# Patient Record
Sex: FEMALE | Race: BLACK OR AFRICAN AMERICAN | Employment: OTHER | URBAN - METROPOLITAN AREA
[De-identification: names, ages, dates, MRNs, and addresses within clinical notes are randomized per-mention and may not be internally consistent; named-entity substitution may affect disease eponyms.]

---

## 2017-07-18 ENCOUNTER — HOSPITAL ENCOUNTER (OUTPATIENT)
Dept: PREADMISSION TESTING | Age: 82
Discharge: HOME | End: 2017-07-18
Attending: UROLOGY
Payer: MEDICARE

## 2017-07-18 VITALS
RESPIRATION RATE: 16 BRPM | SYSTOLIC BLOOD PRESSURE: 145 MMHG | DIASTOLIC BLOOD PRESSURE: 74 MMHG | HEART RATE: 77 BPM | TEMPERATURE: 97.8 F | WEIGHT: 179.9 LBS | BODY MASS INDEX: 28.24 KG/M2 | HEIGHT: 67 IN | OXYGEN SATURATION: 96 %

## 2017-07-18 LAB
ALBUMIN SERPL BCP-MCNC: 3.5 G/DL (ref 3.5–5)
ALBUMIN/GLOB SERPL: 0.8 {RATIO} (ref 1.1–2.2)
ALP SERPL-CCNC: 127 U/L (ref 45–117)
ALT SERPL-CCNC: 17 U/L (ref 12–78)
ANION GAP BLD CALC-SCNC: 2 MMOL/L (ref 5–15)
APPEARANCE UR: ABNORMAL
AST SERPL W P-5'-P-CCNC: 14 U/L (ref 15–37)
ATRIAL RATE: 76 BPM
BACTERIA URNS QL MICRO: ABNORMAL /HPF
BILIRUB SERPL-MCNC: 0.5 MG/DL (ref 0.2–1)
BILIRUB UR QL: NEGATIVE
BUN SERPL-MCNC: 13 MG/DL (ref 6–20)
BUN/CREAT SERPL: 15 (ref 12–20)
CALCIUM SERPL-MCNC: 9.4 MG/DL (ref 8.5–10.1)
CALCULATED P AXIS, ECG09: 49 DEGREES
CALCULATED R AXIS, ECG10: -43 DEGREES
CALCULATED T AXIS, ECG11: 37 DEGREES
CHLORIDE SERPL-SCNC: 103 MMOL/L (ref 97–108)
CO2 SERPL-SCNC: 33 MMOL/L (ref 21–32)
COLOR UR: ABNORMAL
CREAT SERPL-MCNC: 0.88 MG/DL (ref 0.55–1.02)
DIAGNOSIS, 93000: NORMAL
EPITH CASTS URNS QL MICRO: ABNORMAL /LPF
ERYTHROCYTE [DISTWIDTH] IN BLOOD BY AUTOMATED COUNT: 15.9 % (ref 11.5–14.5)
GLOBULIN SER CALC-MCNC: 4.3 G/DL (ref 2–4)
GLUCOSE SERPL-MCNC: 100 MG/DL (ref 65–100)
GLUCOSE UR STRIP.AUTO-MCNC: NEGATIVE MG/DL
HCT VFR BLD AUTO: 39.7 % (ref 35–47)
HGB BLD-MCNC: 12.3 G/DL (ref 11.5–16)
HGB UR QL STRIP: ABNORMAL
HYALINE CASTS URNS QL MICRO: ABNORMAL /LPF (ref 0–5)
KETONES UR QL STRIP.AUTO: NEGATIVE MG/DL
LEUKOCYTE ESTERASE UR QL STRIP.AUTO: ABNORMAL
MCH RBC QN AUTO: 26.5 PG (ref 26–34)
MCHC RBC AUTO-ENTMCNC: 31 G/DL (ref 30–36.5)
MCV RBC AUTO: 85.4 FL (ref 80–99)
NITRITE UR QL STRIP.AUTO: POSITIVE
P-R INTERVAL, ECG05: 176 MS
PH UR STRIP: 7 [PH] (ref 5–8)
PLATELET # BLD AUTO: 286 K/UL (ref 150–400)
POTASSIUM SERPL-SCNC: 4 MMOL/L (ref 3.5–5.1)
PROT SERPL-MCNC: 7.8 G/DL (ref 6.4–8.2)
PROT UR STRIP-MCNC: 100 MG/DL
Q-T INTERVAL, ECG07: 368 MS
QRS DURATION, ECG06: 80 MS
QTC CALCULATION (BEZET), ECG08: 414 MS
RBC # BLD AUTO: 4.65 M/UL (ref 3.8–5.2)
RBC #/AREA URNS HPF: >100 /HPF (ref 0–5)
SODIUM SERPL-SCNC: 138 MMOL/L (ref 136–145)
SP GR UR REFRACTOMETRY: 1.01 (ref 1–1.03)
UROBILINOGEN UR QL STRIP.AUTO: 0.2 EU/DL (ref 0.2–1)
VENTRICULAR RATE, ECG03: 76 BPM
WBC # BLD AUTO: 6.7 K/UL (ref 3.6–11)
WBC URNS QL MICRO: >100 /HPF (ref 0–4)

## 2017-07-18 PROCEDURE — 87186 SC STD MICRODIL/AGAR DIL: CPT | Performed by: UROLOGY

## 2017-07-18 PROCEDURE — 36415 COLL VENOUS BLD VENIPUNCTURE: CPT | Performed by: UROLOGY

## 2017-07-18 PROCEDURE — 93005 ELECTROCARDIOGRAM TRACING: CPT

## 2017-07-18 PROCEDURE — 87077 CULTURE AEROBIC IDENTIFY: CPT | Performed by: UROLOGY

## 2017-07-18 PROCEDURE — 80053 COMPREHEN METABOLIC PANEL: CPT | Performed by: UROLOGY

## 2017-07-18 PROCEDURE — 87086 URINE CULTURE/COLONY COUNT: CPT | Performed by: UROLOGY

## 2017-07-18 PROCEDURE — 85027 COMPLETE CBC AUTOMATED: CPT | Performed by: UROLOGY

## 2017-07-18 PROCEDURE — 81001 URINALYSIS AUTO W/SCOPE: CPT | Performed by: UROLOGY

## 2017-07-18 RX ORDER — GUAIFENESIN 100 MG/5ML
81 LIQUID (ML) ORAL DAILY
COMMUNITY
End: 2017-07-27

## 2017-07-18 RX ORDER — ACETAMINOPHEN 500 MG
500 TABLET ORAL
COMMUNITY
End: 2019-05-13

## 2017-07-18 RX ORDER — SODIUM CHLORIDE, SODIUM LACTATE, POTASSIUM CHLORIDE, CALCIUM CHLORIDE 600; 310; 30; 20 MG/100ML; MG/100ML; MG/100ML; MG/100ML
25 INJECTION, SOLUTION INTRAVENOUS CONTINUOUS
Status: CANCELLED | OUTPATIENT
Start: 2017-07-25

## 2017-07-18 RX ORDER — GLIPIZIDE 10 MG/1
2.5 TABLET, FILM COATED, EXTENDED RELEASE ORAL DAILY
Status: ON HOLD | COMMUNITY
End: 2021-06-11

## 2017-07-18 RX ORDER — PRAVASTATIN SODIUM 80 MG/1
80 TABLET ORAL
COMMUNITY

## 2017-07-18 RX ORDER — DILTIAZEM HYDROCHLORIDE 240 MG/1
240 CAPSULE, COATED, EXTENDED RELEASE ORAL DAILY
COMMUNITY
End: 2018-01-02 | Stop reason: DRUGHIGH

## 2017-07-18 RX ORDER — METFORMIN HYDROCHLORIDE 500 MG/1
500 TABLET ORAL
COMMUNITY
End: 2019-05-13

## 2017-07-18 NOTE — PERIOP NOTES
[facility]  Preoperative Instructions        Surgery Date 7/25/17          Time of Arrival 1:00pm     Contact #  [phone]    1. On the day of your surgery, please report to the Surgical Services Registration Desk and sign in at your designated time. The Surgery Center is located to the right of the Emergency Room. 2. You must have someone with you to drive you home. You should not drive a car for 24 hours following surgery. Please make arrangements for a friend or family member to stay with you for the first 24 hours after your surgery. 3. Do not have anything to eat or drink (including water, gum, mints, coffee, juice) after midnight 7/24/17 . ? This may not apply to medications prescribed by your physician. ?(Please note below the special instructions with medications to take the morning of your procedure.)    4. We recommend you do not drink any alcoholic beverages for 24 hours before and after your surgery. 5. Stop all Aspirin, non-steroidal anti-inflammatory drugs (i.e. Advil, Aleve), vitamins, and supplements?as directed by your surgeon's office. ? **If you are currently taking Plavix, Coumadin, or other blood-thinning agents, contact your surgeon for instructions. **    6. Wear comfortable clothes. Wear glasses instead of contacts. Do not bring any money or jewelry. Please bring picture ID, insurance card, and any prearranged co-payment or hospital payment. Do not wear make-up, particularly mascara the morning of your surgery. Do not wear nail polish, particularly if you are having foot /hand surgery. Wear your hair loose or down, no ponytails, buns, amauri pins or clips. All body piercings must be removed. Please shower with antibacterial soap for three consecutive days before and on the morning of surgery, but do not apply any lotions, powders or deodorants after the shower on the day of surgery. Please use a fresh towels after each shower.  Please sleep in clean clothes and change bed linens the night before surgery. Please do not shave for 48 hours prior to surgery. Shaving of the face is acceptable. 7. You should understand that if you do not follow these instructions your surgery may be cancelled. If your physical condition changes (I.e. fever, cold or flu) please contact your surgeon as soon as possible. 8. It is important that you be on time. If a situation occurs where you may be late, please call [phone] (OR Holding Area). 9. If you have any questions and or problems, please call [phone] (Pre-admission Testing). 10. Your surgery time may be subject to change. You will receive a phone call the evening prior if your time changes. 11.  If having outpatient surgery, you must have someone to drive you here, stay with you during the duration of your stay, and to drive you home at time of discharge. Special Instructions:    MEDICATIONS TO TAKE THE MORNING OF SURGERY WITH A SIP OF WATER: Tylenol if needed, Diltiazem (Cartia)      I understand a pre-operative phone call will be made to verify my surgery time. In the event that I am not available, I give permission for a message to be left on my answering service and/or with another person?   yes         ___________________      __________   _________    (Signature of Patient)             (Witness)                (Date and Time)

## 2017-07-20 NOTE — PERIOP NOTES
Faxed preliminary urine culture results to Dr. Thurman [facility] office. Fax confirmed.

## 2017-07-21 LAB
BACTERIA SPEC CULT: ABNORMAL
CC UR VC: ABNORMAL
SERVICE CMNT-IMP: ABNORMAL

## 2017-07-21 NOTE — PERIOP NOTES
Faxed and left message at Dr. Villasenor American office regarding final urine culture results. Fax confirmed. Waiting on follow-up if treatment needed.

## 2017-07-24 RX ORDER — CIPROFLOXACIN 500 MG/1
500 TABLET ORAL 2 TIMES DAILY
COMMUNITY
End: 2017-09-25

## 2017-07-24 NOTE — PERIOP NOTES
LM with Roverto Martinez, , at [facility] requesting call back about labs previously faxed. 56 - Spoke with Warden Campos at [facility], patient being treated with Cipro 500mg twice per day for one week. First dose 7/21/2017.

## 2017-07-25 ENCOUNTER — ANESTHESIA (OUTPATIENT)
Dept: SURGERY | Age: 82
DRG: 670 | End: 2017-07-25
Payer: MEDICARE

## 2017-07-25 ENCOUNTER — HOSPITAL ENCOUNTER (INPATIENT)
Age: 82
LOS: 1 days | Discharge: HOME | DRG: 670 | End: 2017-07-27
Attending: UROLOGY | Admitting: UROLOGY
Payer: MEDICARE

## 2017-07-25 ENCOUNTER — ANESTHESIA EVENT (OUTPATIENT)
Dept: SURGERY | Age: 82
DRG: 670 | End: 2017-07-25
Payer: MEDICARE

## 2017-07-25 PROBLEM — C67.9 TRANSITIONAL CELL BLADDER CANCER (HCC): Status: ACTIVE | Noted: 2017-07-25

## 2017-07-25 LAB
ANION GAP BLD CALC-SCNC: 6 MMOL/L (ref 5–15)
BUN SERPL-MCNC: 14 MG/DL (ref 6–20)
BUN/CREAT SERPL: 14 (ref 12–20)
CALCIUM SERPL-MCNC: 8.5 MG/DL (ref 8.5–10.1)
CHLORIDE SERPL-SCNC: 107 MMOL/L (ref 97–108)
CO2 SERPL-SCNC: 28 MMOL/L (ref 21–32)
CREAT SERPL-MCNC: 0.98 MG/DL (ref 0.55–1.02)
GLUCOSE BLD STRIP.AUTO-MCNC: 105 MG/DL (ref 65–100)
GLUCOSE BLD STRIP.AUTO-MCNC: 125 MG/DL (ref 65–100)
GLUCOSE BLD STRIP.AUTO-MCNC: 157 MG/DL (ref 65–100)
GLUCOSE SERPL-MCNC: 123 MG/DL (ref 65–100)
HCT VFR BLD AUTO: 38.3 % (ref 35–47)
HGB BLD-MCNC: 11.4 G/DL (ref 11.5–16)
POTASSIUM SERPL-SCNC: 4.2 MMOL/L (ref 3.5–5.1)
SERVICE CMNT-IMP: ABNORMAL
SODIUM SERPL-SCNC: 141 MMOL/L (ref 136–145)

## 2017-07-25 PROCEDURE — 99218 HC RM OBSERVATION: CPT

## 2017-07-25 PROCEDURE — 77030020782 HC GWN BAIR PAWS FLX 3M -B

## 2017-07-25 PROCEDURE — 77030028157 HC ELECTRD BPLR BLDR RWOL -B: Performed by: UROLOGY

## 2017-07-25 PROCEDURE — 77030005546 HC CATH URETH FOL 3W BARD -A: Performed by: UROLOGY

## 2017-07-25 PROCEDURE — 77030008684 HC TU ET CUF COVD -B: Performed by: ANESTHESIOLOGY

## 2017-07-25 PROCEDURE — 74011000258 HC RX REV CODE- 258: Performed by: UROLOGY

## 2017-07-25 PROCEDURE — 74011250636 HC RX REV CODE- 250/636: Performed by: ANESTHESIOLOGY

## 2017-07-25 PROCEDURE — 77030019908 HC STETH ESOPH SIMS -A: Performed by: ANESTHESIOLOGY

## 2017-07-25 PROCEDURE — 82962 GLUCOSE BLOOD TEST: CPT

## 2017-07-25 PROCEDURE — 77030018836 HC SOL IRR NACL ICUM -A: Performed by: UROLOGY

## 2017-07-25 PROCEDURE — 77030005520 HC CATH URETH FOL38 BARD -A: Performed by: UROLOGY

## 2017-07-25 PROCEDURE — 74011250636 HC RX REV CODE- 250/636

## 2017-07-25 PROCEDURE — 74011250636 HC RX REV CODE- 250/636: Performed by: UROLOGY

## 2017-07-25 PROCEDURE — 74011000250 HC RX REV CODE- 250

## 2017-07-25 PROCEDURE — 77030026438 HC STYL ET INTUB CARD -A: Performed by: ANESTHESIOLOGY

## 2017-07-25 PROCEDURE — 0TBB8ZZ EXCISION OF BLADDER, VIA NATURAL OR ARTIFICIAL OPENING ENDOSCOPIC: ICD-10-PCS | Performed by: UROLOGY

## 2017-07-25 PROCEDURE — 76060000034 HC ANESTHESIA 1.5 TO 2 HR: Performed by: UROLOGY

## 2017-07-25 PROCEDURE — 77030032490 HC SLV COMPR SCD KNE COVD -B: Performed by: UROLOGY

## 2017-07-25 PROCEDURE — 77030013079 HC BLNKT BAIR HGGR 3M -A: Performed by: ANESTHESIOLOGY

## 2017-07-25 PROCEDURE — 74011000250 HC RX REV CODE- 250: Performed by: ANESTHESIOLOGY

## 2017-07-25 PROCEDURE — 76210000000 HC OR PH I REC 2 TO 2.5 HR: Performed by: UROLOGY

## 2017-07-25 PROCEDURE — 80048 BASIC METABOLIC PNL TOTAL CA: CPT | Performed by: UROLOGY

## 2017-07-25 PROCEDURE — 88307 TISSUE EXAM BY PATHOLOGIST: CPT | Performed by: UROLOGY

## 2017-07-25 PROCEDURE — 77010033678 HC OXYGEN DAILY

## 2017-07-25 PROCEDURE — 88332 PATH CONSLTJ SURG EA ADD BLK: CPT | Performed by: UROLOGY

## 2017-07-25 PROCEDURE — 36415 COLL VENOUS BLD VENIPUNCTURE: CPT | Performed by: UROLOGY

## 2017-07-25 PROCEDURE — 74011250637 HC RX REV CODE- 250/637: Performed by: UROLOGY

## 2017-07-25 PROCEDURE — 85018 HEMOGLOBIN: CPT | Performed by: UROLOGY

## 2017-07-25 PROCEDURE — 88331 PATH CONSLTJ SURG 1 BLK 1SPC: CPT | Performed by: UROLOGY

## 2017-07-25 PROCEDURE — 76010000153 HC OR TIME 1.5 TO 2 HR: Performed by: UROLOGY

## 2017-07-25 PROCEDURE — 77030021163 HC TUBE CYSTO IRR ICUM -A: Performed by: UROLOGY

## 2017-07-25 RX ORDER — SODIUM CHLORIDE, SODIUM LACTATE, POTASSIUM CHLORIDE, CALCIUM CHLORIDE 600; 310; 30; 20 MG/100ML; MG/100ML; MG/100ML; MG/100ML
25 INJECTION, SOLUTION INTRAVENOUS CONTINUOUS
Status: DISCONTINUED | OUTPATIENT
Start: 2017-07-25 | End: 2017-07-25 | Stop reason: HOSPADM

## 2017-07-25 RX ORDER — ACETAMINOPHEN 500 MG
500 TABLET ORAL
Status: DISCONTINUED | OUTPATIENT
Start: 2017-07-25 | End: 2017-07-27 | Stop reason: HOSPADM

## 2017-07-25 RX ORDER — NALOXONE HYDROCHLORIDE 0.4 MG/ML
0.4 INJECTION, SOLUTION INTRAMUSCULAR; INTRAVENOUS; SUBCUTANEOUS
Status: DISCONTINUED | OUTPATIENT
Start: 2017-07-25 | End: 2017-07-27 | Stop reason: HOSPADM

## 2017-07-25 RX ORDER — ONDANSETRON 2 MG/ML
4 INJECTION INTRAMUSCULAR; INTRAVENOUS
Status: DISCONTINUED | OUTPATIENT
Start: 2017-07-25 | End: 2017-07-27 | Stop reason: HOSPADM

## 2017-07-25 RX ORDER — MAGNESIUM SULFATE 100 %
4 CRYSTALS MISCELLANEOUS AS NEEDED
Status: DISCONTINUED | OUTPATIENT
Start: 2017-07-25 | End: 2017-07-27 | Stop reason: HOSPADM

## 2017-07-25 RX ORDER — DEXTROSE, SODIUM CHLORIDE, AND POTASSIUM CHLORIDE 5; .45; .15 G/100ML; G/100ML; G/100ML
75 INJECTION INTRAVENOUS CONTINUOUS
Status: DISCONTINUED | OUTPATIENT
Start: 2017-07-25 | End: 2017-07-25

## 2017-07-25 RX ORDER — DEXTROSE 50 % IN WATER (D50W) INTRAVENOUS SYRINGE
12.5-25 AS NEEDED
Status: DISCONTINUED | OUTPATIENT
Start: 2017-07-25 | End: 2017-07-25

## 2017-07-25 RX ORDER — CIPROFLOXACIN 250 MG/1
250 TABLET, FILM COATED ORAL 2 TIMES DAILY
Qty: 10 TAB | Refills: 0 | Status: SHIPPED | OUTPATIENT
Start: 2017-07-25 | End: 2017-07-30

## 2017-07-25 RX ORDER — DILTIAZEM HYDROCHLORIDE 120 MG/1
240 CAPSULE, COATED, EXTENDED RELEASE ORAL DAILY
Status: DISCONTINUED | OUTPATIENT
Start: 2017-07-26 | End: 2017-07-27 | Stop reason: HOSPADM

## 2017-07-25 RX ORDER — HYDROMORPHONE HYDROCHLORIDE 1 MG/ML
.2-.5 INJECTION, SOLUTION INTRAMUSCULAR; INTRAVENOUS; SUBCUTANEOUS
Status: DISCONTINUED | OUTPATIENT
Start: 2017-07-25 | End: 2017-07-25 | Stop reason: HOSPADM

## 2017-07-25 RX ORDER — SODIUM CHLORIDE 0.9 % (FLUSH) 0.9 %
5-10 SYRINGE (ML) INJECTION AS NEEDED
Status: DISCONTINUED | OUTPATIENT
Start: 2017-07-25 | End: 2017-07-27 | Stop reason: HOSPADM

## 2017-07-25 RX ORDER — PROPOFOL 10 MG/ML
INJECTION, EMULSION INTRAVENOUS AS NEEDED
Status: DISCONTINUED | OUTPATIENT
Start: 2017-07-25 | End: 2017-07-25 | Stop reason: HOSPADM

## 2017-07-25 RX ORDER — LIDOCAINE HYDROCHLORIDE 20 MG/ML
INJECTION, SOLUTION EPIDURAL; INFILTRATION; INTRACAUDAL; PERINEURAL AS NEEDED
Status: DISCONTINUED | OUTPATIENT
Start: 2017-07-25 | End: 2017-07-25 | Stop reason: HOSPADM

## 2017-07-25 RX ORDER — GLYCOPYRROLATE 0.2 MG/ML
INJECTION INTRAMUSCULAR; INTRAVENOUS AS NEEDED
Status: DISCONTINUED | OUTPATIENT
Start: 2017-07-25 | End: 2017-07-25 | Stop reason: HOSPADM

## 2017-07-25 RX ORDER — LIDOCAINE HYDROCHLORIDE 10 MG/ML
0.1 INJECTION, SOLUTION EPIDURAL; INFILTRATION; INTRACAUDAL; PERINEURAL AS NEEDED
Status: DISCONTINUED | OUTPATIENT
Start: 2017-07-25 | End: 2017-07-25 | Stop reason: HOSPADM

## 2017-07-25 RX ORDER — PRAVASTATIN SODIUM 40 MG/1
80 TABLET ORAL
Status: DISCONTINUED | OUTPATIENT
Start: 2017-07-25 | End: 2017-07-27 | Stop reason: HOSPADM

## 2017-07-25 RX ORDER — DIPHENHYDRAMINE HYDROCHLORIDE 50 MG/ML
12.5 INJECTION, SOLUTION INTRAMUSCULAR; INTRAVENOUS AS NEEDED
Status: DISCONTINUED | OUTPATIENT
Start: 2017-07-25 | End: 2017-07-25 | Stop reason: HOSPADM

## 2017-07-25 RX ORDER — NEOSTIGMINE METHYLSULFATE 1 MG/ML
INJECTION INTRAVENOUS AS NEEDED
Status: DISCONTINUED | OUTPATIENT
Start: 2017-07-25 | End: 2017-07-25 | Stop reason: HOSPADM

## 2017-07-25 RX ORDER — SODIUM CHLORIDE 0.9 % (FLUSH) 0.9 %
5-10 SYRINGE (ML) INJECTION EVERY 8 HOURS
Status: DISCONTINUED | OUTPATIENT
Start: 2017-07-25 | End: 2017-07-27 | Stop reason: HOSPADM

## 2017-07-25 RX ORDER — ONDANSETRON 2 MG/ML
INJECTION INTRAMUSCULAR; INTRAVENOUS AS NEEDED
Status: DISCONTINUED | OUTPATIENT
Start: 2017-07-25 | End: 2017-07-25 | Stop reason: HOSPADM

## 2017-07-25 RX ORDER — ROCURONIUM BROMIDE 10 MG/ML
INJECTION, SOLUTION INTRAVENOUS AS NEEDED
Status: DISCONTINUED | OUTPATIENT
Start: 2017-07-25 | End: 2017-07-25 | Stop reason: HOSPADM

## 2017-07-25 RX ORDER — INSULIN LISPRO 100 [IU]/ML
INJECTION, SOLUTION INTRAVENOUS; SUBCUTANEOUS EVERY 6 HOURS
Status: DISCONTINUED | OUTPATIENT
Start: 2017-07-26 | End: 2017-07-25

## 2017-07-25 RX ORDER — PROCHLORPERAZINE EDISYLATE 5 MG/ML
INJECTION INTRAMUSCULAR; INTRAVENOUS
Status: DISPENSED
Start: 2017-07-25 | End: 2017-07-26

## 2017-07-25 RX ORDER — FENTANYL CITRATE 50 UG/ML
50 INJECTION, SOLUTION INTRAMUSCULAR; INTRAVENOUS AS NEEDED
Status: DISCONTINUED | OUTPATIENT
Start: 2017-07-25 | End: 2017-07-25 | Stop reason: HOSPADM

## 2017-07-25 RX ORDER — SODIUM CHLORIDE 450 MG/100ML
75 INJECTION, SOLUTION INTRAVENOUS CONTINUOUS
Status: DISCONTINUED | OUTPATIENT
Start: 2017-07-25 | End: 2017-07-27

## 2017-07-25 RX ORDER — FENTANYL CITRATE 50 UG/ML
INJECTION, SOLUTION INTRAMUSCULAR; INTRAVENOUS AS NEEDED
Status: DISCONTINUED | OUTPATIENT
Start: 2017-07-25 | End: 2017-07-25 | Stop reason: HOSPADM

## 2017-07-25 RX ORDER — HYDROMORPHONE HYDROCHLORIDE 2 MG/ML
INJECTION, SOLUTION INTRAMUSCULAR; INTRAVENOUS; SUBCUTANEOUS AS NEEDED
Status: DISCONTINUED | OUTPATIENT
Start: 2017-07-25 | End: 2017-07-25 | Stop reason: HOSPADM

## 2017-07-25 RX ORDER — ACETAMINOPHEN 10 MG/ML
INJECTION, SOLUTION INTRAVENOUS AS NEEDED
Status: DISCONTINUED | OUTPATIENT
Start: 2017-07-25 | End: 2017-07-25 | Stop reason: HOSPADM

## 2017-07-25 RX ORDER — ONDANSETRON 2 MG/ML
INJECTION INTRAMUSCULAR; INTRAVENOUS
Status: COMPLETED
Start: 2017-07-25 | End: 2017-07-25

## 2017-07-25 RX ORDER — HYDROCODONE BITARTRATE AND ACETAMINOPHEN 5; 325 MG/1; MG/1
1 TABLET ORAL
Status: DISCONTINUED | OUTPATIENT
Start: 2017-07-25 | End: 2017-07-27 | Stop reason: HOSPADM

## 2017-07-25 RX ORDER — DEXTROSE MONOHYDRATE 100 MG/ML
125-250 INJECTION, SOLUTION INTRAVENOUS AS NEEDED
Status: DISCONTINUED | OUTPATIENT
Start: 2017-07-25 | End: 2017-07-27 | Stop reason: HOSPADM

## 2017-07-25 RX ORDER — CEFAZOLIN SODIUM IN 0.9 % NACL 2 G/100 ML
2 PLASTIC BAG, INJECTION (ML) INTRAVENOUS EVERY 12 HOURS
Status: DISCONTINUED | OUTPATIENT
Start: 2017-07-26 | End: 2017-07-27 | Stop reason: HOSPADM

## 2017-07-25 RX ORDER — INSULIN LISPRO 100 [IU]/ML
INJECTION, SOLUTION INTRAVENOUS; SUBCUTANEOUS
Status: DISCONTINUED | OUTPATIENT
Start: 2017-07-26 | End: 2017-07-27 | Stop reason: HOSPADM

## 2017-07-25 RX ORDER — ONDANSETRON 2 MG/ML
4 INJECTION INTRAMUSCULAR; INTRAVENOUS AS NEEDED
Status: DISCONTINUED | OUTPATIENT
Start: 2017-07-25 | End: 2017-07-25 | Stop reason: HOSPADM

## 2017-07-25 RX ORDER — PHENYLEPHRINE HCL IN 0.9% NACL 0.4MG/10ML
SYRINGE (ML) INTRAVENOUS AS NEEDED
Status: DISCONTINUED | OUTPATIENT
Start: 2017-07-25 | End: 2017-07-25 | Stop reason: HOSPADM

## 2017-07-25 RX ORDER — MIDAZOLAM HYDROCHLORIDE 1 MG/ML
1 INJECTION, SOLUTION INTRAMUSCULAR; INTRAVENOUS AS NEEDED
Status: DISCONTINUED | OUTPATIENT
Start: 2017-07-25 | End: 2017-07-25 | Stop reason: HOSPADM

## 2017-07-25 RX ORDER — FENTANYL CITRATE 50 UG/ML
25 INJECTION, SOLUTION INTRAMUSCULAR; INTRAVENOUS
Status: DISCONTINUED | OUTPATIENT
Start: 2017-07-25 | End: 2017-07-25 | Stop reason: HOSPADM

## 2017-07-25 RX ADMIN — NEOSTIGMINE METHYLSULFATE 3 MG: 1 INJECTION INTRAVENOUS at 17:15

## 2017-07-25 RX ADMIN — SODIUM CHLORIDE, SODIUM LACTATE, POTASSIUM CHLORIDE, AND CALCIUM CHLORIDE 25 ML/HR: 600; 310; 30; 20 INJECTION, SOLUTION INTRAVENOUS at 13:52

## 2017-07-25 RX ADMIN — ONDANSETRON 4 MG: 2 INJECTION INTRAMUSCULAR; INTRAVENOUS at 16:06

## 2017-07-25 RX ADMIN — PROPOFOL 70 MG: 10 INJECTION, EMULSION INTRAVENOUS at 15:54

## 2017-07-25 RX ADMIN — ONDANSETRON 4 MG: 2 INJECTION INTRAMUSCULAR; INTRAVENOUS at 17:53

## 2017-07-25 RX ADMIN — PROPOFOL 30 MG: 10 INJECTION, EMULSION INTRAVENOUS at 17:20

## 2017-07-25 RX ADMIN — Medication 40 MCG: at 16:02

## 2017-07-25 RX ADMIN — PROCHLORPERAZINE EDISYLATE 5 MG: 5 INJECTION INTRAMUSCULAR; INTRAVENOUS at 18:27

## 2017-07-25 RX ADMIN — ACETAMINOPHEN 1000 MG: 10 INJECTION, SOLUTION INTRAVENOUS at 16:16

## 2017-07-25 RX ADMIN — FENTANYL CITRATE 50 MCG: 50 INJECTION, SOLUTION INTRAMUSCULAR; INTRAVENOUS at 15:44

## 2017-07-25 RX ADMIN — LIDOCAINE HYDROCHLORIDE 100 MG: 20 INJECTION, SOLUTION EPIDURAL; INFILTRATION; INTRACAUDAL; PERINEURAL at 15:54

## 2017-07-25 RX ADMIN — HYDROMORPHONE HYDROCHLORIDE 0.4 MG: 2 INJECTION, SOLUTION INTRAMUSCULAR; INTRAVENOUS; SUBCUTANEOUS at 16:20

## 2017-07-25 RX ADMIN — SODIUM CHLORIDE 75 ML/HR: 450 INJECTION, SOLUTION INTRAVENOUS at 18:05

## 2017-07-25 RX ADMIN — HYDROCODONE BITARTRATE AND ACETAMINOPHEN 1 TABLET: 5; 325 TABLET ORAL at 23:11

## 2017-07-25 RX ADMIN — FENTANYL CITRATE 50 MCG: 50 INJECTION, SOLUTION INTRAMUSCULAR; INTRAVENOUS at 16:10

## 2017-07-25 RX ADMIN — CEFAZOLIN 2 G: 1 INJECTION, POWDER, FOR SOLUTION INTRAMUSCULAR; INTRAVENOUS; PARENTERAL at 15:45

## 2017-07-25 RX ADMIN — GLYCOPYRROLATE 0.4 MG: 0.2 INJECTION INTRAMUSCULAR; INTRAVENOUS at 17:15

## 2017-07-25 RX ADMIN — ROCURONIUM BROMIDE 40 MG: 10 INJECTION, SOLUTION INTRAVENOUS at 15:54

## 2017-07-25 NOTE — ANESTHESIA PREPROCEDURE EVALUATION
Anesthetic History   No history of anesthetic complications            Review of Systems / Medical History  Patient summary reviewed, nursing notes reviewed and pertinent labs reviewed    Pulmonary  Within defined limits                 Neuro/Psych   Within defined limits           Cardiovascular    Hypertension              Exercise tolerance: >4 METS     GI/Hepatic/Renal  Within defined limits              Endo/Other    Diabetes         Other Findings   Comments: Aneurysm? ??           Physical Exam    Airway  Mallampati: II  TM Distance: 4 - 6 cm  Neck ROM: normal range of motion   Mouth opening: Normal     Cardiovascular  Regular rate and rhythm,  S1 and S2 normal,  no murmur, click, rub, or gallop             Dental    Dentition: Full upper dentures and Lower partial plate     Pulmonary  Breath sounds clear to auscultation               Abdominal  GI exam deferred       Other Findings            Anesthetic Plan    ASA: 2  Anesthesia type: general    Monitoring Plan: BIS      Induction: Intravenous  Anesthetic plan and risks discussed with: Patient

## 2017-07-25 NOTE — IP AVS SNAPSHOT
[address] [facility] 
[phone] Patient: Humberto Gruber MRN: [id number] AZT:0[birth year] Current Discharge Medication List  
  
CONTINUE these medications which have CHANGED Dose & Instructions Dispensing Information Comments Morning Noon Evening Bedtime * ciprofloxacin HCl 500 mg tablet Commonly known as:  CIPRO What changed:  Another medication with the same name was added. Make sure you understand how and when to take each. Your last dose was: Your next dose is:    
   
   
 Dose:  500 mg Take 500 mg by mouth two (2) times a day. X 1 week, first dose 7/21/2017 Refills:  0  
     
   
   
   
  
 * ciprofloxacin HCl 250 mg tablet Commonly known as:  CIPRO What changed: You were already taking a medication with the same name, and this prescription was added. Make sure you understand how and when to take each. Your last dose was: Your next dose is:    
   
   
 Dose:  250 mg Take 1 Tab by mouth two (2) times a day for 5 days. Quantity:  10 Tab Refills:  0  
     
   
   
   
  
 * Notice: This list has 2 medication(s) that are the same as other medications prescribed for you. Read the directions carefully, and ask your doctor or other care provider to review them with you. CONTINUE these medications which have NOT CHANGED Dose & Instructions Dispensing Information Comments Morning Noon Evening Bedtime ARTIFICIAL TEARS OP Your last dose was: Your next dose is:    
   
   
 Apply  to eye as needed. Refills:  0  
     
   
   
   
  
 CARTIA  mg ER capsule Generic drug:  dilTIAZem CD Your last dose was: Your next dose is:    
   
   
 Dose:  240 mg Take 240 mg by mouth daily. Refills:  0  
     
   
   
   
  
 glipiZIDE SR 10 mg CR tablet Commonly known as:  GLUCOTROL XL Your last dose was: Your next dose is:    
   
   
 Dose:  20 mg Take 20 mg by mouth daily. Refills:  0  
     
   
   
   
  
 metFORMIN 500 mg tablet Commonly known as:  GLUCOPHAGE Your last dose was: Your next dose is:    
   
   
 Dose:  500 mg Take 500 mg by mouth daily (with breakfast). Refills:  0  
     
   
   
   
  
 ONE-A-DAY WOMENS FORMULA PO Your last dose was: Your next dose is:    
   
   
 Dose:  1 Tab Take 1 Tab by mouth daily. Refills:  0  
     
   
   
   
  
 pravastatin 80 mg tablet Commonly known as:  PRAVACHOL Your last dose was: Your next dose is:    
   
   
 Dose:  80 mg Take 80 mg by mouth nightly. Refills:  0  
     
   
   
   
  
 TYLENOL EXTRA STRENGTH 500 mg tablet Generic drug:  acetaminophen Your last dose was: Your next dose is:    
   
   
 Dose:  500 mg Take 500 mg by mouth every six (6) hours as needed for Pain. Usually takes every night Refills:  0 STOP taking these medications   
 aspirin 81 mg chewable tablet Where to Get Your Medications Information on where to get these meds will be given to you by the nurse or doctor. ! Ask your nurse or doctor about these medications  
  ciprofloxacin HCl 250 mg tablet

## 2017-07-25 NOTE — H&P
Chikis Kramer is an 80year old female who presents today for \"CT results\". She returns for follow-up. This very nice diabetic lady has been having trouble with dysuria and hematuria and was recently in the emergency room where she was given a treatment course of antibiotics and comes here for consultation and further E and M. Patient is complaining of continued dysuria and urinary frequency. Evidently she had another urinary tract infection earlier this year. She has been on Invokana for her diabetes. She said she saw some blood in the toilet today with urination. Denies previous significant  problems, lateralizing flank pain or fever. Records from PCP as well as lab work were requested and reviewed. No imaging studies. She was told to stop her Invokana and antibiotics. She is a non-smoker. She is status post hysterectomy in the past.    Set her up for a CT IVP followed by cystoscopy for which she returns today. Physical exam demonstrates no pelvic masses. Atrophic changes noted. Bladder scan residual 466. Urine is clear except for 8-10 red cells per high-power field. Cytology sent. Cath urine for culture sent as well. Urine was grossly bloody. Catheterized volumes 333. Creatinine 0.886 417 with a EGFR of 70. A urine culture from 6/2/17 return no growth. CT IVP done at [facility] 6/15/2017 demonstrated a 5.8 x 4.4 x 2.6 bladder mass. Normal upper tracts. 16mm lipid rich adenoma noted in the left adrenal gland 4.8 cm aneurysm of the aorta also noted. Panendoscopy carried out sterilely demonstrating a residual of 450 cc. The urine was very cloudy. Visualization was difficult. I think there was a large tumor on the posterior wall of the bladder. The urine was bloody. PAST MEDICAL HISTORY:    Allergies: No known allergies. DENIES: Latex, Shellfish, X-Ray Dye, Iodine.      Medications: BACTRIM -160 MG ORAL TABS (SULFAMETHOXAZOLE-TRIMETHOPRIM) 1 p.o. day of procedure, 1 day after  PRAVASTATIN SODIUM 80 MG ORAL TABS (PRAVASTATIN SODIUM) 1 po qhs  METFORMIN  MG ORAL TABS (METFORMIN HCL) 1 po bid  GLIPIZIDE ER 10 MG ORAL XR24H-TAB (GLIPIZIDE) 2 po qd  CARTIA  MG ORAL XR24H-CAP (DILTIAZEM HCL COATED BEADS) 1 po qd    Problems: Bladder cancer (ICD-188.9) (NSC16-D82.9)  Diabetes (ICD-250.00) (OEQ61-H56.9)  Incomplete bladder emptying (YEO-515.81) (TSU29-W58. 9)  Recurrent urinary tract infection (ICD-599.0) (HYE90-F79.0)  Gross hematuria (ICD-599.71) (FCQ31-L13.0)    Illnesses: Diabetes, High Blood Pressure, and Bleeding Problems. DENIES: Heart Disease, Pacemaker/Defibrillator, Lung Disease, Bowel Problems, Stroke/Seizure, Kidney Problems, HIV, Hepatitis, or Cancer. Surgeries: Hysterectomy. Family History: DENIES: Kidney cancer, Kidney disease, Kidney stones, Breast cancer, Uterine cancer, Cervical cancer, Ovarian cancer. Social History: Retired. . Smoking status: never smoker. Does not drink alcohol. System Review: Admits to: Dry Eyes and Dry Mouth. DENIES: Unexplained Weight Loss, Leg Swelling, Shortness of Breath, Constipation, Involuntary Urine Loss, Lower Extremity Weakness, Dry Skin, Difficulty Walking, Psychiatric Problems, Impaired Sex Drive, Easy Bleeding, Rash. URINALYSIS from Voided specimen  Urine Dip: pH: 6.5, Bld: +++, LE: Trace, Nit: Neg, Prot: +++, Ket: Neg, Gluc: Neg  Urine Micro: WBC: 0, RBC: 1-2, Bacteria: 0  Comment: debris    Prescription(s) Today: BACTRIM -160 MG ORAL TABS (SULFAMETHOXAZOLE-TRIMETHOPRIM) 1 p.o. day of procedure, 1 day after  #2 x 0,  06/22/2017, Tania Santana LPN    IMPRESSION:    1. Recurrent urinary tract infections  2. Hematuria  3. Chronic LUTS with incomplete bladder emptying  4. Diabetes  5. Bladder cancer as above  6. AAA  7.   Left adrenal adenoma      PLAN: We will set her up for a TURBT  She understands the procedure along with attendant risks and complications and desires to proceed. She will need to be seen by vascular surgeon for her aneurysm at some point. cc: RAZA Graham  Transcribed by Speech to Text Technology    Today's Services  Cysto, E&M Service, Urinalysis Microscopic    Upcoming Orders  Return Office Visit -  TBA, Schedule Surgery - Schedule for TURBT WITH MITOMYCIN at [place] [address] under General anesthesia. Requesting Next Available Anyone. REQUEST RESECTOSCOPE. REQUEST MITOMYCIN POST-PROCEDURE.

## 2017-07-25 NOTE — PERIOP NOTES
Handoff Report from Operating Room to PACU    Report received from Kuldeep Dorantes RN and Hair Guidry CRNA regarding Elza Carias. Surgeon(s):  Analisa Stark MD  And Procedure(s) (LRB):  TRANSURETHRAL RESECTION OF BLADDER TUMOR (N/A)  confirmed   with allergies and drains discussed. Anesthesia type, drugs, patient history, complications, estimated blood loss, vital signs, intake and output, and last pain medication, lines, reversal medications and temperature were reviewed.

## 2017-07-25 NOTE — PERIOP NOTES
TRANSFER - OUT REPORT:    Verbal report given to Ngozi Culp RN (name) on Jessica Muñoz  being transferred to 2111 (unit) for routine post - op       Report consisted of patients Situation, Background, Assessment and   Recommendations(SBAR). Information from the following report(s) SBAR, OR Summary, Intake/Output, MAR, Recent Results and Med Rec Status was reviewed with the receiving nurse. Opportunity for questions and clarification was provided.       Patient transported with:   O2 @ 1 liters  Registered Nurse

## 2017-07-25 NOTE — BRIEF OP NOTE
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

## 2017-07-25 NOTE — IP AVS SNAPSHOT
[address] [facility] 
[phone] Patient: Sherin Hernandez MRN: [id number] WJS:[birth year] You are allergic to the following No active allergies Recent Documentation Height Weight BMI OB Status Smoking Status 1.702 m 81.5 kg 28.14 kg/m2 Hysterectomy Former Smoker Unresulted Labs Order Current Status SAMPLE TO BLOOD BANK In process Emergency Contacts Name Discharge Info Relation Home Work Mobile Micheline Armando DISCHARGE CAREGIVER [3] Daughter [21] [phone] About your hospitalization You were admitted on:  July 25, 2017 You last received care in the:  [facility] 2 GENERAL SURGERY You were discharged on:  July 27, 2017 Unit phone number:  [phone] Why you were hospitalized Your primary diagnosis was:  Not on File Your diagnoses also included:  Transitional Cell Bladder Cancer (Hcc) Providers Seen During Your Hospitalizations Provider Role Specialty Primary office phone Sally Leon MD Attending Provider Urology [phone] Your Primary Care Physician (PCP) Primary Care Physician Office Phone Office Fax [facility] [phone] Follow-up Information Follow up With Details Comments Contact Info Kevin Phillips NP On 8/3/2017 At 9:30 AM with your PCP. [facility] [phone] Current Discharge Medication List  
  
CONTINUE these medications which have CHANGED Dose & Instructions Dispensing Information Comments Morning Noon Evening Bedtime * ciprofloxacin HCl 500 mg tablet Commonly known as:  CIPRO What changed:  Another medication with the same name was added. Make sure you understand how and when to take each. Your last dose was:     
   
Your next dose is:    
   
   
 Dose:  500 mg  
 Take 500 mg by mouth two (2) times a day. X 1 week, first dose 7/21/2017 Refills:  0  
     
   
   
   
  
 * ciprofloxacin HCl 250 mg tablet Commonly known as:  CIPRO What changed: You were already taking a medication with the same name, and this prescription was added. Make sure you understand how and when to take each. Your last dose was: Your next dose is:    
   
   
 Dose:  250 mg Take 1 Tab by mouth two (2) times a day for 5 days. Quantity:  10 Tab Refills:  0  
     
   
   
   
  
 * Notice: This list has 2 medication(s) that are the same as other medications prescribed for you. Read the directions carefully, and ask your doctor or other care provider to review them with you. CONTINUE these medications which have NOT CHANGED Dose & Instructions Dispensing Information Comments Morning Noon Evening Bedtime ARTIFICIAL TEARS OP Your last dose was: Your next dose is:    
   
   
 Apply  to eye as needed. Refills:  0  
     
   
   
   
  
 CARTIA  mg ER capsule Generic drug:  dilTIAZem CD Your last dose was: Your next dose is:    
   
   
 Dose:  240 mg Take 240 mg by mouth daily. Refills:  0  
     
   
   
   
  
 glipiZIDE SR 10 mg CR tablet Commonly known as:  GLUCOTROL XL Your last dose was: Your next dose is:    
   
   
 Dose:  20 mg Take 20 mg by mouth daily. Refills:  0  
     
   
   
   
  
 metFORMIN 500 mg tablet Commonly known as:  GLUCOPHAGE Your last dose was: Your next dose is:    
   
   
 Dose:  500 mg Take 500 mg by mouth daily (with breakfast). Refills:  0  
     
   
   
   
  
 ONE-A-DAY WOMENS FORMULA PO Your last dose was: Your next dose is:    
   
   
 Dose:  1 Tab Take 1 Tab by mouth daily. Refills:  0  
     
   
   
   
  
 pravastatin 80 mg tablet Commonly known as:  PRAVACHOL Your last dose was: Your next dose is:    
   
   
 Dose:  80 mg Take 80 mg by mouth nightly. Refills:  0  
     
   
   
   
  
 TYLENOL EXTRA STRENGTH 500 mg tablet Generic drug:  acetaminophen Your last dose was: Your next dose is:    
   
   
 Dose:  500 mg Take 500 mg by mouth every six (6) hours as needed for Pain. Usually takes every night Refills:  0 STOP taking these medications   
 aspirin 81 mg chewable tablet Where to Get Your Medications Information on where to get these meds will be given to you by the nurse or doctor. ! Ask your nurse or doctor about these medications  
  ciprofloxacin HCl 250 mg tablet Discharge Instructions Transurethral Resection for Bladder Cancer: What to Expect at Home Your Recovery You have had a transurethral resection (TUR) of the bladder. Your doctor removed cancerous tissue. You may have a small tube called a catheter in your urethra to help prevent blockage of the urethra. When the bleeding from surgery has stopped, the tube is removed. You may need to stay in the hospital 1 to 4 days. You may feel the need to urinate frequently for a while after the surgery, but this should improve with time. It may burn when you urinate. Drink lots of fluids to help with the burning. Your urine also may look pink for up to 2 to 3 weeks after surgery. This is because there may be blood in it. You may have to avoid strenuous activity and heavy lifting for about 3 weeks after TUR. This care sheet gives you a general idea about how long it will take for you to recover. But each person recovers at a different pace. Follow the steps below to feel better as quickly as possible. How can you care for yourself at home? Activity · Rest when you feel tired. Getting enough sleep will help you recover. · Try to walk each day.  Start by walking a little more than you did the day before. Bit by bit, increase the amount you walk. Walking boosts blood flow and helps prevent pneumonia and constipation. · Avoid strenuous activities, such as bicycle riding, jogging, weight lifting, or aerobic exercise, for about 3 weeks, or until your doctor says it is okay. · For about 3 weeks, avoid lifting anything that would make you strain. This may include heavy grocery bags and milk containers, a heavy briefcase or backpack, cat litter or dog food bags, a vacuum , or a child. · Ask your doctor when you can drive again. · You may shower and take baths when your doctor says it is okay. · Ask your doctor when it is okay for you to have sex. Diet · You can eat your normal diet. If your stomach is upset, try bland, low-fat foods like plain rice, broiled chicken, toast, and yogurt. · Drink plenty of fluids (unless your doctor tells you not to). Medicines · Your doctor will tell you if and when you can restart your medicines. He or she will also give you instructions about taking any new medicines. · If you take blood thinners, such as warfarin (Coumadin), clopidogrel (Plavix), or aspirin, be sure to talk to your doctor. He or she will tell you if and when to start taking those medicines again. Make sure that you understand exactly what your doctor wants you to do. · Take pain medicines exactly as directed. ¨ If the doctor gave you a prescription medicine for pain, take it as prescribed. ¨ If you are not taking a prescription pain medicine, ask your doctor if you can take an over-the-counter medicine. · If you think your pain medicine is making you sick to your stomach: 
¨ Take your medicine after meals (unless your doctor has told you not to). ¨ Ask your doctor for a different pain medicine. · If your doctor prescribed antibiotics, take them as directed. Do not stop taking them just because you feel better. You need to take the full course of antibiotics. Follow-up care is a key part of your treatment and safety. Be sure to make and go to all appointments, and call your doctor if you are having problems. It's also a good idea to know your test results and keep a list of the medicines you take. When should you call for help? Call 911 anytime you think you may need emergency care. For example, call if: 
· You passed out (lost consciousness). · You have severe trouble breathing. · You have sudden chest pain and shortness of breath, or you cough up blood. · You have severe pain in your belly. Call your doctor now or seek immediate medical care if: 
· You are sick to your stomach or cannot keep fluids down. · You have trouble passing urine or stool, especially if you have pain or swelling in your lower belly. · You have signs of a blood clot, such as: 
¨ Pain in your calf, back of the knee, thigh, or groin. ¨ Redness and swelling in your leg or groin. · You have fever or severe chills. · You have pain in your back just below your rib cage. This is called flank pain. Watch closely for any changes in your health, and be sure to contact your doctor if: 
· You have pain or burning when you urinate. A burning feeling is normal for a day or two after the surgery, but call if it does not get better. · The blood has not cleared out of your urine after several weeks. · You have a frequent urge to urinate but can pass only small amounts of urine. Where can you learn more? Go to http://marely-sarah.info/. Enter P792 in the search box to learn more about \"Transurethral Resection for Bladder Cancer: What to Expect at Home. \" Current as of: November 11, 2016 Content Version: 11.3 © 4419-0838 Healthwise[employer] Incorporated. Care instructions adapted under license by [employer] (which disclaims liability or warranty for this information).  If you have questions about a medical condition or this instruction, always ask your healthcare professional. [facility] any warranty or liability for your use of this information. Discharge Orders None Introducing [facility]! [employer] introduces [employer] patient portal. Now you can access parts of your medical record, email your doctor's office, and request medication refills online. 1. In your internet browser, go to [email or web address] 2. Click on the First Time User? Click Here link in the Sign In box. You will see the New Member Sign Up page. 3. Enter your [employer] Access Code exactly as it appears below. You will not need to use this code after youve completed the sign-up process. If you do not sign up before the expiration date, you must request a new code. · [employer] Access Code: [email or web address] Expires: 10/15/2017  2:14 PM 
 
4. Enter the last four digits of your Social Security Number (xxxx) and Date of Birth (mm/dd/yyyy) as indicated and click Submit. You will be taken to the next sign-up page. 5. Create a [employer] ID. This will be your [employer] login ID and cannot be changed, so think of one that is secure and easy to remember. 6. Create a [employer] password. You can change your password at any time. 7. Enter your Password Reset Question and Answer. This can be used at a later time if you forget your password. 8. Enter your e-mail address. You will receive e-mail notification when new information is available in [address]. 9. Click Sign Up. You can now view and download portions of your medical record. 10. Click the Download Summary menu link to download a portable copy of your medical information. If you have questions, please visit the Frequently Asked Questions section of the [employer] website. Remember, [employer] is NOT to be used for urgent needs. For medical emergencies, dial 911. Now available from your iPhone and Android! General Information Please provide this summary of care documentation to your next provider. Patient Signature:  ____________________________________________________________ Date:  ____________________________________________________________  
  
Amaya Pane Provider Signature:  ____________________________________________________________ Date:  ____________________________________________________________

## 2017-07-25 NOTE — DISCHARGE INSTRUCTIONS
Transurethral Resection for Bladder Cancer: What to Expect at [address] have had a transurethral resection (TUR) of the bladder. Your doctor removed cancerous tissue. You may have a small tube called a catheter in your urethra to help prevent blockage of the urethra. When the bleeding from surgery has stopped, the tube is removed. You may need to stay in the hospital 1 to 4 days. You may feel the need to urinate frequently for a while after the surgery, but this should improve with time. It may burn when you urinate. Drink lots of fluids to help with the burning. Your urine also may look pink for up to 2 to 3 weeks after surgery. This is because there may be blood in it. You may have to avoid strenuous activity and heavy lifting for about 3 weeks after TUR. This care sheet gives you a general idea about how long it will take for you to recover. But each person recovers at a different pace. Follow the steps below to feel better as quickly as possible. How can you care for yourself at home? Activity  · Rest when you feel tired. Getting enough sleep will help you recover. · Try to walk each day. Start by walking a little more than you did the day before. Bit by bit, increase the amount you walk. Walking boosts blood flow and helps prevent pneumonia and constipation. · Avoid strenuous activities, such as bicycle riding, jogging, weight lifting, or aerobic exercise, for about 3 weeks, or until your doctor says it is okay. · For about 3 weeks, avoid lifting anything that would make you strain. This may include heavy grocery bags and milk containers, a heavy briefcase or backpack, cat litter or dog food bags, a vacuum , or a child. · Ask your doctor when you can drive again. · You may shower and take baths when your doctor says it is okay. · Ask your doctor when it is okay for you to have sex. Diet  · You can eat your normal diet.  If your stomach is upset, try bland, low-fat foods like plain rice, broiled chicken, toast, and yogurt. · Drink plenty of fluids (unless your doctor tells you not to). Medicines  · Your doctor will tell you if and when you can restart your medicines. He or she will also give you instructions about taking any new medicines. · If you take blood thinners, such as warfarin (Coumadin), clopidogrel (Plavix), or aspirin, be sure to talk to your doctor. He or she will tell you if and when to start taking those medicines again. Make sure that you understand exactly what your doctor wants you to do. · Take pain medicines exactly as directed. ¨ If the doctor gave you a prescription medicine for pain, take it as prescribed. ¨ If you are not taking a prescription pain medicine, ask your doctor if you can take an over-the-counter medicine. · If you think your pain medicine is making you sick to your stomach:  ¨ Take your medicine after meals (unless your doctor has told you not to). ¨ Ask your doctor for a different pain medicine. · If your doctor prescribed antibiotics, take them as directed. Do not stop taking them just because you feel better. You need to take the full course of antibiotics. Follow-up care is a key part of your treatment and safety. Be sure to make and go to all appointments, and call your doctor if you are having problems. It's also a good idea to know your test results and keep a list of the medicines you take. When should you call for help? Call 911 anytime you think you may need emergency care. For example, call if:  · You passed out (lost consciousness). · You have severe trouble breathing. · You have sudden chest pain and shortness of breath, or you cough up blood. · You have severe pain in your belly. Call your doctor now or seek immediate medical care if:  · You are sick to your stomach or cannot keep fluids down. · You have trouble passing urine or stool, especially if you have pain or swelling in your lower belly.   · You have signs of a blood clot, such as:  ¨ Pain in your calf, back of the knee, thigh, or groin. ¨ Redness and swelling in your leg or groin. · You have fever or severe chills. · You have pain in your back just below your rib cage. This is called flank pain. Watch closely for any changes in your health, and be sure to contact your doctor if:  · You have pain or burning when you urinate. A burning feeling is normal for a day or two after the surgery, but call if it does not get better. · The blood has not cleared out of your urine after several weeks. · You have a frequent urge to urinate but can pass only small amounts of urine. Where can you learn more? Go to http://marely-sarah.info/. Enter B781 in the search box to learn more about \"Transurethral Resection for Bladder Cancer: What to Expect at Home. \"  Current as of: November 11, 2016  Content Version: 11.3  © 1978-1114 [employer]. Care instructions adapted under license by [employer] (which disclaims liability or warranty for this information). If you have questions about a medical condition or this instruction, always ask your healthcare professional. [place] [address] any warranty or liability for your use of this information.

## 2017-07-25 NOTE — ANESTHESIA POSTPROCEDURE EVALUATION
Post-Anesthesia Evaluation and Assessment    Patient: Sherin Hernandez MRN: [id number]  SSN: [id number]    YOB: 1934  Age: 80 y.o. Sex: female       Cardiovascular Function/Vital Signs  Visit Vitals    /61 (BP 1 Location: Left arm, BP Patient Position: At rest)    Pulse 66    Temp 36.5 °C (97.7 °F)    Resp 15    Ht 5' 7\" (1.702 m)    Wt 81.5 kg (179 lb 10.8 oz)    SpO2 95%    BMI 28.14 kg/m2       Patient is status post general anesthesia for Procedure(s):  TRANSURETHRAL RESECTION OF BLADDER TUMOR. Nausea/Vomiting: None    Postoperative hydration reviewed and adequate. Pain:  Pain Scale 1: Numeric (0 - 10) (07/25/17 1900)  Pain Intensity 1: 0 (07/25/17 1900)   Managed    Neurological Status:   Neuro (WDL): Exceptions to WDL (07/25/17 1734)  Neuro  Neurologic State: Alert; Appropriate for age;Drowsy (07/25/17 1734)  Orientation Level: Oriented to person;Oriented to place;Oriented to situation (07/25/17 1734)  Cognition: Follows commands (07/25/17 1734)  Speech: Clear (07/25/17 1359)  LUE Motor Response: Purposeful (07/25/17 1734)  LLE Motor Response: Purposeful (07/25/17 1734)  RUE Motor Response: Purposeful (07/25/17 1734)  RLE Motor Response: Purposeful (07/25/17 1734)   At baseline    Mental Status and Level of Consciousness: Arousable    Pulmonary Status:   O2 Device: Nasal cannula (07/25/17 1900)   Adequate oxygenation and airway patent    Complications related to anesthesia: None    Post-anesthesia assessment completed.  No concerns    Signed By: Isidra Nelson MD     July 25, 2017

## 2017-07-26 LAB
ANION GAP BLD CALC-SCNC: 5 MMOL/L (ref 5–15)
BUN SERPL-MCNC: 12 MG/DL (ref 6–20)
BUN/CREAT SERPL: 16 (ref 12–20)
CALCIUM SERPL-MCNC: 8.3 MG/DL (ref 8.5–10.1)
CHLORIDE SERPL-SCNC: 105 MMOL/L (ref 97–108)
CO2 SERPL-SCNC: 28 MMOL/L (ref 21–32)
CREAT SERPL-MCNC: 0.73 MG/DL (ref 0.55–1.02)
GLUCOSE BLD STRIP.AUTO-MCNC: 123 MG/DL (ref 65–100)
GLUCOSE BLD STRIP.AUTO-MCNC: 124 MG/DL (ref 65–100)
GLUCOSE BLD STRIP.AUTO-MCNC: 146 MG/DL (ref 65–100)
GLUCOSE BLD STRIP.AUTO-MCNC: 155 MG/DL (ref 65–100)
GLUCOSE SERPL-MCNC: 171 MG/DL (ref 65–100)
HCT VFR BLD AUTO: 37.6 % (ref 35–47)
HGB BLD-MCNC: 11.5 G/DL (ref 11.5–16)
POTASSIUM SERPL-SCNC: 4.4 MMOL/L (ref 3.5–5.1)
SERVICE CMNT-IMP: ABNORMAL
SODIUM SERPL-SCNC: 138 MMOL/L (ref 136–145)

## 2017-07-26 PROCEDURE — 74011000258 HC RX REV CODE- 258: Performed by: UROLOGY

## 2017-07-26 PROCEDURE — 74011636637 HC RX REV CODE- 636/637: Performed by: UROLOGY

## 2017-07-26 PROCEDURE — 74011250637 HC RX REV CODE- 250/637: Performed by: UROLOGY

## 2017-07-26 PROCEDURE — 85018 HEMOGLOBIN: CPT | Performed by: UROLOGY

## 2017-07-26 PROCEDURE — 65270000029 HC RM PRIVATE

## 2017-07-26 PROCEDURE — 77030018836 HC SOL IRR NACL ICUM -A

## 2017-07-26 PROCEDURE — 80048 BASIC METABOLIC PNL TOTAL CA: CPT | Performed by: UROLOGY

## 2017-07-26 PROCEDURE — 85014 HEMATOCRIT: CPT | Performed by: UROLOGY

## 2017-07-26 PROCEDURE — 82962 GLUCOSE BLOOD TEST: CPT

## 2017-07-26 PROCEDURE — 99218 HC RM OBSERVATION: CPT

## 2017-07-26 PROCEDURE — 36415 COLL VENOUS BLD VENIPUNCTURE: CPT | Performed by: UROLOGY

## 2017-07-26 PROCEDURE — 74011250636 HC RX REV CODE- 250/636: Performed by: UROLOGY

## 2017-07-26 RX ADMIN — HYDROCODONE BITARTRATE AND ACETAMINOPHEN 1 TABLET: 5; 325 TABLET ORAL at 10:44

## 2017-07-26 RX ADMIN — Medication 10 ML: at 21:26

## 2017-07-26 RX ADMIN — CEFAZOLIN 2 G: 10 INJECTION, POWDER, FOR SOLUTION INTRAVENOUS; PARENTERAL at 17:18

## 2017-07-26 RX ADMIN — PRAVASTATIN SODIUM 80 MG: 40 TABLET ORAL at 21:26

## 2017-07-26 RX ADMIN — HYDROCODONE BITARTRATE AND ACETAMINOPHEN 1 TABLET: 5; 325 TABLET ORAL at 15:52

## 2017-07-26 RX ADMIN — CEFAZOLIN 2 G: 10 INJECTION, POWDER, FOR SOLUTION INTRAVENOUS; PARENTERAL at 03:27

## 2017-07-26 RX ADMIN — SODIUM CHLORIDE 75 ML/HR: 450 INJECTION, SOLUTION INTRAVENOUS at 15:53

## 2017-07-26 RX ADMIN — Medication 10 ML: at 10:44

## 2017-07-26 RX ADMIN — DILTIAZEM HYDROCHLORIDE 240 MG: 120 CAPSULE, COATED, EXTENDED RELEASE ORAL at 08:37

## 2017-07-26 RX ADMIN — INSULIN LISPRO 2 UNITS: 100 INJECTION, SOLUTION INTRAVENOUS; SUBCUTANEOUS at 12:37

## 2017-07-26 RX ADMIN — ONDANSETRON 4 MG: 2 INJECTION, SOLUTION INTRAMUSCULAR; INTRAVENOUS at 14:27

## 2017-07-26 NOTE — ROUTINE PROCESS
Bedside shift change report given to Erik Sebastian [facility] SEBASTIAN May  (oncoming nurse) by Isai Lynch RN  (offgoing nurse). Report included the following information SBAR, Kardex, Intake/Output and MAR.

## 2017-07-26 NOTE — OP NOTES
[address]   OP NOTE       Name:  Trudi Ayala   MR#:  [id number]   :  1934   Account #:  [de-identified]    Surgery Date:  2017   Date of Adm:  2017       PREOPERATIVE DIAGNOSIS: Bladder mass. POSTOPERATIVE DIAGNOSIS: Bladder mass. PROCEDURES PERFORMED: Transurethral resection of bladder   tumor, large. SURGEON: Oracio Mulligan. Corbin Ann MD    ANESTHESIA: General.    SPECIMENS REMOVED: Bladder tumor. COMPLICATIONS: None. ESTIMATED BLOOD LOSS: Indeterminate. DESCRIPTION OF PROCEDURE: After informed consent, the patient   received preoperative antibiotics and was placed on the operating   table in the supine position. After adequate induction of general   anesthetic, she was placed in lithotomy position, all pressure points   carefully padded. The vaginal area was prepped and draped in sterile   fashion. A full time-out was accomplished. A 26-Hebrew resectoscope was advanced with obturator into the   bladder and the bladder was surveyed. There was a tremendous   amount of fungating sessile tumor starting in the urethra on the left and   going down the left lateral wall and dome, most likely filling about 1/3 of   the surface. The right ureteral orifice was identified, but the left was   unidentifiable. I started by doing a resection of the large bulky tumor   and sent it for frozen, it came back, poorly differentiated, most likely   transitional cell. I resected and debulked as much of the tumor as I   could, paid careful attention not to injure the bladder. I did use the Ellik   to evacuate a large amount of tumor, but it was unresectable because   of the volume and the depth. I then used the button to coag all the   areas, it looked like it was oozing. I surveyed the bladder. The right   ureteral orifice was unharmed. There was no evidence of bladder   perforation and we had reasonable hemostasis.  The cystoscope was   removed. A 26-Japanese 3-way Robledo with 50 mL of water was placed   and there was efflux of pink-tinged urine with continuous bladder   irrigation. Bimanual exam did not reveal any palpable masses or   fixation of the bladder or pelvis. This specimen was collected and sent   to Pathology. She tolerated the procedure well, no complications. MD MICHELLE Leo / BEVERLY   D:  07/25/2017   17:29   T:  07/26/2017   01:02   Job #:  [id number]

## 2017-07-26 NOTE — PROGRESS NOTES
1045- CBI stopped, will monitor urine color and output amount. 1108- Pt with pink tinged urine. No c/o pain, will monitor.

## 2017-07-26 NOTE — PROGRESS NOTES
Admit Date: 2017    POD 1 Day Post-Op    Procedure:  Procedure(s):  TRANSURETHRAL RESECTION OF BLADDER TUMOR    Subjective:     Patient has no complaints. Objective:     Blood pressure 120/65, pulse 70, temperature 97.4 °F (36.3 °C), resp. rate 16, height 5' 7\" (1.702 m), weight 81.5 kg (179 lb 10.8 oz), SpO2 91 %. Temp (24hrs), Av °F (36.7 °C), Min:97.4 °F (36.3 °C), Max:98.9 °F (37.2 °C)      Physical Exam:  ABDOMEN: soft, non-tender.  Bowel sounds normal. No masses,  no organomegaly, urine clear    Labs:   Recent Results (from the past 48 hour(s))   GLUCOSE, POC    Collection Time: 17  1:57 PM   Result Value Ref Range    Glucose (POC) 105 (H) 65 - 100 mg/dL    Performed by Sung Raya    GLUCOSE, POC    Collection Time: 17  6:08 PM   Result Value Ref Range    Glucose (POC) 125 (H) 65 - 100 mg/dL    Performed by Evelyn Herbert    HGB & HCT    Collection Time: 17  6:10 PM   Result Value Ref Range    HGB 11.4 (L) 11.5 - 16.0 g/dL    HCT 38.3 35.0 - 49.6 %   METABOLIC PANEL, BASIC    Collection Time: 17  6:10 PM   Result Value Ref Range    Sodium 141 136 - 145 mmol/L    Potassium 4.2 3.5 - 5.1 mmol/L    Chloride 107 97 - 108 mmol/L    CO2 28 21 - 32 mmol/L    Anion gap 6 5 - 15 mmol/L    Glucose 123 (H) 65 - 100 mg/dL    BUN 14 6 - 20 MG/DL    Creatinine 0.98 0.55 - 1.02 MG/DL    BUN/Creatinine ratio 14 12 - 20      GFR est AA >60 >60 ml/min/1.73m2    GFR est non-AA 54 (L) >60 ml/min/1.73m2    Calcium 8.5 8.5 - 10.1 MG/DL   GLUCOSE, POC    Collection Time: 17 11:02 PM   Result Value Ref Range    Glucose (POC) 157 (H) 65 - 100 mg/dL    Performed by Cristine Hernandez    HEMATOCRIT    Collection Time: 17  3:32 AM   Result Value Ref Range    HCT 37.6 35.0 - 47.0 %   HEMOGLOBIN    Collection Time: 17  3:32 AM   Result Value Ref Range    HGB 11.5 11.5 - 01.2 g/dL   METABOLIC PANEL, BASIC    Collection Time: 17  3:32 AM   Result Value Ref Range    Sodium 138 136 - 145 mmol/L    Potassium 4.4 3.5 - 5.1 mmol/L    Chloride 105 97 - 108 mmol/L    CO2 28 21 - 32 mmol/L    Anion gap 5 5 - 15 mmol/L    Glucose 171 (H) 65 - 100 mg/dL    BUN 12 6 - 20 MG/DL    Creatinine 0.73 0.55 - 1.02 MG/DL    BUN/Creatinine ratio 16 12 - 20      GFR est AA >60 >60 ml/min/1.73m2    GFR est non-AA >60 >60 ml/min/1.73m2    Calcium 8.3 (L) 8.5 - 10.1 MG/DL   GLUCOSE, POC    Collection Time: 07/26/17  8:41 AM   Result Value Ref Range    Glucose (POC) 123 (H) 65 - 100 mg/dL    Performed by Agueda HARRY(CON)    GLUCOSE, POC    Collection Time: 07/26/17 11:35 AM   Result Value Ref Range    Glucose (POC) 146 (H) 65 - 100 mg/dL    Performed by Velma Herman)        Data Review labs ok    Assessment:     Active Problems:    [facility] (7/25/2017)        Plan/Recommendations/Medical Decision Making:     home WITH rose   Will need cystectomy

## 2017-07-27 VITALS
HEART RATE: 74 BPM | WEIGHT: 179.68 LBS | OXYGEN SATURATION: 92 % | DIASTOLIC BLOOD PRESSURE: 63 MMHG | HEIGHT: 67 IN | SYSTOLIC BLOOD PRESSURE: 130 MMHG | RESPIRATION RATE: 14 BRPM | BODY MASS INDEX: 28.2 KG/M2 | TEMPERATURE: 98.3 F

## 2017-07-27 LAB
ANION GAP BLD CALC-SCNC: 3 MMOL/L (ref 5–15)
BUN SERPL-MCNC: 10 MG/DL (ref 6–20)
BUN/CREAT SERPL: 14 (ref 12–20)
CALCIUM SERPL-MCNC: 8.3 MG/DL (ref 8.5–10.1)
CHLORIDE SERPL-SCNC: 105 MMOL/L (ref 97–108)
CO2 SERPL-SCNC: 32 MMOL/L (ref 21–32)
CREAT SERPL-MCNC: 0.71 MG/DL (ref 0.55–1.02)
GLUCOSE BLD STRIP.AUTO-MCNC: 123 MG/DL (ref 65–100)
GLUCOSE BLD STRIP.AUTO-MCNC: 157 MG/DL (ref 65–100)
GLUCOSE SERPL-MCNC: 114 MG/DL (ref 65–100)
POTASSIUM SERPL-SCNC: 3.8 MMOL/L (ref 3.5–5.1)
SERVICE CMNT-IMP: ABNORMAL
SERVICE CMNT-IMP: ABNORMAL
SODIUM SERPL-SCNC: 140 MMOL/L (ref 136–145)

## 2017-07-27 PROCEDURE — 80048 BASIC METABOLIC PNL TOTAL CA: CPT | Performed by: UROLOGY

## 2017-07-27 PROCEDURE — 77010033678 HC OXYGEN DAILY

## 2017-07-27 PROCEDURE — 36415 COLL VENOUS BLD VENIPUNCTURE: CPT | Performed by: UROLOGY

## 2017-07-27 PROCEDURE — 74011250637 HC RX REV CODE- 250/637: Performed by: UROLOGY

## 2017-07-27 PROCEDURE — 82962 GLUCOSE BLOOD TEST: CPT

## 2017-07-27 PROCEDURE — 74011000258 HC RX REV CODE- 258: Performed by: UROLOGY

## 2017-07-27 PROCEDURE — 74011250636 HC RX REV CODE- 250/636: Performed by: UROLOGY

## 2017-07-27 PROCEDURE — 74011636637 HC RX REV CODE- 636/637: Performed by: UROLOGY

## 2017-07-27 RX ADMIN — DILTIAZEM HYDROCHLORIDE 240 MG: 120 CAPSULE, COATED, EXTENDED RELEASE ORAL at 09:02

## 2017-07-27 RX ADMIN — INSULIN LISPRO 2 UNITS: 100 INJECTION, SOLUTION INTRAVENOUS; SUBCUTANEOUS at 11:40

## 2017-07-27 RX ADMIN — CEFAZOLIN 2 G: 10 INJECTION, POWDER, FOR SOLUTION INTRAVENOUS; PARENTERAL at 03:39

## 2017-07-27 RX ADMIN — SODIUM CHLORIDE 75 ML/HR: 450 INJECTION, SOLUTION INTRAVENOUS at 03:39

## 2017-07-27 RX ADMIN — Medication 10 ML: at 06:50

## 2017-07-27 RX ADMIN — HYDROCODONE BITARTRATE AND ACETAMINOPHEN 1 TABLET: 5; 325 TABLET ORAL at 07:00

## 2017-07-27 NOTE — PROGRESS NOTES
Pt is a 79 y/o female that was admitted on 7/26/17 due to a Dx of Adynameic ileus. Pt is alert and oriented. Pt lives alone in a one story home with 3 TAZ. Pt has a neighbor that is very supportive and can look in on her. Pt's DTR lives in [place] and she can transport her home in car at discharge. Pt has a Glucometer. Pt has no experience with HH or SNF. Pt indicated that she was independent with ADLs and IADLs. Pt drives. Pt's Pharmacy is [facility] in [address] [place] [address]. CM notified that Pt is ready to discharge. DTR to transport home in car. PCP appointment with Dr. Leigha Charles scheduled for 8/3/17 at 9:30 AM.  CM Specialist is calling Dr. Georgi Burton, Urology Office to see when she needs to be seen and make that appointment. Care Management Interventions  PCP Verified by CM: Yes  Mode of Transport at Discharge: Other (see comment) (DTR will transport her home in car.)  Transition of Care Consult (CM Consult): Discharge Planning  Discharge Durable Medical Equipment: No (Pt owns a Glucometer)  Physical Therapy Consult: No  Occupational Therapy Consult: No  Speech Therapy Consult: No  Current Support Network: Own Home (Pt lives alone in one story home with 3 TAZ.   Supportive neighbor next door. )  Confirm Follow Up Transport: Self  Plan discussed with Pt/Family/Caregiver: Yes  Freedom of Choice Offered: Yes  Discharge Location  Discharge Placement: [address], [address] [facility]

## 2017-07-27 NOTE — PROGRESS NOTES
Bedside shift change report given to Gallo Ward RN by Haleigh Purcell RN. Report included the following information SBAR and Intake/Output. 1245 Discharge instructions, prescriptions and follow up appts reviewed with patient and patient's daughter. All questions were answered. Patient verbalized understanding. IV removed without difficulty. Personal belongings gathered by patient's daughter. Volunteer request placed for discharge home via patient's daughter in personal vehicle.

## 2017-07-27 NOTE — PROGRESS NOTES
End of Shift Nursing Note    Bedside shift change report given to Estevan Lynch RN (oncoming nurse) by William Belcher (offgoing nurse). Report included the following information SBAR, Kardex, Procedure Summary, Intake/Output, MAR and Recent Results. Zone Phone:   [phone]    Significant changes during shift:    No more c/o of N/V   Non-emergent issues for physician to address:   none     Number times ambulated in hallway past shift: 0      Number of times OOB to chair past shift: 0    POD #: 2     Vital Signs:    Temp: 98 °F (36.7 °C)     Pulse (Heart Rate): 71     BP: 113/51     Resp Rate: 16     O2 Sat (%): 92 %    Lines & Drains:     Urinary Catheter? Yes   Placement Date: 7/25   Medical Necessity: yes  Central Line? No   PICC Line? No       NG tube [] in [] removed [x] not applicable   Drains [] in [] removed [x] not applicable     Skin Integrity:      Wounds: no   Dressings Present: no    Wound Concerns: no      GI:    Current diet:  DIET DIABETIC CONSISTENT CARB Regular    Nausea: NO  Vomiting: NO  Bowel Sounds: NO  Flatus: YES  Last Bowel Movement: several days ago   Respiratory:  Supplemental O2: No      Incentive Spirometer: YES  Coughing and Deep Breathing: YES  Oral Care: YES  Understanding (patient/family education): YES   Getting out of bed: YES  Head of bed elevation: YES    Patient Safety:    Falls Score: 1  Mobility Score: 1  Bed Alarm On? No  Sitter? No      Opportunity for questions and clarification was given to oncoming nurse. Patient bed is in low position, side rails are up x 2, door & observation blinds open as needed, call bell within reach and patient not in distress. Esme Villarreal RN

## 2017-07-27 NOTE — PROGRESS NOTES
Interdisciplinary Rounds were completed on this patient. Rounds included nursing, clinical care leader, pharmacy, and case management. Patient was doing well without problems. Patient had the following concerns: none. Goals for the day will include: mobilize and discharge home, case management to coordinate with patient regarding transportation home.

## 2017-07-31 NOTE — DISCHARGE SUMMARY
Discharge Summary     Patient: Trae Israel MRN: [id number]  SSN: [id number]    YOB: 1934  Age: 80 y.o. Sex: female      Admit Date: 7/25/2017    Discharge Date: 9/18/2017     * Admission Diagnoses:  BLADDER CANCER   Transitional cell bladder cancer ([facility].)  Transitional cell bladder cancer [facility])     * Discharge Diagnoses:   Hospital Problems as of 7/27/2017  Never Reviewed          Codes Class Noted - Resolved POA    Transitional cell bladder cancer ([facility] ICD-10-CM: C67.9  ICD-9-CM: 188.9  7/25/2017 - Present Unknown               * Procedures for this admission:   Procedure(s):  TRANSURETHRAL RESECTION OF BLADDER TUMOR      * Disposition:     * Discharged Condition:     * Hospital Course:      Discharge Medications:   Discharge Medication List as of 7/27/2017 11:56 AM      START taking these medications    Details   !! ciprofloxacin HCl (CIPRO) 250 mg tablet Take 1 Tab by mouth two (2) times a day for 5 days. , Print, Disp-10 Tab, R-0       !! - Potential duplicate medications found. Please discuss with provider. CONTINUE these medications which have NOT CHANGED    Details   !! ciprofloxacin HCl (CIPRO) 500 mg tablet Take 500 mg by mouth two (2) times a day. X 1 week, first dose 7/21/2017, Historical Med      dilTIAZem CD (CARTIA XT) 240 mg ER capsule Take 240 mg by mouth daily. , Historical Med      glipiZIDE SR (GLUCOTROL XL) 10 mg CR tablet Take 20 mg by mouth daily. , Historical Med      pravastatin (PRAVACHOL) 80 mg tablet Take 80 mg by mouth nightly., Historical Med      MULTIVIT,CALC,MINS/IRON/FOLIC (ONE-A-DAY WOMENS FORMULA PO) Take 1 Tab by mouth daily. , Historical Med      metFORMIN (GLUCOPHAGE) 500 mg tablet Take 500 mg by mouth daily (with breakfast). , Historical Med      acetaminophen (TYLENOL EXTRA STRENGTH) 500 mg tablet Take 500 mg by mouth every six (6) hours as needed for Pain.  Usually takes every night, Historical Med      POLYVINYL ALCOHOL/POVIDONE (ARTIFICIAL TEARS OP) Apply  to eye as needed., Historical Med       !! - Potential duplicate medications found. Please discuss with provider. STOP taking these medications       aspirin 81 mg chewable tablet Comments:   Reason for Stopping:                * Follow-up Care/Patient Instructions: Activity:   Diet:   Wound Care: Follow-up Information     Follow up With Details Comments Contact Info    Rasta Lebron NP On 8/3/2017 At 9:30 AM with your PCP.   [facility]  [address]             Signed By: Alin Zavala MD     September 18, 2017

## 2017-07-31 NOTE — DISCHARGE SUMMARY
Discharge Summary     Patient: Radha Jacobson MRN: [id number]  SSN: [id number]    YOB: 1934  Age: 80 y.o. Sex: female      Admit Date: 7/25/2017    Discharge Date: 7/31/2017     * Admission Diagnoses:  BLADDER CANCER   Transitional cell bladder cancer ([facility]  Transitional cell bladder cancer [facility])     * Discharge Diagnoses:   Hospital Problems as of 7/27/2017  Never Reviewed          Codes Class Noted - Resolved POA    Transitional cell bladder cancer ([facility] ICD-10-CM: C67.9  ICD-9-CM: 188.9  7/25/2017 - Present Unknown               * Procedures for this admission:   Procedure(s):  TRANSURETHRAL RESECTION OF BLADDER TUMOR      * Disposition:     * Discharged Condition: improved    * Hospital Course:  Extended bc of N/V-resolved    Discharge Medications:   Discharge Medication List as of 7/27/2017 11:56 AM      START taking these medications    Details   !! ciprofloxacin HCl (CIPRO) 250 mg tablet Take 1 Tab by mouth two (2) times a day for 5 days. , Print, Disp-10 Tab, R-0       !! - Potential duplicate medications found. Please discuss with provider. CONTINUE these medications which have NOT CHANGED    Details   !! ciprofloxacin HCl (CIPRO) 500 mg tablet Take 500 mg by mouth two (2) times a day. X 1 week, first dose 7/21/2017, Historical Med      dilTIAZem CD (CARTIA XT) 240 mg ER capsule Take 240 mg by mouth daily. , Historical Med      glipiZIDE SR (GLUCOTROL XL) 10 mg CR tablet Take 20 mg by mouth daily. , Historical Med      MULTIVIT,CALC,MINS/IRON/FOLIC (ONE-A-DAY WOMENS FORMULA PO) Take 1 Tab by mouth daily. , Historical Med      acetaminophen (TYLENOL EXTRA STRENGTH) 500 mg tablet Take 500 mg by mouth every six (6) hours as needed for Pain.  Usually takes every night, Historical Med      POLYVINYL ALCOHOL/POVIDONE (ARTIFICIAL TEARS OP) Apply  to eye as needed., Historical Med      pravastatin (PRAVACHOL) 80 mg tablet Take 80 mg by mouth nightly., Historical Med      metFORMIN (GLUCOPHAGE) 500 mg tablet Take 500 mg by mouth daily (with breakfast). , Historical Med       !! - Potential duplicate medications found. Please discuss with provider. STOP taking these medications       aspirin 81 mg chewable tablet Comments:   Reason for Stopping:                * Follow-up Care/Patient Instructions: Activity: No lifting, Driving, or Strenuous exercise for 2 weeks  Diet: Resume previous diet  Wound Care: [place] care    Follow-up Information     Follow up With Details Comments [address], NP On 8/3/2017 At 9:30 AM with your PCP.   [facility]  [address]             Signed By: Amina Richey MD     July 31, 2017

## 2017-09-25 NOTE — PERIOP NOTES
LVM w Dr Iker Stone nurse requesting if Dr Iker Stone would like co-ags added to DOS labs. Request callback. Nimco from Dr Iker Stone office called and stated Dr Iker Stone would like PT/INR and PTT am DOS. Order entered.

## 2017-09-25 NOTE — PERIOP NOTES
[facility]  Preoperative Instructions        Surgery Date 9/26/17         Time of Arrival 8:30am    1. On the day of your surgery, please report to the Surgical Services Registration Desk and sign in at your designated time. The Surgery Center is located to the right of the Emergency Room. 2. You must have someone with you to drive you home. You should not drive a car for 24 hours following surgery. Please make arrangements for a friend or family member to stay with you for the first 24 hours after your surgery. 3. Do not have anything to eat or drink (including water, gum, mints, coffee, juice) after midnight 9/25/17?? Eboni Loose ? This may not apply to medications prescribed by your physician. ?(Please note below the special instructions with medications to take the morning of your procedure.)    4. We recommend you do not drink any alcoholic beverages for 24 hours before and after your surgery. 5. Contact your surgeons office for instructions on the following medications: non-steroidal anti-inflammatory drugs (i.e. Advil, Aleve), vitamins, and supplements. (Some surgeons will want you to stop these medications prior to surgery and others may allow you to take them)  **If you are currently taking Plavix, Coumadin, Aspirin and/or other blood-thinning agents, contact your surgeon for instructions. ** Your surgeon will partner with the physician prescribing these medications to determine if it is safe to stop or if you need to continue taking. Please do not stop taking these medications without instructions from your surgeon    6. Wear comfortable clothes. Wear glasses instead of contacts. Do not bring any money or jewelry. Please bring picture ID, insurance card, and any prearranged co-payment or hospital payment. Do not wear make-up, particularly mascara the morning of your surgery. Do not wear nail polish, particularly if you are having foot /hand surgery.   Wear your hair loose or down, no ponytails, buns, amauri pins or clips. All body piercings must be removed. Please shower with antibacterial soap for three consecutive days before and on the morning of surgery, but do not apply any lotions, powders or deodorants after the shower on the day of surgery. Please use a fresh towels after each shower. Please sleep in clean clothes and change bed linens the night before surgery. Please do not shave for 48 hours prior to surgery. Shaving of the face is acceptable. 7. You should understand that if you do not follow these instructions your surgery may be cancelled. If your physical condition changes (I.e. fever, cold or flu) please contact your surgeon as soon as possible. 8. It is important that you be on time. If a situation occurs where you may be late, please call [phone] (OR Holding Area). 9. If you have any questions and or problems, please call [phone] (Pre-admission Testing). 10. Your surgery time may be subject to change. You will receive a phone call the evening prior if your time changes. 11.  If having outpatient surgery, you must have someone to drive you here, stay with you during the duration of your stay, and to drive you home at time of discharge. 12.   In an effort to improve the efficiency, privacy, and safety for all of our Pre-op patients visitors are not allowed in the Holding area. Once you arrive and are registered your family/visitors will be asked to remain in the waiting room. The Pre-op staff will get you from the Surgical Waiting Area and will explain to you and your family/visitors that the Pre-op phase is beginning. The staff will answer any questions and provide instructions for tracking of the patient, by use of the existing tracking number and color-coded status board in the waiting room.   At this time the staff will also ask for your designated spokesperson information in the event that the physician or staff need to provide an update or obtain any pertinent information. The designated spokesperson will be notified if the physician needs to speak to family during the pre-operative phase. If at any time your family/visitors has questions or concerns they may approach the volunteer desk in the waiting area for assistance. Special Instructions:    MEDICATIONS TO TAKE THE MORNING OF SURGERY WITH A SIP OF WATER:Diltiazem. I understand a pre-operative phone call will be made to verify my surgery time. In the event that I am not available, I give permission for a message to be left on my answering service and/or with another person?  Yes          ___________________      __________   _________    (Signature of Patient)             (Witness)                (Date and Time)

## 2017-09-26 ENCOUNTER — APPOINTMENT (OUTPATIENT)
Dept: GENERAL RADIOLOGY | Age: 82
DRG: 269 | End: 2017-09-26
Attending: SURGERY
Payer: MEDICARE

## 2017-09-26 ENCOUNTER — ANESTHESIA (OUTPATIENT)
Dept: SURGERY | Age: 82
DRG: 269 | End: 2017-09-26
Payer: MEDICARE

## 2017-09-26 ENCOUNTER — ANESTHESIA EVENT (OUTPATIENT)
Dept: SURGERY | Age: 82
DRG: 269 | End: 2017-09-26
Payer: MEDICARE

## 2017-09-26 ENCOUNTER — HOSPITAL ENCOUNTER (INPATIENT)
Age: 82
LOS: 1 days | Discharge: HOME | DRG: 269 | End: 2017-09-27
Attending: SURGERY | Admitting: SURGERY
Payer: MEDICARE

## 2017-09-26 PROBLEM — I71.9 AORTIC ANEURYSM (HCC): Status: ACTIVE | Noted: 2017-09-26

## 2017-09-26 LAB
ABO + RH BLD: NORMAL
APPEARANCE UR: ABNORMAL
APTT PPP: 27.4 SEC (ref 22.1–32.5)
BACTERIA URNS QL MICRO: ABNORMAL /HPF
BILIRUB UR QL: NEGATIVE
BLOOD GROUP ANTIBODIES SERPL: NORMAL
COLOR UR: ABNORMAL
EPITH CASTS URNS QL MICRO: ABNORMAL /LPF
GLUCOSE BLD STRIP.AUTO-MCNC: 118 MG/DL (ref 65–100)
GLUCOSE BLD STRIP.AUTO-MCNC: 86 MG/DL (ref 65–100)
GLUCOSE UR STRIP.AUTO-MCNC: NEGATIVE MG/DL
HGB UR QL STRIP: ABNORMAL
HYALINE CASTS URNS QL MICRO: ABNORMAL /LPF (ref 0–5)
INR PPP: 1 (ref 0.9–1.1)
KETONES UR QL STRIP.AUTO: NEGATIVE MG/DL
LEUKOCYTE ESTERASE UR QL STRIP.AUTO: ABNORMAL
NITRITE UR QL STRIP.AUTO: NEGATIVE
PH UR STRIP: 5.5 [PH] (ref 5–8)
PROT UR STRIP-MCNC: 30 MG/DL
PROTHROMBIN TIME: 10 SEC (ref 9–11.1)
RBC #/AREA URNS HPF: ABNORMAL /HPF (ref 0–5)
SERVICE CMNT-IMP: ABNORMAL
SERVICE CMNT-IMP: NORMAL
SP GR UR REFRACTOMETRY: 1.02 (ref 1–1.03)
SPECIMEN EXP DATE BLD: NORMAL
THERAPEUTIC RANGE,PTTT: NORMAL SECS (ref 58–77)
UA: UC IF INDICATED,UAUC: ABNORMAL
UROBILINOGEN UR QL STRIP.AUTO: 0.2 EU/DL (ref 0.2–1)
WBC URNS QL MICRO: >100 /HPF (ref 0–4)

## 2017-09-26 PROCEDURE — 74011250636 HC RX REV CODE- 250/636

## 2017-09-26 PROCEDURE — 77030002986 HC SUT PROL J&J -A: Performed by: SURGERY

## 2017-09-26 PROCEDURE — 74011000250 HC RX REV CODE- 250

## 2017-09-26 PROCEDURE — 85610 PROTHROMBIN TIME: CPT | Performed by: SURGERY

## 2017-09-26 PROCEDURE — 72190 X-RAY EXAM OF PELVIS: CPT

## 2017-09-26 PROCEDURE — 76001 XR FLUOROSCOPY OVER 60 MINUTES: CPT

## 2017-09-26 PROCEDURE — C1769 GUIDE WIRE: HCPCS | Performed by: SURGERY

## 2017-09-26 PROCEDURE — C1760 CLOSURE DEV, VASC: HCPCS | Performed by: SURGERY

## 2017-09-26 PROCEDURE — 82962 GLUCOSE BLOOD TEST: CPT

## 2017-09-26 PROCEDURE — 85730 THROMBOPLASTIN TIME PARTIAL: CPT | Performed by: SURGERY

## 2017-09-26 PROCEDURE — 77030021532 HC CATH ANGI DX IMPRS MRTM -B: Performed by: SURGERY

## 2017-09-26 PROCEDURE — 74011636320 HC RX REV CODE- 636/320: Performed by: SURGERY

## 2017-09-26 PROCEDURE — 36415 COLL VENOUS BLD VENIPUNCTURE: CPT | Performed by: SURGERY

## 2017-09-26 PROCEDURE — C1725 CATH, TRANSLUMIN NON-LASER: HCPCS | Performed by: SURGERY

## 2017-09-26 PROCEDURE — C1725 CATH, TRANSLUMIN NON-LASER: HCPCS

## 2017-09-26 PROCEDURE — 74011000272 HC RX REV CODE- 272: Performed by: SURGERY

## 2017-09-26 PROCEDURE — 74011250636 HC RX REV CODE- 250/636: Performed by: SURGERY

## 2017-09-26 PROCEDURE — 77030034849: Performed by: SURGERY

## 2017-09-26 PROCEDURE — 76210000017 HC OR PH I REC 1.5 TO 2 HR: Performed by: SURGERY

## 2017-09-26 PROCEDURE — 77030003627 HC NDL PERC VASC BSC -A: Performed by: SURGERY

## 2017-09-26 PROCEDURE — 77030022856 HC ADPT VLV HEMSTAS ENLX -B: Performed by: SURGERY

## 2017-09-26 PROCEDURE — C1887 CATHETER, GUIDING: HCPCS | Performed by: SURGERY

## 2017-09-26 PROCEDURE — 65660000000 HC RM CCU STEPDOWN

## 2017-09-26 PROCEDURE — 74011000250 HC RX REV CODE- 250: Performed by: SURGERY

## 2017-09-26 PROCEDURE — 77030005401 HC CATH RAD ARRO -A: Performed by: ANESTHESIOLOGY

## 2017-09-26 PROCEDURE — 71010 XR CHEST PORT: CPT

## 2017-09-26 PROCEDURE — 77030013058 HC DEV INFL ANGIO BSC -B: Performed by: SURGERY

## 2017-09-26 PROCEDURE — 02HV33Z INSERTION OF INFUSION DEVICE INTO SUPERIOR VENA CAVA, PERCUTANEOUS APPROACH: ICD-10-PCS | Performed by: ANESTHESIOLOGY

## 2017-09-26 PROCEDURE — 74011000258 HC RX REV CODE- 258: Performed by: SURGERY

## 2017-09-26 PROCEDURE — 74011636320 HC RX REV CODE- 636/320

## 2017-09-26 PROCEDURE — C1753 CATH, INTRAVAS ULTRASOUND: HCPCS | Performed by: SURGERY

## 2017-09-26 PROCEDURE — 36556 INSERT NON-TUNNEL CV CATH: CPT

## 2017-09-26 PROCEDURE — 77030013797 HC KT TRNSDUC PRSSR EDWD -A: Performed by: ANESTHESIOLOGY

## 2017-09-26 PROCEDURE — 76060000036 HC ANESTHESIA 2.5 TO 3 HR: Performed by: SURGERY

## 2017-09-26 PROCEDURE — C1874 STENT, COATED/COV W/DEL SYS: HCPCS | Performed by: SURGERY

## 2017-09-26 PROCEDURE — 04V03E6 RESTRICT ABD AORTA, BIFURC, W FENESTR DEV 1 OR 2, PERC: ICD-10-PCS | Performed by: SURGERY

## 2017-09-26 PROCEDURE — 74011250637 HC RX REV CODE- 250/637: Performed by: SURGERY

## 2017-09-26 PROCEDURE — 03HY32Z INSERTION OF MONITORING DEVICE INTO UPPER ARTERY, PERCUTANEOUS APPROACH: ICD-10-PCS | Performed by: ANESTHESIOLOGY

## 2017-09-26 PROCEDURE — 86900 BLOOD TYPING SEROLOGIC ABO: CPT | Performed by: SURGERY

## 2017-09-26 PROCEDURE — 77030031139 HC SUT VCRL2 J&J -A: Performed by: SURGERY

## 2017-09-26 PROCEDURE — C1894 INTRO/SHEATH, NON-LASER: HCPCS | Performed by: SURGERY

## 2017-09-26 PROCEDURE — 77030020782 HC GWN BAIR PAWS FLX 3M -B

## 2017-09-26 PROCEDURE — 77030028837 HC SYR ANGI PWR INJ COEU -A: Performed by: SURGERY

## 2017-09-26 PROCEDURE — 77030020263 HC SOL INJ SOD CL0.9% LFCR 1000ML: Performed by: SURGERY

## 2017-09-26 PROCEDURE — 77030008684 HC TU ET CUF COVD -B: Performed by: ANESTHESIOLOGY

## 2017-09-26 PROCEDURE — 77030026438 HC STYL ET INTUB CARD -A: Performed by: ANESTHESIOLOGY

## 2017-09-26 PROCEDURE — 74011250636 HC RX REV CODE- 250/636: Performed by: ANESTHESIOLOGY

## 2017-09-26 PROCEDURE — 77030011640 HC PAD GRND REM COVD -A: Performed by: SURGERY

## 2017-09-26 PROCEDURE — 77030020061 HC IV BLD WRMR ADMIN SET 3M -B: Performed by: ANESTHESIOLOGY

## 2017-09-26 PROCEDURE — 77030020269 HC MISC IMPL: Performed by: SURGERY

## 2017-09-26 PROCEDURE — C1773 RET DEV, INSERTABLE: HCPCS | Performed by: SURGERY

## 2017-09-26 PROCEDURE — 77030018846 HC SOL IRR STRL H20 ICUM -A: Performed by: SURGERY

## 2017-09-26 PROCEDURE — 77030020153 HC PRB DOPLR DISP MIZU -C: Performed by: SURGERY

## 2017-09-26 PROCEDURE — 81001 URINALYSIS AUTO W/SCOPE: CPT | Performed by: SURGERY

## 2017-09-26 PROCEDURE — P9045 ALBUMIN (HUMAN), 5%, 250 ML: HCPCS

## 2017-09-26 PROCEDURE — 76010000172 HC OR TIME 2.5 TO 3 HR INTENSV-TIER 1: Performed by: SURGERY

## 2017-09-26 PROCEDURE — 87086 URINE CULTURE/COLONY COUNT: CPT | Performed by: SURGERY

## 2017-09-26 PROCEDURE — C1751 CATH, INF, PER/CENT/MIDLINE: HCPCS | Performed by: ANESTHESIOLOGY

## 2017-09-26 DEVICE — IMPLANTABLE DEVICE
Type: IMPLANTABLE DEVICE | Site: AORTA | Status: FUNCTIONAL
Brand: AFX2 BIFURCATED ENDOGRAFT SYSTEM

## 2017-09-26 DEVICE — VIABAHN BX BALLOON EXP ENDO 6MMX39MM 7FR 135CMCATH HEPARIN
Type: IMPLANTABLE DEVICE | Site: AORTA | Status: FUNCTIONAL
Brand: GORE VIABAHN VBX BALLOON EXPANDABLE ENDO

## 2017-09-26 DEVICE — IMPLANTABLE DEVICE
Type: IMPLANTABLE DEVICE | Site: AORTA | Status: FUNCTIONAL
Brand: VELA PROXIMAL ENDOGRAFT SYSTEM

## 2017-09-26 RX ORDER — ACETAMINOPHEN 500 MG
500 TABLET ORAL
Status: DISCONTINUED | OUTPATIENT
Start: 2017-09-26 | End: 2017-09-27 | Stop reason: HOSPADM

## 2017-09-26 RX ORDER — PRAVASTATIN SODIUM 40 MG/1
80 TABLET ORAL
Status: DISCONTINUED | OUTPATIENT
Start: 2017-09-26 | End: 2017-09-27 | Stop reason: HOSPADM

## 2017-09-26 RX ORDER — SODIUM CHLORIDE 0.9 % (FLUSH) 0.9 %
SYRINGE (ML) INJECTION
Status: DISPENSED
Start: 2017-09-26 | End: 2017-09-27

## 2017-09-26 RX ORDER — MIDAZOLAM HYDROCHLORIDE 1 MG/ML
INJECTION, SOLUTION INTRAMUSCULAR; INTRAVENOUS AS NEEDED
Status: DISCONTINUED | OUTPATIENT
Start: 2017-09-26 | End: 2017-09-26 | Stop reason: HOSPADM

## 2017-09-26 RX ORDER — POLYVINYL ALCOHOL 14 MG/ML
1 SOLUTION/ DROPS OPHTHALMIC AS NEEDED
Status: DISCONTINUED | OUTPATIENT
Start: 2017-09-26 | End: 2017-09-27 | Stop reason: HOSPADM

## 2017-09-26 RX ORDER — DIPHENHYDRAMINE HYDROCHLORIDE 50 MG/ML
12.5 INJECTION, SOLUTION INTRAMUSCULAR; INTRAVENOUS AS NEEDED
Status: DISCONTINUED | OUTPATIENT
Start: 2017-09-26 | End: 2017-09-26 | Stop reason: HOSPADM

## 2017-09-26 RX ORDER — ACETAMINOPHEN 10 MG/ML
INJECTION, SOLUTION INTRAVENOUS AS NEEDED
Status: DISCONTINUED | OUTPATIENT
Start: 2017-09-26 | End: 2017-09-26 | Stop reason: HOSPADM

## 2017-09-26 RX ORDER — SODIUM CHLORIDE 0.9 % (FLUSH) 0.9 %
5-10 SYRINGE (ML) INJECTION AS NEEDED
Status: DISCONTINUED | OUTPATIENT
Start: 2017-09-26 | End: 2017-09-26 | Stop reason: HOSPADM

## 2017-09-26 RX ORDER — CEFAZOLIN SODIUM IN 0.9 % NACL 2 G/100 ML
PLASTIC BAG, INJECTION (ML) INTRAVENOUS AS NEEDED
Status: DISCONTINUED | OUTPATIENT
Start: 2017-09-26 | End: 2017-09-26

## 2017-09-26 RX ORDER — SODIUM CHLORIDE 450 MG/100ML
75 INJECTION, SOLUTION INTRAVENOUS CONTINUOUS
Status: DISCONTINUED | OUTPATIENT
Start: 2017-09-26 | End: 2017-09-27

## 2017-09-26 RX ORDER — DILTIAZEM HYDROCHLORIDE 240 MG/1
240 CAPSULE, COATED, EXTENDED RELEASE ORAL DAILY
Status: DISCONTINUED | OUTPATIENT
Start: 2017-09-27 | End: 2017-09-27 | Stop reason: HOSPADM

## 2017-09-26 RX ORDER — METFORMIN HYDROCHLORIDE 500 MG/1
500 TABLET ORAL
Status: DISCONTINUED | OUTPATIENT
Start: 2017-09-29 | End: 2017-09-27

## 2017-09-26 RX ORDER — ONDANSETRON 2 MG/ML
4 INJECTION INTRAMUSCULAR; INTRAVENOUS
Status: DISCONTINUED | OUTPATIENT
Start: 2017-09-26 | End: 2017-09-27 | Stop reason: HOSPADM

## 2017-09-26 RX ORDER — PROPOFOL 10 MG/ML
INJECTION, EMULSION INTRAVENOUS AS NEEDED
Status: DISCONTINUED | OUTPATIENT
Start: 2017-09-26 | End: 2017-09-26 | Stop reason: HOSPADM

## 2017-09-26 RX ORDER — SODIUM CHLORIDE, SODIUM LACTATE, POTASSIUM CHLORIDE, CALCIUM CHLORIDE 600; 310; 30; 20 MG/100ML; MG/100ML; MG/100ML; MG/100ML
25 INJECTION, SOLUTION INTRAVENOUS CONTINUOUS
Status: DISCONTINUED | OUTPATIENT
Start: 2017-09-26 | End: 2017-09-27

## 2017-09-26 RX ORDER — ONDANSETRON 2 MG/ML
INJECTION INTRAMUSCULAR; INTRAVENOUS AS NEEDED
Status: DISCONTINUED | OUTPATIENT
Start: 2017-09-26 | End: 2017-09-26 | Stop reason: HOSPADM

## 2017-09-26 RX ORDER — SUCCINYLCHOLINE CHLORIDE 20 MG/ML
INJECTION INTRAMUSCULAR; INTRAVENOUS AS NEEDED
Status: DISCONTINUED | OUTPATIENT
Start: 2017-09-26 | End: 2017-09-26 | Stop reason: HOSPADM

## 2017-09-26 RX ORDER — ALBUMIN HUMAN 50 G/1000ML
SOLUTION INTRAVENOUS AS NEEDED
Status: DISCONTINUED | OUTPATIENT
Start: 2017-09-26 | End: 2017-09-26 | Stop reason: HOSPADM

## 2017-09-26 RX ORDER — ROCURONIUM BROMIDE 10 MG/ML
INJECTION, SOLUTION INTRAVENOUS AS NEEDED
Status: DISCONTINUED | OUTPATIENT
Start: 2017-09-26 | End: 2017-09-26 | Stop reason: HOSPADM

## 2017-09-26 RX ORDER — GLYCOPYRROLATE 0.2 MG/ML
INJECTION INTRAMUSCULAR; INTRAVENOUS AS NEEDED
Status: DISCONTINUED | OUTPATIENT
Start: 2017-09-26 | End: 2017-09-26 | Stop reason: HOSPADM

## 2017-09-26 RX ORDER — HYDROMORPHONE HYDROCHLORIDE 2 MG/ML
INJECTION, SOLUTION INTRAMUSCULAR; INTRAVENOUS; SUBCUTANEOUS AS NEEDED
Status: DISCONTINUED | OUTPATIENT
Start: 2017-09-26 | End: 2017-09-26 | Stop reason: HOSPADM

## 2017-09-26 RX ORDER — FENTANYL CITRATE 50 UG/ML
INJECTION, SOLUTION INTRAMUSCULAR; INTRAVENOUS AS NEEDED
Status: DISCONTINUED | OUTPATIENT
Start: 2017-09-26 | End: 2017-09-26 | Stop reason: HOSPADM

## 2017-09-26 RX ORDER — THERA TABS 400 MCG
TAB ORAL DAILY
Status: DISCONTINUED | OUTPATIENT
Start: 2017-09-27 | End: 2017-09-27 | Stop reason: HOSPADM

## 2017-09-26 RX ORDER — NEOSTIGMINE METHYLSULFATE 1 MG/ML
INJECTION INTRAVENOUS AS NEEDED
Status: DISCONTINUED | OUTPATIENT
Start: 2017-09-26 | End: 2017-09-26 | Stop reason: HOSPADM

## 2017-09-26 RX ORDER — CEFAZOLIN SODIUM IN 0.9 % NACL 2 G/100 ML
2 PLASTIC BAG, INJECTION (ML) INTRAVENOUS EVERY 8 HOURS
Status: COMPLETED | OUTPATIENT
Start: 2017-09-26 | End: 2017-09-27

## 2017-09-26 RX ORDER — FENTANYL CITRATE 50 UG/ML
25 INJECTION, SOLUTION INTRAMUSCULAR; INTRAVENOUS
Status: DISCONTINUED | OUTPATIENT
Start: 2017-09-26 | End: 2017-09-26 | Stop reason: HOSPADM

## 2017-09-26 RX ORDER — SODIUM CHLORIDE 0.9 % (FLUSH) 0.9 %
5-10 SYRINGE (ML) INJECTION EVERY 8 HOURS
Status: DISCONTINUED | OUTPATIENT
Start: 2017-09-26 | End: 2017-09-26 | Stop reason: HOSPADM

## 2017-09-26 RX ORDER — GUAIFENESIN 100 MG/5ML
81 LIQUID (ML) ORAL DAILY
COMMUNITY
End: 2017-09-27

## 2017-09-26 RX ORDER — LIDOCAINE HYDROCHLORIDE 20 MG/ML
INJECTION, SOLUTION EPIDURAL; INFILTRATION; INTRACAUDAL; PERINEURAL AS NEEDED
Status: DISCONTINUED | OUTPATIENT
Start: 2017-09-26 | End: 2017-09-26 | Stop reason: HOSPADM

## 2017-09-26 RX ORDER — HYDROMORPHONE HYDROCHLORIDE 1 MG/ML
0.2 INJECTION, SOLUTION INTRAMUSCULAR; INTRAVENOUS; SUBCUTANEOUS
Status: DISCONTINUED | OUTPATIENT
Start: 2017-09-26 | End: 2017-09-26 | Stop reason: HOSPADM

## 2017-09-26 RX ORDER — MORPHINE SULFATE 5 MG/ML
INJECTION, SOLUTION INTRAVENOUS
Status: DISCONTINUED | OUTPATIENT
Start: 2017-09-26 | End: 2017-09-27

## 2017-09-26 RX ORDER — CEFAZOLIN SODIUM 1 G/3ML
2 INJECTION, POWDER, FOR SOLUTION INTRAMUSCULAR; INTRAVENOUS ONCE
Status: COMPLETED | OUTPATIENT
Start: 2017-09-26 | End: 2017-09-26

## 2017-09-26 RX ORDER — ONDANSETRON 2 MG/ML
4 INJECTION INTRAMUSCULAR; INTRAVENOUS ONCE
Status: COMPLETED | OUTPATIENT
Start: 2017-09-26 | End: 2017-09-26

## 2017-09-26 RX ORDER — ONDANSETRON 2 MG/ML
INJECTION INTRAMUSCULAR; INTRAVENOUS
Status: COMPLETED
Start: 2017-09-26 | End: 2017-09-26

## 2017-09-26 RX ORDER — SODIUM CHLORIDE, SODIUM LACTATE, POTASSIUM CHLORIDE, CALCIUM CHLORIDE 600; 310; 30; 20 MG/100ML; MG/100ML; MG/100ML; MG/100ML
25 INJECTION, SOLUTION INTRAVENOUS CONTINUOUS
Status: DISCONTINUED | OUTPATIENT
Start: 2017-09-26 | End: 2017-09-26 | Stop reason: HOSPADM

## 2017-09-26 RX ORDER — HEPARIN SODIUM 5000 [USP'U]/ML
INJECTION, SOLUTION INTRAVENOUS; SUBCUTANEOUS AS NEEDED
Status: DISCONTINUED | OUTPATIENT
Start: 2017-09-26 | End: 2017-09-26 | Stop reason: HOSPADM

## 2017-09-26 RX ORDER — ENOXAPARIN SODIUM 100 MG/ML
40 INJECTION SUBCUTANEOUS EVERY 24 HOURS
Status: DISCONTINUED | OUTPATIENT
Start: 2017-09-26 | End: 2017-09-27 | Stop reason: HOSPADM

## 2017-09-26 RX ORDER — LIDOCAINE HYDROCHLORIDE 10 MG/ML
0.1 INJECTION, SOLUTION EPIDURAL; INFILTRATION; INTRACAUDAL; PERINEURAL AS NEEDED
Status: DISCONTINUED | OUTPATIENT
Start: 2017-09-26 | End: 2017-09-26 | Stop reason: HOSPADM

## 2017-09-26 RX ORDER — GUAIFENESIN 100 MG/5ML
81 LIQUID (ML) ORAL DAILY
Status: DISCONTINUED | OUTPATIENT
Start: 2017-09-27 | End: 2017-09-27 | Stop reason: HOSPADM

## 2017-09-26 RX ORDER — OXYCODONE AND ACETAMINOPHEN 7.5; 325 MG/1; MG/1
1-2 TABLET ORAL
Status: DISCONTINUED | OUTPATIENT
Start: 2017-09-26 | End: 2017-09-27 | Stop reason: HOSPADM

## 2017-09-26 RX ORDER — GLIPIZIDE 5 MG/1
20 TABLET, FILM COATED, EXTENDED RELEASE ORAL DAILY
Status: DISCONTINUED | OUTPATIENT
Start: 2017-09-27 | End: 2017-09-27

## 2017-09-26 RX ORDER — DEXAMETHASONE SODIUM PHOSPHATE 4 MG/ML
INJECTION, SOLUTION INTRA-ARTICULAR; INTRALESIONAL; INTRAMUSCULAR; INTRAVENOUS; SOFT TISSUE AS NEEDED
Status: DISCONTINUED | OUTPATIENT
Start: 2017-09-26 | End: 2017-09-26 | Stop reason: HOSPADM

## 2017-09-26 RX ADMIN — FENTANYL CITRATE 50 MCG: 50 INJECTION, SOLUTION INTRAMUSCULAR; INTRAVENOUS at 10:32

## 2017-09-26 RX ADMIN — Medication: at 13:46

## 2017-09-26 RX ADMIN — ONDANSETRON 4 MG: 2 INJECTION INTRAMUSCULAR; INTRAVENOUS at 14:04

## 2017-09-26 RX ADMIN — SODIUM CHLORIDE 75 ML/HR: 450 INJECTION, SOLUTION INTRAVENOUS at 13:46

## 2017-09-26 RX ADMIN — SUCCINYLCHOLINE CHLORIDE 120 MG: 20 INJECTION INTRAMUSCULAR; INTRAVENOUS at 10:32

## 2017-09-26 RX ADMIN — ROCURONIUM BROMIDE 10 MG: 10 INJECTION, SOLUTION INTRAVENOUS at 11:50

## 2017-09-26 RX ADMIN — HYDROMORPHONE HYDROCHLORIDE 0.5 MG: 2 INJECTION, SOLUTION INTRAMUSCULAR; INTRAVENOUS; SUBCUTANEOUS at 12:38

## 2017-09-26 RX ADMIN — SODIUM CHLORIDE, POTASSIUM CHLORIDE, SODIUM LACTATE AND CALCIUM CHLORIDE: 600; 310; 30; 20 INJECTION, SOLUTION INTRAVENOUS at 12:15

## 2017-09-26 RX ADMIN — ONDANSETRON 4 MG: 2 INJECTION INTRAMUSCULAR; INTRAVENOUS at 12:42

## 2017-09-26 RX ADMIN — FENTANYL CITRATE 50 MCG: 50 INJECTION, SOLUTION INTRAMUSCULAR; INTRAVENOUS at 09:31

## 2017-09-26 RX ADMIN — SODIUM CHLORIDE, SODIUM LACTATE, POTASSIUM CHLORIDE, AND CALCIUM CHLORIDE 25 ML/HR: 600; 310; 30; 20 INJECTION, SOLUTION INTRAVENOUS at 08:55

## 2017-09-26 RX ADMIN — MIDAZOLAM HYDROCHLORIDE 2 MG: 1 INJECTION, SOLUTION INTRAMUSCULAR; INTRAVENOUS at 10:30

## 2017-09-26 RX ADMIN — ACETAMINOPHEN 1000 MG: 10 INJECTION, SOLUTION INTRAVENOUS at 11:00

## 2017-09-26 RX ADMIN — CEFAZOLIN 2 G: 10 INJECTION, POWDER, FOR SOLUTION INTRAVENOUS; PARENTERAL at 20:20

## 2017-09-26 RX ADMIN — HEPARIN SODIUM 5000 UNITS: 5000 INJECTION, SOLUTION INTRAVENOUS; SUBCUTANEOUS at 11:03

## 2017-09-26 RX ADMIN — NEOSTIGMINE METHYLSULFATE 3 MG: 1 INJECTION INTRAVENOUS at 12:53

## 2017-09-26 RX ADMIN — CEFAZOLIN 2 G: 1 INJECTION, POWDER, FOR SOLUTION INTRAMUSCULAR; INTRAVENOUS; PARENTERAL at 10:37

## 2017-09-26 RX ADMIN — DEXAMETHASONE SODIUM PHOSPHATE 4 MG: 4 INJECTION, SOLUTION INTRA-ARTICULAR; INTRALESIONAL; INTRAMUSCULAR; INTRAVENOUS; SOFT TISSUE at 10:46

## 2017-09-26 RX ADMIN — PRAVASTATIN SODIUM 80 MG: 40 TABLET ORAL at 20:20

## 2017-09-26 RX ADMIN — ENOXAPARIN SODIUM 40 MG: 40 INJECTION SUBCUTANEOUS at 17:19

## 2017-09-26 RX ADMIN — MIDAZOLAM HYDROCHLORIDE 2 MG: 1 INJECTION, SOLUTION INTRAMUSCULAR; INTRAVENOUS at 09:31

## 2017-09-26 RX ADMIN — PROPOFOL 130 MG: 10 INJECTION, EMULSION INTRAVENOUS at 10:32

## 2017-09-26 RX ADMIN — GLYCOPYRROLATE 0.5 MG: 0.2 INJECTION INTRAMUSCULAR; INTRAVENOUS at 12:53

## 2017-09-26 RX ADMIN — ROCURONIUM BROMIDE 5 MG: 10 INJECTION, SOLUTION INTRAVENOUS at 10:32

## 2017-09-26 RX ADMIN — ALBUMIN HUMAN 250 ML: 50 SOLUTION INTRAVENOUS at 12:15

## 2017-09-26 RX ADMIN — ROCURONIUM BROMIDE 5 MG: 10 INJECTION, SOLUTION INTRAVENOUS at 12:09

## 2017-09-26 RX ADMIN — ROCURONIUM BROMIDE 35 MG: 10 INJECTION, SOLUTION INTRAVENOUS at 10:44

## 2017-09-26 RX ADMIN — ROCURONIUM BROMIDE 10 MG: 10 INJECTION, SOLUTION INTRAVENOUS at 11:08

## 2017-09-26 RX ADMIN — SODIUM CHLORIDE, POTASSIUM CHLORIDE, SODIUM LACTATE AND CALCIUM CHLORIDE: 600; 310; 30; 20 INJECTION, SOLUTION INTRAVENOUS at 10:30

## 2017-09-26 RX ADMIN — LIDOCAINE HYDROCHLORIDE 100 MG: 20 INJECTION, SOLUTION EPIDURAL; INFILTRATION; INTRACAUDAL; PERINEURAL at 10:32

## 2017-09-26 NOTE — OP NOTES
[address]   OP NOTE       Name:  Tressa Savage   MR#:  [id number]   :  1934   Account #:  [de-identified]    Surgery Date:  2017   Date of Adm:  2017       PREOPERATIVE DIAGNOSIS: Complex abdominal aortic aneurysm. POSTOPERATIVE DIAGNOSIS: Complex abdominal aortic aneurysm. PROCEDURES PERFORMED   1. Endovascular repair of abdominal aortic aneurysm. 2. Snorkeling of the left renal artery with a 6 x 39 VBX covered stent. 3. Intravascular ultrasound and fluoroscopic angiography. SURGEONS: Maggie Cintron MD and Mark Moy MD    ASSISTANT: Freeman Sandifer, MD    ANESTHESIA: General.    IMPLANTS: Endologix 28-80-16-40 bifurcated device and Endologix   proximal cuff -80. VBX covered stent 6 mm x 39 mm in left   renal.    ESTIMATED BLOOD LOSS: 200 mL. COMPLICATIONS: None. SPECIMENS REMOVED: None. DESCRIPTION OF PROCEDURE: With the patient supine on the   operating table, general anesthesia was induced. She was prepared in   a sterile field from nipples to knees. An incision was made obliquely in   the right groin where the common femoral artery was identified nicely. The left femoral artery was cannulated percutaneously. A guidewire   and sheath were inserted. Incision was then made over the brachial   artery just proximal to the antecubital fossa and  identified nicely. The patient was systemically heparinized. Sheaths were placed in both   femoral arteries. Guidewires were placed up through the aneurysm   under fluoroscopic control. A stiff wire was placed into the thoracic   aorta of the right iliac system. A snare catheter was placed over the left   iliac system. The Endologix bifurcated device, as noted above, was   introduced, deployed and brought down and seated on the aortic   bifurcation. The crossover wire was snared, brought out through the   left femoral system.  The proximal cuff was then positioned. Using   intravascular fluoro angiography as well as intravascular ultrasound,   the level of the left renal artery, right renal artery and superior   mesenteric arteries were identified. The patient had been   previously systemically heparinized. Dr. Nati Maher then entered the case   and put a long 7-[place] sheath through the aortic arch and down the   descending aorta into the abdominal aorta. Through this, he   cannulated the left renal artery and ultimately using several wires and   guiding catheters, was able to place a sheath within the orifice of the   renal artery. Again, the position of the right and left renal artery as well   as SMA were identified with fluoroscopy angiography and with   intravascular ultrasound. The proximal Endologix cuff noted above was   deployed just distal to the SMA and right renal and covering over the   orifice of the left renal. Following this, the previously positioned VBX 6   x 39 stent was deployed in the left renal artery and seated with a 6 mm   balloon. All anastomotic sites of the aortic portion were balloon dilated   with high volume/low pressure balloon, and the left iliac was dilated   with high pressure normal balloon. A completion angiogram revealed   excellent flow through the SMA, celiac, and the snorkeled left renal   artery. The sheath and wires were removed. The arterial defect in the   right groin was closed via lateral arteriography with 4-0 Prolene. The   defect in the brachial artery was closed via lateral arteriography with a   5-0 Prolene. The left femoral puncture site was sealed with a   StarClose device. Wounds were examined for hemostasis, which was   considered adequate. The subcutaneous tissues were closed with 2-0   Vicryl and the skin with subcuticular suture of 3-0 Vicryl. Flexible collodion was applied. The patient returned to the recovery   room in good condition.         MD DANIELA Harding / Melva Wagner   D:  09/26/2017 13:14   T:  09/26/2017   13:43   Job #:  [id number]

## 2017-09-26 NOTE — PERIOP NOTES
0239 - Pre op completed, anesthesia took over and pt in [facility].  Prior to anesthesia taking over, security in to take pt insurance cards and ID.     2177 - Pre op took back over patient, patient now has central line, arterial line, second tube of type and screen drawn by anesthesia sent to lab, patient on 2 LPM nasal cannula, monitoring patient vital signs

## 2017-09-26 NOTE — ANESTHESIA PROCEDURE NOTES
Arterial Line Placement    Start time: 9/26/2017 9:31 AM  End time: 9/26/2017 9:46 AM  Performed by: Aide Valdez by: Valentina Reardon     Pre-Procedure  Indications:  Arterial pressure monitoring  Preanesthetic Checklist: timeout performed    Timeout Time: 09:31        Procedure:   Prep:  Chlorhexidine  Seldinger Technique?: Yes    Orientation:  Right  Location:  Radial artery  Catheter size:  20 G  Number of attempts:  1  Cont Cardiac Output Sensor: No      Assessment:   Post-procedure:  Line secured and sterile dressing applied  Patient Tolerance:  Patient tolerated the procedure well with no immediate complications  Comment:   Risks, benefits, alternatives explained and patient agrees to proceed. Sterile prep with Chlorhexidine. 0.5 mL 1% Lidocaine placed at insertion site.

## 2017-09-26 NOTE — PERIOP NOTES
Handoff Report from Operating Room to PACU    Report received from JASON Rodriguez RN and [address] [place] [address] Regla and Donnie Hall CRNA regarding Alicia Hauser. Surgeon(s):  MD Penelope Young MD  And Procedure(s) (LRB):  ENDOVASCULAR AORTIC ABDOMINAL ANEURYSM REPAIR/SNORKELING, LEFT RENAL ARTERY STENT PLACEMENT and balloon angioplasty (Bilateral)  confirmed   with allergies, drains and dressings discussed. Anesthesia type, drugs, patient history, complications, estimated blood loss, vital signs, intake and output, and last pain medication, lines, reversal medications and temperature were reviewed.

## 2017-09-26 NOTE — ANESTHESIA PREPROCEDURE EVALUATION
Anesthetic History   No history of anesthetic complications            Review of Systems / Medical History  Patient summary reviewed, nursing notes reviewed and pertinent labs reviewed    Pulmonary                Comments: Former smoker - 1.25 pack years   Neuro/Psych   Within defined limits           Cardiovascular    Hypertension: well controlled              Exercise tolerance: >4 METS  Comments: AAA  Renal Artery Stenosis   GI/Hepatic/Renal  Within defined limits              Endo/Other    Diabetes: well controlled, type 2         Other Findings            Physical Exam    Airway  Mallampati: II  TM Distance: > 6 cm  Neck ROM: normal range of motion   Mouth opening: Normal     Cardiovascular  Regular rate and rhythm,  S1 and S2 normal,  no murmur, click, rub, or gallop             Dental    Dentition: Edentulous, Full lower dentures and Full upper dentures     Pulmonary  Breath sounds clear to auscultation               Abdominal  GI exam deferred       Other Findings            Anesthetic Plan    ASA: 3  Anesthesia type: general    Monitoring Plan: CVP and Arterial line      Induction: Intravenous  Anesthetic plan and risks discussed with: Patient

## 2017-09-26 NOTE — BRIEF OP NOTE
BRIEF OPERATIVE NOTE    Date of Procedure: 9/26/2017   Preoperative Diagnosis: AAA, RENAL STENOSIS  Postoperative Diagnosis: AAA, RENAL STENOSIS    Procedure(s):  ENDOVASCULAR AORTIC ABDOMINAL ANEURYSM REPAIR/SNORKELING, LEFT RENAL ARTERY STENT PLACEMENT and balloon angioplasty  Surgeon(s) and Role:     * Omar Russo MD - Primary     * Candace Johns MD - Assisting         Assistant Staff:       Surgical Staff:  Circ-1: Judith Sullivan RN  Circ-Relief: Cesar Delgadillo RN  Radiology Technician: RT Liv; RT Kina  Scrub Tech-1: Dotty Feil  Scrub Tech-2: [facility]  Scrub Tech-Relief: Demetrice Georgia  Surg Asst-1: [facility]  Float Staff: Humberto Resendiz RN  Event Time In   Incision Start 1052   Incision Close 1256     Anesthesia: General   Estimated Blood Loss: 200cc  Specimens: * No specimens in log *   Findings: aaa   Complications: 0  Implants:   Implant Name Type Inv.  Item Serial No.  Lot No. LRB No. Used Action   AFX2 bifurcated endograft system   [id number] ENDOLOGIX NA N/A 1 Implanted   [employer] AFX   [id number] ENDOLOGIX NA N/A 1 Implanted   GRAFT STNT EXP [id number] -- Ronne Decree - [de-identified]   GRAFT STNT EXP [id number] -- VIABAHN VBX [de-identified] [employer] NA   1 Implanted

## 2017-09-26 NOTE — ANESTHESIA POSTPROCEDURE EVALUATION
Post-Anesthesia Evaluation and Assessment    Patient: Maritza Patel MRN: [id number]  SSN: [id number]    YOB: 1934  Age: 80 y.o. Sex: female       Cardiovascular Function/Vital Signs  Visit Vitals    /67 (BP 1 Location: Right arm, BP Patient Position: At rest)    Pulse (!) 58    Temp 36.4 °C (97.6 °F)    Resp 12    Ht 5' 7\" (1.702 m)    Wt 83.1 kg (183 lb 3.2 oz)    SpO2 97%    BMI 28.69 kg/m2       Patient is status post general anesthesia for Procedure(s):  ENDOVASCULAR AORTIC ABDOMINAL ANEURYSM REPAIR/SNORKELING, LEFT RENAL ARTERY STENT PLACEMENT and balloon angioplasty. Nausea/Vomiting: None    Postoperative hydration reviewed and adequate. Pain:  Pain Scale 1: Numeric (0 - 10) (09/26/17 1430)  Pain Intensity 1: 0 (09/26/17 1430)   Managed    Neurological Status:   Neuro (WDL): Within Defined Limits (09/26/17 1316)  Neuro  Neurologic State: Drowsy (09/26/17 1316)  Orientation Level: Oriented to person;Oriented to place;Oriented to situation (09/26/17 1316)  Cognition: Appropriate decision making; Appropriate for age attention/concentration; Appropriate safety awareness; Follows commands (09/26/17 1316)  Speech: Clear (09/26/17 1316)  LUE Motor Response: Purposeful (09/26/17 1316)  LLE Motor Response: Purposeful (09/26/17 1316)  RUE Motor Response: Purposeful (09/26/17 1316)  RLE Motor Response: Purposeful (09/26/17 1316)   At baseline    Mental Status and Level of Consciousness: Arousable    Pulmonary Status:   O2 Device: Nasal cannula (09/26/17 1445)   Adequate oxygenation and airway patent    Complications related to anesthesia: None    Post-anesthesia assessment completed.  No concerns    Signed By: Cj Lopez MD     September 26, 2017

## 2017-09-26 NOTE — IP AVS SNAPSHOT
[address] [facility] 
[phone] Patient: Jeannette Bartlett MRN: [id number] EXH:4/9/0988 Current Discharge Medication List  
  
START taking these medications Dose & Instructions Dispensing Information Comments Morning Noon Evening Bedtime  
 aspirin delayed-release 81 mg tablet Replaces:  aspirin 81 mg chewable tablet Your last dose was: Your next dose is:    
   
   
 Dose:  81 mg Take 1 Tab by mouth daily. Quantity:  30 Tab Refills:  0  
     
   
   
   
  
 oxyCODONE IR 5 mg immediate release tablet Commonly known as:  Eloina Reagan Your last dose was: Your next dose is:    
   
   
 Dose:  5 mg Take 1 Tab by mouth every four (4) hours as needed for Pain. Max Daily Amount: 30 mg.  
 Quantity:  20 Tab Refills:  0 CONTINUE these medications which have NOT CHANGED Dose & Instructions Dispensing Information Comments Morning Noon Evening Bedtime ARTIFICIAL TEARS OP Your last dose was: Your next dose is:    
   
   
 Apply  to eye as needed. Refills:  0  
     
   
   
   
  
 CARTIA  mg ER capsule Generic drug:  dilTIAZem CD Your last dose was: Your next dose is:    
   
   
 Dose:  240 mg Take 240 mg by mouth daily. Refills:  0  
     
   
   
   
  
 glipiZIDE SR 10 mg CR tablet Commonly known as:  GLUCOTROL XL Your last dose was: Your next dose is:    
   
   
 Dose:  20 mg Take 20 mg by mouth daily. Refills:  0  
     
   
   
   
  
 metFORMIN 500 mg tablet Commonly known as:  GLUCOPHAGE Your last dose was: Your next dose is:    
   
   
 Dose:  500 mg Take 500 mg by mouth daily (with breakfast). Refills:  0  
     
   
   
   
  
 ONE-A-DAY WOMENS FORMULA PO Your last dose was: Your next dose is:    
   
   
 Dose:  1 Tab Take 1 Tab by mouth daily. Refills:  0  
     
   
   
   
  
 pravastatin 80 mg tablet Commonly known as:  PRAVACHOL Your last dose was: Your next dose is:    
   
   
 Dose:  80 mg Take 80 mg by mouth nightly. Refills:  0  
     
   
   
   
  
 TYLENOL EXTRA STRENGTH 500 mg tablet Generic drug:  acetaminophen Your last dose was: Your next dose is:    
   
   
 Dose:  500 mg Take 500 mg by mouth every six (6) hours as needed for Pain. Usually takes every night Refills:  0 STOP taking these medications   
 aspirin 81 mg chewable tablet Replaced by:  aspirin delayed-release 81 mg tablet Where to Get Your Medications These medications were sent to [address] [facility]  [address] [address] Phone:  [phone]  
  aspirin delayed-release 81 mg tablet Information on where to get these meds will be given to you by the nurse or doctor. ! Ask your nurse or doctor about these medications  
  oxyCODONE IR 5 mg immediate release tablet

## 2017-09-26 NOTE — IP AVS SNAPSHOT
[facility] [employer] 
[phone] Patient: Lester Montero MRN: [id number] VOF:3/5/9648 You are allergic to the following No active allergies Recent Documentation Height Weight BMI OB Status Smoking Status 1.702 m 84.8 kg 29.29 kg/m2 Hysterectomy Former Smoker Unresulted Labs Order Current Status CULTURE, URINE Preliminary result Emergency Contacts Name Discharge Info Relation Home Work Mobile Micheline Armando DISCHARGE CAREGIVER [3] Daughter [21] [phone] About your hospitalization You were admitted on:  September 26, 2017 You last received care in the:  [facility] PROGRESSIVE CARE You were discharged on:  September 27, 2017 Unit phone number:  [phone] Why you were hospitalized Your primary diagnosis was:  Not on File Your diagnoses also included: Aortic Aneurysm (Hcc) Providers Seen During Your Hospitalizations Provider Role Specialty Primary office phone Bar King MD Attending Provider Vascular Surgery [phone] Your Primary Care Physician (PCP) Primary Care Physician Office Phone Office Fax Dveaughn Miranda [phone] Follow-up Information Follow up With Details Comments Contact Info Елена Lyons NP Go on 10/5/2017 PCP follow up at 10:30 AM [facility] [address] 
[phone] Bar King MD In 2 weeks Vascular Surgery- The nurse will call you to schedule this appontment. CherMauricio [address] [employer] 
[phone] Current Discharge Medication List  
  
START taking these medications Dose & Instructions Dispensing Information Comments Morning Noon Evening Bedtime  
 aspirin delayed-release 81 mg tablet Replaces:  aspirin 81 mg chewable tablet Your last dose was: Your next dose is: Dose:  81 mg Take 1 Tab by mouth daily. Quantity:  30 Tab Refills:  0  
     
   
   
   
  
 oxyCODONE IR 5 mg immediate release tablet Commonly known as:  Robertyenni Vasquez Your last dose was: Your next dose is:    
   
   
 Dose:  5 mg Take 1 Tab by mouth every four (4) hours as needed for Pain. Max Daily Amount: 30 mg.  
 Quantity:  20 Tab Refills:  0 CONTINUE these medications which have NOT CHANGED Dose & Instructions Dispensing Information Comments Morning Noon Evening Bedtime ARTIFICIAL TEARS OP Your last dose was: Your next dose is:    
   
   
 Apply  to eye as needed. Refills:  0  
     
   
   
   
  
 CARTIA  mg ER capsule Generic drug:  dilTIAZem CD Your last dose was: Your next dose is:    
   
   
 Dose:  240 mg Take 240 mg by mouth daily. Refills:  0  
     
   
   
   
  
 glipiZIDE SR 10 mg CR tablet Commonly known as:  GLUCOTROL XL Your last dose was: Your next dose is:    
   
   
 Dose:  20 mg Take 20 mg by mouth daily. Refills:  0  
     
   
   
   
  
 metFORMIN 500 mg tablet Commonly known as:  GLUCOPHAGE Your last dose was: Your next dose is:    
   
   
 Dose:  500 mg Take 500 mg by mouth daily (with breakfast). Refills:  0  
     
   
   
   
  
 ONE-A-DAY WOMENS FORMULA PO Your last dose was: Your next dose is:    
   
   
 Dose:  1 Tab Take 1 Tab by mouth daily. Refills:  0  
     
   
   
   
  
 pravastatin 80 mg tablet Commonly known as:  PRAVACHOL Your last dose was: Your next dose is:    
   
   
 Dose:  80 mg Take 80 mg by mouth nightly. Refills:  0  
     
   
   
   
  
 TYLENOL EXTRA STRENGTH 500 mg tablet Generic drug:  acetaminophen Your last dose was: Your next dose is:    
   
   
 Dose:  500 mg Take 500 mg by mouth every six (6) hours as needed for Pain.  Usually takes every night Refills:  0 STOP taking these medications   
 aspirin 81 mg chewable tablet Replaced by:  aspirin delayed-release 81 mg tablet Where to Get Your Medications These medications were sent to [address] [facility]  [address] [address] Phone:  [phone]  
  aspirin delayed-release 81 mg tablet Information on where to get these meds will be given to you by the nurse or doctor. ! Ask your nurse or doctor about these medications  
  oxyCODONE IR 5 mg immediate release tablet Discharge Instructions Discharge  Instructions: You may shower on Friday. Dry the wound carefully. Keep wounds clean and dry. Do not attempt to drive a car until you are comfortable and NOT taking pain medication. No heavy lifting (20 lbs limit). Mild exercise and light duties around the house are OK. The dressings can be removed if there is no drainage, or changed and kept in place for comfort. Call the office if you have any problems. Discharge Orders None Neighborhoods Announcement We are excited to announce that we are making your provider's discharge notes available to you in Neighborhoods. You will see these notes when they are completed and signed by the physician that discharged you from your recent hospital stay. If you have any questions or concerns about any information you see in Neighborhoods, please call the Health Information Department where you were seen or reach out to your Primary Care Provider for more information about your plan of care. Introducing [facility] SERVICES! Alecia Raphael introduces Neighborhoods patient portal. Now you can access parts of your medical record, email your doctor's office, and request medication refills online. 1. In your internet browser, go to [email or web address] 2. Click on the First Time User? Click Here link in the Sign In box. You will see the New Member Sign Up page. 3. Enter your [employer] Access Code exactly as it appears below. You will not need to use this code after youve completed the sign-up process. If you do not sign up before the expiration date, you must request a new code. · [employer] Access Code: [email or web address] Expires: 10/15/2017  2:14 PM 
 
4. Enter the last four digits of your Social Security Number (xxxx) and Date of Birth (mm/dd/yyyy) as indicated and click Submit. You will be taken to the next sign-up page. 5. Create a [employer] ID. This will be your [employer] login ID and cannot be changed, so think of one that is secure and easy to remember. 6. Create a [employer] password. You can change your password at any time. 7. Enter your Password Reset Question and Answer. This can be used at a later time if you forget your password. 8. Enter your e-mail address. You will receive e-mail notification when new information is available in [address] 9. Click Sign Up. You can now view and download portions of your medical record. 10. Click the Download Summary menu link to download a portable copy of your medical information. If you have questions, please visit the Frequently Asked Questions section of the [employer] website. Remember, [employer] is NOT to be used for urgent needs. For medical emergencies, dial 911. Now available from your iPhone and Android! General Information Please provide this summary of care documentation to your next provider. Patient Signature:  ____________________________________________________________ Date:  ____________________________________________________________  
  
Melva Spare Provider Signature:  ____________________________________________________________ Date:  ____________________________________________________________

## 2017-09-26 NOTE — H&P
[facility]   [place], [address]   HISTORY AND PHYSICAL       Name:  Refugio Myers   MR#:  [id number]   :  1934   Account #:  [de-identified]        Date of Adm:  2017       HISTORY OF PRESENT ILLNESS: The patient is a very pleasant 80  year-old lady who was worked up thoroughly as an outpatient for an   abdominal aortic aneurysm. She also has carcinoma of the bladder. She needs aneurysm repair prior to her bladder surgery. PAST MEDICAL HISTORY: Well documented in the chart. PHYSICAL EXAMINATION   HEENT: PERRL. EOMI. NECK: Supple. CHEST: Clear. CARDIAC: Rhythm without gallop. ABDOMEN: Obese, soft, nontender. Aortic pulsation is broadened. VASCULAR: Carotid, brachial and radial pulses are palpable, as are   the femorals. IMPRESSION: Abdominal aortic aneurysm. PLAN: Endovascular repair.         MD DANIELA Garcia / Dulce Elliott   D:  2017   13:08   T:  2017   13:20   Job #:  [id number]

## 2017-09-26 NOTE — PERIOP NOTES
TRANSFER - OUT REPORT:    Verbal report given to Mohini Wilson RN (name) on Maritza Re  being transferred to [address] [facility] (unit) for routine post - op       Report consisted of patients Situation, Background, Assessment and   Recommendations(SBAR). Information from the following report(s) SBAR, OR Summary, Intake/Output, MAR, Recent Results, Med Rec Status and Cardiac Rhythm NSR was reviewed with the receiving nurse. Opportunity for questions and clarification was provided.       Patient transported with:   Monitor  O2 @ 2 liters  Registered Nurse  [facility]

## 2017-09-26 NOTE — PROGRESS NOTES
TRANSFER - IN REPORT:    Verbal report received from Ashley Lopez RN(name) on Steffen Foot  being received from [employer] for routine post - op      Report consisted of patients Situation, Background, Assessment and   Recommendations(SBAR). Information from the following report(s) Kardex, MAR and Cardiac Rhythm NSR was reviewed with the receiving nurse. Opportunity for questions and clarification was provided. Assessment completed upon patients arrival to unit and care assumed. Primary Nurse Nato Rodriguez RN and SEBASTIAN Monroy performed a dual skin assessment on this patient No impairment noted with the exception of the surgical inscisions to the left and right groin. Adam score is 21    1530 Patient awake and alert. Patient states that her left arm hurts, but she does not want any medication. 1630 Patient resting quietly  1730 Patient watching television resting quietly.

## 2017-09-26 NOTE — ANESTHESIA PROCEDURE NOTES
Central Line Placement    Start time: 9/26/2017 9:31 AM  End time: 9/26/2017 9:44 AM  Performed by: Shira Mendoza  Authorized by: Shira Mendoza     Indications: central pressure monitoring and vascular access  Timeout Time: 09:31       Pre-procedure: All elements of maximal sterile barrier technique followed? Yes    Maximal barrier precautions followed, 2% Chlorhexidine for cutaneous antisepsis and Hand hygiene performed prior to catheter insertion            Procedure:   Prep:  Chlorhexidine  Location:  Internal jugular  Orientation:  Right  Patient position:  Trendelenburg  Catheter type:  Triple lumen  Catheter size:  7 Fr  Catheter length:  16 cm  Number of attempts:  1  Successful placement: Yes      Assessment:   Post-procedure:  Catheter secured and sterile dressing with CHG applied  Assessment:  Blood return through all ports, free fluid flow, placement verified by x-ray and guidewire removal verified  Insertion:  Uncomplicated  Patient tolerance:  Patient tolerated the procedure well with no immediate complications  Central Line Procedure Note    Indication: Inadequate venous access    Risks, benefits, alternatives explained and patient agrees to proceed. Patient positioned in Trendelenburg. 7-Step Sterility Protocol followed. (cap, mask sterile gown, sterile gloves, large sterile sheet, hand hygiene, 2% chlorhexidine for cutaneous antisepsis)  5 mL 1% Lidocaine placed at insertion site. Right internal jugular cannulated x 1 attempt(s) utilizing the Seldinger technique. Catheter secured & Biopatch applied. Sterile Tegaderm placed. CXR pending.

## 2017-09-27 VITALS
TEMPERATURE: 98 F | HEART RATE: 74 BPM | DIASTOLIC BLOOD PRESSURE: 68 MMHG | BODY MASS INDEX: 29.35 KG/M2 | WEIGHT: 187 LBS | HEIGHT: 67 IN | OXYGEN SATURATION: 96 % | RESPIRATION RATE: 18 BRPM | SYSTOLIC BLOOD PRESSURE: 150 MMHG

## 2017-09-27 LAB
ALBUMIN SERPL-MCNC: 3.3 G/DL (ref 3.5–5)
ALBUMIN/GLOB SERPL: 0.9 {RATIO} (ref 1.1–2.2)
ALP SERPL-CCNC: 108 U/L (ref 45–117)
ALT SERPL-CCNC: 15 U/L (ref 12–78)
ANION GAP SERPL CALC-SCNC: 6 MMOL/L (ref 5–15)
AST SERPL-CCNC: 13 U/L (ref 15–37)
BACTERIA SPEC CULT: NORMAL
BASOPHILS # BLD: 0 K/UL (ref 0–0.1)
BASOPHILS NFR BLD: 0 % (ref 0–1)
BILIRUB SERPL-MCNC: 0.5 MG/DL (ref 0.2–1)
BUN SERPL-MCNC: 11 MG/DL (ref 6–20)
BUN/CREAT SERPL: 14 (ref 12–20)
CALCIUM SERPL-MCNC: 8.5 MG/DL (ref 8.5–10.1)
CC UR VC: NORMAL
CHLORIDE SERPL-SCNC: 105 MMOL/L (ref 97–108)
CO2 SERPL-SCNC: 28 MMOL/L (ref 21–32)
CREAT SERPL-MCNC: 0.77 MG/DL (ref 0.55–1.02)
EOSINOPHIL # BLD: 0 K/UL (ref 0–0.4)
EOSINOPHIL NFR BLD: 0 % (ref 0–7)
ERYTHROCYTE [DISTWIDTH] IN BLOOD BY AUTOMATED COUNT: 16 % (ref 11.5–14.5)
GLOBULIN SER CALC-MCNC: 3.6 G/DL (ref 2–4)
GLUCOSE BLD STRIP.AUTO-MCNC: 189 MG/DL (ref 65–100)
GLUCOSE SERPL-MCNC: 166 MG/DL (ref 65–100)
HCT VFR BLD AUTO: 36.2 % (ref 35–47)
HGB BLD-MCNC: 11.1 G/DL (ref 11.5–16)
LYMPHOCYTES # BLD: 0.8 K/UL (ref 0.8–3.5)
LYMPHOCYTES NFR BLD: 10 % (ref 12–49)
MCH RBC QN AUTO: 25.2 PG (ref 26–34)
MCHC RBC AUTO-ENTMCNC: 30.7 G/DL (ref 30–36.5)
MCV RBC AUTO: 82.1 FL (ref 80–99)
MONOCYTES # BLD: 0.5 K/UL (ref 0–1)
MONOCYTES NFR BLD: 6 % (ref 5–13)
NEUTS SEG # BLD: 7.1 K/UL (ref 1.8–8)
NEUTS SEG NFR BLD: 84 % (ref 32–75)
PLATELET # BLD AUTO: 198 K/UL (ref 150–400)
POTASSIUM SERPL-SCNC: 4.2 MMOL/L (ref 3.5–5.1)
PROT SERPL-MCNC: 6.9 G/DL (ref 6.4–8.2)
RBC # BLD AUTO: 4.41 M/UL (ref 3.8–5.2)
SERVICE CMNT-IMP: ABNORMAL
SERVICE CMNT-IMP: NORMAL
SODIUM SERPL-SCNC: 139 MMOL/L (ref 136–145)
WBC # BLD AUTO: 8.3 K/UL (ref 3.6–11)

## 2017-09-27 PROCEDURE — 36415 COLL VENOUS BLD VENIPUNCTURE: CPT | Performed by: SURGERY

## 2017-09-27 PROCEDURE — 82962 GLUCOSE BLOOD TEST: CPT

## 2017-09-27 PROCEDURE — 74011250636 HC RX REV CODE- 250/636: Performed by: SURGERY

## 2017-09-27 PROCEDURE — 74011250637 HC RX REV CODE- 250/637: Performed by: SURGERY

## 2017-09-27 PROCEDURE — 74011636637 HC RX REV CODE- 636/637: Performed by: NURSE PRACTITIONER

## 2017-09-27 PROCEDURE — 85025 COMPLETE CBC W/AUTO DIFF WBC: CPT | Performed by: SURGERY

## 2017-09-27 PROCEDURE — 80053 COMPREHEN METABOLIC PANEL: CPT | Performed by: SURGERY

## 2017-09-27 RX ORDER — OXYCODONE HYDROCHLORIDE 5 MG/1
5 TABLET ORAL
Qty: 20 TAB | Refills: 0 | Status: SHIPPED | OUTPATIENT
Start: 2017-09-27 | End: 2017-11-08

## 2017-09-27 RX ORDER — OXYCODONE HYDROCHLORIDE 5 MG/1
5 TABLET ORAL
Qty: 20 TAB | Refills: 0 | Status: SHIPPED | OUTPATIENT
Start: 2017-09-27 | End: 2017-09-27

## 2017-09-27 RX ORDER — INSULIN LISPRO 100 [IU]/ML
INJECTION, SOLUTION INTRAVENOUS; SUBCUTANEOUS
Status: DISCONTINUED | OUTPATIENT
Start: 2017-09-27 | End: 2017-09-27 | Stop reason: HOSPADM

## 2017-09-27 RX ORDER — MAGNESIUM SULFATE 100 %
4 CRYSTALS MISCELLANEOUS AS NEEDED
Status: DISCONTINUED | OUTPATIENT
Start: 2017-09-27 | End: 2017-09-27 | Stop reason: HOSPADM

## 2017-09-27 RX ORDER — ASPIRIN 81 MG/1
81 TABLET ORAL DAILY
Qty: 30 TAB | Refills: 0 | Status: SHIPPED | OUTPATIENT
Start: 2017-09-27 | End: 2017-11-26

## 2017-09-27 RX ORDER — DEXTROSE 50 % IN WATER (D50W) INTRAVENOUS SYRINGE
12.5-25 AS NEEDED
Status: DISCONTINUED | OUTPATIENT
Start: 2017-09-27 | End: 2017-09-27 | Stop reason: HOSPADM

## 2017-09-27 RX ADMIN — DILTIAZEM HYDROCHLORIDE 240 MG: 240 CAPSULE, COATED, EXTENDED RELEASE ORAL at 09:51

## 2017-09-27 RX ADMIN — INSULIN LISPRO 2 UNITS: 100 INJECTION, SOLUTION INTRAVENOUS; SUBCUTANEOUS at 12:20

## 2017-09-27 RX ADMIN — ASPIRIN 81 MG 81 MG: 81 TABLET ORAL at 09:51

## 2017-09-27 RX ADMIN — CEFAZOLIN 2 G: 10 INJECTION, POWDER, FOR SOLUTION INTRAVENOUS; PARENTERAL at 05:10

## 2017-09-27 RX ADMIN — THERA TABS 1 TABLET: TAB at 09:51

## 2017-09-27 NOTE — PROGRESS NOTES
GIANNI Initial Assessment        Current admission assessment--  Patient came in with AAA, Renal stenosis and had endovascular aortic abdominal aneurysm repair/ snorkeling left renal artery stent place and balloon angioplasty. Patient states she lives in one story home alone. She has a neighbor who will check in on her. She states he lives across the street from her. Patient states prior to coming to the hospital she was independent in adl's and iadl's. She does not use dme. She does not use oxygen. She denies using home health or snf in the past. She gets her prescriptions filled at [facility] in [address]. She is being discharged home today and states her neighbor will be picking her up. She states she has been up in the room with no problems. Care Management Interventions  PCP Verified by CM:  Yes  Mode of Transport at Discharge:  (Patient is being discharged to home today.  )  Current Support Network: Lives Alone  Confirm Follow Up Transport: Family  Plan discussed with Pt/Family/Caregiver: Yes  Discharge Location  Discharge Placement: Home                Freddy CORTESBSNCRM EXT 9037

## 2017-09-27 NOTE — ROUTINE PROCESS
Follow up appointments scheduled. [facility] Specialist [phone].

## 2017-09-27 NOTE — PROGRESS NOTES
.    PCU SHIFT NURSING NOTE      Bedside shift change report given to Adan Mcneill RN (oncoming nurse) by Cayla Grijalva RN (offgoing nurse). Report included the following information SBAR, Kardex, Intake/Output, MAR and Recent Results. Shift Summary:   0800- Patient resting in bed. No bleeing from right and left groin dressing. Left arm dressing dry. 1000- [place] d/c'ed. Patient up to chair. Family at bedside. For d/c today. bandaid place on left arm dressing. 1230-  Patient up to bathroom, voided. No nausea. 1245- Right neck TLC removed per policy. Patient tolerated well. No bleeding at site. 1430- Patient discharged home with family friend. She received valuables from security. Patient verbalized understanding of discharge instructions. Admission Date 9/26/2017   Admission Diagnosis THORACIC ABD AORTIC ANEURYSM  Aortic aneurysm ([facility] [address]   Consults IP CONSULT TO HOSPITALIST        Consults   []PT   []OT   []Speech   []Case Management      [] Palliative      Cardiac Monitoring Order   [x]Yes   []No     IV drips   []Yes    Drip:                            Dose:  Drip:                            Dose:  Drip:                            Dose:   [x]No     GI Prophylaxis   []Yes   [x]No         DVT Prophylaxis   SCDs:             Leo stockings:         [x] Medication   []Contraindicated   []None      Activity Level Activity Level: Bed Rest     Activity Assistance: Partial (one person)   Purposeful Rounding every 1-2 hour? [x]Yes   Curran Score  Total Score: 2   Bed Alarm (If score 3 or >)   [x]Yes   [] Refused (See signed refusal form in chart)   Adam Score  Adam Score: 20   Adam Score (if score 14 or less)   []PMT consult   []Wound Care consult      []Specialty bed   [] Nutrition consult          Needs prior to discharge:   Home O2 required:    []Yes   [x]No    If yes, how much O2 required?     Other:    Last Bowel Movement: Last Bowel Movement Date: 09/25/17      Influenza Vaccine          Pneumonia Vaccine           Diet Active Orders   Diet    DIET DIABETIC CONSISTENT CARB Regular      LDAs           Triple Lumen 09/26/17 Right Internal jugular (Active)   Central Line Being Utilized Yes 9/27/2017  3:00 AM   Site Assessment Clean, dry, & intact 9/27/2017  3:00 AM   Infiltration Assessment 0 9/27/2017  3:00 AM   Affected Extremity/Extremities Color distal to insertion site pink (or appropriate for race) 9/27/2017  3:00 AM   Date of Last Dressing Change 09/26/17 9/26/2017  8:00 PM   Dressing Status Clean, dry, & intact 9/27/2017  3:00 AM   Dressing Type Disk with Chlorhexadine gluconate (CHG); Transparent 9/27/2017  3:00 AM   Action Taken Open ports on tubing capped 9/26/2017  8:00 PM   Proximal Hub Color/Line Status White;Patent;Capped 9/27/2017  3:00 AM   Positive Blood Return (Medial Site) Yes 9/27/2017  3:00 AM   Medial Hub Color/Line Status Blue;Patent; Infusing 9/27/2017  3:00 AM   Positive Blood Return (Lateral Site) Yes 9/27/2017  3:00 AM   Distal Hub Color/Line Status Red;Patent;Capped 9/27/2017  3:00 AM   Positive Blood Return (Site #3) Yes 9/27/2017  3:00 AM   Alcohol Cap Used Yes 9/27/2017  3:00 AM        Peripheral IV 09/26/17 Left Forearm (Active)   Site Assessment Clean, dry, & intact 9/27/2017  3:00 AM   Phlebitis Assessment 0 9/27/2017  3:00 AM   Infiltration Assessment 0 9/27/2017  3:00 AM   Dressing Status Clean, dry, & intact 9/27/2017  3:00 AM   Dressing Type Tape;Transparent 9/27/2017  3:00 AM   Hub Color/Line Status Green;Patent; Flushed 9/27/2017  3:00 AM   Action Taken Open ports on tubing capped 9/27/2017  3:00 AM   Alcohol Cap Used Yes 9/27/2017  3:00 AM                      Urinary Catheter Urinary Catheter 09/26/17 [place]-Criteria for Appropriate Use: Medically/surgically unstable    Intake & Output   Date 09/26/17 0700 - 09/27/17 0659 09/27/17 0700 - 09/28/17 0659   Shift 9445-9038 2866-7127 24 Hour Total [id number] 3958-7144 24 Hour Total   I  N  T  A  K  E   P.O. 240  240         P. O. 240  240 I.V.  (mL/kg/hr) 2887.5  (2.9)  2887.5  (1.4)         Volume (lactated Ringers infusion) 1500  1500         Volume (lactated Ringers infusion) 1000  1000         Volume (0.45% sodium chloride infusion) 387.5  387.5       Shift Total  (mL/kg) 3127.5  (37.6)  3127.5  (36.9)      O  U  T  P  U  T   Urine  (mL/kg/hr) 1500  (1.5) 2550  (2.5) 4050  (2)         Urine Output 400  400         Urine Output (mL) (Urinary Catheter 09/26/17 [place]) 1100 2550 3650       Blood 250  250         Estimated Blood Loss 250  250       Shift Total  (mL/kg) 1750  (21.1) 2550  (30.1) 4300  (50.7)      NET 1377.5 -2550 -1172.5      Weight (kg) 83.1 84.8 84.8 84.8 84.8 84.8         Readmission Risk Assessment Tool Score Medium Risk            13       Total Score        4 IP Visits Last 12 Months (1-3=4, 4=9, >4=11)    9 Pt. Coverage (Medicare=5 , Medicaid, or Self-Pay=4)        Criteria that do not apply:    Has Seen PCP in Last 6 Months (Yes=3, No=0)    . Living with Significant Other. Assisted Living. LTAC. SNF.  or   Rehab    Patient Length of Stay (>5 days = 3)    Charlson Comorbidity Score (Age + Comorbid Conditions)       Expected Length of Stay - - -   Actual Length of Stay 1

## 2017-09-27 NOTE — DISCHARGE SUMMARY
Physician Discharge Summary     Patient ID:  Umair Jefferson  [id number]  29 y.o.  1934    Admitting Provider: Shasha Amezquita MD    Admit Date: 9/26/2017    Discharge Date: 9/27/2017    Admission Diagnoses:   THORACIC ABD AORTIC ANEURYSM ([address]  DM II  Bladder Cancer     Discharge Diagnoses:    THORACIC ABD AORTIC ANEURYSM ([address] POA  DM II POA   Bladder Cancer POA     Last Procedure: Procedure(s):  ENDOVASCULAR AORTIC ABDOMINAL ANEURYSM, REPAIR/SNORKELING, LEFT RENAL ARTERY STENT PLACEMENT, and balloon angioplasty  On 09/26/17. Hospital Course: The patient is a very pleasant 80-year-old lady with a past medical history of DM who presents to the hospital for an elective EVAR of a complex thoracic abd aortic aneurysm. Her course has been complicated by bladder cancer requiring surgical intervention. It was recommended that she have her AAA repaired prior to proceeding with that surgery. On 09/25/2017 she underwent EVAR w snorkeling of the left renal artery w 6 x 39 VBX covered stent. Her post op course was uncomplicated. Her blood glucose was controlled and creatinine remained w in normal limits. She was discharged home with outpatient follow up. Pertinent Lab Results:   Recent Results (from the past 24 hour(s))   GLUCOSE, POC    Collection Time: 09/26/17  1:24 PM   Result Value Ref Range    Glucose (POC) 86 65 - 100 mg/dL    Performed by Trell Delgadillo    CBC WITH AUTOMATED DIFF    Collection Time: 09/27/17  4:03 AM   Result Value Ref Range    WBC 8.3 3.6 - 11.0 K/uL    RBC 4.41 3.80 - 5.20 M/uL    HGB 11.1 (L) 11.5 - 16.0 g/dL    HCT 36.2 35.0 - 47.0 %    MCV 82.1 80.0 - 99.0 FL    MCH 25.2 (L) 26.0 - 34.0 PG    MCHC 30.7 30.0 - 36.5 g/dL    RDW 16.0 (H) 11.5 - 14.5 %    PLATELET 049 315 - 885 K/uL    NEUTROPHILS 84 (H) 32 - 75 %    LYMPHOCYTES 10 (L) 12 - 49 %    MONOCYTES 6 5 - 13 %    EOSINOPHILS 0 0 - 7 %    BASOPHILS 0 0 - 1 %    ABS. NEUTROPHILS 7.1 1.8 - 8.0 K/UL    ABS.  LYMPHOCYTES 0.8 0.8 - 3.5 K/UL    ABS. MONOCYTES 0.5 0.0 - 1.0 K/UL    ABS. EOSINOPHILS 0.0 0.0 - 0.4 K/UL    ABS. BASOPHILS 0.0 0.0 - 0.1 K/UL   METABOLIC PANEL, COMPREHENSIVE    Collection Time: 09/27/17  4:03 AM   Result Value Ref Range    Sodium 139 136 - 145 mmol/L    Potassium 4.2 3.5 - 5.1 mmol/L    Chloride 105 97 - 108 mmol/L    CO2 28 21 - 32 mmol/L    Anion gap 6 5 - 15 mmol/L    Glucose 166 (H) 65 - 100 mg/dL    BUN 11 6 - 20 MG/DL    Creatinine 0.77 0.55 - 1.02 MG/DL    BUN/Creatinine ratio 14 12 - 20      GFR est AA >60 >60 ml/min/1.73m2    GFR est non-AA >60 >60 ml/min/1.73m2    Calcium 8.5 8.5 - 10.1 MG/DL    Bilirubin, total 0.5 0.2 - 1.0 MG/DL    ALT (SGPT) 15 12 - 78 U/L    AST (SGOT) 13 (L) 15 - 37 U/L    Alk. phosphatase 108 45 - 117 U/L    Protein, total 6.9 6.4 - 8.2 g/dL    Albumin 3.3 (L) 3.5 - 5.0 g/dL    Globulin 3.6 2.0 - 4.0 g/dL    A-G Ratio 0.9 (L) 1.1 - 2.2         Patient Weight:   Last 3 Recorded Weights in this Encounter    09/25/17 1019 09/26/17 0907 09/27/17 0300   Weight: 82.1 kg (181 lb) 83.1 kg (183 lb 3.2 oz) 84.8 kg (187 lb)       Consults: none     Patient Condition at Discharge: stable    Disposition: home    Patient Instructions:   Current Discharge Medication List      START taking these medications    Details   oxyCODONE IR (ROXICODONE) 5 mg immediate release tablet Take 1 Tab by mouth every four (4) hours as needed for Pain. Max Daily Amount: 30 mg.  Qty: 20 Tab, Refills: 0      aspirin delayed-release 81 mg tablet Take 1 Tab by mouth daily. Qty: 30 Tab, Refills: 0         CONTINUE these medications which have NOT CHANGED    Details   dilTIAZem CD (CARTIA XT) 240 mg ER capsule Take 240 mg by mouth daily. glipiZIDE SR (GLUCOTROL XL) 10 mg CR tablet Take 20 mg by mouth daily. pravastatin (PRAVACHOL) 80 mg tablet Take 80 mg by mouth nightly. MULTIVIT,CALC,MINS/IRON/FOLIC (ONE-A-DAY WOMENS FORMULA PO) Take 1 Tab by mouth daily.       metFORMIN (GLUCOPHAGE) 500 mg tablet Take 500 mg by mouth daily (with breakfast). acetaminophen (TYLENOL EXTRA STRENGTH) 500 mg tablet Take 500 mg by mouth every six (6) hours as needed for Pain. Usually takes every night      POLYVINYL ALCOHOL/POVIDONE (ARTIFICIAL TEARS OP) Apply  to eye as needed. STOP taking these medications       aspirin 81 mg chewable tablet Comments:   Reason for Stopping:               Diet: Diabetic Diet  Activity/Wound Care: You may shower on Friday. Dry the wound carefully. Keep wound clean and dry. Do not attempt to drive a car until you are comfortable and NOT taking pain medication. No heavy lifting (20 lbs limit). Mild exercise and light duties around the house are OK. The dressing can be removed if there is no drainage, or changed and kept in place for comfort. Call the office if you have any problems. Follow-up with Mic Going  in 2 weeks.     Signed:  Brittnee Landon NP  9/27/2017  10:58 AM

## 2017-09-27 NOTE — DISCHARGE INSTRUCTIONS
Discharge  Instructions: You may shower on Friday. Dry the wound carefully. Keep wounds clean and dry. Do not attempt to drive a car until you are comfortable and NOT taking pain medication. No heavy lifting (20 lbs limit). Mild exercise and light duties around the house are OK. The dressings can be removed if there is no drainage, or changed and kept in place for comfort. Call the office if you have any problems.

## 2017-09-27 NOTE — PROGRESS NOTES
PCU SHIFT NOTE      Bedside verbal shift report given by Savanah Dorsey RN to oncoming nurse Jacklyn Barney. Report included: SBAR, kardex, I/O's, MAR, pain management, vital signs, recent results, and heart rhythm.

## 2017-11-08 ENCOUNTER — HOSPITAL ENCOUNTER (OUTPATIENT)
Dept: GENERAL RADIOLOGY | Age: 82
Discharge: HOME | End: 2017-11-08
Attending: UROLOGY
Payer: MEDICARE

## 2017-11-08 ENCOUNTER — HOSPITAL ENCOUNTER (OUTPATIENT)
Dept: PREADMISSION TESTING | Age: 82
Discharge: HOME | End: 2017-11-08
Attending: UROLOGY
Payer: MEDICARE

## 2017-11-08 VITALS
WEIGHT: 179.9 LBS | HEART RATE: 75 BPM | TEMPERATURE: 97.9 F | DIASTOLIC BLOOD PRESSURE: 85 MMHG | BODY MASS INDEX: 28.24 KG/M2 | OXYGEN SATURATION: 95 % | RESPIRATION RATE: 16 BRPM | SYSTOLIC BLOOD PRESSURE: 178 MMHG | HEIGHT: 67 IN

## 2017-11-08 LAB
APPEARANCE UR: ABNORMAL
APTT PPP: 25.9 SEC (ref 22.1–32.5)
BACTERIA URNS QL MICRO: NEGATIVE /HPF
BILIRUB UR QL: NEGATIVE
COLOR UR: ABNORMAL
EPITH CASTS URNS QL MICRO: ABNORMAL /LPF
GLUCOSE UR STRIP.AUTO-MCNC: NEGATIVE MG/DL
HGB UR QL STRIP: ABNORMAL
INR PPP: 1 (ref 0.9–1.1)
KETONES UR QL STRIP.AUTO: NEGATIVE MG/DL
LEUKOCYTE ESTERASE UR QL STRIP.AUTO: ABNORMAL
NITRITE UR QL STRIP.AUTO: NEGATIVE
PH UR STRIP: 6.5 [PH] (ref 5–8)
PROT UR STRIP-MCNC: 30 MG/DL
PROTHROMBIN TIME: 10.1 SEC (ref 9–11.1)
RBC #/AREA URNS HPF: ABNORMAL /HPF (ref 0–5)
SP GR UR REFRACTOMETRY: 1.01 (ref 1–1.03)
THERAPEUTIC RANGE,PTTT: NORMAL SECS (ref 58–77)
UROBILINOGEN UR QL STRIP.AUTO: 1 EU/DL (ref 0.2–1)
WBC URNS QL MICRO: ABNORMAL /HPF (ref 0–4)

## 2017-11-08 PROCEDURE — 85730 THROMBOPLASTIN TIME PARTIAL: CPT | Performed by: UROLOGY

## 2017-11-08 PROCEDURE — 85610 PROTHROMBIN TIME: CPT | Performed by: UROLOGY

## 2017-11-08 PROCEDURE — 81001 URINALYSIS AUTO W/SCOPE: CPT | Performed by: UROLOGY

## 2017-11-08 PROCEDURE — 87086 URINE CULTURE/COLONY COUNT: CPT | Performed by: UROLOGY

## 2017-11-08 PROCEDURE — 86900 BLOOD TYPING SEROLOGIC ABO: CPT | Performed by: UROLOGY

## 2017-11-08 PROCEDURE — 86923 COMPATIBILITY TEST ELECTRIC: CPT | Performed by: UROLOGY

## 2017-11-08 PROCEDURE — 71020 XR CHEST PA LAT: CPT

## 2017-11-08 PROCEDURE — 36415 COLL VENOUS BLD VENIPUNCTURE: CPT | Performed by: UROLOGY

## 2017-11-08 RX ORDER — SODIUM CHLORIDE, SODIUM LACTATE, POTASSIUM CHLORIDE, CALCIUM CHLORIDE 600; 310; 30; 20 MG/100ML; MG/100ML; MG/100ML; MG/100ML
25 INJECTION, SOLUTION INTRAVENOUS CONTINUOUS
Status: CANCELLED | OUTPATIENT
Start: 2017-11-20

## 2017-11-08 RX ORDER — GENTAMICIN SULFATE 80 MG/100ML
80 INJECTION, SOLUTION INTRAVENOUS ONCE
Status: CANCELLED | OUTPATIENT
Start: 2017-11-20 | End: 2017-11-20

## 2017-11-08 RX ORDER — HEPARIN SODIUM 5000 [USP'U]/ML
5000 INJECTION, SOLUTION INTRAVENOUS; SUBCUTANEOUS ONCE
Status: CANCELLED | OUTPATIENT
Start: 2017-11-20 | End: 2017-11-20

## 2017-11-08 RX ORDER — ALVIMOPAN 12 MG/1
12 CAPSULE ORAL ONCE
Status: CANCELLED | OUTPATIENT
Start: 2017-11-20 | End: 2017-11-20

## 2017-11-08 RX ORDER — HYDROCHLOROTHIAZIDE 12.5 MG/1
12.5 CAPSULE ORAL DAILY
COMMUNITY
End: 2019-05-23

## 2017-11-08 NOTE — PERIOP NOTES
Incentive Spirometer        Using the incentive spirometer helps expand the small air sacs of your lungs, helps you breathe deeply, and helps improve your lung function. Use your incentive spirometer twice a day (10 breaths each time) prior to surgery. How to Use Your Incentive Spirometer:  1. Hold the incentive spirometer in an upright position. 2. Breathe out as usual.   3. Place the mouthpiece in your mouth and seal your lips tightly around it. 4. Take a deep breath. Breathe in slowly and as deeply as possible. Keep the blue flow rate guide between the arrows. 5. Hold your breath as long as possible. Then exhale slowly and allow the piston to fall to the bottom of the column. 6. Rest for a few seconds and repeat steps one through five at least 10 times. PAT Tidal Volume____1750______________  x____10____________  Date______11/8/17_________________    David Brazen THE INCENTIVE SPIROMETER WITH YOU TO THE HOSPITAL ON THE DAY OF YOUR SURGERY. Opportunity given to ask and answer questions as well as to observe return demonstration.     Patient signature_____________________________    Witness____________________________

## 2017-11-08 NOTE — PERIOP NOTES
[facility]  Preoperative Instructions        Surgery Date 11/20/17          Time of Arrival 1100 am    Contact # [phone]    1. On the day of your surgery, please report to the Surgical Services Registration Desk and sign in at your designated time. The Surgery Center is located to the right of the Emergency Room. 2. You must have someone with you to drive you home. You should not drive a car for 24 hours following surgery. Please make arrangements for a friend or family member to stay with you for the first 24 hours after your surgery. 3. Do not have anything to eat or drink (including water, gum, mints, coffee, juice) after midnight ?11/19/17. ? This may not apply to medications prescribed by your physician. ?(Please note below the special instructions with medications to take the morning of your procedure.)    4. We recommend you do not drink any alcoholic beverages for 24 hours before and after your surgery. 5. Contact your surgeons office for instructions on the following medications: non-steroidal anti-inflammatory drugs (i.e. Advil, Aleve), vitamins, and supplements. (Some surgeons will want you to stop these medications prior to surgery and others may allow you to take them)  **If you are currently taking Plavix, Coumadin, Aspirin and/or other blood-thinning agents, contact your surgeon for instructions. ** Your surgeon will partner with the physician prescribing these medications to determine if it is safe to stop or if you need to continue taking. Please do not stop taking these medications without instructions from your surgeon    6. Wear comfortable clothes. Wear glasses instead of contacts. Do not bring any money or jewelry. Please bring picture ID, insurance card, and any prearranged co-payment or hospital payment. Do not wear make-up, particularly mascara the morning of your surgery.   Do not wear nail polish, particularly if you are having foot /hand surgery. Wear your hair loose or down, no ponytails, buns, amauri pins or clips. All body piercings must be removed. Please shower with antibacterial soap for three consecutive days before and on the morning of surgery, but do not apply any lotions, powders or deodorants after the shower on the day of surgery. Please use a fresh towels after each shower. Please sleep in clean clothes and change bed linens the night before surgery. Please do not shave for 48 hours prior to surgery. Shaving of the face is acceptable. 7. You should understand that if you do not follow these instructions your surgery may be cancelled. If your physical condition changes (I.e. fever, cold or flu) please contact your surgeon as soon as possible. 8. It is important that you be on time. If a situation occurs where you may be late, please call [phone] (OR Holding Area). 9. If you have any questions and or problems, please call [phone] (Pre-admission Testing). 10. Your surgery time may be subject to change. You will receive a phone call the evening prior if your time changes. 11.  If having outpatient surgery, you must have someone to drive you here, stay with you during the duration of your stay, and to drive you home at time of discharge. 12.   In an effort to improve the efficiency, privacy, and safety for all of our Pre-op patients visitors are not allowed in the Holding area. Once you arrive and are registered your family/visitors will be asked to remain in the waiting room. The Pre-op staff will get you from the Surgical Waiting Area and will explain to you and your family/visitors that the Pre-op phase is beginning. The staff will answer any questions and provide instructions for tracking of the patient, by use of the existing tracking number and color-coded status board in the waiting room.   At this time the staff will also ask for your designated spokesperson information in the event that the physician or staff need to provide an update or obtain any pertinent information. The designated spokesperson will be notified if the physician needs to speak to family during the pre-operative phase. If at any time your family/visitors has questions or concerns they may approach the volunteer desk in the waiting area for assistance. Special Instructions: Follow bowel prep as instructed by surgeon's office. MEDICATIONS TO TAKE THE MORNING OF SURGERY WITH A SIP OF WATER:  Tylenol as needed, Diltiazem (Cartia)      I understand a pre-operative phone call will be made to verify my surgery time. In the event that I am not available, I give permission for a message to be left on my answering service and/or with another person?  yes         ___________________      __________   _________    (Signature of Patient)             (Witness)                (Date and Time)

## 2017-11-08 NOTE — PERIOP NOTES
Have you been prescribed a bowel prep? Yes  And what kind? Golytely   Do you understand the instructions? Yes  · Have you been prescribed antibiotics to take prior to surgery? Yes  If so, what is the antibiotic and when do you start them? Erythromycin every 6 hours PO day of surgery. ·  Have you been prescribed any other medicines by your surgeon to take prior to surgery?  No

## 2017-11-10 LAB
BACTERIA SPEC CULT: NORMAL
CC UR VC: NORMAL
SERVICE CMNT-IMP: NORMAL

## 2017-11-20 ENCOUNTER — APPOINTMENT (OUTPATIENT)
Dept: GENERAL RADIOLOGY | Age: 82
DRG: 654 | End: 2017-11-20
Attending: ANESTHESIOLOGY
Payer: MEDICARE

## 2017-11-20 ENCOUNTER — ANESTHESIA EVENT (OUTPATIENT)
Dept: SURGERY | Age: 82
DRG: 654 | End: 2017-11-20
Payer: MEDICARE

## 2017-11-20 ENCOUNTER — ANESTHESIA (OUTPATIENT)
Dept: SURGERY | Age: 82
DRG: 654 | End: 2017-11-20
Payer: MEDICARE

## 2017-11-20 ENCOUNTER — HOSPITAL ENCOUNTER (INPATIENT)
Age: 82
LOS: 6 days | Discharge: HOME HEALTH | DRG: 654 | End: 2017-11-26
Attending: UROLOGY | Admitting: UROLOGY
Payer: MEDICARE

## 2017-11-20 LAB
ANION GAP BLD CALC-SCNC: 19 MMOL/L (ref 5–15)
ANION GAP SERPL CALC-SCNC: 9 MMOL/L (ref 5–15)
BUN BLD-MCNC: 17 MG/DL (ref 9–20)
BUN SERPL-MCNC: 19 MG/DL (ref 6–20)
BUN/CREAT SERPL: 12 (ref 12–20)
CA-I BLD-MCNC: 1.12 MMOL/L (ref 1.12–1.32)
CALCIUM SERPL-MCNC: 8 MG/DL (ref 8.5–10.1)
CHLORIDE BLD-SCNC: 97 MMOL/L (ref 98–107)
CHLORIDE SERPL-SCNC: 102 MMOL/L (ref 97–108)
CO2 BLD-SCNC: 26 MMOL/L (ref 21–32)
CO2 SERPL-SCNC: 28 MMOL/L (ref 21–32)
CREAT BLD-MCNC: 1.2 MG/DL (ref 0.6–1.3)
CREAT SERPL-MCNC: 1.63 MG/DL (ref 0.55–1.02)
GLUCOSE BLD STRIP.AUTO-MCNC: 152 MG/DL (ref 65–100)
GLUCOSE BLD STRIP.AUTO-MCNC: 91 MG/DL (ref 65–100)
GLUCOSE BLD-MCNC: 121 MG/DL (ref 65–100)
GLUCOSE SERPL-MCNC: 144 MG/DL (ref 65–100)
HCT VFR BLD AUTO: 29.5 % (ref 35–47)
HCT VFR BLD CALC: 32 % (ref 35–47)
HGB BLD-MCNC: 10.9 GM/DL (ref 11.5–16)
HGB BLD-MCNC: 9 G/DL (ref 11.5–16)
HGB BLD-MCNC: 9.3 G/DL (ref 11.5–16)
POTASSIUM BLD-SCNC: 3.7 MMOL/L (ref 3.5–5.1)
POTASSIUM SERPL-SCNC: 4.1 MMOL/L (ref 3.5–5.1)
SERVICE CMNT-IMP: ABNORMAL
SERVICE CMNT-IMP: ABNORMAL
SERVICE CMNT-IMP: NORMAL
SODIUM BLD-SCNC: 138 MMOL/L (ref 136–145)
SODIUM SERPL-SCNC: 139 MMOL/L (ref 136–145)

## 2017-11-20 PROCEDURE — 74011250637 HC RX REV CODE- 250/637: Performed by: UROLOGY

## 2017-11-20 PROCEDURE — 77030010939 HC CLP LIG TELE -B: Performed by: UROLOGY

## 2017-11-20 PROCEDURE — 77030014647 HC SEAL FBRN TISSL BAXT -D: Performed by: UROLOGY

## 2017-11-20 PROCEDURE — 88307 TISSUE EXAM BY PATHOLOGIST: CPT | Performed by: UROLOGY

## 2017-11-20 PROCEDURE — 77030008771 HC TU NG SALEM SUMP -A: Performed by: UROLOGY

## 2017-11-20 PROCEDURE — 77030013079 HC BLNKT BAIR HGGR 3M -A: Performed by: ANESTHESIOLOGY

## 2017-11-20 PROCEDURE — 77030011640 HC PAD GRND REM COVD -A: Performed by: UROLOGY

## 2017-11-20 PROCEDURE — 77030034818 HC STPLR INT GIA COVD -C: Performed by: UROLOGY

## 2017-11-20 PROCEDURE — 88309 TISSUE EXAM BY PATHOLOGIST: CPT | Performed by: UROLOGY

## 2017-11-20 PROCEDURE — 77030010548 HC BG WND DRN ARMD -B: Performed by: UROLOGY

## 2017-11-20 PROCEDURE — 74011000258 HC RX REV CODE- 258: Performed by: UROLOGY

## 2017-11-20 PROCEDURE — 0TTB0ZZ RESECTION OF BLADDER, OPEN APPROACH: ICD-10-PCS | Performed by: UROLOGY

## 2017-11-20 PROCEDURE — C1765 ADHESION BARRIER: HCPCS | Performed by: UROLOGY

## 2017-11-20 PROCEDURE — 65270000029 HC RM PRIVATE

## 2017-11-20 PROCEDURE — 74011250636 HC RX REV CODE- 250/636: Performed by: UROLOGY

## 2017-11-20 PROCEDURE — C2617 STENT, NON-COR, TEM W/O DEL: HCPCS | Performed by: UROLOGY

## 2017-11-20 PROCEDURE — 77030014125 HC TY EPDRL BBMI -B: Performed by: ANESTHESIOLOGY

## 2017-11-20 PROCEDURE — 77030008771 HC TU NG SALEM SUMP -A: Performed by: ANESTHESIOLOGY

## 2017-11-20 PROCEDURE — 77030002916 HC SUT ETHLN J&J -A: Performed by: UROLOGY

## 2017-11-20 PROCEDURE — 88305 TISSUE EXAM BY PATHOLOGIST: CPT | Performed by: UROLOGY

## 2017-11-20 PROCEDURE — 77030031139 HC SUT VCRL2 J&J -A: Performed by: UROLOGY

## 2017-11-20 PROCEDURE — 77030034850: Performed by: UROLOGY

## 2017-11-20 PROCEDURE — 80048 BASIC METABOLIC PNL TOTAL CA: CPT | Performed by: UROLOGY

## 2017-11-20 PROCEDURE — 77030002996 HC SUT SLK J&J -A: Performed by: UROLOGY

## 2017-11-20 PROCEDURE — 77030018836 HC SOL IRR NACL ICUM -A: Performed by: UROLOGY

## 2017-11-20 PROCEDURE — 77030012406 HC DRN WND PENRS BARD -A: Performed by: UROLOGY

## 2017-11-20 PROCEDURE — 74011250636 HC RX REV CODE- 250/636

## 2017-11-20 PROCEDURE — 76060000040 HC ANESTHESIA 4.5 TO 5 HR: Performed by: UROLOGY

## 2017-11-20 PROCEDURE — 74011250636 HC RX REV CODE- 250/636: Performed by: ANESTHESIOLOGY

## 2017-11-20 PROCEDURE — 07BC0ZZ EXCISION OF PELVIS LYMPHATIC, OPEN APPROACH: ICD-10-PCS | Performed by: UROLOGY

## 2017-11-20 PROCEDURE — 76210000001 HC OR PH I REC 2.5 TO 3 HR: Performed by: UROLOGY

## 2017-11-20 PROCEDURE — 36415 COLL VENOUS BLD VENIPUNCTURE: CPT | Performed by: UROLOGY

## 2017-11-20 PROCEDURE — 82962 GLUCOSE BLOOD TEST: CPT

## 2017-11-20 PROCEDURE — 76010000176 HC OR TIME 4.5 TO 5 HR INTENSV-TIER 1: Performed by: UROLOGY

## 2017-11-20 PROCEDURE — 74011000250 HC RX REV CODE- 250: Performed by: ANESTHESIOLOGY

## 2017-11-20 PROCEDURE — 77030011264 HC ELECTRD BLD EXT COVD -A: Performed by: UROLOGY

## 2017-11-20 PROCEDURE — 77030032490 HC SLV COMPR SCD KNE COVD -B: Performed by: UROLOGY

## 2017-11-20 PROCEDURE — 77030018846 HC SOL IRR STRL H20 ICUM -A: Performed by: UROLOGY

## 2017-11-20 PROCEDURE — 88331 PATH CONSLTJ SURG 1 BLK 1SPC: CPT | Performed by: UROLOGY

## 2017-11-20 PROCEDURE — 80047 BASIC METABLC PNL IONIZED CA: CPT

## 2017-11-20 PROCEDURE — P9045 ALBUMIN (HUMAN), 5%, 250 ML: HCPCS

## 2017-11-20 PROCEDURE — 77030008467 HC STPLR SKN COVD -B: Performed by: UROLOGY

## 2017-11-20 PROCEDURE — 77030020061 HC IV BLD WRMR ADMIN SET 3M -B: Performed by: ANESTHESIOLOGY

## 2017-11-20 PROCEDURE — 77030002966 HC SUT PDS J&J -A: Performed by: UROLOGY

## 2017-11-20 PROCEDURE — 74011000272 HC RX REV CODE- 272: Performed by: UROLOGY

## 2017-11-20 PROCEDURE — 77030009972 HC RELD STPLR INT COVD -B: Performed by: UROLOGY

## 2017-11-20 PROCEDURE — 85018 HEMOGLOBIN: CPT | Performed by: UROLOGY

## 2017-11-20 PROCEDURE — 77030008684 HC TU ET CUF COVD -B: Performed by: ANESTHESIOLOGY

## 2017-11-20 PROCEDURE — 77030010516 HC APPL HEMA CLP TELE -B: Performed by: UROLOGY

## 2017-11-20 PROCEDURE — C1751 CATH, INF, PER/CENT/MIDLINE: HCPCS | Performed by: ANESTHESIOLOGY

## 2017-11-20 PROCEDURE — 77030025240 HC RELD STPL GIA 2 COVD -C: Performed by: UROLOGY

## 2017-11-20 PROCEDURE — 77030003029 HC SUT VCRL J&J -B: Performed by: UROLOGY

## 2017-11-20 PROCEDURE — 77030018390 HC SPNG HEMSTAT2 J&J -B: Performed by: UROLOGY

## 2017-11-20 PROCEDURE — 77030026438 HC STYL ET INTUB CARD -A: Performed by: ANESTHESIOLOGY

## 2017-11-20 PROCEDURE — 71010 XR CHEST PORT: CPT

## 2017-11-20 PROCEDURE — 77030009969 HC RELD STPLR GIA COVD -B: Performed by: UROLOGY

## 2017-11-20 PROCEDURE — 0T1807C BYPASS BILATERAL URETERS TO ILEOCUTANEOUS WITH AUTOLOGOUS TISSUE SUBSTITUTE, OPEN APPROACH: ICD-10-PCS | Performed by: UROLOGY

## 2017-11-20 PROCEDURE — 74011000250 HC RX REV CODE- 250: Performed by: UROLOGY

## 2017-11-20 PROCEDURE — 74011000250 HC RX REV CODE- 250

## 2017-11-20 PROCEDURE — 74011250637 HC RX REV CODE- 250/637

## 2017-11-20 PROCEDURE — 77030002888 HC SUT CHRMC J&J -A: Performed by: UROLOGY

## 2017-11-20 DEVICE — URINARY DIVERSION STENT SET
Type: IMPLANTABLE DEVICE | Site: URETER | Status: FUNCTIONAL
Brand: PERCUFLEX™ URINARY DIVERSION STENT SET

## 2017-11-20 RX ORDER — MORPHINE SULFATE IN 0.9 % NACL 150MG/30ML
PATIENT CONTROLLED ANALGESIA SYRINGE INTRAVENOUS
Status: DISCONTINUED | OUTPATIENT
Start: 2017-11-20 | End: 2017-11-25

## 2017-11-20 RX ORDER — DIPHENHYDRAMINE HYDROCHLORIDE 50 MG/ML
12.5 INJECTION, SOLUTION INTRAMUSCULAR; INTRAVENOUS AS NEEDED
Status: DISCONTINUED | OUTPATIENT
Start: 2017-11-20 | End: 2017-11-20 | Stop reason: HOSPADM

## 2017-11-20 RX ORDER — SUCCINYLCHOLINE CHLORIDE 20 MG/ML
INJECTION INTRAMUSCULAR; INTRAVENOUS AS NEEDED
Status: DISCONTINUED | OUTPATIENT
Start: 2017-11-20 | End: 2017-11-20 | Stop reason: HOSPADM

## 2017-11-20 RX ORDER — CEFUROXIME AXETIL 250 MG/1
250 TABLET ORAL DAILY
Qty: 10 TAB | Refills: 0 | Status: SHIPPED | OUTPATIENT
Start: 2017-11-20 | End: 2019-05-13

## 2017-11-20 RX ORDER — ROCURONIUM BROMIDE 10 MG/ML
INJECTION, SOLUTION INTRAVENOUS AS NEEDED
Status: DISCONTINUED | OUTPATIENT
Start: 2017-11-20 | End: 2017-11-20 | Stop reason: HOSPADM

## 2017-11-20 RX ORDER — GENTAMICIN SULFATE 80 MG/100ML
80 INJECTION, SOLUTION INTRAVENOUS ONCE
Status: DISCONTINUED | OUTPATIENT
Start: 2017-11-20 | End: 2017-11-20

## 2017-11-20 RX ORDER — OXYCODONE AND ACETAMINOPHEN 5; 325 MG/1; MG/1
1-2 TABLET ORAL
Status: DISCONTINUED | OUTPATIENT
Start: 2017-11-20 | End: 2017-11-26 | Stop reason: HOSPADM

## 2017-11-20 RX ORDER — ONDANSETRON 2 MG/ML
INJECTION INTRAMUSCULAR; INTRAVENOUS AS NEEDED
Status: DISCONTINUED | OUTPATIENT
Start: 2017-11-20 | End: 2017-11-20 | Stop reason: HOSPADM

## 2017-11-20 RX ORDER — DILTIAZEM HYDROCHLORIDE 120 MG/1
240 CAPSULE, COATED, EXTENDED RELEASE ORAL DAILY
Status: DISCONTINUED | OUTPATIENT
Start: 2017-11-21 | End: 2017-11-26 | Stop reason: HOSPADM

## 2017-11-20 RX ORDER — ONDANSETRON 2 MG/ML
4 INJECTION INTRAMUSCULAR; INTRAVENOUS
Status: DISCONTINUED | OUTPATIENT
Start: 2017-11-20 | End: 2017-11-26 | Stop reason: HOSPADM

## 2017-11-20 RX ORDER — GLYCOPYRROLATE 0.2 MG/ML
INJECTION INTRAMUSCULAR; INTRAVENOUS AS NEEDED
Status: DISCONTINUED | OUTPATIENT
Start: 2017-11-20 | End: 2017-11-20 | Stop reason: HOSPADM

## 2017-11-20 RX ORDER — BUPIVACAINE HYDROCHLORIDE AND EPINEPHRINE 2.5; 5 MG/ML; UG/ML
INJECTION, SOLUTION EPIDURAL; INFILTRATION; INTRACAUDAL; PERINEURAL AS NEEDED
Status: DISCONTINUED | OUTPATIENT
Start: 2017-11-20 | End: 2017-11-20 | Stop reason: HOSPADM

## 2017-11-20 RX ORDER — MIDAZOLAM HYDROCHLORIDE 1 MG/ML
INJECTION, SOLUTION INTRAMUSCULAR; INTRAVENOUS AS NEEDED
Status: DISCONTINUED | OUTPATIENT
Start: 2017-11-20 | End: 2017-11-20 | Stop reason: HOSPADM

## 2017-11-20 RX ORDER — SODIUM CHLORIDE 0.9 % (FLUSH) 0.9 %
5-10 SYRINGE (ML) INJECTION AS NEEDED
Status: DISCONTINUED | OUTPATIENT
Start: 2017-11-20 | End: 2017-11-26 | Stop reason: HOSPADM

## 2017-11-20 RX ORDER — SODIUM CHLORIDE, SODIUM LACTATE, POTASSIUM CHLORIDE, CALCIUM CHLORIDE 600; 310; 30; 20 MG/100ML; MG/100ML; MG/100ML; MG/100ML
25 INJECTION, SOLUTION INTRAVENOUS CONTINUOUS
Status: DISCONTINUED | OUTPATIENT
Start: 2017-11-20 | End: 2017-11-20 | Stop reason: HOSPADM

## 2017-11-20 RX ORDER — NEOSTIGMINE METHYLSULFATE 1 MG/ML
INJECTION INTRAVENOUS AS NEEDED
Status: DISCONTINUED | OUTPATIENT
Start: 2017-11-20 | End: 2017-11-20 | Stop reason: HOSPADM

## 2017-11-20 RX ORDER — HYDROCHLOROTHIAZIDE 12.5 MG/1
12.5 CAPSULE ORAL DAILY
Status: DISCONTINUED | OUTPATIENT
Start: 2017-11-21 | End: 2017-11-26 | Stop reason: HOSPADM

## 2017-11-20 RX ORDER — ALVIMOPAN 12 MG/1
12 CAPSULE ORAL ONCE
Status: COMPLETED | OUTPATIENT
Start: 2017-11-20 | End: 2017-11-20

## 2017-11-20 RX ORDER — BUPIVACAINE HCL/0.9 % NACL/PF 0.125 %
1-12 PREFILLED PUMP RESERVOIR EPIDURAL
Status: DISCONTINUED | OUTPATIENT
Start: 2017-11-20 | End: 2017-11-22

## 2017-11-20 RX ORDER — SODIUM CHLORIDE 0.9 % (FLUSH) 0.9 %
5-10 SYRINGE (ML) INJECTION EVERY 8 HOURS
Status: DISCONTINUED | OUTPATIENT
Start: 2017-11-20 | End: 2017-11-20 | Stop reason: HOSPADM

## 2017-11-20 RX ORDER — CEFUROXIME AXETIL 250 MG/1
250 TABLET ORAL DAILY
Qty: 10 TAB | Refills: 10 | Status: SHIPPED | OUTPATIENT
Start: 2017-11-20 | End: 2019-05-13

## 2017-11-20 RX ORDER — HEPARIN SODIUM 5000 [USP'U]/ML
5000 INJECTION, SOLUTION INTRAVENOUS; SUBCUTANEOUS ONCE
Status: COMPLETED | OUTPATIENT
Start: 2017-11-20 | End: 2017-11-20

## 2017-11-20 RX ORDER — SODIUM CHLORIDE, SODIUM LACTATE, POTASSIUM CHLORIDE, CALCIUM CHLORIDE 600; 310; 30; 20 MG/100ML; MG/100ML; MG/100ML; MG/100ML
INJECTION, SOLUTION INTRAVENOUS
Status: DISCONTINUED | OUTPATIENT
Start: 2017-11-20 | End: 2017-11-20 | Stop reason: HOSPADM

## 2017-11-20 RX ORDER — MINERAL OIL
OIL (ML) MISCELLANEOUS AS NEEDED
Status: DISCONTINUED | OUTPATIENT
Start: 2017-11-20 | End: 2017-11-20 | Stop reason: HOSPADM

## 2017-11-20 RX ORDER — ZOLPIDEM TARTRATE 5 MG/1
5 TABLET ORAL
Status: DISCONTINUED | OUTPATIENT
Start: 2017-11-20 | End: 2017-11-26 | Stop reason: HOSPADM

## 2017-11-20 RX ORDER — GLIPIZIDE 5 MG/1
20 TABLET, FILM COATED, EXTENDED RELEASE ORAL
Status: DISCONTINUED | OUTPATIENT
Start: 2017-11-21 | End: 2017-11-26 | Stop reason: HOSPADM

## 2017-11-20 RX ORDER — FENTANYL CITRATE 50 UG/ML
25 INJECTION, SOLUTION INTRAMUSCULAR; INTRAVENOUS
Status: DISCONTINUED | OUTPATIENT
Start: 2017-11-20 | End: 2017-11-20 | Stop reason: HOSPADM

## 2017-11-20 RX ORDER — INSULIN LISPRO 100 [IU]/ML
INJECTION, SOLUTION INTRAVENOUS; SUBCUTANEOUS EVERY 6 HOURS
Status: DISCONTINUED | OUTPATIENT
Start: 2017-11-20 | End: 2017-11-25

## 2017-11-20 RX ORDER — LIDOCAINE HYDROCHLORIDE 10 MG/ML
0.1 INJECTION, SOLUTION EPIDURAL; INFILTRATION; INTRACAUDAL; PERINEURAL AS NEEDED
Status: DISCONTINUED | OUTPATIENT
Start: 2017-11-20 | End: 2017-11-20 | Stop reason: HOSPADM

## 2017-11-20 RX ORDER — HYDROCODONE BITARTRATE AND ACETAMINOPHEN 5; 325 MG/1; MG/1
1 TABLET ORAL
Qty: 30 TAB | Refills: 0 | Status: SHIPPED | OUTPATIENT
Start: 2017-11-20 | End: 2019-05-13

## 2017-11-20 RX ORDER — ALBUMIN HUMAN 50 G/1000ML
SOLUTION INTRAVENOUS AS NEEDED
Status: DISCONTINUED | OUTPATIENT
Start: 2017-11-20 | End: 2017-11-20 | Stop reason: HOSPADM

## 2017-11-20 RX ORDER — LIDOCAINE HYDROCHLORIDE 20 MG/ML
INJECTION, SOLUTION EPIDURAL; INFILTRATION; INTRACAUDAL; PERINEURAL AS NEEDED
Status: DISCONTINUED | OUTPATIENT
Start: 2017-11-20 | End: 2017-11-20 | Stop reason: HOSPADM

## 2017-11-20 RX ORDER — ACETAMINOPHEN 10 MG/ML
INJECTION, SOLUTION INTRAVENOUS AS NEEDED
Status: DISCONTINUED | OUTPATIENT
Start: 2017-11-20 | End: 2017-11-20 | Stop reason: HOSPADM

## 2017-11-20 RX ORDER — DEXTROSE 50 % IN WATER (D50W) INTRAVENOUS SYRINGE
12.5-25 AS NEEDED
Status: DISCONTINUED | OUTPATIENT
Start: 2017-11-20 | End: 2017-11-26 | Stop reason: HOSPADM

## 2017-11-20 RX ORDER — METFORMIN HYDROCHLORIDE 500 MG/1
500 TABLET ORAL
Status: DISCONTINUED | OUTPATIENT
Start: 2017-11-21 | End: 2017-11-26 | Stop reason: HOSPADM

## 2017-11-20 RX ORDER — SODIUM CHLORIDE 0.9 % (FLUSH) 0.9 %
5-10 SYRINGE (ML) INJECTION EVERY 8 HOURS
Status: DISCONTINUED | OUTPATIENT
Start: 2017-11-20 | End: 2017-11-26 | Stop reason: HOSPADM

## 2017-11-20 RX ORDER — FENTANYL CITRATE 50 UG/ML
INJECTION, SOLUTION INTRAMUSCULAR; INTRAVENOUS AS NEEDED
Status: DISCONTINUED | OUTPATIENT
Start: 2017-11-20 | End: 2017-11-20 | Stop reason: HOSPADM

## 2017-11-20 RX ORDER — CEFAZOLIN SODIUM IN 0.9 % NACL 2 G/100 ML
2 PLASTIC BAG, INJECTION (ML) INTRAVENOUS EVERY 8 HOURS
Status: COMPLETED | OUTPATIENT
Start: 2017-11-20 | End: 2017-11-21

## 2017-11-20 RX ORDER — MAGNESIUM SULFATE 100 %
4 CRYSTALS MISCELLANEOUS AS NEEDED
Status: DISCONTINUED | OUTPATIENT
Start: 2017-11-20 | End: 2017-11-26 | Stop reason: HOSPADM

## 2017-11-20 RX ORDER — SODIUM CHLORIDE 9 MG/ML
100 INJECTION, SOLUTION INTRAVENOUS CONTINUOUS
Status: DISCONTINUED | OUTPATIENT
Start: 2017-11-20 | End: 2017-11-22

## 2017-11-20 RX ORDER — DOCUSATE SODIUM 100 MG/1
100 CAPSULE, LIQUID FILLED ORAL 2 TIMES DAILY
Qty: 60 CAP | Refills: 2 | Status: SHIPPED | OUTPATIENT
Start: 2017-11-20 | End: 2018-02-18

## 2017-11-20 RX ORDER — MORPHINE SULFATE 10 MG/ML
INJECTION, SOLUTION INTRAMUSCULAR; INTRAVENOUS AS NEEDED
Status: DISCONTINUED | OUTPATIENT
Start: 2017-11-20 | End: 2017-11-20 | Stop reason: HOSPADM

## 2017-11-20 RX ORDER — DEXAMETHASONE SODIUM PHOSPHATE 100 MG/10ML
INJECTION INTRAMUSCULAR; INTRAVENOUS AS NEEDED
Status: DISCONTINUED | OUTPATIENT
Start: 2017-11-20 | End: 2017-11-20 | Stop reason: HOSPADM

## 2017-11-20 RX ORDER — PROPOFOL 10 MG/ML
INJECTION, EMULSION INTRAVENOUS AS NEEDED
Status: DISCONTINUED | OUTPATIENT
Start: 2017-11-20 | End: 2017-11-20 | Stop reason: HOSPADM

## 2017-11-20 RX ORDER — ACETAMINOPHEN 325 MG/1
650 TABLET ORAL
Status: DISCONTINUED | OUTPATIENT
Start: 2017-11-20 | End: 2017-11-26 | Stop reason: HOSPADM

## 2017-11-20 RX ORDER — ACETAMINOPHEN 500 MG
500 TABLET ORAL
Status: DISCONTINUED | OUTPATIENT
Start: 2017-11-20 | End: 2017-11-20

## 2017-11-20 RX ORDER — DOCUSATE SODIUM 100 MG/1
100 CAPSULE, LIQUID FILLED ORAL 2 TIMES DAILY
Status: DISCONTINUED | OUTPATIENT
Start: 2017-11-20 | End: 2017-11-26 | Stop reason: HOSPADM

## 2017-11-20 RX ORDER — ALVIMOPAN 12 MG/1
12 CAPSULE ORAL 2 TIMES DAILY
Status: DISCONTINUED | OUTPATIENT
Start: 2017-11-21 | End: 2017-11-26 | Stop reason: HOSPADM

## 2017-11-20 RX ORDER — SODIUM CHLORIDE 0.9 % (FLUSH) 0.9 %
5-10 SYRINGE (ML) INJECTION AS NEEDED
Status: DISCONTINUED | OUTPATIENT
Start: 2017-11-20 | End: 2017-11-20 | Stop reason: HOSPADM

## 2017-11-20 RX ORDER — NALOXONE HYDROCHLORIDE 0.4 MG/ML
0.1 INJECTION, SOLUTION INTRAMUSCULAR; INTRAVENOUS; SUBCUTANEOUS
Status: DISCONTINUED | OUTPATIENT
Start: 2017-11-20 | End: 2017-11-26 | Stop reason: HOSPADM

## 2017-11-20 RX ORDER — PRAVASTATIN SODIUM 40 MG/1
80 TABLET ORAL
Status: DISCONTINUED | OUTPATIENT
Start: 2017-11-20 | End: 2017-11-26 | Stop reason: HOSPADM

## 2017-11-20 RX ORDER — CEFOXITIN 2 G/1
INJECTION, POWDER, FOR SOLUTION INTRAVENOUS AS NEEDED
Status: DISCONTINUED | OUTPATIENT
Start: 2017-11-20 | End: 2017-11-20 | Stop reason: HOSPADM

## 2017-11-20 RX ORDER — HYDROMORPHONE HYDROCHLORIDE 1 MG/ML
0.2 INJECTION, SOLUTION INTRAMUSCULAR; INTRAVENOUS; SUBCUTANEOUS
Status: DISCONTINUED | OUTPATIENT
Start: 2017-11-20 | End: 2017-11-20 | Stop reason: HOSPADM

## 2017-11-20 RX ADMIN — SODIUM CHLORIDE 100 ML/HR: 900 INJECTION, SOLUTION INTRAVENOUS at 18:10

## 2017-11-20 RX ADMIN — ACETAMINOPHEN 1000 MG: 10 INJECTION, SOLUTION INTRAVENOUS at 14:55

## 2017-11-20 RX ADMIN — MORPHINE SULFATE 2 MG: 10 INJECTION, SOLUTION INTRAMUSCULAR; INTRAVENOUS at 13:34

## 2017-11-20 RX ADMIN — FENTANYL CITRATE 25 MCG: 50 INJECTION, SOLUTION INTRAMUSCULAR; INTRAVENOUS at 14:45

## 2017-11-20 RX ADMIN — ALBUMIN HUMAN 250 ML: 50 SOLUTION INTRAVENOUS at 13:46

## 2017-11-20 RX ADMIN — LIDOCAINE HYDROCHLORIDE 80 MG: 20 INJECTION, SOLUTION EPIDURAL; INFILTRATION; INTRACAUDAL; PERINEURAL at 13:05

## 2017-11-20 RX ADMIN — SODIUM CHLORIDE, SODIUM LACTATE, POTASSIUM CHLORIDE, CALCIUM CHLORIDE: 600; 310; 30; 20 INJECTION, SOLUTION INTRAVENOUS at 12:58

## 2017-11-20 RX ADMIN — SODIUM CHLORIDE, SODIUM LACTATE, POTASSIUM CHLORIDE, CALCIUM CHLORIDE: 600; 310; 30; 20 INJECTION, SOLUTION INTRAVENOUS at 14:15

## 2017-11-20 RX ADMIN — ROCURONIUM BROMIDE 15 MG: 10 INJECTION, SOLUTION INTRAVENOUS at 13:25

## 2017-11-20 RX ADMIN — MIDAZOLAM HYDROCHLORIDE 2 MG: 1 INJECTION, SOLUTION INTRAMUSCULAR; INTRAVENOUS at 12:06

## 2017-11-20 RX ADMIN — BUPIVACAINE HYDROCHLORIDE AND EPINEPHRINE 2 ML: 2.5; 5 INJECTION, SOLUTION EPIDURAL; INFILTRATION; INTRACAUDAL; PERINEURAL at 17:29

## 2017-11-20 RX ADMIN — PROPOFOL 130 MG: 10 INJECTION, EMULSION INTRAVENOUS at 13:05

## 2017-11-20 RX ADMIN — NEOSTIGMINE METHYLSULFATE 3 MG: 1 INJECTION INTRAVENOUS at 17:23

## 2017-11-20 RX ADMIN — GLYCOPYRROLATE 0.5 MG: 0.2 INJECTION INTRAMUSCULAR; INTRAVENOUS at 17:23

## 2017-11-20 RX ADMIN — ALBUMIN HUMAN 250 ML: 50 SOLUTION INTRAVENOUS at 15:02

## 2017-11-20 RX ADMIN — FENTANYL CITRATE 50 MCG: 50 INJECTION, SOLUTION INTRAMUSCULAR; INTRAVENOUS at 13:05

## 2017-11-20 RX ADMIN — FENTANYL CITRATE 50 MCG: 50 INJECTION, SOLUTION INTRAMUSCULAR; INTRAVENOUS at 12:06

## 2017-11-20 RX ADMIN — CEFAZOLIN 2 G: 10 INJECTION, POWDER, FOR SOLUTION INTRAVENOUS; PARENTERAL at 22:30

## 2017-11-20 RX ADMIN — Medication 10 ML: at 22:12

## 2017-11-20 RX ADMIN — CEFOXITIN 2 G: 2 INJECTION, POWDER, FOR SOLUTION INTRAVENOUS at 13:32

## 2017-11-20 RX ADMIN — DEXAMETHASONE SODIUM PHOSPHATE 4 MG: 100 INJECTION INTRAMUSCULAR; INTRAVENOUS at 13:55

## 2017-11-20 RX ADMIN — ROCURONIUM BROMIDE 10 MG: 10 INJECTION, SOLUTION INTRAVENOUS at 15:38

## 2017-11-20 RX ADMIN — GENTAMICIN SULFATE 170 MG: 40 INJECTION, SOLUTION INTRAMUSCULAR; INTRAVENOUS at 13:16

## 2017-11-20 RX ADMIN — SODIUM CHLORIDE, SODIUM LACTATE, POTASSIUM CHLORIDE, CALCIUM CHLORIDE: 600; 310; 30; 20 INJECTION, SOLUTION INTRAVENOUS at 15:45

## 2017-11-20 RX ADMIN — ALVIMOPAN 12 MG: 12 CAPSULE ORAL at 11:47

## 2017-11-20 RX ADMIN — ROCURONIUM BROMIDE 10 MG: 10 INJECTION, SOLUTION INTRAVENOUS at 13:45

## 2017-11-20 RX ADMIN — BUPIVACAINE HYDROCHLORIDE AND EPINEPHRINE 3 ML: 2.5; 5 INJECTION, SOLUTION EPIDURAL; INFILTRATION; INTRACAUDAL; PERINEURAL at 17:32

## 2017-11-20 RX ADMIN — ALBUMIN HUMAN 250 ML: 50 SOLUTION INTRAVENOUS at 15:55

## 2017-11-20 RX ADMIN — PHENYLEPHRINE HYDROCHLORIDE 20 MCG/MIN: 10 INJECTION INTRAVENOUS at 18:25

## 2017-11-20 RX ADMIN — SUCCINYLCHOLINE CHLORIDE 120 MG: 20 INJECTION INTRAMUSCULAR; INTRAVENOUS at 13:06

## 2017-11-20 RX ADMIN — HEPARIN SODIUM 5000 UNITS: 5000 INJECTION, SOLUTION INTRAVENOUS; SUBCUTANEOUS at 11:58

## 2017-11-20 RX ADMIN — PHENYLEPHRINE HYDROCHLORIDE 10 MCG/MIN: 10 INJECTION INTRAVENOUS at 17:50

## 2017-11-20 RX ADMIN — ROCURONIUM BROMIDE 5 MG: 10 INJECTION, SOLUTION INTRAVENOUS at 13:04

## 2017-11-20 RX ADMIN — ROCURONIUM BROMIDE 10 MG: 10 INJECTION, SOLUTION INTRAVENOUS at 16:47

## 2017-11-20 RX ADMIN — SODIUM CHLORIDE, SODIUM LACTATE, POTASSIUM CHLORIDE, AND CALCIUM CHLORIDE 25 ML/HR: 600; 310; 30; 20 INJECTION, SOLUTION INTRAVENOUS at 11:48

## 2017-11-20 RX ADMIN — ROCURONIUM BROMIDE 10 MG: 10 INJECTION, SOLUTION INTRAVENOUS at 14:34

## 2017-11-20 RX ADMIN — BUPIVACAINE HYDROCHLORIDE AND EPINEPHRINE 3 ML: 2.5; 5 INJECTION, SOLUTION EPIDURAL; INFILTRATION; INTRACAUDAL; PERINEURAL at 17:16

## 2017-11-20 RX ADMIN — ONDANSETRON 4 MG: 2 INJECTION INTRAMUSCULAR; INTRAVENOUS at 17:23

## 2017-11-20 RX ADMIN — Medication 8 ML/HR: at 19:32

## 2017-11-20 NOTE — BRIEF OP NOTE
BRIEF OPERATIVE NOTE    Date of Procedure: 11/20/2017   Preoperative Diagnosis: BLADDER CARCINOMA  Postoperative Diagnosis: BLADDER CARCINOMA    Procedure(s):  RADICAL CYSTECTOMY WITH ILEAL CONDUIT/PELVIC LYMPH NODE DISSECTION  Surgeon(s) and Role:     * Llia Alfonso MD - Primary     * David Varela MD - Rosa Smith MD-Assisting         Surgical Staff:  Circ-1: Talisha Valle RN  Circ-Intern: Consuelo Baldwin  Scrub Tech-1: Felipe Vasquez  Scrub Tech-Relief: Ana Amanda  Surg Asst-1: Marlinda Cea  Event Time In   Incision Start 1345   Incision Close      Anesthesia: General   Estimated Blood Loss: 500 ml  Specimens:   ID Type Source Tests Collected by Time Destination   1 : Distal Left Ureter Frozen Section Ureter  Lila Alfonso MD 11/20/2017 1401 Pathology   2 : Distal Right Ureter Frozen Section Ureter  Lila Alfonso MD 11/20/2017 1359 Pathology   3 : Left ovary Preservative Ovary  Lila Alfonso MD 11/20/2017 1445 Pathology   4 : Bladder Preservative Bladder  Lila Alfonso MD 11/20/2017 1453 Pathology   5 : Left Obturator Lymph Nodes Preservative Lymph Node  Lila Alfonso MD 11/20/2017 1513 Pathology   6 : Right Obturator Lymph Nodes Preservative Lymph Node  Lila Alfonso MD 11/20/2017 1534 Pathology      Findings: large firm obturator LN's   Complications: NONE  Implants:   Implant Name Type Inv.  Item Serial No.  Lot No. LRB No. Used Action   STENT KT URET SGL J [id number] -- GYRUS - SN/A  STENT KT URET SGL J [id number] -- GYRUS N/A [employer] - GYRUS [id number] Left 1 Implanted   STENT KT URET SGL J [id number] -- GYRUS - SN/A   STENT KT URET SGL J [id number] -- GYRUS N/A [employer] - [employer] [id number] Right 1 Implanted

## 2017-11-20 NOTE — H&P
Be Herrera is an 80year old female who presents today for \"cystectomy talk\". This very nice diabetic lady has been having trouble with dysuria and hematuria and found to have a large bladder tumor on CT scanning status post TURBT on  7/25/2017 also with a large AAA status post endovascular grafting. Creatinine 0.886 417 with a EGFR of 70. CT IVP done at [facility] 6/15/2017 demonstrated a 5.8 x 4.4 x 2.6 bladder mass. Normal upper tracts. 16mm lipid rich adenoma noted in the left adrenal gland 4.8 cm aneurysm of the aorta also noted. Bone scan revealed no evidence of metastatic disease. FINAL PATHOLOGIC DIAGNOSIS    1. Urethra, tumor, transurethral resection:       Invasive urothelial carcinoma, high-grade, with necrosis  No lymphovascular invasion is identified  No muscularis propria is present for evaluation    2. Urinary bladder, tumor, transurethral resection:       Invasive urothelial carcinoma, high-grade  Focal carcinoma in situ  Lymphovascular invasion is present  No definite muscularis propria is present for evaluation  See comment    She presents today to discuss a radical cystectomy and ileal conduit urinary diversion. She denies any chest pain. She has had a hysterectomy in the past.  Reports no urinary tract infections. Last follow up:       PAST MEDICAL HISTORY:    Allergies: No known allergies. DENIES: Latex, Shellfish, X-Ray Dye, Iodine. Medications: PRAVASTATIN SODIUM 80 MG ORAL TABS (PRAVASTATIN SODIUM) 1 po qhs  METFORMIN  MG ORAL TABS (METFORMIN HCL) 1 po bid  GLIPIZIDE ER 10 MG ORAL XR24H-TAB (GLIPIZIDE) 2 po qd  CARTIA  MG ORAL XR24H-CAP (DILTIAZEM HCL COATED BEADS) 1 po qd    Problems: Bladder cancer (ICD-188.9) (BTA37-Z89.9)  Diabetes (ICD-250.00) (IOD66-X10.9)  Incomplete bladder emptying (UBI-064.62) (NQV87-Y03. 9)  Recurrent urinary tract infection (ICD-599.0) (MKR50-X07.0)  Gross hematuria (ICD-599.71) (APP35-V65.0)    Illnesses: Diabetes, High Blood Pressure, and Bleeding Problems. DENIES: Heart Disease, Pacemaker/Defibrillator, Lung Disease, Bowel Problems, Stroke/Seizure, Kidney Problems, HIV, Hepatitis, or Cancer. Surgeries: Hysterectomy. Family History: DENIES: Kidney cancer, Kidney disease, Kidney stones, Breast cancer, Uterine cancer, Cervical cancer, Ovarian cancer. Social History: Retired. . Smoking status: never smoker. Does not drink alcohol. System Review: Admits to: Dry Eyes. DENIES: Unexplained Weight Loss, Dry Mouth, Leg Swelling, Shortness of Breath, Constipation, Involuntary Urine Loss, Lower Extremity Weakness, Dry Skin, Difficulty Walking, Psychiatric Problems, Impaired Sex Drive, Easy Bleeding, Rash. EXAMINATION: Vitals: Pulse: 108 BP: 160/82 Weight: 181 lbs Height: 5' 7\" BMI: 28.45 kg/m^2  Appearance: well-developed NAD Respiratory Effort: breathing easily Abdomen/Flank: soft non-tender without masses; no CVA tenderness, well-healed lower midline surgical scar Liver/Spleen: no organomegaly Orientation: oriented to person; time and place Mood/Affect: normal     URINALYSIS  Urine Micro not done    IMPRESSION:    1. BLADDER CANCER (ZZX85-B48.9) - Unchanged: Plan radical cystectomy with bilateral pelvic lymph node dissection ileal conduit urinary diversion, we will be sure to do complete urethral resection at the time. The risks, alternatives as well as benefits of the procedure including but not limited to bleeding, infection, risk of transfusion, DVT, PE, MI, recurrence or persistence of disease as well as potentially needing further treatments were discussed in detail the patient understands and wished to proceed. The patient was given a verbal/written log of Blood Pressure and recommendations. Severe Hypertension: Refer patient for urgent (next couple days) BP followup.       cc: RAZA Regalado  Transcribed by Speech to Text Technology    [employer]  E&M Service    Upcoming Orders  Schedule Surgery - Schedule for RADICAL CYSTECTOMY BILATERAL PELVIC LYMPH NODE DISSECTION AND ILEAL CONDUIT URINARY DIVERSION. - 3 hrs at [facility] under General anesthesia. Requesting Next Available (Me). Office followup 3 weeks.

## 2017-11-20 NOTE — ANESTHESIA PREPROCEDURE EVALUATION
Anesthetic History   No history of anesthetic complications            Review of Systems / Medical History  Patient summary reviewed, nursing notes reviewed and pertinent labs reviewed    Pulmonary  Within defined limits                 Neuro/Psych   Within defined limits           Cardiovascular    Hypertension          PAD    Exercise tolerance: >4 METS  Comments: S/P AAA endovar repair   GI/Hepatic/Renal  Within defined limits              Endo/Other    Diabetes    Arthritis and cancer     Other Findings   Comments: Bladder CA           Physical Exam    Airway  Mallampati: III  TM Distance: 4 - 6 cm  Neck ROM: normal range of motion   Mouth opening: Normal     Cardiovascular  Regular rate and rhythm,  S1 and S2 normal,  no murmur, click, rub, or gallop             Dental    Dentition: Full upper dentures and Lower partial plate     Pulmonary  Breath sounds clear to auscultation               Abdominal  GI exam deferred       Other Findings            Anesthetic Plan    ASA: 3  Anesthesia type: general    Monitoring Plan: BIS  Post-op pain plan if not by surgeon: indwelling epidural catheter    Induction: Intravenous  Anesthetic plan and risks discussed with: Patient

## 2017-11-20 NOTE — IP AVS SNAPSHOT
[address] [facility] 
[phone] Patient: Giorgio Doll MRN: [id number] BQP:[id number] About your hospitalization You were admitted on:  November 20, 2017 You last received care in the:  [facility] 2 GENERAL SURGERY You were discharged on:  November 26, 2017 Why you were hospitalized Your primary diagnosis was:  Not on File Your diagnoses also included:  Transitional Cell Bladder Cancer (Hcc) Things You Need To Do (next 8 weeks) Follow up with Steven Davenport NP Phone:  [phone] Where:  [place], [address] [facility] [address] Monday Dec 11, 2017 Follow up with Kristan Hines MD  
APPOINTMENT TIME: 11:00AM  
  
Phone:  [phone] Where:  [facility], [address] [place] [address] [address], [facility] [address] [address] Discharge Orders None A check maycol indicates which time of day the medication should be taken. My Medications STOP taking these medications   
 aspirin delayed-release 81 mg tablet TAKE these medications as instructed Instructions Each Dose to Equal  
 Morning Noon Evening Bedtime ARTIFICIAL TEARS OP Your last dose was: Your next dose is:    
   
   
 Apply  to eye as needed. CARTIA  mg ER capsule Generic drug:  dilTIAZem CD Your last dose was: Your next dose is: Take 240 mg by mouth daily. 240 mg  
    
   
   
   
  
 * cefUROXime 250 mg tablet Commonly known as:  CEFTIN Your last dose was: Your next dose is: Take 1 Tab by mouth daily. 250 mg  
    
   
   
   
  
 * cefUROXime 250 mg tablet Commonly known as:  CEFTIN Your last dose was: Your next dose is: Take 1 Tab by mouth daily. 250 mg  
    
   
   
   
  
 docusate sodium 100 mg capsule Commonly known as:  Melvenia Clipper Your last dose was: Your next dose is: Take 1 Cap by mouth two (2) times a day for 90 days. 100 mg  
    
   
   
   
  
 glipiZIDE SR 10 mg CR tablet Commonly known as:  GLUCOTROL XL Your last dose was: Your next dose is: Take 20 mg by mouth daily. 20 mg  
    
   
   
   
  
 hydroCHLOROthiazide 12.5 mg capsule Commonly known as:  Baker Sine Your last dose was: Your next dose is: Take 12.5 mg by mouth daily. 12.5 mg  
    
   
   
   
  
 HYDROcodone-acetaminophen 5-325 mg per tablet Commonly known as:  March Castles Your last dose was: Your next dose is: Take 1 Tab by mouth every six (6) hours as needed for Pain. Max Daily Amount: 4 Tabs. 1 Tab  
    
   
   
   
  
 metFORMIN 500 mg tablet Commonly known as:  GLUCOPHAGE Your last dose was: Your next dose is: Take 500 mg by mouth daily (with breakfast). 500 mg ONE-A-DAY WOMENS FORMULA PO Your last dose was: Your next dose is: Take 1 Tab by mouth daily. 1 Tab  
    
   
   
   
  
 pravastatin 80 mg tablet Commonly known as:  PRAVACHOL Your last dose was: Your next dose is: Take 80 mg by mouth nightly. 80 mg  
    
   
   
   
  
 TYLENOL EXTRA STRENGTH 500 mg tablet Generic drug:  acetaminophen Your last dose was: Your next dose is: Take 500 mg by mouth every six (6) hours as needed for Pain. Usually takes every night 500 mg * Notice: This list has 2 medication(s) that are the same as other medications prescribed for you. Read the directions carefully, and ask your doctor or other care provider to review them with you. Where to Get Your Medications These medications were sent to [address], [address] [facility]  [address] [address] Phone:  [phone] cefUROXime 250 mg tablet Information on where to get these meds will be given to you by the nurse or doctor. ! Ask your nurse or doctor about these medications  
  cefUROXime 250 mg tablet  
 docusate sodium 100 mg capsule HYDROcodone-acetaminophen 5-325 mg per tablet Discharge Instructions Cystectomy With Ileal Conduit: What to Expect at Home Your Recovery A cystectomy is surgery to remove part or all of the bladder. The surgery is mainly used to treat bladder cancer. After surgery, your belly will be sore, and you will probably need pain medicine for 1 to 2 weeks. You can expect your urostomy (stoma) to be swollen and tender at first. This usually improves after 2 to 3 weeks. You may notice some blood in your urine or that your urine is light pink for the first 3 weeks after surgery. This is normal. 
While you are recovering from surgery, you will also be learning to care for your stoma. You may find it helpful to meet several times with a wound ostomy continence nurse (WOCN). This nurse can teach you how to care for your stoma and use a urostomy pouch. Many people can return to work or their usual activities 4 to 6 weeks after surgery. But you will probably need 6 to 8 weeks to fully recover from the surgery. Bladder cancer surgery may affect sexual function. If a woman's uterus and ovaries are removed during the surgery, she will not be able to get pregnant, and menopause may start. She may have hot flashes and other symptoms of menopause. And if a man's prostate gland and seminal vesicles are removed, he may have problems getting an erection and will not be able to make a woman pregnant. You may feel sad or depressed, or you may worry about how your body will look after surgery. You may worry about whether the surgery will affect your ability to have sex. These concerns are common.  Ask your doctor about support groups or other resources that can help you with this. Call the [facility] (4-941.620.5577) or visit its website at [address] [employer] for more information. This care sheet gives you a general idea about how long it will take for you to recover. But each person recovers at a different pace. Follow the steps below to get better as quickly as possible. How can you care for yourself at home? Activity ? · Rest when you feel tired. Getting enough sleep will help you recover. ? · Try to walk each day. Start by walking a little more than you did the day before. Bit by bit, increase the amount you walk. Walking boosts blood flow and helps prevent pneumonia and constipation. ? · Avoid strenuous activities, such as bicycle riding, jogging, weight lifting, or aerobic exercise, until your doctor says it is okay. ? · Avoid lifting more than 5 pounds for about 4 weeks or until your doctor says it is okay. This may include a child, grocery bags and milk containers, a heavy briefcase or backpack, cat litter or dog food bags, or a vacuum . ? · Ask your doctor when you can drive again. ? · You will probably be able to go back to work or your normal routine in 4 to 6 weeks. This depends on the type of work you do and if you have any further treatment. ? · You may take a bath or shower as usual. You can bathe with the pouch on or off. Gently pat the skin around your stoma dry after bathing. Diet ? · You can eat your normal diet. If your stomach is upset, try bland, low-fat foods like plain rice, broiled chicken, toast, and yogurt. ? · Drink plenty of fluids to avoid becoming dehydrated. Medicines ? · Your doctor will tell you if and when you can restart your medicines. He or she will also give you instructions about taking any new medicines. ? · If you take blood thinners, such as warfarin (Coumadin), clopidogrel (Plavix), or aspirin, be sure to talk to your doctor.  He or she will tell you if and when to start taking those medicines again. Make sure that you understand exactly what your doctor wants you to do. ? · Take pain medicines exactly as directed. ¨ If the doctor gave you a prescription medicine for pain, take it as prescribed. ¨ If you are not taking a prescription pain medicine, ask your doctor if you can take an over-the-counter medicine. ? · If you think your pain medicine is making you sick to your stomach: 
¨ Take your medicine after meals (unless your doctor has told you not to). ¨ Ask your doctor for a different pain medicine. ? · If your doctor prescribed antibiotics, take them as directed. Do not stop taking them just because you feel better. You need to take the full course of antibiotics. Incision care ? · If you have strips of tape on the cut (incision) the doctor made, leave the tape on for a week or until it falls off.  
? · Wash the area daily with warm, soapy water, and pat it dry. Don't use hydrogen peroxide or alcohol, which can slow healing. You may cover the area with a gauze bandage if it weeps or rubs against clothing. Change the bandage every day. ? · Keep the area clean and dry. Other instructions ? · You may notice that your bowel movements are not regular right after your surgery. This is common. Try to avoid constipation and straining with bowel movements. You may want to take a fiber supplement every day. If you have not had a bowel movement after a couple of days, ask your doctor about taking a mild laxative. ? · Follow your doctor's or WOCN's instructions for caring for your stoma. ? · To control pain when you cough or sneeze, hold a pillow over your incision. Follow-up care is a key part of your treatment and safety. Be sure to make and go to all appointments, and call your doctor if you are having problems. It's also a good idea to know your test results and keep a list of the medicines you take. When should you call for help? Call 911 anytime you think you may need emergency care. For example, call if: 
? · You passed out (lost consciousness). ? · You have chest pain, are short of breath, or cough up blood. ?Call your doctor now or seek immediate medical care if: 
? · You have pain that does not get better after you take pain medicine. ? · You have loose stitches, or your incision comes open. ? · You are bleeding from the incision. ? · You have signs of infection, such as: 
¨ Increased pain, swelling, warmth, or redness. ¨ Red streaks leading from the area. ¨ Pus draining from the area. ¨ A fever. ? · You are unable to urinate. ? · You have symptoms of a urinary tract infection. These may include: 
¨ Pain or burning when you urinate. ¨ A frequent need to urinate without being able to pass much urine. ¨ Pain in the flank, which is just below the rib cage and above the waist on either side of the back. ¨ Blood in your urine. ¨ A fever. ? · You are sick to your stomach or cannot drink fluids. ? · You have signs of a blood clot in your leg (called a deep vein thrombosis), such as: 
¨ Pain in your calf, back of the knee, thigh, or groin. ¨ Redness or swelling in your leg. ? Watch closely for changes in your health, and be sure to contact your doctor if you have any problems. Where can you learn more? Go to http://marely-sarah.info/. Enter B829 in the search box to learn more about \"Cystectomy With Ileal Conduit: What to Expect at Home. \" Current as of: May 12, 2017 Content Version: 11.4 © 1325-5245 [employer]. Care instructions adapted under license by [employer] (which disclaims liability or warranty for this information). If you have questions about a medical condition or this instruction, always ask your healthcare professional. [address]  any warranty or liability for your use of this information. Introducing [facility]! Freddie Robison introduces [employer] patient portal. Now you can access parts of your medical record, email your doctor's office, and request medication refills online. 1. In your internet browser, go to [email or web address] 2. Click on the First Time User? Click Here link in the Sign In box. You will see the New Member Sign Up page. 3. Enter your [employer] Access Code exactly as it appears below. You will not need to use this code after youve completed the sign-up process. If you do not sign up before the expiration date, you must request a new code. · [employer] Access Code: [email or web address] Expires: 2/15/2018  3:41 PM 
 
4. Enter the last four digits of your Social Security Number (xxxx) and Date of Birth (mm/dd/yyyy) as indicated and click Submit. You will be taken to the next sign-up page. 5. Create a [employer] ID. This will be your [employer] login ID and cannot be changed, so think of one that is secure and easy to remember. 6. Create a [employer] password. You can change your password at any time. 7. Enter your Password Reset Question and Answer. This can be used at a later time if you forget your password. 8. Enter your e-mail address. You will receive e-mail notification when new information is available in [address] 9. Click Sign Up. You can now view and download portions of your medical record. 10. Click the Download Summary menu link to download a portable copy of your medical information. If you have questions, please visit the Frequently Asked Questions section of the [employer] website. Remember, [employer] is NOT to be used for urgent needs. For medical emergencies, dial 911. Now available from your iPhone and Android! Providers Seen During Your Hospitalization Provider Specialty Primary office phone Allie Mccabe MD Urology [phone] Your Primary Care Physician (PCP) Primary Care Physician Office Phone Office Fax [employer] [phone] You are allergic to the following No active allergies Recent Documentation Height Weight BMI OB Status Smoking Status 1.702 m 82.9 kg 28.62 kg/m2 Hysterectomy Former Smoker Emergency Contacts Name Discharge Info Relation Home Work Mobile Micheline Armando DISCHARGE CAREGIVER [3] Daughter [21] [phone] Patient Belongings The following personal items are in your possession at time of discharge: 
  Dental Appliances: None  Visual Aid: Glasses      Home Medications: None   Jewelry: None  Clothing: Other (comment) (street clothes)    Other Valuables: Eyeglasses (left in personal belongings bag)  Personal Items Sent to Safe: none Please provide this summary of care documentation to your next provider. Signatures-by signing, you are acknowledging that this After Visit Summary has been reviewed with you and you have received a copy. Patient Signature:  ____________________________________________________________ Date:  ____________________________________________________________  
  
[facility] Provider Signature:  ____________________________________________________________ Date:  ____________________________________________________________

## 2017-11-20 NOTE — PERIOP NOTES
11:25= spoke with Pamela Leong to inform that pt needs to be marked before surgery. 11:31= Mohini Nicolas, in to see.

## 2017-11-20 NOTE — ANESTHESIA PROCEDURE NOTES
Epidural Block    Performed by: Remington Giron  Authorized by: Remington Giron     Pre-Procedure      Epidural Procedure Note    Risk and benefits were discussed with the patient and plans are to proceed. Patient was placed in the seated position. The back was prepped at the lumbar region with Duraprep. 0.25% bupivicaine with 1:200 K epi was used as local at T11 - T12. A 17 Tuohy needle was passed x 1 attempt(s) at the above stated level. Loss of resistance was obtained using air. A 19 g epidural catheter was placed/secured at 12 cm at the skin. Aspiration was negative. No paresthesia. Test dose with 5 mL 0.25% bupivicaine with 1:200 K epinephrine was negative. Catheter was secured with tegaderm and tape.

## 2017-11-20 NOTE — WOUND CARE
WOUND OSTOMY CARE:  Chart and consent for surgery reviewed. Patient is scheduled today for a radical cystectomy with bilateral pelvic lymph node dissection ileal conduit urinary diversion. Past Medical History:   Diagnosis Date    Aneurysm ([address]     abdominal (pt not sure of blood vessel) repaired    Arthritis     hands    Cancer ([address]     bladder     Diabetes ([address]     oral hypoglycemics    Hypertension     Ill-defined condition     high cholesterol     Past Surgical History:   Procedure Laterality Date    ABDOMEN SURGERY PROC UNLISTED  09/2017    aneurysm repair    HX CATARACT REMOVAL      bilateral    HX GYN      hysterectomy    HX HEENT      Lasik on right    HX UROLOGICAL  2017    resection of bladder tumor      Nurse introduced self and services to patient. Patient verbalized knowledge of surgical procedure consistent with written signed consent. Nurse reviewed patient's normal anatomy and changes with surgical procedure. Nurse assessed patient's abdomen in lying, sitting and standing position. Nurse marked with an \"X\" an appropriate site for a Ileal Conduit/Urostomy in the right upper quadrant, away from the umbilicus and any creases or folds, on a flat pouching surface, within the rectus abdominal muscle and within patient's visual field. Will follow post op day 1 for continued ostomy education and care.      Vikas Garcia RN, CWON, zone ph# [phone]

## 2017-11-20 NOTE — BRIEF OP NOTE
BRIEF OPERATIVE NOTE    Date of Procedure: 11/20/2017   Preoperative Diagnosis: BLADDER CARCINOMA  Postoperative Diagnosis: BLADDER CARCINOMA    Procedure(s):  RADICAL CYSTECTOMY WITH ILEAL CONDUIT/PELVIC LYMPH NODE DISSECTION  Surgeon(s) and Role:     * Jaime Stark MD - Primary     * Paty Law MD - Assisting         Assistant Staff:       Surgical Staff:  Circ-1: Samanta Pozo RN  Circ-Intern: Owen Mcmahon  Scrub Tech-1: Claudia Randall  Scrub Tech-Relief: Jamison Smith  Surg Asst-1: Minus Fester  Event Time In   Incision Start 1345   Incision Close 1725     Anesthesia: General   Estimated Blood Loss: 500cc  Specimens:   ID Type Source Tests Collected by Time Destination   1 : Distal Left Ureter Frozen Section Ureter  Jaime Stark MD 11/20/2017 1401 Pathology   2 : Distal Right Ureter Frozen Section Ureter  Jaime Stark MD 11/20/2017 1359 Pathology   3 : Left ovary Preservative Ovary  Jaime Stark MD 11/20/2017 1445 Pathology   4 : Bladder Preservative Bladder  Jaime Stark MD 11/20/2017 1453 Pathology   5 : Left Obturator Lymph Nodes Preservative Lymph Node  Jaime Stark MD 11/20/2017 1513 Pathology   6 : Right Obturator Lymph Nodes Preservative Lymph Node  Jaime Stark MD 11/20/2017 1534 Pathology      Findings: suspicous bilateral obturator nodes   Complications: none  Implants:   Implant Name Type Inv.  Item Serial No.  Lot No. LRB No. Used Action   STENT KT URET SGL J [id number] -- GYRUS - SN/A  STENT KT URET SGL J [id number] -- GYRUS N/A [employer] [id number] Left 1 Implanted   STENT KT URET SGL J [id number] -- GYRUS - SN/A   STENT KT URET SGL J [id number] -- GYRUS N/A [employer] [id number] Right 1 Implanted

## 2017-11-20 NOTE — ANESTHESIA PROCEDURE NOTES
Central Line Placement    Performed by: Clare Casiano  Authorized by: Shelby Jimenez Line Procedure Note    Indication: Inadequate venous access    Risks, benefits, alternatives explained and patient agrees to proceed. Patient positioned in Trendelenburg. 7-Step Sterility Protocol followed. (cap, mask sterile gown, sterile gloves, large sterile sheet, hand hygiene, 2% chlorhexidine for cutaneous antisepsis)  5 mL 1% Lidocaine placed at insertion site. Right internal jugular cannulated x 2 attempt(s) utilizing the Seldinger technique. Catheter secured & Biopatch applied. Sterile Tegaderm placed. CXR pending.     Care turned over to covering Attending MD.

## 2017-11-21 LAB
ANION GAP SERPL CALC-SCNC: 11 MMOL/L (ref 5–15)
BUN SERPL-MCNC: 21 MG/DL (ref 6–20)
BUN/CREAT SERPL: 18 (ref 12–20)
CALCIUM SERPL-MCNC: 7.5 MG/DL (ref 8.5–10.1)
CHLORIDE SERPL-SCNC: 107 MMOL/L (ref 97–108)
CO2 SERPL-SCNC: 23 MMOL/L (ref 21–32)
CREAT SERPL-MCNC: 1.18 MG/DL (ref 0.55–1.02)
GLUCOSE BLD STRIP.AUTO-MCNC: 132 MG/DL (ref 65–100)
GLUCOSE BLD STRIP.AUTO-MCNC: 136 MG/DL (ref 65–100)
GLUCOSE BLD STRIP.AUTO-MCNC: 148 MG/DL (ref 65–100)
GLUCOSE BLD STRIP.AUTO-MCNC: 159 MG/DL (ref 65–100)
GLUCOSE SERPL-MCNC: 172 MG/DL (ref 65–100)
HCT VFR BLD AUTO: 27.6 % (ref 35–47)
HGB BLD-MCNC: 8.7 G/DL (ref 11.5–16)
POTASSIUM SERPL-SCNC: 4 MMOL/L (ref 3.5–5.1)
SERVICE CMNT-IMP: ABNORMAL
SODIUM SERPL-SCNC: 141 MMOL/L (ref 136–145)

## 2017-11-21 PROCEDURE — 74011250636 HC RX REV CODE- 250/636: Performed by: UROLOGY

## 2017-11-21 PROCEDURE — 77010033678 HC OXYGEN DAILY

## 2017-11-21 PROCEDURE — 80048 BASIC METABOLIC PNL TOTAL CA: CPT | Performed by: UROLOGY

## 2017-11-21 PROCEDURE — 74011250637 HC RX REV CODE- 250/637: Performed by: UROLOGY

## 2017-11-21 PROCEDURE — 74011000250 HC RX REV CODE- 250: Performed by: ANESTHESIOLOGY

## 2017-11-21 PROCEDURE — 82962 GLUCOSE BLOOD TEST: CPT

## 2017-11-21 PROCEDURE — 65270000029 HC RM PRIVATE

## 2017-11-21 PROCEDURE — 77030027138 HC INCENT SPIROMETER -A

## 2017-11-21 PROCEDURE — 85018 HEMOGLOBIN: CPT | Performed by: UROLOGY

## 2017-11-21 PROCEDURE — 74011000250 HC RX REV CODE- 250: Performed by: UROLOGY

## 2017-11-21 PROCEDURE — 77030018836 HC SOL IRR NACL ICUM -A

## 2017-11-21 PROCEDURE — 74011000258 HC RX REV CODE- 258: Performed by: UROLOGY

## 2017-11-21 PROCEDURE — 36415 COLL VENOUS BLD VENIPUNCTURE: CPT | Performed by: UROLOGY

## 2017-11-21 PROCEDURE — 74011636637 HC RX REV CODE- 636/637: Performed by: UROLOGY

## 2017-11-21 PROCEDURE — 97161 PT EVAL LOW COMPLEX 20 MIN: CPT

## 2017-11-21 PROCEDURE — 77030013575 HC DRSG HYDROFERA HOLL -B

## 2017-11-21 RX ADMIN — Medication 10 ML: at 21:53

## 2017-11-21 RX ADMIN — CEFOXITIN SODIUM 1 G: 1 POWDER, FOR SOLUTION INTRAVENOUS at 13:51

## 2017-11-21 RX ADMIN — SODIUM CHLORIDE 100 ML/HR: 900 INJECTION, SOLUTION INTRAVENOUS at 23:50

## 2017-11-21 RX ADMIN — DILTIAZEM HYDROCHLORIDE 240 MG: 240 CAPSULE, COATED, EXTENDED RELEASE ORAL at 09:42

## 2017-11-21 RX ADMIN — FAMOTIDINE 20 MG: 10 INJECTION, SOLUTION INTRAVENOUS at 20:16

## 2017-11-21 RX ADMIN — ALVIMOPAN 12 MG: 12 CAPSULE ORAL at 19:20

## 2017-11-21 RX ADMIN — GLIPIZIDE 20 MG: 5 TABLET, FILM COATED, EXTENDED RELEASE ORAL at 07:15

## 2017-11-21 RX ADMIN — Medication 10 ML/HR: at 16:05

## 2017-11-21 RX ADMIN — Medication 10 ML: at 05:48

## 2017-11-21 RX ADMIN — Medication 10 ML: at 21:52

## 2017-11-21 RX ADMIN — Medication 10 ML: at 21:51

## 2017-11-21 RX ADMIN — CEFAZOLIN 2 G: 10 INJECTION, POWDER, FOR SOLUTION INTRAVENOUS; PARENTERAL at 05:51

## 2017-11-21 RX ADMIN — PRAVASTATIN SODIUM 80 MG: 40 TABLET ORAL at 21:54

## 2017-11-21 RX ADMIN — SODIUM CHLORIDE 100 ML/HR: 900 INJECTION, SOLUTION INTRAVENOUS at 13:51

## 2017-11-21 RX ADMIN — DOCUSATE SODIUM 100 MG: 100 CAPSULE, LIQUID FILLED ORAL at 09:37

## 2017-11-21 RX ADMIN — METFORMIN HYDROCHLORIDE 500 MG: 500 TABLET ORAL at 13:50

## 2017-11-21 RX ADMIN — DOCUSATE SODIUM 100 MG: 100 CAPSULE, LIQUID FILLED ORAL at 19:21

## 2017-11-21 RX ADMIN — INSULIN LISPRO 2 UNITS: 100 INJECTION, SOLUTION INTRAVENOUS; SUBCUTANEOUS at 06:00

## 2017-11-21 RX ADMIN — HYDROCHLOROTHIAZIDE 12.5 MG: 12.5 CAPSULE ORAL at 09:36

## 2017-11-21 RX ADMIN — Medication 10 ML: at 20:17

## 2017-11-21 RX ADMIN — Medication 2 ML/HR: at 01:50

## 2017-11-21 RX ADMIN — CEFOXITIN SODIUM 1 G: 1 POWDER, FOR SOLUTION INTRAVENOUS at 19:58

## 2017-11-21 RX ADMIN — Medication 10 ML/HR: at 05:46

## 2017-11-21 RX ADMIN — FAMOTIDINE 20 MG: 10 INJECTION, SOLUTION INTRAVENOUS at 13:50

## 2017-11-21 RX ADMIN — SODIUM CHLORIDE 100 ML/HR: 900 INJECTION, SOLUTION INTRAVENOUS at 04:43

## 2017-11-21 RX ADMIN — ALVIMOPAN 12 MG: 12 CAPSULE ORAL at 09:37

## 2017-11-21 RX ADMIN — Medication 40 ML: at 14:00

## 2017-11-21 NOTE — WOUND CARE
Urostomy teaching: POD#1 urostomy/Ileal Conduit and Day #1 teaching. Stoma: appears pink and moist through ostomy pouch with x2 white ureter stents protruding from stoma into bag. Lower, mid abdomen staples to surgical incision, LLQ ARAM with mostly sanguinous drainage (emptied 40 ml's), IV fluids at 100 ml's/hour and Epidural in for pain control over next couple of days. Education: Ostomy nurse explained patients new anatomy and the urostomy pouch. Reviewed that urine passes through stoma into the pouch; stoma is red, moist and vascular so it bleeds easily but has no sensory nerve endings; urine will pass initially from stents and stoma; stents sometimes are removed about a week after surgery (they are to support the lumen patency as the anastomosis heals); urine passes almost constantly into the pouch. Pouch is changed every 3 days or promptly for leaking, burning or itching. The pouch is emptied of urine when the pouch is 1/3 to 1/2 full. Use a night time drainage system versus getting up at night to empty pouch. Normal output for a urostomy is clear urine with mucus shreds and no significant odor. Signs and symptoms of a urinary tract infection=cloudy urine, blood in urine, malodorous urine and fever or flank pain. Prevent infection by assuring adequate fluid intake (48-64 ozs/day) 50% of fluids should be water based and sipped on throughout the day to provide constant flow of urine. Plan: day# 2 of teaching tomorrow with pouch change.   Hector Agee RN, CWON, zone ph# [phone]

## 2017-11-21 NOTE — PROGRESS NOTES
Admit Date: 2017    POD 1 Day Post-Op    Procedure:  Procedure(s):  RADICAL CYSTECTOMY WITH ILEAL CONDUIT/PELVIC LYMPH NODE DISSECTION    Subjective:     Patient has complaints of pain. Objective:     Blood pressure 124/50, pulse 80, temperature 97.4 °F (36.3 °C), resp. rate 16, height 5' 7\" (1.702 m), weight 84.5 kg (186 lb 3.2 oz), SpO2 100 %. Temp (24hrs), Av.5 °F (36.4 °C), Min:97.4 °F (36.3 °C), Max:97.7 °F (36.5 °C)      Physical Exam:  GENERAL: alert, cooperative, no distress, appears stated age, ABDOMEN: abnormal findings:  tenderness mild in the entire abdomen, stoma pink and viable    Labs:   Recent Results (from the past 48 hour(s))   GLUCOSE, POC    Collection Time: 17 11:34 AM   Result Value Ref Range    Glucose (POC) 91 65 - 100 mg/dL    Performed by Vangie Dale*    POC CHEM8    Collection Time: 17  3:15 PM   Result Value Ref Range    Calcium, ionized (POC) 1.12 1.12 - 1.32 MMOL/L    Sodium (POC) 138 136 - 145 MMOL/L    Potassium (POC) 3.7 3.5 - 5.1 MMOL/L    Chloride (POC) 97 (L) 98 - 107 MMOL/L    CO2 (POC) 26 21 - 32 MMOL/L    Anion gap (POC) 19 (H) 5 - 15 mmol/L    Glucose (POC) 121 (H) 65 - 100 MG/DL    BUN (POC) 17 9 - 20 MG/DL    Creatinine (POC) 1.2 0.6 - 1.3 MG/DL    GFRAA, POC 52 (L) >60 ml/min/1.73m2    GFRNA, POC 43 (L) >60 ml/min/1.73m2    Hemoglobin (POC) 10.9 (L) 11.5 - 16.0 GM/DL    Hematocrit (POC) 32 (L) 35.0 - 47.0 %    Comment Comment Not Indicated.      METABOLIC PANEL, BASIC    Collection Time: 17  6:02 PM   Result Value Ref Range    Sodium 139 136 - 145 mmol/L    Potassium 4.1 3.5 - 5.1 mmol/L    Chloride 102 97 - 108 mmol/L    CO2 28 21 - 32 mmol/L    Anion gap 9 5 - 15 mmol/L    Glucose 144 (H) 65 - 100 mg/dL    BUN 19 6 - 20 MG/DL    Creatinine 1.63 (H) 0.55 - 1.02 MG/DL    BUN/Creatinine ratio 12 12 - 20      GFR est AA 37 (L) >60 ml/min/1.73m2    GFR est non-AA 30 (L) >60 ml/min/1.73m2    Calcium 8.0 (L) 8.5 - 10.1 MG/DL   HGB & HCT Collection Time: 11/20/17  6:02 PM   Result Value Ref Range    HGB 9.3 (L) 11.5 - 16.0 g/dL    HCT 29.5 (L) 35.0 - 47.0 %   HEMOGLOBIN    Collection Time: 11/20/17 11:12 PM   Result Value Ref Range    HGB 9.0 (L) 11.5 - 16.0 g/dL   GLUCOSE, POC    Collection Time: 11/20/17 11:17 PM   Result Value Ref Range    Glucose (POC) 152 (H) 65 - 100 mg/dL    Performed by Apolinar Cooley    HGB & HCT    Collection Time: 11/21/17  4:50 AM   Result Value Ref Range    HGB 8.7 (L) 11.5 - 16.0 g/dL    HCT 27.6 (L) 35.0 - 78.6 %   METABOLIC PANEL, BASIC    Collection Time: 11/21/17  4:50 AM   Result Value Ref Range    Sodium 141 136 - 145 mmol/L    Potassium 4.0 3.5 - 5.1 mmol/L    Chloride 107 97 - 108 mmol/L    CO2 23 21 - 32 mmol/L    Anion gap 11 5 - 15 mmol/L    Glucose 172 (H) 65 - 100 mg/dL    BUN 21 (H) 6 - 20 MG/DL    Creatinine 1.18 (H) 0.55 - 1.02 MG/DL    BUN/Creatinine ratio 18 12 - 20      GFR est AA 53 (L) >60 ml/min/1.73m2    GFR est non-AA 44 (L) >60 ml/min/1.73m2    Calcium 7.5 (L) 8.5 - 10.1 MG/DL   GLUCOSE, POC    Collection Time: 11/21/17  5:56 AM   Result Value Ref Range    Glucose (POC) 159 (H) 65 - 100 mg/dL    Performed by Martín Mcnally    GLUCOSE, POC    Collection Time: 11/21/17 12:03 PM   Result Value Ref Range    Glucose (POC) 148 (H) 65 - 100 mg/dL    Performed by Allison Lewis (PCT)        Data Review images and reports reviewed    Assessment:     Active Problems:    Transitional cell bladder cancer ([facility] [address] (7/25/2017)        Plan/Recommendations/Medical Decision Making:     PT for ambulation, continue NPO

## 2017-11-21 NOTE — PROGRESS NOTES
Spiritual Care Partner Volunteer visited patient in PCU on 11/21/17. Documented by:  Isabella Tom M.Div.    Paging Service [phone])

## 2017-11-21 NOTE — PROGRESS NOTES
TRANSFER - IN REPORT:    Verbal report received from Kusum Chambers RN (name) on Ralph You  being received from PACU (unit) for routine progression of care      Report consisted of patients Situation, Background, Assessment and   Recommendations(SBAR). Information from the following report(s) SBAR, Kardex, Intake/Output, MAR and Recent Results was reviewed with the receiving nurse. Opportunity for questions and clarification was provided. Assessment completed upon patients arrival to unit and care assumed. PCU SHIFT NURSING NOTE    Shift Summary:     9:05 PM  Patient arrived on unit via stretcher. Patient settled into room and call bell at side. Primary Nurse Álvaro Trejo RN and Marycarmen De La Cruz RN performed a dual skin assessment on this patient Impairment noted- see wound doc flow sheet; midline abdomina; incision, ARAM drain left abdomen, urostomy right abdomen. Adam score is 20    11:40 PM  Stopped Phenylephrine gtt. SBP sustaining in the 120's. Will continue to monitor. Admission Date 11/20/2017   Admission Diagnosis BLADDER CARCINOMA  Transitional cell bladder cancer ([facility] [address]   Consults None        Consults   []PT   []OT   []Speech   []Case Management      [] Palliative      Cardiac Monitoring Order   []Yes   []No     IV drips   []Yes    Drip:                            Dose:  Drip:                            Dose:  Drip:                            Dose:   []No     GI Prophylaxis   []Yes   []No         DVT Prophylaxis   SCDs:  Sequential Compression Device: Bilateral          Leo stockings:  Graduated Compression Stockings: Bilateral      [] Medication   []Contraindicated   []None      Activity Level           Purposeful Rounding every 1-2 hour?    [x]Yes   Curran Score  Total Score: 1   Bed Alarm (If score 3 or >)   [x]Yes   [] Refused (See signed refusal form in chart)   Adam Score  Adam Score: 23   Adam Score (if score 14 or less)   []PMT consult   []Wound Care consult []Specialty bed   [] Nutrition consult          Needs prior to discharge:   Home O2 required:    []Yes   []No    If yes, how much O2 required? Other:    Last Bowel Movement: Last Bowel Movement Date: 11/19/17      Influenza Vaccine Received Flu Vaccine for Current Season (usually Sept-March): Yes        Pneumonia Vaccine           Diet Active Orders   Diet    DIET NPO      LDAs           Quad Lumen 11/20/17 Right Internal jugular (Active)   Central Line Being Utilized Yes 11/20/2017  8:15 PM   Criteria for Appropriate Use Irritant/vesicant medication 11/20/2017  8:15 PM   Site Assessment Clean, dry, & intact 11/20/2017  8:15 PM   Infiltration Assessment 0 11/20/2017  8:15 PM   Affected Extremity/Extremities Color distal to insertion site pink (or appropriate for race) 11/20/2017  8:15 PM   Date of Last Dressing Change 11/20/17 11/20/2017  8:15 PM   Dressing Status Clean, dry, & intact 11/20/2017  8:15 PM   Dressing Type Disk with Chlorhexadine gluconate (CHG); Transparent 11/20/2017  8:15 PM   Action Taken Blood drawn 11/20/2017 11:14 PM        Peripheral IV 11/20/17 Left Forearm (Active)   Site Assessment Clean, dry, & intact 11/20/2017  9:05 PM   Phlebitis Assessment 0 11/20/2017  8:15 PM   Infiltration Assessment 0 11/20/2017  8:15 PM   Dressing Status Clean, dry, & intact 11/20/2017  8:15 PM   Dressing Type Tape;Transparent 11/20/2017  8:15 PM   Hub Color/Line Status Capped 11/20/2017  8:15 PM          Tanvir-Downing Drain 11/20/17 Abdomen (Active)   Site Assessment Clean, dry, & intact 11/21/2017  1:46 AM   Dressing Status Clean, dry, & intact 11/21/2017  1:46 AM   Status Patent; Charged 11/21/2017  1:46 AM   Drainage Color Sanguinous 11/21/2017  1:46 AM   Output (ml) 30 ml 11/21/2017  1:46 AM       Urinary Ostomy 11/20/17 Left Abdomen (Active)   Drainage Color Serosanguinous 11/20/2017  8:15 PM   Site Assessment Clean, dry, intact 11/20/2017  8:15 PM   Dressing Status Clean, dry, & intact 11/20/2017  8:15 PM   Urine Output (mL) 125 ml 11/20/2017  8:40 PM          Epidural/Intrathecal 11/20/17 Lumbar spine (comment) (Active)   Site Assessment Clean, dry, & intact 11/20/2017  9:05 PM   Dressing Status Clean, dry, & intact 11/20/2017  8:15 PM   Dressing Type Transparent;Tape 11/20/2017  8:15 PM   Hub Color/Line Status Infusing 11/20/2017  8:15 PM          Urinary Catheter [REMOVED] Urinary Catheter 11/20/17 2- way; [place]-Criteria for Appropriate Use: Surgical procedure    Intake & Output   Date 11/20/17 0700 - 11/21/17 0659 11/21/17 0700 - 11/22/17 0659   Shift 8561-5743 5782-4114 24 Hour Total 1643-7849 5602-2774 24 Hour Total   I  N  T  A  K  E   I.V.  (mL/kg/hr) 2600  (2.7)  2600         I.V. 2600  2600       Other 750  750         Other 750  750       Shift Total  (mL/kg) 3350  (42.5)  3350  (41.5)      O  U  T  P  U  T   Urine  (mL/kg/hr)  125 125         Urine Output (mL) (Urinary Ostomy 11/20/17 Left Abdomen)  125 125       Drains 100 145 245         Output (ml) (Tanvir-Downing Drain 11/20/17 Abdomen) 100 145 245       Blood 500  500         Estimated Blood Loss 500  500       Shift Total  (mL/kg) 600  (7.6) 270  (3.3) 870  (10.8)      NET 2750 -270 2480      Weight (kg) 78.9 80.8 80.8 80.8 80.8 80.8         Readmission Risk Assessment Tool Score Medium Risk            16       Total Score        3 Has Seen PCP in Last 6 Months (Yes=3, No=0)    4 IP Visits Last 12 Months (1-3=4, 4=9, >4=11)    9 Pt. Coverage (Medicare=5 , Medicaid, or Self-Pay=4)        Criteria that do not apply:    . Living with Significant Other. Assisted Living. LTAC. SNF.  or   Rehab    Patient Length of Stay (>5 days = 3)    Charlson Comorbidity Score (Age + Comorbid Conditions)       Expected Length of Stay - - -   Actual Length of Stay 1

## 2017-11-21 NOTE — CDMP QUERY
Please clarify if this patient is being treated/managed for:    =>Acute Kidney Injury POA in the setting of  large bladder tumor requiring radical cystectomy and ileal conduit urinary diversion  =>Other Explanation of clinical findings  =>Unable to Determine (no explanation of clinical findings)    The medical record reflects the following clinical findings, treatment, and risk factors:    Presentation: c/o dysuria and hematuria, dx large bladder tumor on CT, 9/27/17 BUN 11 CRT 0.77 GFR >60, 11/20/17 BUN 19 CRT 1.63 GFR 37  Treatment: radical cystectomy and ileal conduit urinary diversion, lab monitoring    RIFLE Critieria Reference:    Risk:   GFR Decrease 25%   Serum Cr . Increase x 1.5   UO: 0.5 for 6 hours    Injury   GFR Decrease 50%   Serum Cr. Increase x 2.0   UO: 0.5 for 12 hours    Failure:  GFR Decrease 75%   Serum Cr. Increase x 3.0 or sCr 4 mg/dla and acute rise 0.5 mg/dlb within 48 hours   UO: 0.3 for 24 hours or anuria for 12 hours    Loss: Persistent loss of kidney function  4 weeks    ESRD: Dialysis dependent  3 months      Please clarify and document your clinical opinion in the progress notes and discharge summary including the definitive and/or presumptive diagnosis, (suspected or probable), related to the above clinical findings. Please include clinical findings supporting your diagnosis.   Thank [address] [facility], [address] [place] [address] Rd     [phone]

## 2017-11-21 NOTE — PROGRESS NOTES
Problem: Falls - Risk of  Goal: *Absence of Falls  Document Tavo Fall Risk and appropriate interventions in the flowsheet.    Outcome: Progressing Towards Goal  Fall Risk Interventions:            Medication Interventions: Bed/chair exit alarm, Patient to call before getting OOB, Teach patient to arise slowly, Evaluate medications/consider consulting pharmacy    Elimination Interventions: Bed/chair exit alarm, Call light in reach, Elevated toilet seat, Toileting schedule/hourly rounds, Toilet paper/wipes in reach, Patient to call for help with toileting needs

## 2017-11-21 NOTE — PROGRESS NOTES
Problem: Mobility Impaired (Adult and Pediatric)  Goal: *Acute Goals and Plan of Care (Insert Text)  Physical Therapy Goals  Initiated 11/21/2017  1. Patient will move from supine to sit and sit to supine  in bed with modified independence within 7 day(s). 2.  Patient will transfer from bed to chair and chair to bed with supervision/set-up using the least restrictive device within 7 day(s). 3.  Patient will perform sit to stand with supervision/set-up within 7 day(s). 4.  Patient will ambulate with supervision/set-up for 200 feet with the least restrictive device within 7 day(s). 5.  Patient will ascend/descend 3 stairs with 1 handrail(s) with modified independence within 7 day(s). physical Therapy EVALUATION  Patient: Morro Hernandez (8 y.o. female)  Date: 11/21/2017  Primary Diagnosis: BLADDER CARCINOMA  Transitional cell bladder cancer ([facility] [address]  Procedure(s) (LRB):  RADICAL CYSTECTOMY WITH ILEAL CONDUIT/PELVIC LYMPH NODE DISSECTION (N/A) 1 Day Post-Op   Precautions:   Fall    ASSESSMENT :  Based on the objective data described below, the patient presents with high post-op pain, impaired balance, and orthostatic hypotension post radical cystectomy with ileal conduit. Mrs Fabiana Mcclendon was independent for mobility and lived alone prior to admission. Activity was limited during evaluation d/t c/o dizziness and clear orthostatic hypotension seated at bedside. Briefly assisted her to stand before returning her to supine where her vitals returned to her resting baseline. Discharge recommendations TBD with progress but she is hopeful to return to her home with support from friends and neighbors. .    Patient will benefit from skilled intervention to address the above impairments.   Patients rehabilitation potential is considered to be Good  Factors which may influence rehabilitation potential include:   [x]         None noted  []         Mental ability/status  []         Medical condition  []         Home/family situation and support systems  []         Safety awareness  []         Pain tolerance/management  []         Other:      PLAN :  Recommendations and Planned Interventions:  [x]           Bed Mobility Training             [x]    Neuromuscular Re-Education  [x]           Transfer Training                   []    Orthotic/Prosthetic Training  [x]           Gait Training                         []    Modalities  [x]           Therapeutic Exercises           []    Edema Management/Control  [x]           Therapeutic Activities            []    Patient and Family Training/Education  []           Other (comment):    Frequency/Duration: Patient will be followed by physical therapy  5 times a week to address goals. Discharge Recommendations: Rehab, Home Health and To Be Determined  Further Equipment Recommendations for Discharge: to be determined       SUBJECTIVE:   Patient stated I feel dizzy.     OBJECTIVE DATA SUMMARY:   HISTORY:    Past Medical History:   Diagnosis Date    Aneurysm ([facility] [address]     abdominal (pt not sure of blood vessel) repaired    Arthritis     hands    Cancer ([facility] [address]     bladder     Diabetes ([facility] [address]     oral hypoglycemics    Hypertension     Ill-defined condition     high cholesterol     Past Surgical History:   Procedure Laterality Date    ABDOMEN SURGERY PROC UNLISTED  09/2017    aneurysm repair    HX CATARACT REMOVAL      bilateral    HX GYN      hysterectomy    HX HEENT      Lasik on right    HX UROLOGICAL  2017    resection of bladder tumor     Prior Level of Function/Home Situation: independent for mobility and self care  Personal factors and/or comorbidities impacting plan of care:     Home Situation  Home Environment: Private residence  # Steps to Enter: 4  Rails to Enter: Yes  One/Two Story Residence: One story  Living Alone: Yes  Support Systems: Friends \ neighbors, Family member(s)  Patient Expects to be Discharged to[de-identified] Private residence  Current DME Used/Available at Home: None    EXAMINATION/PRESENTATION/DECISION MAKING:   Critical Behavior:  Neurologic State: Alert, Appropriate for age  Orientation Level: Oriented X4  Cognition: Appropriate decision making, Follows commands     Hearing: Auditory  Auditory Impairment: Hard of hearing, bilateral  Skin:  Lines/leads intact  Edema:   Range Of Motion:  AROM: Generally decreased, functional                       Strength:    Strength: Within functional limits                    Tone & Sensation:                  Sensation: Intact               Coordination:     Vision:      Functional Mobility:  Bed Mobility:     Supine to Sit: Moderate assistance (from [facility] elevated)  Sit to Supine: Moderate assistance;Assist x2     Transfers:  Sit to Stand: Moderate assistance  Stand to Sit: Moderate assistance                       Balance:   Sitting: Impaired  Sitting - Static: Fair (occasional)  Sitting - Dynamic: Fair (occasional)  Standing: Impaired  Standing - Static: Poor  Standing - Dynamic : Poor  Ambulation/Gait Training:  Distance (ft): 2 Feet (ft)  Assistive Device: Walker, rolling  Ambulation - Level of Assistance:  Moderate assistance     Gait Description (WDL): Exceptions to WDL  Gait Abnormalities: Decreased step clearance        Base of Support: Widened   Side stepping to [facility]   Stairs:                 Physical Therapy Evaluation Charge Determination   History Examination Presentation Decision-Making   MEDIUM  Complexity : 1-2 comorbidities / personal factors will impact the outcome/ POC  MEDIUM Complexity : 3 Standardized tests and measures addressing body structure, function, activity limitation and / or participation in recreation  LOW Complexity : Stable, uncomplicated  LOW Complexity : FOTO score of       Based on the above components, the patient evaluation is determined to be of the following complexity level: LOW     Pain:  Pain Scale 1: Numeric (0 - 10)  Pain Intensity 1: 0              Activity Tolerance:   Orthostatic hypotension  Please refer to the flowsheet for vital signs taken during this treatment. After treatment:   []         Patient left in no apparent distress sitting up in chair  [x]         Patient left in no apparent distress in bed  [x]         Call bell left within reach  [x]         Nursing notified  []         Caregiver present  []         Bed alarm activated    COMMUNICATION/EDUCATION:   The patients plan of care was discussed with: Registered Nurse. [x]         Fall prevention education was provided and the patient/caregiver indicated understanding. [x]         Patient/family have participated as able in goal setting and plan of care. []         Patient/family agree to work toward stated goals and plan of care. []         Patient understands intent and goals of therapy, but is neutral about his/her participation. []         Patient is unable to participate in goal setting and plan of care.     Thank you for this referral.  Kathie Lux, PT, DPT   Time Calculation: 20 mins

## 2017-11-21 NOTE — DISCHARGE INSTRUCTIONS
Cystectomy With Ileal Conduit: What to Expect at [address]  A cystectomy is surgery to remove part or all of the bladder. The surgery is mainly used to treat bladder cancer. After surgery, your belly will be sore, and you will probably need pain medicine for 1 to 2 weeks. You can expect your urostomy (stoma) to be swollen and tender at first. This usually improves after 2 to 3 weeks. You may notice some blood in your urine or that your urine is light pink for the first 3 weeks after surgery. This is normal.  While you are recovering from surgery, you will also be learning to care for your stoma. You may find it helpful to meet several times with a wound ostomy continence nurse (WOCN). This nurse can teach you how to care for your stoma and use a urostomy pouch. Many people can return to work or their usual activities 4 to 6 weeks after surgery. But you will probably need 6 to 8 weeks to fully recover from the surgery. Bladder cancer surgery may affect sexual function. If a woman's uterus and ovaries are removed during the surgery, she will not be able to get pregnant, and menopause may start. She may have hot flashes and other symptoms of menopause. And if a man's prostate gland and seminal vesicles are removed, he may have problems getting an erection and will not be able to make a woman pregnant. You may feel sad or depressed, or you may worry about how your body will look after surgery. You may worry about whether the surgery will affect your ability to have sex. These concerns are common. Ask your doctor about support groups or other resources that can help you with this. Call the [facility] Demetri Robbins ([phone]) or visit its website at [address] [employer] for more information. This care sheet gives you a general idea about how long it will take for you to recover. But each person recovers at a different pace. Follow the steps below to get better as quickly as possible.   How can you care for yourself at home? Activity  ? · Rest when you feel tired. Getting enough sleep will help you recover. ? · Try to walk each day. Start by walking a little more than you did the day before. Bit by bit, increase the amount you walk. Walking boosts blood flow and helps prevent pneumonia and constipation. ? · Avoid strenuous activities, such as bicycle riding, jogging, weight lifting, or aerobic exercise, until your doctor says it is okay. ? · Avoid lifting more than 5 pounds for about 4 weeks or until your doctor says it is okay. This may include a child, grocery bags and milk containers, a heavy briefcase or backpack, cat litter or dog food bags, or a vacuum . ? · Ask your doctor when you can drive again. ? · You will probably be able to go back to work or your normal routine in 4 to 6 weeks. This depends on the type of work you do and if you have any further treatment. ? · You may take a bath or shower as usual. You can bathe with the pouch on or off. Gently pat the skin around your stoma dry after bathing. Diet  ? · You can eat your normal diet. If your stomach is upset, try bland, low-fat foods like plain rice, broiled chicken, toast, and yogurt. ? · Drink plenty of fluids to avoid becoming dehydrated. Medicines  ? · Your doctor will tell you if and when you can restart your medicines. He or she will also give you instructions about taking any new medicines. ? · If you take blood thinners, such as warfarin (Coumadin), clopidogrel (Plavix), or aspirin, be sure to talk to your doctor. He or she will tell you if and when to start taking those medicines again. Make sure that you understand exactly what your doctor wants you to do. ? · Take pain medicines exactly as directed. ¨ If the doctor gave you a prescription medicine for pain, take it as prescribed. ¨ If you are not taking a prescription pain medicine, ask your doctor if you can take an over-the-counter medicine.    ? · If you think your pain medicine is making you sick to your stomach:  ¨ Take your medicine after meals (unless your doctor has told you not to). ¨ Ask your doctor for a different pain medicine. ? · If your doctor prescribed antibiotics, take them as directed. Do not stop taking them just because you feel better. You need to take the full course of antibiotics. Incision care  ? · If you have strips of tape on the cut (incision) the doctor made, leave the tape on for a week or until it falls off.   ? · Wash the area daily with warm, soapy water, and pat it dry. Don't use hydrogen peroxide or alcohol, which can slow healing. You may cover the area with a gauze bandage if it weeps or rubs against clothing. Change the bandage every day. ? · Keep the area clean and dry. Other instructions  ? · You may notice that your bowel movements are not regular right after your surgery. This is common. Try to avoid constipation and straining with bowel movements. You may want to take a fiber supplement every day. If you have not had a bowel movement after a couple of days, ask your doctor about taking a mild laxative. ? · Follow your doctor's or WOCN's instructions for caring for your stoma. ? · To control pain when you cough or sneeze, hold a pillow over your incision. Follow-up care is a key part of your treatment and safety. Be sure to make and go to all appointments, and call your doctor if you are having problems. It's also a good idea to know your test results and keep a list of the medicines you take. When should you call for help? Call 911 anytime you think you may need emergency care. For example, call if:  ? · You passed out (lost consciousness). ? · You have chest pain, are short of breath, or cough up blood. ?Call your doctor now or seek immediate medical care if:  ? · You have pain that does not get better after you take pain medicine. ? · You have loose stitches, or your incision comes open.    ? · You are bleeding from the incision. ? · You have signs of infection, such as:  ¨ Increased pain, swelling, warmth, or redness. ¨ Red streaks leading from the area. ¨ Pus draining from the area. ¨ A fever. ? · You are unable to urinate. ? · You have symptoms of a urinary tract infection. These may include:  ¨ Pain or burning when you urinate. ¨ A frequent need to urinate without being able to pass much urine. ¨ Pain in the flank, which is just below the rib cage and above the waist on either side of the back. ¨ Blood in your urine. ¨ A fever. ? · You are sick to your stomach or cannot drink fluids. ? · You have signs of a blood clot in your leg (called a deep vein thrombosis), such as:  ¨ Pain in your calf, back of the knee, thigh, or groin. ¨ Redness or swelling in your leg. ? Watch closely for changes in your health, and be sure to contact your doctor if you have any problems. Where can you learn more? Go to http://marely-sarah.info/. Enter H009 in the search box to learn more about \"Cystectomy With Ileal Conduit: What to Expect at Home. \"  Current as of: May 12, 2017  Content Version: 11.4  © 9428-5959 [employer], Inc[employer]. Care instructions adapted under license by [employer] (which disclaims liability or warranty for this information). If you have questions about a medical condition or this instruction, always ask your healthcare professional. [place] [address] any warranty or liability for your use of this information.

## 2017-11-21 NOTE — PERIOP NOTES
TRANSFER - OUT REPORT:    Verbal report given to Karissa Spain RN (name) on [facility]  being transferred to [facility](unit) for routine post - op       Report consisted of patients Situation, Background, Assessment and   Recommendations(SBAR). Information from the following report(s) SBAR, Kardex, OR Summary, Intake/Output, MAR and Recent Results was reviewed with the receiving nurse. Opportunity for questions and clarification was provided.       Patient transported with:   Monitor  O2 @ 2 liters  Registered Nurse

## 2017-11-21 NOTE — PROGRESS NOTES
PCU SHIFT NURSING NOTE      Bedside and Verbal shift change report given to Adriana Mendosa RN (oncoming nurse) by Karissa Spain RN (offgoing nurse). Report included the following information SBAR, Kardex, ED Summary, OR Summary, Procedure Summary, Intake/Output, MAR, Recent Results, Med Rec Status, Cardiac Rhythm NSR and Alarm Parameters . Shift Summary:         Admission Date 11/20/2017   Admission Diagnosis BLADDER CARCINOMA  Transitional cell bladder cancer ([facility] [address]   Consults None        Consults   [x]PT   [x]OT   []Speech   [x]Case Management      [] Palliative      Cardiac Monitoring Order   [x]Yes   []No     IV drips   []Yes    Drip:                            Dose:  Drip:                            Dose:  Drip:                            Dose:   [x]No     GI Prophylaxis   [x]Yes   []No         DVT Prophylaxis   SCDs:  Sequential Compression Device: Bilateral          Leo stockings:  Graduated Compression Stockings: Bilateral      [x] Medication   []Contraindicated   []None      Activity Level Activity Level: Up with Assistance     Activity Assistance: Partial (one person)   Purposeful Rounding every 1-2 hour? [x]Yes   Curran Score  Total Score: 2   Bed Alarm (If score 3 or >)   [x]Yes   [] Refused (See signed refusal form in chart)   Adam Score  Adam Score: 16   Adam Score (if score 14 or less)   [x]PMT consult   [x]Wound Care consult      []Specialty bed   [x] Nutrition consult          Needs prior to discharge:   Home O2 required:    []Yes   [x]No    If yes, how much O2 required?     Other:    Last Bowel Movement: Last Bowel Movement Date: 11/19/17      Influenza Vaccine Received Flu Vaccine for Current Season (usually Sept-March): Yes        Pneumonia Vaccine           Diet Active Orders   Diet    DIET NPO      LDAs           Quad Lumen 11/20/17 Right Internal jugular (Active)   Central Line Being Utilized Yes 11/21/2017  7:13 AM   Criteria for Appropriate Use Limited/no vessel suitable for conventional peripheral access 11/21/2017  7:13 AM   Site Assessment Clean, dry, & intact 11/21/2017  7:13 AM   Infiltration Assessment 0 11/21/2017  7:13 AM   Affected Extremity/Extremities Color distal to insertion site pink (or appropriate for race) 11/21/2017  7:13 AM   Date of Last Dressing Change 11/20/17 11/21/2017  7:13 AM   Dressing Status Clean, dry, & intact 11/21/2017  7:13 AM   Dressing Type Disk with Chlorhexadine gluconate (CHG) 11/21/2017  7:13 AM   Action Taken Open ports on tubing capped 11/21/2017  7:13 AM   Proximal Hub Color/Line Status White;Capped;Flushed 11/21/2017  7:13 AM   Positive Blood Return (Medial Site) Yes 11/21/2017  7:13 AM   Medial 1 Hub Color/Line Status Blue;Capped;Flushed 11/21/2017  7:13 AM   Positive Blood Return (Lateral Site) Yes 11/21/2017  7:13 AM   Medial 2 Hub Color/Line Status Brown;Capped;Flushed 11/21/2017  7:13 AM   Positive Blood Return (Site #3) Yes 11/21/2017  7:13 AM   Alcohol Cap Used Yes 11/21/2017  7:13 AM        Peripheral IV 11/20/17 Left Forearm (Active)   Site Assessment Clean, dry, & intact 11/21/2017  7:13 AM   Phlebitis Assessment 0 11/21/2017  7:13 AM   Infiltration Assessment 0 11/21/2017  7:13 AM   Dressing Status Clean, dry, & intact 11/21/2017  7:13 AM   Dressing Type Tape;Transparent 11/21/2017  7:13 AM   Hub Color/Line Status Pink;Capped;Flushed 11/21/2017  7:13 AM   Action Taken Open ports on tubing capped 11/21/2017  7:13 AM   Alcohol Cap Used Yes 11/21/2017  7:13 AM          Tanvir-Downing Drain 11/20/17 Abdomen (Active)   Site Assessment Clean, dry, & intact 11/21/2017  7:13 AM   Dressing Status Clean, dry, & intact 11/21/2017  7:13 AM   Status Patent; Charged;Draining 11/21/2017  7:13 AM   Drainage Color Sanguinous 11/21/2017  7:13 AM   Output (ml) 18 ml 11/21/2017  6:00 AM       Urinary Ostomy 11/20/17 Left Abdomen (Active)   Drainage Color Serosanguinous 11/21/2017  7:13 AM   Site Assessment Clean, dry, intact 11/21/2017  7:13 AM   Dressing Status Clean, dry, & intact 11/21/2017  7:13 AM   Urine Output (mL) 580 ml 11/21/2017  6:00 AM          Epidural/Intrathecal 11/20/17 Lumbar spine (comment) (Active)   Site Assessment Clean, dry, & intact 11/21/2017  7:13 AM   Dressing Status Clean, dry, & intact 11/21/2017  7:13 AM   Dressing Type Tape;Transparent 11/21/2017  7:13 AM   Hub Color/Line Status Infusing 11/21/2017  7:13 AM          Urinary Catheter [REMOVED] Urinary Catheter 11/20/17 2- way; Robledo-Criteria for Appropriate Use: Surgical procedure    Intake & Output   Date 11/20/17 0700 - 11/21/17 0659 11/21/17 0700 - 11/22/17 0659   Shift 5359-6778 6451-0105 24 Hour Total 9501-1626 7851-9904 24 Hour Total   I  N  T  A  K  E   I.V.  (mL/kg/hr) 2600  (2.7) 1328.2  (1.4) 3928.2  (2) 23.3  23.3      I.V. 2600  2600         Phenylephrine Volume  46.5 46.5 0  0      Volume (0.9% sodium chloride infusion)  1281.7 1281.7 23.3  23.3    Other 750  750         Other 750  750       Shift Total  (mL/kg) 3350  (42.5) 1328.2  (16.4) 4678.2  (57.9) 23.3  (0.3)  23.3  (0.3)   O  U  T  P  U  T   Urine  (mL/kg/hr)  705  (0.7) 705  (0.4)         Urine Output (mL) (Urinary Ostomy 11/20/17 Left Abdomen)  705 705       Drains 100 191 291         Output (ml) (Tanvir-Downing Drain 11/20/17 Abdomen) 100 191 291       Blood 500  500         Estimated Blood Loss 500  500       Shift Total  (mL/kg) 600  (7.6) 896  (11.1) 1496  (18.5)      NET 2750 432.2 3182.2 23.3  23.3   Weight (kg) 78.9 80.8 80.8 84.5 84.5 84.5         Readmission Risk Assessment Tool Score Medium Risk            16       Total Score        3 Has Seen PCP in Last 6 Months (Yes=3, No=0)    4 IP Visits Last 12 Months (1-3=4, 4=9, >4=11)    9 Pt. Coverage (Medicare=5 , Medicaid, or Self-Pay=4)        Criteria that do not apply:    . Living with Significant Other. Assisted Living. LTAC. SNF.  or   Rehab    Patient Length of Stay (>5 days = 3)    Charlson Comorbidity Score (Age + Comorbid Conditions) Expected Length of Stay - - -   Actual Length of Stay 1

## 2017-11-21 NOTE — PROGRESS NOTES
Epidural Follow-up Note    1 Day Post-Op sp Procedure(s):  RADICAL CYSTECTOMY WITH ILEAL CONDUIT/PELVIC LYMPH NODE DISSECTION. Visit Vitals    /50 (BP 1 Location: Right arm, BP Patient Position: At rest)    Pulse 80    Temp 36.3 °C (97.4 °F)    Resp 16    Ht 5' 7\" (1.702 m)    Wt 84.5 kg (186 lb 3.2 oz)    SpO2 100%    BMI 29.16 kg/m2   . Pain is well controlled with epidural infusion. Epidural site is clean, dry and intact. Continue Epidural analgesia.

## 2017-11-21 NOTE — ANESTHESIA POSTPROCEDURE EVALUATION
Post-Anesthesia Evaluation and Assessment    Patient: Ashley Bacon MRN: [id number]  SSN: [id number]    YOB: 1934  Age: 80 y.o. Sex: female       Cardiovascular Function/Vital Signs  Visit Vitals    /51    Pulse 63    Temp 36.4 °C (97.5 °F)    Resp 12    Wt 78.9 kg (173 lb 15.1 oz)    SpO2 100%    BMI 27.24 kg/m2       Patient is status post general anesthesia for Procedure(s):  RADICAL CYSTECTOMY WITH ILEAL CONDUIT/PELVIC LYMPH NODE DISSECTION. Nausea/Vomiting: None    Postoperative hydration reviewed and adequate. Pain:  Pain Scale 1: Numeric (0 - 10) (11/20/17 1900)  Pain Intensity 1: 0 (11/20/17 1900)   Managed    Neurological Status:   Neuro (WDL): Exceptions to WDL (11/20/17 1744)  Neuro  Neurologic State: Drowsy (11/20/17 1744)  Orientation Level: Oriented to person (11/20/17 1744)  Cognition: Follows commands (11/20/17 1744)  LUE Motor Response: Purposeful (11/20/17 1744)  LLE Motor Response: Purposeful (11/20/17 1744)  RUE Motor Response: Purposeful (11/20/17 1744)  RLE Motor Response: Purposeful (11/20/17 1744)   At baseline    Mental Status and Level of Consciousness: Arousable    Pulmonary Status:   O2 Device: Nasal cannula (11/20/17 1900)   Adequate oxygenation and airway patent    Complications related to anesthesia: None    Post-anesthesia assessment completed.  No concerns    Signed By: Jordi León MD     November 20, 2017

## 2017-11-21 NOTE — OP NOTES
[address]   OP NOTE       Name:  Noemi Perea   MR#:  [id number]   :  1934   Account #:  [de-identified]    Surgery Date:  2017   Date of Adm:  2017       PREOPERATIVE DIAGNOSIS: Transitional carcinoma of the bladder. POSTOPERATIVE DIAGNOSIS: Transitional carcinoma of the   bladder. PROCEDURES PERFORMED: Radical cystectomy with bilateral   pelvic lymph node dissection and ileal conduit urine diversion. SURGEON: Taylor Sanchez. Cuca Montemayor MD    ASSISTANT: Ania Oliveros. Pee Mills MD    ESTIMATED BLOOD LOSS: 500 mL. FLUIDS: Crystalloid 2600, albumin 750 albumin. FINDINGS: Suspicious bilateral obturator nodes. SPECIMENS REMOVED: Bilateral pelvic lymph nodes and bladder   with ovaries. ANESTHESIA: General.    COMPLICATIONS: None. DESCRIPTION OF PROCEDURE: The patient received a bowel prep   and IV antibiotics for prophylaxis. Sequential compression devices and   TEDs were placed on the lower extremities. An epidural was placed   per Anesthesia. She was then placed on the operating table in supine   position after signing informed consent. After adequate induction of   general anesthetic, an OG tube was placed per Anesthesia. The table   was slightly flexed and she was placed in the low lithotomy position, all   pressure points carefully padded. A full abdominal and vaginal prep   was performed. A time-out was accomplished. A Betadine-soaked sponge stick was placed in the vagina. A Robledo   catheter was placed in the bladder with efflux of pink-tinged urine. A   low midline incision was made along her previous incision site about   2/3 of the way to  the umbilicus. I dissected through Camper and Truman fascia   until I opened the rectus in the midline and bluntly dissected out the   space of Retzius. I then entered the peritoneum at the superior-most   aspect of the incision sharply.  I was able to take down anterior wings in   a V-shape to the bladder and grabbed it with a trocar to manipulate the   bladder. There did appear to be some suspicious nodes the we could   palpate bilaterally in obturator area. We began by using the Bookwalter   for exposure and we were able to discover the ureters in the   retroperitoneum. Using combination of sharp and blunt dissection,   dissected all the way down to the bladder. We then transected them   and sent the distal margins for frozen, both were negative for any   carcinoma. We then were able to isolate the infundibulopelvic   ligaments bilaterally and used the LigaSure to transect them. We were   then able to free up some of the attachments using LigaSure of the   ovaries. The left ovary was transected and the right ovary was sent   with the specimen. We then were able to come right down on the   sponge stick and opened the posterior vaginal area. This created nice pedicles. We   did take the anterior vaginal wall with the specimen to get a good   margin. We stayed nice and lateral along the pedicles with the   LigaSure all the way to the apex. Meanwhile we were able to do a   circumferential incision around the meatus to ensure that we got the   entire urethra. We used the LigaSure and then were able to take the   rest of the pedicles to essentially down to just the urethra. We then cut   the Robledo, pulled it into the pelvis, we were able to complete the   transection using the LigaSure. At this point, we had a nice margin and   the complete urethral meatus resected and it was sent to Pathology. We then copiously irrigated the pelvis with antibiotic irrigation and we   were able to run the vaginal epithelium close to essentially reconstruct   the vagina using running 2-0 Vicryl. Several 2-0 Vicryl stitches were   thrown to take care of some of the bleeders within the pelvis and we   had complete hemostasis and FloSeal was placed as well.      We then performed a bilateral pelvic lymph node dissection. The   margins were distally node of Buskirk, proximally the bifurcation,   laterally the pelvic side wall, anteriorly the external iliac vein and   posteriorly the obturator nerve. The left side they were slightly bulky,   but were able to use the LigaSure as well as small and large clips to   take control of the perforating lymphatics and vessels and send the   specimen to Pathology. The obturator nerve was unharmed. We did   place some Surgicel within the fossa. We did take the external iliac   nodes as well. There were no palpable presacral nodes to resect. The   right side there was a large bulky obturator node. I had to spend   considerable amount of time carefully dissecting obturator nerve off of   this. I was then able to use large clips to carefully remove this large   node, I did take some external nodes as well and carried it all the way   to the bifurcation and then sent it to Pathology. The obturator nerve   was unharmed. Hemostasis was achieved and we did place Surgicel   within the fossa. We then turned our attention to the ileal conduit urinary diversion. About 10 to 15 cm from the ileocecal valve, we isolated a nice piece of   ileum based on a nice vascular pedicle after transillumination of the   mesentery. We used an [de-identified] BRUNO to isolate the conduit. We then   dropped the conduit down into the pelvis and we did an ileoileal   reanastomosis using 2 more fires of the BRUNO, and we did put a stitch at   the apex to take any tension off the anastomosis. We then   maneuvered bowel contents back and forth through the anastomosis   and it was watertight and it was palpably nice and large. We then   closed the trap with a running 3-0 chromic. We then cut off the staple   line from the distal end of the conduit, cut 2 areas for ureters to attach   it to base and we were able to flush the conduit using antibiotic   irrigation into a sterile urine cup.  We then created ureteroileal   anastomoses after sweeping the left ureter underneath the sigmoid   mesentery in a nice sweeping fashion with absolutely no tension. These were affixed over 7-Bruneian single J stents to the ileal conduit   using double-armed running 4-0 Monocryl in a cobra head fashion. We   then back irrigated the conduit and it distended nicely and there was   no leakage from the ureteroileal anastomoses. All sponge and needle   counts were correct at this time. We placed the bowel contents back in   their appropriate position, used Seprafilm on the belly after placing a   19 [address] Miguel out of the left lower quadrant, affixed to the skin with nylon   suture and draped into the pelvis. We then reapproximated the fascia   using 2 double-armed #1 PDS sutures. We copiously irrigated the   subcutaneous tissue and then closed skin with staples and placed an   Op-Site. We had already cut a hole in the skinat  the premarked area   for the conduit and the  pulled the conduit with the stents up through   this. We then matured it in a nice rosebud fashion using several 3-0   Vicryl sutures. It was pink and viable and raised above the skin. We   then affixed stents to the conduit using two 3-0 chromic's. We irrigated   the stents and they flowed nicely with a nice hydronephrotic drip. We   then applied Mastisol and the ostomy bag. The patient tolerated the   procedure well, no complications. MD MICHELLE Manjarrez / BEVERLY   D:  11/20/2017   17:38   T:  11/21/2017   02:11   Job #:  [id number]     Dr. Hair Padilla, primary care doctor

## 2017-11-22 LAB
ANION GAP SERPL CALC-SCNC: 10 MMOL/L (ref 5–15)
BUN SERPL-MCNC: 20 MG/DL (ref 6–20)
BUN/CREAT SERPL: 19 (ref 12–20)
CALCIUM SERPL-MCNC: 7.9 MG/DL (ref 8.5–10.1)
CHLORIDE SERPL-SCNC: 108 MMOL/L (ref 97–108)
CO2 SERPL-SCNC: 25 MMOL/L (ref 21–32)
CREAT SERPL-MCNC: 1.06 MG/DL (ref 0.55–1.02)
GLUCOSE BLD STRIP.AUTO-MCNC: 121 MG/DL (ref 65–100)
GLUCOSE BLD STRIP.AUTO-MCNC: 130 MG/DL (ref 65–100)
GLUCOSE BLD STRIP.AUTO-MCNC: 135 MG/DL (ref 65–100)
GLUCOSE BLD STRIP.AUTO-MCNC: 87 MG/DL (ref 65–100)
GLUCOSE SERPL-MCNC: 124 MG/DL (ref 65–100)
HCT VFR BLD AUTO: 24 % (ref 35–47)
HGB BLD-MCNC: 7.5 G/DL (ref 11.5–16)
POTASSIUM SERPL-SCNC: 3.7 MMOL/L (ref 3.5–5.1)
SERVICE CMNT-IMP: ABNORMAL
SERVICE CMNT-IMP: NORMAL
SODIUM SERPL-SCNC: 143 MMOL/L (ref 136–145)

## 2017-11-22 PROCEDURE — 97530 THERAPEUTIC ACTIVITIES: CPT

## 2017-11-22 PROCEDURE — 97116 GAIT TRAINING THERAPY: CPT | Performed by: PHYSICAL THERAPIST

## 2017-11-22 PROCEDURE — P9016 RBC LEUKOCYTES REDUCED: HCPCS | Performed by: UROLOGY

## 2017-11-22 PROCEDURE — 65270000029 HC RM PRIVATE

## 2017-11-22 PROCEDURE — 97110 THERAPEUTIC EXERCISES: CPT | Performed by: PHYSICAL THERAPIST

## 2017-11-22 PROCEDURE — 74011000258 HC RX REV CODE- 258: Performed by: UROLOGY

## 2017-11-22 PROCEDURE — 74011000250 HC RX REV CODE- 250: Performed by: ANESTHESIOLOGY

## 2017-11-22 PROCEDURE — 77030010520

## 2017-11-22 PROCEDURE — 36430 TRANSFUSION BLD/BLD COMPNT: CPT

## 2017-11-22 PROCEDURE — 36415 COLL VENOUS BLD VENIPUNCTURE: CPT | Performed by: UROLOGY

## 2017-11-22 PROCEDURE — 77030010522

## 2017-11-22 PROCEDURE — 74011250636 HC RX REV CODE- 250/636: Performed by: UROLOGY

## 2017-11-22 PROCEDURE — 77030010541

## 2017-11-22 PROCEDURE — 82962 GLUCOSE BLOOD TEST: CPT

## 2017-11-22 PROCEDURE — 77010033678 HC OXYGEN DAILY

## 2017-11-22 PROCEDURE — 85018 HEMOGLOBIN: CPT | Performed by: UROLOGY

## 2017-11-22 PROCEDURE — 30233P1 TRANSFUSION OF NONAUTOLOGOUS FROZEN RED CELLS INTO PERIPHERAL VEIN, PERCUTANEOUS APPROACH: ICD-10-PCS | Performed by: UROLOGY

## 2017-11-22 PROCEDURE — 74011000250 HC RX REV CODE- 250: Performed by: UROLOGY

## 2017-11-22 PROCEDURE — 77030011641 HC PASTE OST ADH BMS -A

## 2017-11-22 PROCEDURE — 80048 BASIC METABOLIC PNL TOTAL CA: CPT | Performed by: UROLOGY

## 2017-11-22 PROCEDURE — 74011250637 HC RX REV CODE- 250/637: Performed by: UROLOGY

## 2017-11-22 PROCEDURE — 77030010545

## 2017-11-22 PROCEDURE — 77030008895 HC ADPT UROSTMY TU HOLL -A

## 2017-11-22 RX ORDER — SODIUM CHLORIDE 9 MG/ML
250 INJECTION, SOLUTION INTRAVENOUS AS NEEDED
Status: DISCONTINUED | OUTPATIENT
Start: 2017-11-22 | End: 2017-11-25

## 2017-11-22 RX ADMIN — ALVIMOPAN 12 MG: 12 CAPSULE ORAL at 10:08

## 2017-11-22 RX ADMIN — CEFOXITIN SODIUM 1 G: 1 POWDER, FOR SOLUTION INTRAVENOUS at 09:25

## 2017-11-22 RX ADMIN — Medication 10 ML: at 05:59

## 2017-11-22 RX ADMIN — Medication 10 ML/HR: at 01:19

## 2017-11-22 RX ADMIN — ALVIMOPAN 12 MG: 12 CAPSULE ORAL at 17:55

## 2017-11-22 RX ADMIN — CEFOXITIN SODIUM 1 G: 1 POWDER, FOR SOLUTION INTRAVENOUS at 01:30

## 2017-11-22 RX ADMIN — Medication 10 ML: at 06:00

## 2017-11-22 RX ADMIN — Medication 10 ML: at 22:18

## 2017-11-22 RX ADMIN — Medication 10 ML: at 05:57

## 2017-11-22 RX ADMIN — Medication: at 09:34

## 2017-11-22 RX ADMIN — CEFOXITIN SODIUM 1 G: 1 POWDER, FOR SOLUTION INTRAVENOUS at 18:52

## 2017-11-22 RX ADMIN — FAMOTIDINE 20 MG: 10 INJECTION, SOLUTION INTRAVENOUS at 09:28

## 2017-11-22 RX ADMIN — DILTIAZEM HYDROCHLORIDE 240 MG: 240 CAPSULE, COATED, EXTENDED RELEASE ORAL at 10:05

## 2017-11-22 RX ADMIN — PRAVASTATIN SODIUM 80 MG: 40 TABLET ORAL at 22:18

## 2017-11-22 RX ADMIN — Medication 10 ML: at 14:17

## 2017-11-22 RX ADMIN — METFORMIN HYDROCHLORIDE 500 MG: 500 TABLET ORAL at 10:08

## 2017-11-22 RX ADMIN — POTASSIUM CHLORIDE: 149 INJECTION, SOLUTION, CONCENTRATE INTRAVENOUS at 11:13

## 2017-11-22 RX ADMIN — SODIUM CHLORIDE 250 ML: 900 INJECTION, SOLUTION INTRAVENOUS at 11:48

## 2017-11-22 RX ADMIN — GLIPIZIDE 20 MG: 5 TABLET, FILM COATED, EXTENDED RELEASE ORAL at 10:07

## 2017-11-22 RX ADMIN — CEFOXITIN SODIUM 1 G: 1 POWDER, FOR SOLUTION INTRAVENOUS at 14:06

## 2017-11-22 RX ADMIN — HYDROCHLOROTHIAZIDE 12.5 MG: 12.5 CAPSULE ORAL at 10:07

## 2017-11-22 RX ADMIN — DOCUSATE SODIUM 100 MG: 100 CAPSULE, LIQUID FILLED ORAL at 17:55

## 2017-11-22 RX ADMIN — DOCUSATE SODIUM 100 MG: 100 CAPSULE, LIQUID FILLED ORAL at 10:06

## 2017-11-22 NOTE — PROGRESS NOTES
TRANSFER - IN REPORT:    Verbal report received from CRISTHIAN Hyatt RN(name) on Brooke Sample  being received from PCU(unit) for routine progression of care      Report consisted of patients Situation, Background, Assessment and   Recommendations(SBAR). Information from the following report(s) SBAR was reviewed with the receiving nurse. Opportunity for questions and clarification was provided. Assessment completed upon patients arrival to unit and care assumed.

## 2017-11-22 NOTE — PROGRESS NOTES
Problem: Mobility Impaired (Adult and Pediatric)  Goal: *Acute Goals and Plan of Care (Insert Text)  Physical Therapy Goals  Initiated 11/21/2017  1. Patient will move from supine to sit and sit to supine  in bed with modified independence within 7 day(s). 2.  Patient will transfer from bed to chair and chair to bed with supervision/set-up using the least restrictive device within 7 day(s). 3.  Patient will perform sit to stand with supervision/set-up within 7 day(s). 4.  Patient will ambulate with supervision/set-up for 200 feet with the least restrictive device within 7 day(s). 5.  Patient will ascend/descend 3 stairs with 1 handrail(s) with modified independence within 7 day(s). physical Therapy TREATMENT  Seen 1126 to 1149      Patient: Vicente Ba (89 y.o. female)  Date: 11/22/2017  Diagnosis: BLADDER CARCINOMA  Transitional cell bladder cancer ([facility] [address] <principal problem not specified>  Procedure(s) (LRB):  RADICAL CYSTECTOMY WITH ILEAL CONDUIT/PELVIC LYMPH NODE DISSECTION (N/A) 2 Days Post-Op  Precautions: Fall    ASSESSMENT:  Patient seen for mobility skills and attempt at ambulation. At rest she stated that her pain was a 1/10. Pain medicine had taken effect. She had been too painful earlier for PT to see. With transition from supine with HOB raised to sit she needed assist x 2. Pain increased with transition and she was not able to rate, but clearly she was in a lot of pain. Fair to good sitting balance once brought to the upright position. Stood to the  with the bed raised and assist x 2. Again very painful. She took about 8 steps toward the [facility] with the RW and assist x 2. Was about to get her to walk forward, but she stated that she was feeling very nauseous. Assisted to sitting at bedside. Got a green bag for her. She was gagging, but didn't vomit. Once she had settled down, assisted to supine with assist x 2.  Raised HOB for comfort and since she was nauseous.   She is to get a unit of blood. Nurse in room entire session. Will likely need SNF for short term rehab at discharge. Progression toward goals:  []    Improving appropriately and progressing toward goals  [x]    Improving slowly and progressing toward goals  []    Not making progress toward goals and plan of care will be adjusted     PLAN:  Patient continues to benefit from skilled intervention to address the above impairments. Continue treatment per established plan of care. Discharge Recommendations:  Gavin Aleman  Further Equipment Recommendations for Discharge:  Defer to SNF rehab     SUBJECTIVE:   Patient stated It's about a (1) just lying here.   Stated when asked about her pain level. OBJECTIVE DATA SUMMARY:   Critical Behavior:  Neurologic State: Alert  Orientation Level: Oriented X4  Cognition: Appropriate decision making     Functional Mobility Training:  Bed Mobility:  Supine to Sit: Moderate assistance;Assist x2 (painful with transitional movements, HOB elevated)     Transfers:  Sit to Stand: Moderate assistance;Assist x2 (bed raised)  Stand to Sit: Minimum assistance;Assist x2     Balance:  Sitting: Impaired  Sitting - Static: Fair (occasional)  Sitting - Dynamic: Fair (occasional)  Standing: Impaired  Standing - Static: Constant support; Fair  Standing - Dynamic : Fair (with RW)    Ambulation/Gait Training:  Distance (ft): 2 Feet (ft)  Assistive Device: Gait belt;Walker, rolling  Ambulation - Level of Assistance: Minimal assistance;Assist x2  Gait Abnormalities: Decreased step clearance  Base of Support: Widened    Therapeutic Exercises: Toe wiggles, ankle pumps and circles, heel slides, hip internal/external rotation and hip abduction/adduction. Pain:  Pain Scale 1: Numeric (0 - 10)  Pain Intensity 1: 1  Pain Location 1: Abdomen  Pain Orientation 1: Left;Mid;Right  Pain Description 1: Aching  Pain Intervention(s) 1: Encouraged PCA     Activity Tolerance: Tolerated PT session fair.   Did get nauseous. Gagging, but no vomiting. Please refer to the flowsheet for vital signs taken during this treatment.   After treatment:   []    Patient left in no apparent distress sitting up in chair  [x]    Patient left in no apparent distress in bed  [x]    Call bell left within reach  [x]    Nursing notified  []    Caregiver present  []    Bed alarm activated (not being used)    COMMUNICATION/COLLABORATION:   The patients plan of care was discussed with: Registered Nurse    Brook Fleischer, PT  Time Calculation: 23 mins

## 2017-11-22 NOTE — CDMP QUERY
#2  Please clarify if this patient is being treated/managed for:    =>Acute Blood Loss Anemia in the setting of HGB 7.5, postop, EBL 500ml requiring transfusion PRBCs  =>Other Explanation of clinical findings  =>Unable to Determine (no explanation of clinical findings)    The medical record reflects the following clinical findings, treatment, and risk factors:    Presentation: dx bladder cancer, s/p radical cystectomy, EBL 500mls, 9/27 HGB 11.1 11/20 HGB 9.3 11/22 HGB 7.5,  PN 11/22 documented \"Hg 7.5-transfuse 1 unit PRBC\"  Treatment: lab monitoring, 1 unit PRBCs    Please clarify and document your clinical opinion in the progress notes and discharge summary including the definitive and/or presumptive diagnosis, (suspected or probable), related to the above clinical findings. Please include clinical findings supporting your diagnosis.   Thank [address] [place] [address], [address] [place] [address] Rd     [phone]

## 2017-11-22 NOTE — PROGRESS NOTES
Admit Date: 2017    POD 2 Days Post-Op    Procedure:  Procedure(s):  RADICAL CYSTECTOMY WITH ILEAL CONDUIT/PELVIC LYMPH NODE DISSECTION    Subjective:     Patient has complaints of pain. Objective:     Blood pressure 126/81, pulse 82, temperature 97.8 °F (36.6 °C), resp. rate 18, height 5' 7\" (1.702 m), weight 82.9 kg (182 lb 12.2 oz), SpO2 100 %. Temp (24hrs), Av.8 °F (36.6 °C), Min:97.5 °F (36.4 °C), Max:98.2 °F (36.8 °C)      Physical Exam:  GENERAL: alert, cooperative, no distress, appears stated age, ABDOMEN: abnormal findings:  tenderness mild in the entire abdomen, stoma pink, viable, ARAM in place-serosanguinous    Labs:   Recent Results (from the past 48 hour(s))   GLUCOSE, POC    Collection Time: 17 11:34 AM   Result Value Ref Range    Glucose (POC) 91 65 - 100 mg/dL    Performed by Jony Winkler*    POC CHEM8    Collection Time: 17  3:15 PM   Result Value Ref Range    Calcium, ionized (POC) 1.12 1.12 - 1.32 MMOL/L    Sodium (POC) 138 136 - 145 MMOL/L    Potassium (POC) 3.7 3.5 - 5.1 MMOL/L    Chloride (POC) 97 (L) 98 - 107 MMOL/L    CO2 (POC) 26 21 - 32 MMOL/L    Anion gap (POC) 19 (H) 5 - 15 mmol/L    Glucose (POC) 121 (H) 65 - 100 MG/DL    BUN (POC) 17 9 - 20 MG/DL    Creatinine (POC) 1.2 0.6 - 1.3 MG/DL    GFRAA, POC 52 (L) >60 ml/min/1.73m2    GFRNA, POC 43 (L) >60 ml/min/1.73m2    Hemoglobin (POC) 10.9 (L) 11.5 - 16.0 GM/DL    Hematocrit (POC) 32 (L) 35.0 - 47.0 %    Comment Comment Not Indicated.      METABOLIC PANEL, BASIC    Collection Time: 17  6:02 PM   Result Value Ref Range    Sodium 139 136 - 145 mmol/L    Potassium 4.1 3.5 - 5.1 mmol/L    Chloride 102 97 - 108 mmol/L    CO2 28 21 - 32 mmol/L    Anion gap 9 5 - 15 mmol/L    Glucose 144 (H) 65 - 100 mg/dL    BUN 19 6 - 20 MG/DL    Creatinine 1.63 (H) 0.55 - 1.02 MG/DL    BUN/Creatinine ratio 12 12 - 20      GFR est AA 37 (L) >60 ml/min/1.73m2    GFR est non-AA 30 (L) >60 ml/min/1.73m2    Calcium 8.0 (L) 8.5 - 10.1 MG/DL   HGB & HCT    Collection Time: 11/20/17  6:02 PM   Result Value Ref Range    HGB 9.3 (L) 11.5 - 16.0 g/dL    HCT 29.5 (L) 35.0 - 47.0 %   HEMOGLOBIN    Collection Time: 11/20/17 11:12 PM   Result Value Ref Range    HGB 9.0 (L) 11.5 - 16.0 g/dL   GLUCOSE, POC    Collection Time: 11/20/17 11:17 PM   Result Value Ref Range    Glucose (POC) 152 (H) 65 - 100 mg/dL    Performed by Didi Giles    HGB & HCT    Collection Time: 11/21/17  4:50 AM   Result Value Ref Range    HGB 8.7 (L) 11.5 - 16.0 g/dL    HCT 27.6 (L) 35.0 - 79.1 %   METABOLIC PANEL, BASIC    Collection Time: 11/21/17  4:50 AM   Result Value Ref Range    Sodium 141 136 - 145 mmol/L    Potassium 4.0 3.5 - 5.1 mmol/L    Chloride 107 97 - 108 mmol/L    CO2 23 21 - 32 mmol/L    Anion gap 11 5 - 15 mmol/L    Glucose 172 (H) 65 - 100 mg/dL    BUN 21 (H) 6 - 20 MG/DL    Creatinine 1.18 (H) 0.55 - 1.02 MG/DL    BUN/Creatinine ratio 18 12 - 20      GFR est AA 53 (L) >60 ml/min/1.73m2    GFR est non-AA 44 (L) >60 ml/min/1.73m2    Calcium 7.5 (L) 8.5 - 10.1 MG/DL   GLUCOSE, POC    Collection Time: 11/21/17  5:56 AM   Result Value Ref Range    Glucose (POC) 159 (H) 65 - 100 mg/dL    Performed by Fabian Brenner    GLUCOSE, POC    Collection Time: 11/21/17 12:03 PM   Result Value Ref Range    Glucose (POC) 148 (H) 65 - 100 mg/dL    Performed by Al Lam (PCT)    GLUCOSE, POC    Collection Time: 11/21/17  7:19 PM   Result Value Ref Range    Glucose (POC) 132 (H) 65 - 100 mg/dL    Performed by [address], POC    Collection Time: 11/21/17 11:23 PM   Result Value Ref Range    Glucose (POC) 136 (H) 65 - 100 mg/dL    Performed by Fabian Brenner    HGB & HCT    Collection Time: 11/22/17  3:38 AM   Result Value Ref Range    HGB 7.5 (L) 11.5 - 16.0 g/dL    HCT 24.0 (L) 35.0 - 29.3 %   METABOLIC PANEL, BASIC    Collection Time: 11/22/17  3:38 AM   Result Value Ref Range    Sodium 143 136 - 145 mmol/L    Potassium 3.7 3.5 - 5.1 mmol/L    Chloride 108 97 - 108 mmol/L    CO2 25 21 - 32 mmol/L    Anion gap 10 5 - 15 mmol/L    Glucose 124 (H) 65 - 100 mg/dL    BUN 20 6 - 20 MG/DL    Creatinine 1.06 (H) 0.55 - 1.02 MG/DL    BUN/Creatinine ratio 19 12 - 20      GFR est AA >60 >60 ml/min/1.73m2    GFR est non-AA 50 (L) >60 ml/min/1.73m2    Calcium 7.9 (L) 8.5 - 10.1 MG/DL   GLUCOSE, POC    Collection Time: 11/22/17  5:54 AM   Result Value Ref Range    Glucose (POC) 135 (H) 65 - 100 mg/dL    Performed by Lolly Jeffers        Data Review images and reports reviewed    Assessment:     Active Problems:    Transitional cell bladder cancer ([facility] [address] (7/25/2017)        Plan/Recommendations/Medical Decision Making:     Hg 7.5-transfuse 1 unit PRBC, epidural out, ambulate, pulmonary toilet, transfer to floor

## 2017-11-22 NOTE — PROGRESS NOTES
Epidural Follow-up Note    2 Days Post-Op sp Procedure(s):  RADICAL CYSTECTOMY WITH ILEAL CONDUIT/PELVIC LYMPH NODE DISSECTION. Visit Vitals    /81 (BP 1 Location: Right arm, BP Patient Position: At rest)    Pulse 82    Temp 36.6 °C (97.8 °F)    Resp 18    Ht 5' 7\" (1.702 m)    Wt 82.9 kg (182 lb 12.2 oz)    SpO2 96%    BMI 28.62 kg/m2   . Pain is well controlled with epidural infusion. Epidural site is clean, dry and intact. Epidural catheter removed with blue-tip intact.

## 2017-11-22 NOTE — PROGRESS NOTES
.    PCU SHIFT NURSING NOTE      Bedside shift change report given to Conner Soliman RN (oncoming nurse) by Adam Baum (offgoing nurse). Report included the following information SBAR, Kardex, Intake/Output, MAR and Recent Results. Shift Summary:   0800- Patient resting in bed. Epidural in.  0900- Epidural removed by Anesthesia. 1000- Morphine PCA started. Tolerating sips water. 1153- 1 unit PRBS started after PT worked with patient. Patient had more pain and became nauseated with therapy. Better before blood started and patient refused anti-emetic. Resting comfortably at present. 1215- Wound care nurse, Conner Soliman in to change urostomy pouch. 1300- Patient resting in bed. Abdominal pain 1/10. Nausea resolved. Blood infusing. Admission Date 11/20/2017   Admission Diagnosis BLADDER CARCINOMA  Transitional cell bladder cancer ([facility] [address]   Consults None        Consults   [x]PT   [x]OT   []Speech   [x]Case Management      [] Palliative      Cardiac Monitoring Order   [x]Yes   []No     IV drips   []Yes    Drip:                            Dose:  Drip:                            Dose:  Drip:                            Dose:   [x]No     GI Prophylaxis   [x]Yes   []No         DVT Prophylaxis   SCDs:  Sequential Compression Device: Bilateral          Leo stockings:  Graduated Compression Stockings: Bilateral      [] Medication   []Contraindicated   [x]None      Activity Level Activity Level: Up with Assistance     Activity Assistance: Partial (one person)   Purposeful Rounding every 1-2 hour? [x]Yes   Curran Score  Total Score: 3   Bed Alarm (If score 3 or >)   [x]Yes   [] Refused (See signed refusal form in chart)   Adam Score  Adam Score: 15   Adam Score (if score 14 or less)   []PMT consult   []Wound Care consult      []Specialty bed   [] Nutrition consult          Needs prior to discharge:   Home O2 required:    []Yes   [x]No    If yes, how much O2 required?     Other:    Last Bowel Movement: Last Bowel Movement Date: 11/19/17      Influenza Vaccine Received Flu Vaccine for Current Season (usually Sept-March): Yes        Pneumonia Vaccine           Diet Active Orders   Diet    DIET NPO      LDAs           Quad Lumen 11/20/17 Right Internal jugular (Active)   Central Line Being Utilized Yes 11/22/2017  3:32 AM   Criteria for Appropriate Use Limited/no vessel suitable for conventional peripheral access 11/22/2017  3:32 AM   Site Assessment Clean, dry, & intact 11/22/2017  3:32 AM   Infiltration Assessment 0 11/22/2017  3:32 AM   Affected Extremity/Extremities Color distal to insertion site pink (or appropriate for race) 11/22/2017  3:32 AM   Date of Last Dressing Change 11/20/17 11/22/2017  3:32 AM   Dressing Status Clean, dry, & intact 11/22/2017  3:32 AM   Dressing Type Disk with Chlorhexadine gluconate (CHG) 11/22/2017  3:32 AM   Action Taken Blood drawn 11/22/2017  3:32 AM   Proximal Hub Color/Line Status White; Infusing 11/22/2017  3:32 AM   Positive Blood Return (Medial Site) Yes 11/22/2017  3:32 AM   Medial 1 Hub Color/Line Status Blue;Flushed;Capped 11/22/2017  3:32 AM   Positive Blood Return (Lateral Site) Yes 11/22/2017  3:32 AM   Medial 2 Hub Color/Line Status Brown;Flushed;Capped 11/22/2017  3:32 AM   Positive Blood Return (Site #3) Yes 11/22/2017  3:32 AM   Alcohol Cap Used Yes 11/22/2017  3:32 AM        Peripheral IV 11/20/17 Left Forearm (Active)   Site Assessment Clean, dry, & intact 11/22/2017  3:32 AM   Phlebitis Assessment 0 11/22/2017  3:32 AM   Infiltration Assessment 0 11/22/2017  3:32 AM   Dressing Status Clean, dry, & intact 11/22/2017  3:32 AM   Dressing Type Transparent;Tape 11/22/2017  3:32 AM   Hub Color/Line Status Pink;Patent 11/22/2017  3:32 AM   Action Taken Open ports on tubing capped 11/21/2017  6:36 PM   Alcohol Cap Used Yes 11/21/2017  6:36 PM          Tanvir-Downing Drain 11/20/17 Abdomen (Active)   Site Assessment Clean, dry, & intact 11/22/2017  6:00 AM   Dressing Status Clean, dry, & intact 11/22/2017  6:00 AM   Status Patent; Charged 11/22/2017  6:00 AM   Drainage Color Serosanguinous 11/22/2017  6:00 AM   Output (ml) 30 ml 11/22/2017  6:00 AM       Urinary Ostomy 11/20/17 Left Abdomen (Active)   Drainage Color Serosanguinous 11/22/2017  6:00 AM   Site Assessment Clean, dry, intact 11/22/2017  6:00 AM   Dressing Status Clean, dry, & intact 11/22/2017  6:00 AM   Urine Output (mL) 500 ml 11/22/2017  6:00 AM          Epidural/Intrathecal 11/20/17 Lumbar spine (comment) (Active)   Site Assessment Clean, dry, & intact 11/22/2017  3:32 AM   Dressing Status Clean, dry, & intact 11/22/2017  3:32 AM   Dressing Type Transparent;Tape 11/22/2017  3:32 AM   Hub Color/Line Status Infusing 11/22/2017  3:32 AM   Neurovascular Checks No deficit 11/22/2017  3:32 AM          Urinary Catheter [REMOVED] Urinary Catheter 11/20/17 2- way; [place]-Criteria for Appropriate Use: Surgical procedure    Intake & Output   Date 11/21/17 0700 - 11/22/17 0659 11/22/17 0700 - 11/23/17 0659   Shift 7823-2797 0556-1861 24 Hour Total 4920-9711 0779-5869 24 Hour Total   I  N  T  A  K  E   I.V.  (mL/kg/hr) 1211.7  (1.2)  1211.7  (0.6)         Phenylephrine Volume 0  0         Volume (0.9% sodium chloride infusion) 1161.7  1161.7         Volume (cefOXitin (MEFOXIN) 1 g in 0.9% sodium chloride (MBP/ADV) 50 mL) 50  50       Shift Total  (mL/kg) 1211.7  (14.3)  1211.7  (14.6)      O  U  T  P  U  T   Urine  (mL/kg/hr) 1000  (1) 825  (0.8) 1825  (0.9)         Urine Output (mL) (Urinary Ostomy 11/20/17 Left Abdomen) 7283 179 3113       Drains 155 149 304         Output (ml) (Tanvir-Downing Drain 11/20/17 Abdomen) 155 149 304       Shift Total  (mL/kg) 1155  (13.7) 974  (11.7) 2129  (25.7)      NET 56.7 -974 -917.3      Weight (kg) 84.5 82.9 82.9 82.9 82.9 82.9         Readmission Risk Assessment Tool Score Medium Risk            16       Total Score        3 Has Seen PCP in Last 6 Months (Yes=3, No=0)    4 IP Visits Last 12 Months (1-3=4, 4=9, >4=11) 9 Pt. Coverage (Medicare=5 , Medicaid, or Self-Pay=4)        Criteria that do not apply:    . Living with Significant Other. Assisted Living. LTAC. SNF.  or   Rehab    Patient Length of Stay (>5 days = 3)    Charlson Comorbidity Score (Age + Comorbid Conditions)       Expected Length of Stay 4d 0h   Actual Length of Stay 2

## 2017-11-22 NOTE — WOUND CARE
Urostomy/Ileal conduit care: POD#2 and Education day# 2. Patient A&Ox3, epidural removed this am, pain under control per patient, IV fluids infusing, Patient receiving x1 unit of PRBC's for hgb= 7.5, LLQ abdominal drain charged and draining sero-sang, urine has gone from bright red to light cherry red. Pouch change: patient's stoma measured 29 mm, round today. X2 stents dripping urine, emmanuel-stomal skin intact. A x2 piece cut to fit Drake urostomy pouch placed on patient with a stoma ring for light convexity. Pouch re-hooked to bedside drainage bag. Patient interested in process of changing pouch and was able to visualize her stoma okay from a reclined position. Ostomy nurse reviewed process of changing pouch, accessory's used and how often to change pouch and measure stoma. Plan: patient will be moved from PCU to Gen surgery today, she will start mobilizing now that epidural out (PT consult in) and diet will be advanced as tolerated and per MD orders. Ostomy nurse to see patient Friday for pouch change and education.   Sary Cunningham RN, CWON, zone ph# [phone]

## 2017-11-22 NOTE — PROGRESS NOTES
Primary Nurse Isrrael Montes De Oca and Jesika Stark charge nurse, RN performed a dual skin assessment on this patient Impairment noted- see wound doc flow sheet  Adam score is 18

## 2017-11-22 NOTE — PROGRESS NOTES
Problem: Falls - Risk of  Goal: *Absence of Falls  Document Tavo Fall Risk and appropriate interventions in the flowsheet.    Outcome: Progressing Towards Goal  Fall Risk Interventions:  Mobility Interventions: Patient to call before getting OOB         Medication Interventions: Patient to call before getting OOB, Teach patient to arise slowly    Elimination Interventions: Call light in reach

## 2017-11-22 NOTE — PROGRESS NOTES
Bedside and Verbal shift change report given to Dick Muñoz (student RN) by Vivian Goel. Report included the following information SBAR, Kardex and Recent Results.      1900: Patient is in bed resting quietly, vitals are within limits    2000: Patient is comfortable and has no requests currently    2325: ARAM and urostomy draining and emptied    0330: Vitals checked, labs drawn and sent, ARAM drain emptied    0600: Patient requires no humalog

## 2017-11-22 NOTE — PROGRESS NOTES
.. TRANSFER - OUT REPORT:    Verbal report given to [facility] RN(name) on Elizabeth Hobson  being transferred to [facility] for routine progression of care       Report consisted of patients Situation, Background, Assessment and   Recommendations(SBAR). Information from the following report(s) SBAR, Kardex, Intake/Output, MAR and Recent Results was reviewed with the receiving nurse. Lines:   Quad Lumen 11/20/17 Right Internal jugular (Active)   Central Line Being Utilized Yes 11/22/2017  9:09 AM   Criteria for Appropriate Use Hemodynamically unstable, requiring monitoring lines, vasopressors, or volume resuscitation 11/22/2017  9:09 AM   Site Assessment Clean, dry, & intact 11/22/2017  9:09 AM   Infiltration Assessment 0 11/22/2017  9:09 AM   Affected Extremity/Extremities Color distal to insertion site pink (or appropriate for race) 11/22/2017  9:09 AM   Date of Last Dressing Change 11/20/17 11/22/2017  9:09 AM   Dressing Status Clean, dry, & intact 11/22/2017  9:09 AM   Dressing Type Disk with Chlorhexadine gluconate (CHG) 11/22/2017  9:09 AM   Action Taken Blood drawn 11/22/2017  3:32 AM   Proximal Hub Color/Line Status White; Infusing 11/22/2017  9:09 AM   Positive Blood Return (Medial Site) Yes 11/22/2017 11:00 AM   Medial 1 Hub Color/Line Status Blue;Capped 11/22/2017  9:09 AM   Positive Blood Return (Lateral Site) Yes 11/22/2017 11:00 AM   Medial 2 Hub Color/Line Status Brown;Flushed;Capped 11/22/2017  3:32 AM   Positive Blood Return (Site #3) Yes 11/22/2017  3:32 AM   Distal Hub Color/Line Status Brown;Capped 11/22/2017  9:09 AM   Positive Blood Return (Site #4) Yes 11/22/2017 11:00 AM   Alcohol Cap Used Yes 11/22/2017  3:32 AM       Peripheral IV 11/20/17 Left Forearm (Active)   Site Assessment Clean;Dry 11/22/2017  9:09 AM   Phlebitis Assessment 0 11/22/2017  9:09 AM   Infiltration Assessment 0 11/22/2017  9:09 AM   Dressing Status Clean, dry, & intact 11/22/2017  9:09 AM   Dressing Type Tape;Transparent 11/22/2017  9:09 AM   Hub Color/Line Status Pink;Capped 11/22/2017  9:09 AM   Action Taken Open ports on tubing capped 11/21/2017  6:36 PM   Alcohol Cap Used Yes 11/21/2017  6:36 PM        Opportunity for questions and clarification was provided. Patient transported with:   Monitor  O2 @ 2 liters AND 2 RNS.

## 2017-11-23 LAB
ABO + RH BLD: NORMAL
ALBUMIN SERPL-MCNC: 2.7 G/DL (ref 3.5–5)
ALBUMIN/GLOB SERPL: 0.8 {RATIO} (ref 1.1–2.2)
ALP SERPL-CCNC: 73 U/L (ref 45–117)
ALT SERPL-CCNC: 12 U/L (ref 12–78)
ANION GAP SERPL CALC-SCNC: 7 MMOL/L (ref 5–15)
AST SERPL-CCNC: 15 U/L (ref 15–37)
BILIRUB SERPL-MCNC: 0.6 MG/DL (ref 0.2–1)
BLD PROD TYP BPU: NORMAL
BLD PROD TYP BPU: NORMAL
BLOOD GROUP ANTIBODIES SERPL: NORMAL
BPU ID: NORMAL
BPU ID: NORMAL
BUN SERPL-MCNC: 11 MG/DL (ref 6–20)
BUN/CREAT SERPL: 17 (ref 12–20)
CALCIUM SERPL-MCNC: 8.3 MG/DL (ref 8.5–10.1)
CHLORIDE SERPL-SCNC: 104 MMOL/L (ref 97–108)
CO2 SERPL-SCNC: 30 MMOL/L (ref 21–32)
CREAT SERPL-MCNC: 0.63 MG/DL (ref 0.55–1.02)
CROSSMATCH RESULT,%XM: NORMAL
CROSSMATCH RESULT,%XM: NORMAL
GLOBULIN SER CALC-MCNC: 3.3 G/DL (ref 2–4)
GLUCOSE BLD STRIP.AUTO-MCNC: 120 MG/DL (ref 65–100)
GLUCOSE BLD STRIP.AUTO-MCNC: 121 MG/DL (ref 65–100)
GLUCOSE BLD STRIP.AUTO-MCNC: 122 MG/DL (ref 65–100)
GLUCOSE BLD STRIP.AUTO-MCNC: 89 MG/DL (ref 65–100)
GLUCOSE SERPL-MCNC: 89 MG/DL (ref 65–100)
HCT VFR BLD AUTO: 29.6 % (ref 35–47)
HGB BLD-MCNC: 9.3 G/DL (ref 11.5–16)
POTASSIUM SERPL-SCNC: 3.5 MMOL/L (ref 3.5–5.1)
PROT SERPL-MCNC: 6 G/DL (ref 6.4–8.2)
SERVICE CMNT-IMP: ABNORMAL
SERVICE CMNT-IMP: NORMAL
SODIUM SERPL-SCNC: 141 MMOL/L (ref 136–145)
SPECIMEN EXP DATE BLD: NORMAL
STATUS OF UNIT,%ST: NORMAL
STATUS OF UNIT,%ST: NORMAL
UNIT DIVISION, %UDIV: 0
UNIT DIVISION, %UDIV: 0

## 2017-11-23 PROCEDURE — 80053 COMPREHEN METABOLIC PANEL: CPT | Performed by: UROLOGY

## 2017-11-23 PROCEDURE — 74011250637 HC RX REV CODE- 250/637: Performed by: UROLOGY

## 2017-11-23 PROCEDURE — 82962 GLUCOSE BLOOD TEST: CPT

## 2017-11-23 PROCEDURE — 74011250636 HC RX REV CODE- 250/636: Performed by: UROLOGY

## 2017-11-23 PROCEDURE — 65270000029 HC RM PRIVATE

## 2017-11-23 PROCEDURE — 36415 COLL VENOUS BLD VENIPUNCTURE: CPT | Performed by: UROLOGY

## 2017-11-23 PROCEDURE — 85018 HEMOGLOBIN: CPT | Performed by: UROLOGY

## 2017-11-23 PROCEDURE — 77010033678 HC OXYGEN DAILY

## 2017-11-23 PROCEDURE — 74011000250 HC RX REV CODE- 250: Performed by: UROLOGY

## 2017-11-23 RX ORDER — POTASSIUM CHLORIDE AND SODIUM CHLORIDE 450; 150 MG/100ML; MG/100ML
INJECTION, SOLUTION INTRAVENOUS CONTINUOUS
Status: DISCONTINUED | OUTPATIENT
Start: 2017-11-23 | End: 2017-11-25

## 2017-11-23 RX ADMIN — DOCUSATE SODIUM 100 MG: 100 CAPSULE, LIQUID FILLED ORAL at 17:43

## 2017-11-23 RX ADMIN — GLIPIZIDE 20 MG: 5 TABLET, FILM COATED, EXTENDED RELEASE ORAL at 08:27

## 2017-11-23 RX ADMIN — Medication 10 ML: at 06:40

## 2017-11-23 RX ADMIN — Medication 10 ML: at 08:27

## 2017-11-23 RX ADMIN — SODIUM CHLORIDE AND POTASSIUM CHLORIDE: 4.5; 1.49 INJECTION, SOLUTION INTRAVENOUS at 14:26

## 2017-11-23 RX ADMIN — HYDROCHLOROTHIAZIDE 12.5 MG: 12.5 CAPSULE ORAL at 08:26

## 2017-11-23 RX ADMIN — FAMOTIDINE 20 MG: 10 INJECTION, SOLUTION INTRAVENOUS at 06:40

## 2017-11-23 RX ADMIN — ALVIMOPAN 12 MG: 12 CAPSULE ORAL at 08:27

## 2017-11-23 RX ADMIN — DILTIAZEM HYDROCHLORIDE 240 MG: 240 CAPSULE, COATED, EXTENDED RELEASE ORAL at 08:26

## 2017-11-23 RX ADMIN — METFORMIN HYDROCHLORIDE 500 MG: 500 TABLET ORAL at 08:27

## 2017-11-23 RX ADMIN — DOCUSATE SODIUM 100 MG: 100 CAPSULE, LIQUID FILLED ORAL at 08:27

## 2017-11-23 RX ADMIN — ALVIMOPAN 12 MG: 12 CAPSULE ORAL at 17:43

## 2017-11-23 RX ADMIN — PRAVASTATIN SODIUM 80 MG: 40 TABLET ORAL at 22:28

## 2017-11-23 RX ADMIN — Medication 10 ML: at 22:28

## 2017-11-23 NOTE — PROGRESS NOTES
Admit Date: 2017    POD 3 Days Post-Op    Procedure:  Procedure(s):  RADICAL CYSTECTOMY WITH ILEAL CONDUIT/PELVIC LYMPH NODE DISSECTION    Subjective:     Patient has complaints of pain and nausea. Objective:     Blood pressure 150/67, pulse 76, temperature 98.6 °F (37 °C), resp. rate 16, height 5' 7\" (1.702 m), weight 82.9 kg (182 lb 12.2 oz), SpO2 95 %.     Temp (24hrs), Av.4 °F (36.9 °C), Min:98.1 °F (36.7 °C), Max:99 °F (37.2 °C)      Physical Exam:  GENERAL: alert, cooperative, no distress, appears stated age, ABDOMEN: abnormal findings:  tenderness mild in the entire abdomen, stoma pink, viable    Labs:   Recent Results (from the past 48 hour(s))   GLUCOSE, POC    Collection Time: 17 12:03 PM   Result Value Ref Range    Glucose (POC) 148 (H) 65 - 100 mg/dL    Performed by Arnav Lopez (PCT)    GLUCOSE, POC    Collection Time: 17  7:19 PM   Result Value Ref Range    Glucose (POC) 132 (H) 65 - 100 mg/dL    Performed by N[address], POC    Collection Time: 17 11:23 PM   Result Value Ref Range    Glucose (POC) 136 (H) 65 - 100 mg/dL    Performed by Get Thomas    HGB & HCT    Collection Time: 17  3:38 AM   Result Value Ref Range    HGB 7.5 (L) 11.5 - 16.0 g/dL    HCT 24.0 (L) 35.0 - 54.2 %   METABOLIC PANEL, BASIC    Collection Time: 17  3:38 AM   Result Value Ref Range    Sodium 143 136 - 145 mmol/L    Potassium 3.7 3.5 - 5.1 mmol/L    Chloride 108 97 - 108 mmol/L    CO2 25 21 - 32 mmol/L    Anion gap 10 5 - 15 mmol/L    Glucose 124 (H) 65 - 100 mg/dL    BUN 20 6 - 20 MG/DL    Creatinine 1.06 (H) 0.55 - 1.02 MG/DL    BUN/Creatinine ratio 19 12 - 20      GFR est AA >60 >60 ml/min/1.73m2    GFR est non-AA 50 (L) >60 ml/min/1.73m2    Calcium 7.9 (L) 8.5 - 10.1 MG/DL   GLUCOSE, POC    Collection Time: 17  5:54 AM   Result Value Ref Range    Glucose (POC) 135 (H) 65 - 100 mg/dL    Performed by Torrey JONAS, POC    Collection Time: 11/22/17 12:38 PM   Result Value Ref Range    Glucose (POC) 130 (H) 65 - 100 mg/dL    Performed by Adele Brar    GLUCOSE, POC    Collection Time: 11/22/17  4:52 PM   Result Value Ref Range    Glucose (POC) 121 (H) 65 - 100 mg/dL    Performed by Mariluz Fuentes (PCT)    GLUCOSE, POC    Collection Time: 11/22/17 11:45 PM   Result Value Ref Range    Glucose (POC) 87 65 - 100 mg/dL    Performed by [employer]    METABOLIC PANEL, COMPREHENSIVE    Collection Time: 11/23/17  4:10 AM   Result Value Ref Range    Sodium 141 136 - 145 mmol/L    Potassium 3.5 3.5 - 5.1 mmol/L    Chloride 104 97 - 108 mmol/L    CO2 30 21 - 32 mmol/L    Anion gap 7 5 - 15 mmol/L    Glucose 89 65 - 100 mg/dL    BUN 11 6 - 20 MG/DL    Creatinine 0.63 0.55 - 1.02 MG/DL    BUN/Creatinine ratio 17 12 - 20      GFR est AA >60 >60 ml/min/1.73m2    GFR est non-AA >60 >60 ml/min/1.73m2    Calcium 8.3 (L) 8.5 - 10.1 MG/DL    Bilirubin, total 0.6 0.2 - 1.0 MG/DL    ALT (SGPT) 12 12 - 78 U/L    AST (SGOT) 15 15 - 37 U/L    Alk.  phosphatase 73 45 - 117 U/L    Protein, total 6.0 (L) 6.4 - 8.2 g/dL    Albumin 2.7 (L) 3.5 - 5.0 g/dL    Globulin 3.3 2.0 - 4.0 g/dL    A-G Ratio 0.8 (L) 1.1 - 2.2     HGB & HCT    Collection Time: 11/23/17  4:10 AM   Result Value Ref Range    HGB 9.3 (L) 11.5 - 16.0 g/dL    HCT 29.6 (L) 35.0 - 47.0 %   GLUCOSE, POC    Collection Time: 11/23/17  6:26 AM   Result Value Ref Range    Glucose (POC) 89 65 - 100 mg/dL    Performed by Evelin Lundbergelor        Data Review images and reports reviewed    Assessment:     Active Problems:    Transitional cell bladder cancer ([facility] [address] (7/25/2017)        Plan/Recommendations/Medical Decision Making:     Ambulate, clear liquids

## 2017-11-23 NOTE — PROGRESS NOTES
.General Surgery End of Shift Nursing Note    Bedside shift change report given to [address] [place] [address] (oncoming nurse) by [facility] (offgoing nurse). Report included the following information SBAR, Intake/Output, Accordion and Recent Results. Shift worked:   7a-7p   Summary of shift:   patient continue to have bloody drainage from urostomy . Issues for physician to address:   none     Number times ambulated in hallway past shift: 0    Number of times OOB to chair past shift: 0    Pain Management:  Current medication: morphine  Patient states pain is manageable on current pain medication: YES    GI:    Current diet:  DIET NPO    Tolerating current diet: YES  Passing flatus: YES  Last Bowel Movement: yesterday   Appearance: unknown    Respiratory:    Incentive Spirometer at bedside: YES  Patient instructed on use: YES    Patient Safety:    Falls Score: 2  Bed Alarm On? No  Sitter?  No    Alba Gold RN

## 2017-11-24 LAB
ANION GAP SERPL CALC-SCNC: 6 MMOL/L (ref 5–15)
BUN SERPL-MCNC: 9 MG/DL (ref 6–20)
BUN/CREAT SERPL: 15 (ref 12–20)
CALCIUM SERPL-MCNC: 8.6 MG/DL (ref 8.5–10.1)
CHLORIDE SERPL-SCNC: 99 MMOL/L (ref 97–108)
CO2 SERPL-SCNC: 31 MMOL/L (ref 21–32)
CREAT SERPL-MCNC: 0.61 MG/DL (ref 0.55–1.02)
GLUCOSE BLD STRIP.AUTO-MCNC: 113 MG/DL (ref 65–100)
GLUCOSE BLD STRIP.AUTO-MCNC: 140 MG/DL (ref 65–100)
GLUCOSE BLD STRIP.AUTO-MCNC: 144 MG/DL (ref 65–100)
GLUCOSE BLD STRIP.AUTO-MCNC: 146 MG/DL (ref 65–100)
GLUCOSE SERPL-MCNC: 122 MG/DL (ref 65–100)
HGB BLD-MCNC: 10.5 G/DL (ref 11.5–16)
POTASSIUM SERPL-SCNC: 3.6 MMOL/L (ref 3.5–5.1)
SERVICE CMNT-IMP: ABNORMAL
SODIUM SERPL-SCNC: 136 MMOL/L (ref 136–145)

## 2017-11-24 PROCEDURE — 77030010541

## 2017-11-24 PROCEDURE — 65270000029 HC RM PRIVATE

## 2017-11-24 PROCEDURE — 97116 GAIT TRAINING THERAPY: CPT

## 2017-11-24 PROCEDURE — 80048 BASIC METABOLIC PNL TOTAL CA: CPT | Performed by: UROLOGY

## 2017-11-24 PROCEDURE — 77030010522

## 2017-11-24 PROCEDURE — 82962 GLUCOSE BLOOD TEST: CPT

## 2017-11-24 PROCEDURE — 85018 HEMOGLOBIN: CPT | Performed by: UROLOGY

## 2017-11-24 PROCEDURE — 74011000250 HC RX REV CODE- 250: Performed by: UROLOGY

## 2017-11-24 PROCEDURE — 36415 COLL VENOUS BLD VENIPUNCTURE: CPT | Performed by: UROLOGY

## 2017-11-24 PROCEDURE — 74011250637 HC RX REV CODE- 250/637: Performed by: UROLOGY

## 2017-11-24 PROCEDURE — 74011250636 HC RX REV CODE- 250/636: Performed by: UROLOGY

## 2017-11-24 RX ADMIN — SODIUM CHLORIDE AND POTASSIUM CHLORIDE: 4.5; 1.49 INJECTION, SOLUTION INTRAVENOUS at 14:41

## 2017-11-24 RX ADMIN — HYDROCHLOROTHIAZIDE 12.5 MG: 12.5 CAPSULE ORAL at 09:34

## 2017-11-24 RX ADMIN — ALVIMOPAN 12 MG: 12 CAPSULE ORAL at 13:23

## 2017-11-24 RX ADMIN — Medication 10 ML: at 13:27

## 2017-11-24 RX ADMIN — METFORMIN HYDROCHLORIDE 500 MG: 500 TABLET ORAL at 09:35

## 2017-11-24 RX ADMIN — ALVIMOPAN 12 MG: 12 CAPSULE ORAL at 19:26

## 2017-11-24 RX ADMIN — Medication 10 ML: at 06:33

## 2017-11-24 RX ADMIN — Medication 10 ML: at 22:02

## 2017-11-24 RX ADMIN — PRAVASTATIN SODIUM 80 MG: 40 TABLET ORAL at 22:02

## 2017-11-24 RX ADMIN — SODIUM CHLORIDE AND POTASSIUM CHLORIDE: 4.5; 1.49 INJECTION, SOLUTION INTRAVENOUS at 02:56

## 2017-11-24 RX ADMIN — GLIPIZIDE 20 MG: 5 TABLET, FILM COATED, EXTENDED RELEASE ORAL at 06:32

## 2017-11-24 RX ADMIN — FAMOTIDINE 20 MG: 10 INJECTION, SOLUTION INTRAVENOUS at 06:33

## 2017-11-24 RX ADMIN — DOCUSATE SODIUM 100 MG: 100 CAPSULE, LIQUID FILLED ORAL at 19:03

## 2017-11-24 RX ADMIN — DOCUSATE SODIUM 100 MG: 100 CAPSULE, LIQUID FILLED ORAL at 09:34

## 2017-11-24 RX ADMIN — DILTIAZEM HYDROCHLORIDE 240 MG: 240 CAPSULE, COATED, EXTENDED RELEASE ORAL at 09:35

## 2017-11-24 NOTE — PROGRESS NOTES
Problem: Mobility Impaired (Adult and Pediatric)  Goal: *Acute Goals and Plan of Care (Insert Text)  Physical Therapy Goals  Initiated 11/21/2017  1. Patient will move from supine to sit and sit to supine  in bed with modified independence within 7 day(s). 2.  Patient will transfer from bed to chair and chair to bed with supervision/set-up using the least restrictive device within 7 day(s). 3.  Patient will perform sit to stand with supervision/set-up within 7 day(s). 4.  Patient will ambulate with supervision/set-up for 200 feet with the least restrictive device within 7 day(s). 5.  Patient will ascend/descend 3 stairs with 1 handrail(s) with modified independence within 7 day(s). physical Therapy TREATMENT  Patient: Ashley Pike (28 y.o. female)  Date: 11/24/2017  Diagnosis: BLADDER CARCINOMA  Transitional cell bladder cancer ([facility] [address] <principal problem not specified>  Procedure(s) (LRB):  RADICAL CYSTECTOMY WITH ILEAL CONDUIT/PELVIC LYMPH NODE DISSECTION (N/A) 4 Days Post-Op  Precautions: Fall  Chart, physical therapy assessment, plan of care and goals were reviewed. ASSESSMENT:  Pt cleared by nurse to mobilize. Pt receive din bed supine. Pt agreeable to therapy. Pt performed supine to sit transfer at [facility] with additional time. Pt performed sit to stand transfer at [facility]. Pt requring cueing for hand placement . Pt  ambulated 35ft with RW at [facility]. Pt requiring cueing to stand upright and to walk closer to walker. Pt able to correct posture of short time but with increased safety with walker. Pt with improved activity  tolerance today. Pt reported she was fatigued after ambulation but agreeable of sitting up in chair. Pt will benefit from SNF rehab to improve endurance and strength.    Progression toward goals:  []      Improving appropriately and progressing toward goals  [x]      Improving slowly and progressing toward goals  []      Not making progress toward goals and plan of care will be adjusted PLAN:  Patient continues to benefit from skilled intervention to address the above impairments. Continue treatment per established plan of care. Discharge Recommendations:  Gavin Aleman  Further Equipment Recommendations for Discharge:  TBD by SNF     SUBJECTIVE:   Patient stated Tomas Hernandez that was tiring .     OBJECTIVE DATA SUMMARY:   Critical Behavior:  Neurologic State: Alert  Orientation Level: Oriented X4  Cognition: Follows commands     Functional Mobility Training:  Bed Mobility:     Supine to Sit: Contact guard assistance; Additional time               Transfers:  Sit to Stand: Minimum assistance;Assist x1  Stand to Sit: Contact guard assistance                             Balance:  Sitting: Intact  Sitting - Static: Good (unsupported)  Sitting - Dynamic: Good (unsupported)  Standing: Intact; With support  Standing - Static: Good  Standing - Dynamic : Fair  Ambulation/Gait Training:  Distance (ft): 35 Feet (ft)  Assistive Device: Gait belt;Walker, rolling  Ambulation - Level of Assistance: Contact guard assistance        Gait Abnormalities: Decreased step clearance (flexed posture )        Base of Support: Narrowed                           Pain:  Pain Scale 1: Numeric (0 - 10)  Pain Intensity 1: 0              Activity Tolerance:   Pt with improved activity tolerance today     After treatment:   [x] Patient left in no apparent distress sitting up in chair  [] Patient left in no apparent distress in bed  [x] Call bell left within reach  [x] Nursing notified  [] Caregiver present  [x] Bed alarm activated    COMMUNICATION/COLLABORATION:   The patients plan of care was discussed with: Registered Nurse    Milena Gerard   Time Calculation: 9 mins

## 2017-11-24 NOTE — PROGRESS NOTES
Pt is a 80 y.o. , female admitted for bladder carcinoma. Pt was alert and oriented, upon CM room visit. Pt reported that she resides alone in a one story home (4 steps into main entrance). Pt reported that she is independent with ADLs, and drives. Pt reported that she is active with PCP: Summer 2017 for physical and she uses [facility] (Merlene Gutierrez). Pt reported no DME, HH and SNF in the past.    Pt is in need of home health at d/c for urostomy care and education. CM will enter a referral to [facility], in regards to pt's needs. Pt will have family to assist with care once returning home. UPDATE: 11:57AM    Pt was denied auth by [facility] due to low staffing. CM sent referral to [facility]    Care Management Interventions  PCP Verified by CM: Yes  Mode of Transport at Discharge:  Other (see comment) (pt's family will transport )  Transition of Care Consult (CM Consult): Discharge Planning, [address]: No  Reason Outside [place]: Out of service area  Discharge Durable Medical Equipment: No  Physical Therapy Consult: Yes  Occupational Therapy Consult: Yes  Speech Therapy Consult: No  Current Support Network: Lives Alone, Own Home  Confirm Follow Up Transport: Family  Plan discussed with Pt/Family/Caregiver: Yes  Discharge Location  Discharge Placement: Home with home health    ANTONIO Londono   [phone]

## 2017-11-24 NOTE — PROGRESS NOTES
General Surgery End of Shift Nursing Note    Bedside shift change report given to [address] [place] [address] (oncoming nurse) by Alexandre Franks (offgoing nurse). Report included the following information SBAR, Kardex, OR Summary, Procedure Summary, Intake/Output, Accordion, Recent Results and Med Rec Status. Shift worked:   7a-7p   Summary of shift:    Urostomy changed with Wound RN. Otherwise, uneventful shift. Nae full liq diet. Issues for physician to address:   none     Number times ambulated in hallway past shift: 0    Number of times OOB to chair past shift: 2    Pain Management:  Current medication:  none  Patient states pain is manageable on current pain medication: YES    GI:    Current diet:  DIET FULL LIQUID    Tolerating current diet: YES  Passing flatus: NO  Last Bowel Movement: several days ago       Respiratory:    Incentive Spirometer at bedside: YES  Patient instructed on use: YES    Patient Safety:    Falls Score: 3  Bed Alarm On? No  Sitter?  No    Vasyl Maguire, RN

## 2017-11-24 NOTE — WOUND CARE
Urostomy Care: POD#4 and education day #3. Patient still has not passed gas or had a BM, tolerating liquid diet, advanced to full liquid diet, denies N/V.Dr Arturo Marie in this am and pleased with progress so far, but wants her to be passing gas and eating normally before discharge. WC nurse reviewed daily ostomy care and provided educational booklets for patient to go home. Ostomy nurse stressed that: drinking plenty of fluids is important, emptying her pouch when 1/3-1/2 full, placing pouch to bedside bag at night to avoid pouch overfilling/waking up to empty in middle of night; to change pouch on a regular basis or promptly should it leak; s/s of UTI. Patient did a return demonstration in a cut-to-fit pouch application on a stoma model, return demonstration on emptying pouch and clipping to bedside pouch. Pouch change: patient assisted ostomy nurse in changing her pouch while reclined in bed. Two-pice cut to fit at 29 mm round placed on patient today. Supplies for possible discharge to home over weekend placed in room, along with Educational material, instructions on supply ordering and Secure start Sign up. Plan: Patient to be un-hooked from bedside drainage bag during the day, emptying Urostomy pouch with staff assistance and re-hooking to bedside drainage bag when back in bed for the evening.   Loan Corcoran RN, CWON, zone ph# [phone]

## 2017-11-24 NOTE — PROGRESS NOTES
Admit Date: 2017    POD 4 Days Post-Op    Procedure:  Procedure(s):  RADICAL CYSTECTOMY WITH ILEAL CONDUIT/PELVIC LYMPH NODE DISSECTION    Subjective:     Patient has no complaints. No flatus, no nausea    Objective:     Blood pressure 158/76, pulse 88, temperature 98.4 °F (36.9 °C), resp. rate 18, height 5' 7\" (1.702 m), weight 82.9 kg (182 lb 12.2 oz), SpO2 98 %. Temp (24hrs), Av.3 °F (36.8 °C), Min:97.9 °F (36.6 °C), Max:98.8 °F (37.1 °C)      Physical Exam:  GENERAL: alert, cooperative, no distress, appears stated age, ABDOMEN: abnormal findings:  tenderness mild in the lower abdomen, stoma pink and viable    Labs:   Recent Results (from the past 48 hour(s))   GLUCOSE, POC    Collection Time: 17 12:38 PM   Result Value Ref Range    Glucose (POC) 130 (H) 65 - 100 mg/dL    Performed by Jennifer Tadeo    GLUCOSE, POC    Collection Time: 17  4:52 PM   Result Value Ref Range    Glucose (POC) 121 (H) 65 - 100 mg/dL    Performed by Esme Mehta (PCT)    GLUCOSE, POC    Collection Time: 17 11:45 PM   Result Value Ref Range    Glucose (POC) 87 65 - 100 mg/dL    Performed by Sunday Stack    METABOLIC PANEL, COMPREHENSIVE    Collection Time: 17  4:10 AM   Result Value Ref Range    Sodium 141 136 - 145 mmol/L    Potassium 3.5 3.5 - 5.1 mmol/L    Chloride 104 97 - 108 mmol/L    CO2 30 21 - 32 mmol/L    Anion gap 7 5 - 15 mmol/L    Glucose 89 65 - 100 mg/dL    BUN 11 6 - 20 MG/DL    Creatinine 0.63 0.55 - 1.02 MG/DL    BUN/Creatinine ratio 17 12 - 20      GFR est AA >60 >60 ml/min/1.73m2    GFR est non-AA >60 >60 ml/min/1.73m2    Calcium 8.3 (L) 8.5 - 10.1 MG/DL    Bilirubin, total 0.6 0.2 - 1.0 MG/DL    ALT (SGPT) 12 12 - 78 U/L    AST (SGOT) 15 15 - 37 U/L    Alk.  phosphatase 73 45 - 117 U/L    Protein, total 6.0 (L) 6.4 - 8.2 g/dL    Albumin 2.7 (L) 3.5 - 5.0 g/dL    Globulin 3.3 2.0 - 4.0 g/dL    A-G Ratio 0.8 (L) 1.1 - 2.2     HGB & HCT    Collection Time: 17  4:10 AM Result Value Ref Range    HGB 9.3 (L) 11.5 - 16.0 g/dL    HCT 29.6 (L) 35.0 - 47.0 %   GLUCOSE, POC    Collection Time: 11/23/17  6:26 AM   Result Value Ref Range    Glucose (POC) 89 65 - 100 mg/dL    Performed by Nika [address], POC    Collection Time: 11/23/17 11:51 AM   Result Value Ref Range    Glucose (POC) 121 (H) 65 - 100 mg/dL    Performed by Jalil Night (PCT)    GLUCOSE, POC    Collection Time: 11/23/17  5:27 PM   Result Value Ref Range    Glucose (POC) 122 (H) 65 - 100 mg/dL    Performed by Marnie Herrera    GLUCOSE, POC    Collection Time: 11/23/17 11:36 PM   Result Value Ref Range    Glucose (POC) 120 (H) 65 - 100 mg/dL    Performed by Maris Cobos    HEMOGLOBIN    Collection Time: 11/24/17  4:00 AM   Result Value Ref Range    HGB 10.5 (L) 11.5 - 24.1 g/dL   METABOLIC PANEL, BASIC    Collection Time: 11/24/17  4:00 AM   Result Value Ref Range    Sodium 136 136 - 145 mmol/L    Potassium 3.6 3.5 - 5.1 mmol/L    Chloride 99 97 - 108 mmol/L    CO2 31 21 - 32 mmol/L    Anion gap 6 5 - 15 mmol/L    Glucose 122 (H) 65 - 100 mg/dL    BUN 9 6 - 20 MG/DL    Creatinine 0.61 0.55 - 1.02 MG/DL    BUN/Creatinine ratio 15 12 - 20      GFR est AA >60 >60 ml/min/1.73m2    GFR est non-AA >60 >60 ml/min/1.73m2    Calcium 8.6 8.5 - 10.1 MG/DL   GLUCOSE, POC    Collection Time: 11/24/17  6:31 AM   Result Value Ref Range    Glucose (POC) 113 (H) 65 - 100 mg/dL    Performed by Maris Cobos        Data Review images and reports reviewed    Assessment:     Active Problems:    Transitional cell bladder cancer ([facility] [address] (7/25/2017)        Plan/Recommendations/Medical Decision Making:     Doing well.  Full liq, ambulate, home soon

## 2017-11-24 NOTE — PROGRESS NOTES
GIANNI was informed that pt has been accepted to [facility] ([phone]). HH is aware that pt will be d/c on Saturday. GIANNI  Will leave FYI on pt's chart to notify MD.  Pt aware that she has been accepted to [facility] agency.       ANTONIO Negron CM  [phone]

## 2017-11-24 NOTE — PROGRESS NOTES
Pressure Ulcer Prevention Alert Received for Adam < 14 (moderate risk).        Care Plan/Interventions for Nursin. Complete Adam Pressure Ulcer Risk Scale and use sub scores to identify appropriate interventions. 2. Perform Assessment: skin, changes in LOC, visual cues for pain, monitor skin under medical devices  3. Respond to Reduced Sensory Perception: changes in LOC, check visual cues for pain, float heels, suspension boots, pressure redistribution bed/mattress/chair cushion, turning and reposition approximately every 2 hours (pillows & wedges), pad between skin to skin, turn & reposition  4. Manage Moisture: absorbent under pads, internal / external urinary device, internal /  external fecal device, minimize layers, contain wound drainage, access need for specialty bed, limit adult briefs, maintain skin hydration (lotion/cream), moisture barrier, offer toileting every hour  5. Promote Activity: increase time out of bed, chair cushion, PT/OT evaluation, trapeze to reposition, pressure redistribution bed/mattress/chair  6. Address Reduced Mobility: float heels / suspension boot, HOB 30 degrees or less, pressure redistribution bed/mattress/cushion, PT / OT evaluation, turn and reposition approximately every 2 hours (pillows & wedges)  7. Promote Nutrition: document food / fluid / supplement intake, encourage/assist with meals as needed  8. Reduce Friction and Shear: transferring/repositioning devices (lift/draw sheet), lift team/ patient mobility team, feet elevated on foot rest, minimize layers, foam dressing / transparent film / skin sealants, protective barrier creams and emollients, transfer aides (board, Brenden lift, ceiling lift, stand assist), HOB 30 degrees or less, trapeze to reposition.   Wound Care Team

## 2017-11-25 LAB
CREAT FLD-MCNC: 0.58 MG/DL
GLUCOSE BLD STRIP.AUTO-MCNC: 114 MG/DL (ref 65–100)
GLUCOSE BLD STRIP.AUTO-MCNC: 128 MG/DL (ref 65–100)
GLUCOSE BLD STRIP.AUTO-MCNC: 136 MG/DL (ref 65–100)
GLUCOSE BLD STRIP.AUTO-MCNC: 175 MG/DL (ref 65–100)
HGB BLD-MCNC: 9.8 G/DL (ref 11.5–16)
SERVICE CMNT-IMP: ABNORMAL
SPECIMEN SOURCE FLD: NORMAL

## 2017-11-25 PROCEDURE — 74011000250 HC RX REV CODE- 250: Performed by: UROLOGY

## 2017-11-25 PROCEDURE — 65270000029 HC RM PRIVATE

## 2017-11-25 PROCEDURE — 74011636637 HC RX REV CODE- 636/637: Performed by: UROLOGY

## 2017-11-25 PROCEDURE — 85018 HEMOGLOBIN: CPT | Performed by: UROLOGY

## 2017-11-25 PROCEDURE — 74011250636 HC RX REV CODE- 250/636: Performed by: UROLOGY

## 2017-11-25 PROCEDURE — 74011250637 HC RX REV CODE- 250/637: Performed by: UROLOGY

## 2017-11-25 PROCEDURE — 82962 GLUCOSE BLOOD TEST: CPT

## 2017-11-25 PROCEDURE — 82570 ASSAY OF URINE CREATININE: CPT | Performed by: UROLOGY

## 2017-11-25 RX ORDER — INSULIN LISPRO 100 [IU]/ML
INJECTION, SOLUTION INTRAVENOUS; SUBCUTANEOUS
Status: DISCONTINUED | OUTPATIENT
Start: 2017-11-25 | End: 2017-11-26 | Stop reason: HOSPADM

## 2017-11-25 RX ADMIN — INSULIN LISPRO 2 UNITS: 100 INJECTION, SOLUTION INTRAVENOUS; SUBCUTANEOUS at 11:26

## 2017-11-25 RX ADMIN — FAMOTIDINE 20 MG: 10 INJECTION, SOLUTION INTRAVENOUS at 07:19

## 2017-11-25 RX ADMIN — OXYCODONE HYDROCHLORIDE AND ACETAMINOPHEN 1 TABLET: 5; 325 TABLET ORAL at 19:50

## 2017-11-25 RX ADMIN — PRAVASTATIN SODIUM 80 MG: 40 TABLET ORAL at 19:50

## 2017-11-25 RX ADMIN — ALVIMOPAN 12 MG: 12 CAPSULE ORAL at 17:25

## 2017-11-25 RX ADMIN — ALVIMOPAN 12 MG: 12 CAPSULE ORAL at 08:50

## 2017-11-25 RX ADMIN — GLIPIZIDE 20 MG: 5 TABLET, FILM COATED, EXTENDED RELEASE ORAL at 07:16

## 2017-11-25 RX ADMIN — HYDROCHLOROTHIAZIDE 12.5 MG: 12.5 CAPSULE ORAL at 08:50

## 2017-11-25 RX ADMIN — Medication 10 ML: at 04:49

## 2017-11-25 RX ADMIN — DOCUSATE SODIUM 100 MG: 100 CAPSULE, LIQUID FILLED ORAL at 08:49

## 2017-11-25 RX ADMIN — Medication 10 ML: at 19:51

## 2017-11-25 RX ADMIN — SODIUM CHLORIDE AND POTASSIUM CHLORIDE: 4.5; 1.49 INJECTION, SOLUTION INTRAVENOUS at 04:41

## 2017-11-25 RX ADMIN — METFORMIN HYDROCHLORIDE 500 MG: 500 TABLET ORAL at 08:49

## 2017-11-25 RX ADMIN — DILTIAZEM HYDROCHLORIDE 240 MG: 240 CAPSULE, COATED, EXTENDED RELEASE ORAL at 08:50

## 2017-11-25 NOTE — PROGRESS NOTES
General Surgery End of Shift Nursing Note    Bedside shift change report given to January Lee (oncoming nurse) by Pio Olvera (offgoing nurse). Report included the following information SBAR, Kardex, Intake/Output, MAR, Accordion and Recent Results. Shift worked:   7p-7a   Summary of shift:    NA   Issues for physician to address:   NA     Number times ambulated in hallway past shift: 0    Number of times OOB to chair past shift: 0    Pain Management:  Current medication: PCA  Patient states pain is manageable on current pain medication: YES    GI:    Current diet:  DIET FULL LIQUID    Tolerating current diet: YES  Passing flatus: YES  Last Bowel Movement: several days ago   Appearance: NA    Respiratory:    Incentive Spirometer at bedside: YES  Patient instructed on use: YES    Patient Safety:    Falls Score: 3  Bed Alarm On? No  Sitter?  No    Darlene Mendoza

## 2017-11-25 NOTE — PROGRESS NOTES
Admit Date: 2017    POD 5 Days Post-Op    Procedure:  Procedure(s):  RADICAL CYSTECTOMY WITH ILEAL CONDUIT/PELVIC LYMPH NODE DISSECTION    Subjective:     Patient has no complaints. Passed flatus! Objective:     Blood pressure 160/80, pulse 86, temperature 98.2 °F (36.8 °C), resp. rate 16, height 5' 7\" (1.702 m), weight 82.9 kg (182 lb 12.2 oz), SpO2 98 %. Temp (24hrs), Av.7 °F (36.5 °C), Min:96.7 °F (35.9 °C), Max:98.2 °F (36.8 °C)      Physical Exam:  GENERAL: alert, cooperative, no distress, appears stated age, ABDOMEN: soft, non-tender.  Bowel sounds normal. No masses,  no organomegaly, stoma pink and viable    Labs:   Recent Results (from the past 48 hour(s))   GLUCOSE, POC    Collection Time: 17 11:51 AM   Result Value Ref Range    Glucose (POC) 121 (H) 65 - 100 mg/dL    Performed by Zaida Sim (PCT)    GLUCOSE, POC    Collection Time: 17  5:27 PM   Result Value Ref Range    Glucose (POC) 122 (H) 65 - 100 mg/dL    Performed by Cr Mcgee*    GLUCOSE, POC    Collection Time: 17 11:36 PM   Result Value Ref Range    Glucose (POC) 120 (H) 65 - 100 mg/dL    Performed by Destinee Lowry    HEMOGLOBIN    Collection Time: 17  4:00 AM   Result Value Ref Range    HGB 10.5 (L) 11.5 - 59.7 g/dL   METABOLIC PANEL, BASIC    Collection Time: 17  4:00 AM   Result Value Ref Range    Sodium 136 136 - 145 mmol/L    Potassium 3.6 3.5 - 5.1 mmol/L    Chloride 99 97 - 108 mmol/L    CO2 31 21 - 32 mmol/L    Anion gap 6 5 - 15 mmol/L    Glucose 122 (H) 65 - 100 mg/dL    BUN 9 6 - 20 MG/DL    Creatinine 0.61 0.55 - 1.02 MG/DL    BUN/Creatinine ratio 15 12 - 20      GFR est AA >60 >60 ml/min/1.73m2    GFR est non-AA >60 >60 ml/min/1.73m2    Calcium 8.6 8.5 - 10.1 MG/DL   GLUCOSE, POC    Collection Time: 17  6:31 AM   Result Value Ref Range    Glucose (POC) 113 (H) 65 - 100 mg/dL    Performed by Destinee Lowry    GLUCOSE, POC    Collection Time: 17  1:27 PM   Result Value Ref Range    Glucose (POC) 140 (H) 65 - 100 mg/dL    Performed by [address], POC    Collection Time: 11/24/17  4:21 PM   Result Value Ref Range    Glucose (POC) 146 (H) 65 - 100 mg/dL    Performed by [address] [place] [address], POC    Collection Time: 11/24/17  8:54 PM   Result Value Ref Range    Glucose (POC) 144 (H) 65 - 100 mg/dL    Performed by Donita Pacheco (PCT)    HEMOGLOBIN    Collection Time: 11/25/17  4:52 AM   Result Value Ref Range    HGB 9.8 (L) 11.5 - 16.0 g/dL   GLUCOSE, POC    Collection Time: 11/25/17  5:53 AM   Result Value Ref Range    Glucose (POC) 128 (H) 65 - 100 mg/dL    Performed by Luis Armando Filter        Data Review images and reports reviewed    Assessment:     Active Problems:    [facility] (7/25[facility]2017)        Plan/Recommendations/Medical Decision Making:     HL IV, ARAM for creatinine, Regular diet, home in am

## 2017-11-26 VITALS
BODY MASS INDEX: 28.69 KG/M2 | RESPIRATION RATE: 16 BRPM | TEMPERATURE: 97.1 F | OXYGEN SATURATION: 98 % | SYSTOLIC BLOOD PRESSURE: 167 MMHG | HEART RATE: 80 BPM | DIASTOLIC BLOOD PRESSURE: 70 MMHG | WEIGHT: 182.76 LBS | HEIGHT: 67 IN

## 2017-11-26 LAB
GLUCOSE BLD STRIP.AUTO-MCNC: 126 MG/DL (ref 65–100)
SERVICE CMNT-IMP: ABNORMAL

## 2017-11-26 PROCEDURE — 74011250637 HC RX REV CODE- 250/637: Performed by: UROLOGY

## 2017-11-26 PROCEDURE — 74011000250 HC RX REV CODE- 250: Performed by: UROLOGY

## 2017-11-26 PROCEDURE — 82962 GLUCOSE BLOOD TEST: CPT

## 2017-11-26 RX ADMIN — Medication 10 ML: at 06:40

## 2017-11-26 RX ADMIN — METFORMIN HYDROCHLORIDE 500 MG: 500 TABLET ORAL at 08:43

## 2017-11-26 RX ADMIN — DOCUSATE SODIUM 100 MG: 100 CAPSULE, LIQUID FILLED ORAL at 08:43

## 2017-11-26 RX ADMIN — OXYCODONE HYDROCHLORIDE AND ACETAMINOPHEN 1 TABLET: 5; 325 TABLET ORAL at 08:43

## 2017-11-26 RX ADMIN — ALVIMOPAN 12 MG: 12 CAPSULE ORAL at 08:43

## 2017-11-26 RX ADMIN — FAMOTIDINE 20 MG: 10 INJECTION, SOLUTION INTRAVENOUS at 06:40

## 2017-11-26 RX ADMIN — DILTIAZEM HYDROCHLORIDE 240 MG: 240 CAPSULE, COATED, EXTENDED RELEASE ORAL at 08:43

## 2017-11-26 RX ADMIN — HYDROCHLOROTHIAZIDE 12.5 MG: 12.5 CAPSULE ORAL at 08:43

## 2017-11-26 NOTE — PROGRESS NOTES
General Surgery End of Shift Nursing Note    Bedside shift change report given to SEBASTIAN Estevez (oncoming nurse) by Jcarlos Pedraza RN (offgoing nurse). Report included the following information SBAR, Kardex, Intake/Output, MAR, Accordion and Recent Results. Shift worked:   [id number]   Summary of shift:    Patient stable overnight; no complaints; ARAM put out 40mL; emptied 350mL from urostomy; medicated with Percocet x1 overnight   Issues for physician to address:        Number times ambulated in hallway past shift: 0    Number of times OOB to chair past shift: 0    Pain Management:  Current medication: Percocet  Patient states pain is manageable on current pain medication: YES    GI:    Current diet:  DIET REGULAR    Tolerating current diet: YES  Passing flatus: YES  Last Bowel Movement: several days ago   Appearance:     Respiratory:    Incentive Spirometer at bedside: YES  Patient instructed on use: YES    Patient Safety:    Falls Score: 2  Bed Alarm On? No  Sitter?  No    Jcarlos Pedraza RN

## 2017-11-26 NOTE — PROGRESS NOTES
Admit Date: 2017    POD 6 Days Post-Op    Procedure:  Procedure(s):  RADICAL CYSTECTOMY WITH ILEAL CONDUIT/PELVIC LYMPH NODE DISSECTION    Subjective:     Patient has no complaints. Objective:     Blood pressure 167/70, pulse 80, temperature 97.1 °F (36.2 °C), resp. rate 16, height 5' 7\" (1.702 m), weight 82.9 kg (182 lb 12.2 oz), SpO2 98 %. Temp (24hrs), Av.9 °F (36.6 °C), Min:97.1 °F (36.2 °C), Max:98.3 °F (36.8 °C)      Physical Exam:  GENERAL: alert, cooperative, no distress, appears stated age, ABDOMEN: soft, non-tender.  Bowel sounds normal. No masses,  no organomegaly, stoma pink, incisions ok    Labs:   Recent Results (from the past 48 hour(s))   GLUCOSE, POC    Collection Time: 17  1:27 PM   Result Value Ref Range    Glucose (POC) 140 (H) 65 - 100 mg/dL    Performed by Unique Robbins, POC    Collection Time: 17  4:21 PM   Result Value Ref Range    Glucose (POC) 146 (H) 65 - 100 mg/dL    Performed by [address] [place] [address], POC    Collection Time: 17  8:54 PM   Result Value Ref Range    Glucose (POC) 144 (H) 65 - 100 mg/dL    Performed by [facility] (PCT)    HEMOGLOBIN    Collection Time: 17  4:52 AM   Result Value Ref Range    HGB 9.8 (L) 11.5 - 16.0 g/dL   GLUCOSE, POC    Collection Time: 17  5:53 AM   Result Value Ref Range    Glucose (POC) 128 (H) 65 - 100 mg/dL    Performed by Hali Mcfadden, FLUID    Collection Time: 17  9:40 AM   Result Value Ref Range    Fluid Type: ABDOMINAL FLUID      Creatinine, fluid 0.58 MG/DL   GLUCOSE, POC    Collection Time: 17 11:11 AM   Result Value Ref Range    Glucose (POC) 175 (H) 65 - 100 mg/dL    Performed by iWld Castillo    GLUCOSE, POC    Collection Time: 17  5:12 PM   Result Value Ref Range    Glucose (POC) 114 (H) 65 - 100 mg/dL    Performed by Dinorah Kapadia (PCT)    GLUCOSE, POC    Collection Time: 17  9:50 PM   Result Value Ref Range    Glucose (POC) 136 (H) 65 - 100 mg/dL    Performed by Shubham Koch, POC    Collection Time: 11/26/17  7:27 AM   Result Value Ref Range    Glucose (POC) 126 (H) 65 - 100 mg/dL    Performed by Marnie Quintana*        Data Review ARAM serous    Assessment:     Active Problems:    Transitional cell bladder cancer ([facility] [address] (7/25/2017)        Plan/Recommendations/Medical Decision Making:     Remove drain, send home

## 2017-12-29 ENCOUNTER — HOME CARE VISIT (OUTPATIENT)
Dept: HOME HEALTH SERVICES | Facility: HOME HEALTH | Age: 82
End: 2017-12-29

## 2017-12-29 ENCOUNTER — HOME HEALTH ADMISSION (OUTPATIENT)
Dept: HOME HEALTH SERVICES | Facility: HOME HEALTH | Age: 82
End: 2017-12-29
Payer: MEDICARE

## 2018-01-02 ENCOUNTER — HOME CARE VISIT (OUTPATIENT)
Dept: SCHEDULING | Facility: HOME HEALTH | Age: 83
End: 2018-01-02
Payer: MEDICARE

## 2018-01-02 VITALS
RESPIRATION RATE: 16 BRPM | TEMPERATURE: 98 F | SYSTOLIC BLOOD PRESSURE: 136 MMHG | BODY MASS INDEX: 25.43 KG/M2 | WEIGHT: 162 LBS | OXYGEN SATURATION: 98 % | HEART RATE: 76 BPM | DIASTOLIC BLOOD PRESSURE: 72 MMHG | HEIGHT: 67 IN

## 2018-01-02 PROCEDURE — 3331090002 HH PPS REVENUE DEBIT

## 2018-01-02 PROCEDURE — 400013 HH SOC

## 2018-01-02 PROCEDURE — 3331090001 HH PPS REVENUE CREDIT

## 2018-01-02 PROCEDURE — G0299 HHS/HOSPICE OF RN EA 15 MIN: HCPCS

## 2018-01-03 PROCEDURE — 3331090001 HH PPS REVENUE CREDIT

## 2018-01-03 PROCEDURE — 3331090002 HH PPS REVENUE DEBIT

## 2018-01-04 PROCEDURE — 3331090001 HH PPS REVENUE CREDIT

## 2018-01-04 PROCEDURE — 3331090002 HH PPS REVENUE DEBIT

## 2018-01-05 PROCEDURE — 3331090002 HH PPS REVENUE DEBIT

## 2018-01-05 PROCEDURE — 3331090001 HH PPS REVENUE CREDIT

## 2018-01-06 PROCEDURE — 3331090001 HH PPS REVENUE CREDIT

## 2018-01-06 PROCEDURE — 3331090002 HH PPS REVENUE DEBIT

## 2018-01-07 ENCOUNTER — HOME CARE VISIT (OUTPATIENT)
Dept: HOME HEALTH SERVICES | Facility: HOME HEALTH | Age: 83
End: 2018-01-07
Payer: MEDICARE

## 2018-01-07 PROCEDURE — 3331090001 HH PPS REVENUE CREDIT

## 2018-01-07 PROCEDURE — 3331090002 HH PPS REVENUE DEBIT

## 2018-01-08 ENCOUNTER — HOME CARE VISIT (OUTPATIENT)
Dept: SCHEDULING | Facility: HOME HEALTH | Age: 83
End: 2018-01-08
Payer: MEDICARE

## 2018-01-08 PROCEDURE — G0155 HHCP-SVS OF CSW,EA 15 MIN: HCPCS

## 2018-01-08 PROCEDURE — 3331090001 HH PPS REVENUE CREDIT

## 2018-01-08 PROCEDURE — 3331090002 HH PPS REVENUE DEBIT

## 2018-01-09 PROCEDURE — 3331090002 HH PPS REVENUE DEBIT

## 2018-01-09 PROCEDURE — 3331090001 HH PPS REVENUE CREDIT

## 2018-01-10 PROCEDURE — 3331090002 HH PPS REVENUE DEBIT

## 2018-01-10 PROCEDURE — 3331090001 HH PPS REVENUE CREDIT

## 2018-01-11 ENCOUNTER — HOME CARE VISIT (OUTPATIENT)
Dept: HOME HEALTH SERVICES | Facility: HOME HEALTH | Age: 83
End: 2018-01-11
Payer: MEDICARE

## 2018-01-11 PROCEDURE — 3331090001 HH PPS REVENUE CREDIT

## 2018-01-11 PROCEDURE — 3331090002 HH PPS REVENUE DEBIT

## 2018-01-12 ENCOUNTER — HOME CARE VISIT (OUTPATIENT)
Dept: SCHEDULING | Facility: HOME HEALTH | Age: 83
End: 2018-01-12
Payer: MEDICARE

## 2018-01-12 VITALS
HEART RATE: 72 BPM | OXYGEN SATURATION: 95 % | SYSTOLIC BLOOD PRESSURE: 120 MMHG | DIASTOLIC BLOOD PRESSURE: 58 MMHG | TEMPERATURE: 98.2 F | RESPIRATION RATE: 18 BRPM

## 2018-01-12 PROCEDURE — G0299 HHS/HOSPICE OF RN EA 15 MIN: HCPCS

## 2018-01-12 PROCEDURE — 3331090002 HH PPS REVENUE DEBIT

## 2018-01-12 PROCEDURE — 3331090001 HH PPS REVENUE CREDIT

## 2018-01-13 PROCEDURE — 3331090001 HH PPS REVENUE CREDIT

## 2018-01-13 PROCEDURE — 3331090002 HH PPS REVENUE DEBIT

## 2018-01-14 ENCOUNTER — HOME CARE VISIT (OUTPATIENT)
Dept: HOME HEALTH SERVICES | Facility: HOME HEALTH | Age: 83
End: 2018-01-14
Payer: MEDICARE

## 2018-01-14 PROCEDURE — 3331090001 HH PPS REVENUE CREDIT

## 2018-01-14 PROCEDURE — 3331090002 HH PPS REVENUE DEBIT

## 2018-01-15 ENCOUNTER — HOME CARE VISIT (OUTPATIENT)
Dept: HOME HEALTH SERVICES | Facility: HOME HEALTH | Age: 83
End: 2018-01-15
Payer: MEDICARE

## 2018-01-15 PROCEDURE — 3331090001 HH PPS REVENUE CREDIT

## 2018-01-15 PROCEDURE — 3331090002 HH PPS REVENUE DEBIT

## 2018-01-15 PROCEDURE — G0155 HHCP-SVS OF CSW,EA 15 MIN: HCPCS

## 2018-01-16 PROCEDURE — 3331090002 HH PPS REVENUE DEBIT

## 2018-01-16 PROCEDURE — 3331090001 HH PPS REVENUE CREDIT

## 2018-01-17 PROCEDURE — 3331090001 HH PPS REVENUE CREDIT

## 2018-01-17 PROCEDURE — 3331090002 HH PPS REVENUE DEBIT

## 2018-01-18 PROCEDURE — 3331090002 HH PPS REVENUE DEBIT

## 2018-01-18 PROCEDURE — 3331090001 HH PPS REVENUE CREDIT

## 2018-01-19 ENCOUNTER — HOME CARE VISIT (OUTPATIENT)
Dept: SCHEDULING | Facility: HOME HEALTH | Age: 83
End: 2018-01-19
Payer: MEDICARE

## 2018-01-19 VITALS
RESPIRATION RATE: 18 BRPM | SYSTOLIC BLOOD PRESSURE: 128 MMHG | HEART RATE: 88 BPM | DIASTOLIC BLOOD PRESSURE: 66 MMHG | OXYGEN SATURATION: 96 % | TEMPERATURE: 97.7 F

## 2018-01-19 PROCEDURE — G0299 HHS/HOSPICE OF RN EA 15 MIN: HCPCS

## 2018-01-19 PROCEDURE — 3331090001 HH PPS REVENUE CREDIT

## 2018-01-19 PROCEDURE — 3331090002 HH PPS REVENUE DEBIT

## 2018-01-19 PROCEDURE — 3331090003 HH PPS REVENUE ADJ

## 2018-01-20 PROCEDURE — 3331090002 HH PPS REVENUE DEBIT

## 2018-01-20 PROCEDURE — 3331090001 HH PPS REVENUE CREDIT

## 2018-01-21 PROCEDURE — 3331090001 HH PPS REVENUE CREDIT

## 2018-01-21 PROCEDURE — 3331090002 HH PPS REVENUE DEBIT

## 2018-01-22 PROCEDURE — 3331090001 HH PPS REVENUE CREDIT

## 2018-01-22 PROCEDURE — 3331090002 HH PPS REVENUE DEBIT

## 2018-01-23 PROCEDURE — 3331090001 HH PPS REVENUE CREDIT

## 2018-01-23 PROCEDURE — 3331090002 HH PPS REVENUE DEBIT

## 2018-01-24 PROCEDURE — 3331090001 HH PPS REVENUE CREDIT

## 2018-01-24 PROCEDURE — 3331090002 HH PPS REVENUE DEBIT

## 2018-01-25 PROCEDURE — 3331090001 HH PPS REVENUE CREDIT

## 2018-01-25 PROCEDURE — 3331090002 HH PPS REVENUE DEBIT

## 2018-01-26 PROCEDURE — 3331090002 HH PPS REVENUE DEBIT

## 2018-01-26 PROCEDURE — 3331090001 HH PPS REVENUE CREDIT

## 2018-01-27 PROCEDURE — 3331090002 HH PPS REVENUE DEBIT

## 2018-01-27 PROCEDURE — 3331090001 HH PPS REVENUE CREDIT

## 2018-01-28 PROCEDURE — 3331090002 HH PPS REVENUE DEBIT

## 2018-01-28 PROCEDURE — 3331090001 HH PPS REVENUE CREDIT

## 2018-01-29 PROCEDURE — 3331090001 HH PPS REVENUE CREDIT

## 2018-01-29 PROCEDURE — 3331090002 HH PPS REVENUE DEBIT

## 2018-01-30 PROCEDURE — 3331090001 HH PPS REVENUE CREDIT

## 2018-01-30 PROCEDURE — 3331090002 HH PPS REVENUE DEBIT

## 2019-05-13 ENCOUNTER — OFFICE VISIT (OUTPATIENT)
Dept: SURGERY | Age: 84
End: 2019-05-13

## 2019-05-13 VITALS
WEIGHT: 124.3 LBS | HEIGHT: 67 IN | RESPIRATION RATE: 14 BRPM | DIASTOLIC BLOOD PRESSURE: 65 MMHG | BODY MASS INDEX: 19.51 KG/M2 | HEART RATE: 67 BPM | SYSTOLIC BLOOD PRESSURE: 111 MMHG | TEMPERATURE: 98.5 F | OXYGEN SATURATION: 97 %

## 2019-05-13 DIAGNOSIS — R11.0 NAUSEA: Primary | ICD-10-CM

## 2019-05-13 RX ORDER — ONDANSETRON HYDROCHLORIDE 8 MG/1
8 TABLET, FILM COATED ORAL
COMMUNITY
Start: 2018-08-07 | End: 2019-05-23

## 2019-05-13 RX ORDER — PROCHLORPERAZINE MALEATE 10 MG
10 TABLET ORAL
Status: ON HOLD | COMMUNITY
Start: 2018-08-07 | End: 2021-06-11

## 2019-05-13 NOTE — PROGRESS NOTES
Chief Complaint   Patient presents with    Abdominal Pain     Evaluate gallbladder, requested to be seen by Dr. Elsa Ozuna     1. Have you been to the ER, urgent care clinic since your last visit? Hospitalized since your last visit? No    2. Have you seen or consulted any other health care providers outside of the [address] [place] [address] since your last visit? Include any pap smears or colon screening.  No

## 2019-05-13 NOTE — Clinical Note
5/13/19 Patient: Gema Valenzuela YOB: 1934 Date of Visit: 5/13/2019 Werner Bradley NP 
[facility] [address] VIA Facsimile: [phone] Dear Werner Bradley NP, Thank you for referring Ms. Kaelyn Wilburn to  [facility] for evaluation. My notes for this consultation are attached. If you have questions, please do not hesitate to call me. I look forward to following your patient along with you. Sincerely, Isacc Adam MD

## 2019-05-13 NOTE — PROGRESS NOTES
HISTORY OF PRESENT ILLNESS  Elías Oates is a 80 y.o. female.     Had CT scan at [place]:  Possible sludge in GB  I do not have access to the images    LFTs normal    +nausea, not every day  More than once a week  Worse with eating corn flakes    But does ok with waffles and scrambled eggs  Has had fried chicken a few times without trouble    Rare abd pain usually LLQ      ____________________________________________________________________________  Patient presents with:  Abdominal Pain: Evaluate gallbladder, requested to be seen by Dr. Lesvia Hernandez    /65 (BP 1 Location: Left arm, BP Patient Position: Sitting)   Pulse 67   Temp 98.5 °F (36.9 °C) (Oral)   Resp 14   Ht 5' 7\" (1.702 m)   Wt 56.4 kg (124 lb 4.8 oz)   SpO2 97%   BMI 19.47 kg/m²   Past Medical History:  No date: Aneurysm (HCC)      Comment:  abdominal (pt not sure of blood vessel) repaired  No date: Arthritis      Comment:  hands  No date: Cancer (HCC)      Comment:  bladder   No date: Diabetes ([address]      Comment:  oral hypoglycemics  No date: Hypertension  No date: Ill-defined condition      Comment:  high cholesterol  No date: Menopause  Past Surgical History:  2017: ABDOMEN SURGERY PROC UNLISTED      Comment:  aneurysm repair  No date: HX CATARACT REMOVAL      Comment:  bilateral  No date: HX GYN      Comment:  hysterectomy  No date: HX HEENT      Comment:  Lasik on right  2017: HX UROLOGICAL      Comment:  resection of bladder tumor  Social History    Socioeconomic History      Marital status:       Spouse name: Not on file      Number of children: Not on file      Years of education: Not on file      Highest education level: Not on file    Tobacco Use      Smoking status: Former Smoker        Packs/day: 0.25        Years: 5.00        Pack years: 1.25        Quit date:         Years since quittin.3      Smokeless tobacco: Never Used    Substance and Sexual Activity      Alcohol use: No      Drug use: No    Review of patient's family history indicates:  Problem: Other      Relation: Mother          Age of Onset: (Not Specified)          Comment: abdominal aneurysm  Problem: Cancer      Relation: Sister          Age of Onset: (Not Specified)          Comment: lung  Problem: Lung Disease      Relation: Brother          Age of Onset: (Not Specified)          Comment: asbestos  Problem: Stroke      Relation: Sister          Age of Onset: (Not Specified)  Problem: Stroke      Relation: Sister          Age of Onset: (Not Specified)  Problem: Stroke      Relation: Sister          Age of Onset: (Not Specified)  Problem: Other      Relation: Sister          Age of Onset: (Not Specified)          Comment: brain aneurysm  Problem: No Known Problems      Relation: Sister          Age of Onset: (Not Specified)  Problem: Lung Disease      Relation: Brother          Age of Onset: (Not Specified)          Comment: asbestos  Problem: Heart Disease      Relation: Brother          Age of Onset: (Not Specified)          Comment: pacemaker    Current Outpatient Medications:  ondansetron hcl (ZOFRAN) 8 mg tablet, Take 8 mg by mouth.  prochlorperazine (COMPAZINE) 10 mg tablet, Take 10 mg by mouth. dilTIAZem ER (CARDIZEM LA) 300 mg Tb24 tablet, Take 300 mg by mouth daily. metoprolol succinate (TOPROL XL) 50 mg XL tablet, Take 50 mg by mouth daily. hydroCHLOROthiazide (MICROZIDE) 12.5 mg capsule, Take 12.5 mg by mouth daily. glipiZIDE SR (GLUCOTROL XL) 10 mg CR tablet, Take 10 mg by mouth daily. pravastatin (PRAVACHOL) 80 mg tablet, Take 80 mg by mouth nightly. MULTIVIT,CALC,MINS/IRON/FOLIC (ONE-A-DAY WOMENS FORMULA PO), Take 1 Tab by mouth daily. POLYVINYL ALCOHOL/POVIDONE (ARTIFICIAL TEARS OP), Apply 1 Drop to eye as needed (DRY EYES). 1 DROP TO EACH EYE  cefUROXime (CEFTIN) 250 mg tablet, Take 1 Tab by mouth daily.  (Patient not taking: Reported on 1/2/2018)  HYDROcodone-acetaminophen (NORCO) 5-325 mg per tablet, Take 1 Tab by mouth every six (6) hours as needed for Pain. Max Daily Amount: 4 Tabs. (Patient not taking: Reported on 1/2/2018)  cefUROXime (CEFTIN) 250 mg tablet, Take 1 Tab by mouth daily. (Patient not taking: Reported on 1/2/2018)  metFORMIN (GLUCOPHAGE) 500 mg tablet, Take 500 mg by mouth daily (with breakfast). acetaminophen (TYLENOL EXTRA STRENGTH) 500 mg tablet, Take 500 mg by mouth every six (6) hours as needed for Pain. Usually takes every night    No current facility-administered medications for this visit. Allergies: No Known Allergies  _____________________________________________________________________________            Abdominal Pain   The history is provided by the patient. This is a chronic problem. The current episode started more than 1 week ago. The problem occurs every several days. The problem has not changed since onset. Associated symptoms include abdominal pain. Pertinent negatives include no chest pain, no headaches and no shortness of breath. The symptoms are aggravated by eating. The treatment provided no relief. Review of Systems   Constitutional: Negative for chills, fever and weight loss. HENT: Negative for ear pain. Eyes: Negative for pain. Respiratory: Negative for shortness of breath. Cardiovascular: Negative for chest pain. Gastrointestinal: Positive for abdominal pain. Negative for blood in stool. Genitourinary: Negative for hematuria. Musculoskeletal: Negative for joint pain. Skin: Negative for rash. Neurological: Negative for dizziness, focal weakness, seizures and headaches. Endo/Heme/Allergies: Does not bruise/bleed easily. Psychiatric/Behavioral: The patient does not have insomnia. Physical Exam   Constitutional: She is oriented to person, place, and time. She appears well-developed and well-nourished. No distress. HENT:   Head: Normocephalic and atraumatic. Mouth/Throat: No oropharyngeal exudate.    Eyes: Pupils are equal, round, and reactive to light. Neck: Normal range of motion. No tracheal deviation present. Cardiovascular: Normal rate, regular rhythm and normal heart sounds. No murmur heard. Pulmonary/Chest: Effort normal and breath sounds normal. No respiratory distress. She has no wheezes. Abdominal: Soft. Bowel sounds are normal. She exhibits no distension and no mass. There is tenderness in the right lower quadrant, suprapubic area and left lower quadrant. There is no rebound and no guarding. Musculoskeletal: Normal range of motion. She exhibits no edema or tenderness. Lymphadenopathy:     She has no cervical adenopathy. Neurological: She is alert and oriented to person, place, and time. Skin: Skin is warm. No rash noted. She is not diaphoretic. No erythema. Psychiatric: She has a normal mood and affect. Her behavior is normal.       ASSESSMENT and PLAN    ICD-10-CM ICD-9-CM    1. Nausea R11.0 787.02 US ABD LTD     Primary complaint is nausea  Also having some occ lower abd pain  Symptoms do not sound like gallbladder symptoms  Will check a GB ultrasound. If ok, may need GI workup    Expressed understanding.

## 2019-05-20 ENCOUNTER — HOSPITAL ENCOUNTER (OUTPATIENT)
Dept: ULTRASOUND IMAGING | Age: 84
Discharge: HOME | End: 2019-05-20
Attending: SURGERY
Payer: MEDICARE

## 2019-05-20 ENCOUNTER — TELEPHONE (OUTPATIENT)
Dept: SURGERY | Age: 84
End: 2019-05-20

## 2019-05-20 DIAGNOSIS — R11.0 NAUSEA: ICD-10-CM

## 2019-05-20 PROCEDURE — 76705 ECHO EXAM OF ABDOMEN: CPT

## 2019-05-20 NOTE — TELEPHONE ENCOUNTER
Pt agreeable with GB surgery on 6/4/19, but would like to get pre-op with PCP due distance from hospital.I will call her back on tomorrow.

## 2019-05-20 NOTE — TELEPHONE ENCOUNTER
Reviewed US: IMPRESSION  Impression:  1.  Echogenic material within the gall bladder may represent sludge although is  somewhat unusual in appearance and a soft tissue lesion is a differential  consideration. Of note, the prior mentioned CT on which this abnormality was  originally seen was not available for review. If the gallbladder is not  surgically removed due to patient's symptoms, consider sonographic follow-up or  further evaluation with MRI or liver protocol CT as clinically appropriate for  the patient's age. 2.  Trace pericholecystic fluid. Reviewed our discussion in the office. May be reasonable to proceed with surgery. Typical symptoms maybe masked due to the DM and also need to r/o gallbladder cancer. I Recommend we proceed with a Laparoscopic Cholecystectomy with Intraoperative Cholangiogram.    Risks, Benefits, and Alternatives of the procedure were discussed with Vince Weber  including:  the risk of the anesthesia, bleeding, infection, injury to the intestines, injury to the ducts, conversion to an open procedure, and the lack of symptomatic improvement. The patient is agreeable to proceed. she will undergo a preoperative workup and we will proceed at her earliest convenience. Expressed understanding. Wants to proceed with surgery.

## 2019-05-21 NOTE — TELEPHONE ENCOUNTER
Pt agreeable with appointment on 5/23/19  With Sudeep Nazario NP for medical clearance, arrival time 10;45am.Pre-op instructions reviewed with pt. GB surgery scheduled on 6/4/19, arrival time 7:30am @ [facility] surgical center. NPO after mn unless otherwise instructed by PAT nurse. Stop all nsaids 5 days prior to surgery. Have a responsible adult for transportation. Expressed understanding.

## 2019-05-23 RX ORDER — ONDANSETRON 4 MG/1
4 TABLET, FILM COATED ORAL
COMMUNITY

## 2019-05-23 NOTE — PERIOP NOTES
[facility]  Preoperative Instructions        Surgery Date 6/4/2019          Time of Arrival 0930    1. On the day of your surgery, please report to the Surgical Services Registration Desk and sign in at your designated time. The Surgery Center is located to the right of the Emergency Room. 2. You must have someone with you to drive you home. You should not drive a car for 24 hours following surgery. Please make arrangements for a friend or family member to stay with you for the first 24 hours after your surgery. 3. Do not have anything to eat or drink (including water, gum, mints, coffee, juice) after midnight 6/3/2019. ? This may not apply to medications prescribed by your physician. ?(Please note below the special instructions with medications to take the morning of your procedure.)    4. We recommend you do not drink any alcoholic beverages for 24 hours before and after your surgery. 5. Contact your surgeons office for instructions on the following medications: non-steroidal anti-inflammatory drugs (i.e. Advil, Aleve), vitamins, and supplements. (Some surgeons will want you to stop these medications prior to surgery and others may allow you to take them)  **If you are currently taking Plavix, Coumadin, Aspirin and/or other blood-thinning agents, contact your surgeon for instructions. ** Your surgeon will partner with the physician prescribing these medications to determine if it is safe to stop or if you need to continue taking. Please do not stop taking these medications without instructions from your surgeon    6. Wear comfortable clothes. Wear glasses instead of contacts. Do not bring any money or jewelry. Please bring picture ID, insurance card, and any prearranged co-payment or hospital payment. Do not wear make-up, particularly mascara the morning of your surgery. Do not wear nail polish, particularly if you are having foot /hand surgery.   Wear your hair loose or down, no ponytails, buns, amauri pins or clips. All body piercings must be removed. Please shower with antibacterial soap for three consecutive days before and on the morning of surgery, but do not apply any lotions, powders or deodorants after the shower on the day of surgery. Please use a fresh towels after each shower. Please sleep in clean clothes and change bed linens the night before surgery. Please do not shave for 48 hours prior to surgery. Shaving of the face is acceptable. 7. You should understand that if you do not follow these instructions your surgery may be cancelled. If your physical condition changes (I.e. fever, cold or flu) please contact your surgeon as soon as possible. 8. It is important that you be on time. If a situation occurs where you may be late, please call [phone] (OR Holding Area). 9. If you have any questions and or problems, please call [phone] (Pre-admission Testing). 10. Your surgery time may be subject to change. You will receive a phone call the evening prior if your time changes. 11.  If having outpatient surgery, you must have someone to drive you here, stay with you during the duration of your stay, and to drive you home at time of discharge. 12.   In an effort to improve the efficiency, privacy, and safety for all of our Pre-op patients visitors are not allowed in the Holding area. Once you arrive and are registered your family/visitors will be asked to remain in the waiting room. The Pre-op staff will get you from the Surgical Waiting Area and will explain to you and your family/visitors that the Pre-op phase is beginning. The staff will answer any questions and provide instructions for tracking of the patient, by use of the existing tracking number and color-coded status board in the waiting room.   At this time the staff will also ask for your designated spokesperson information in the event that the physician or staff need to provide an update or obtain any pertinent information. The designated spokesperson will be notified if the physician needs to speak to family during the pre-operative phase. If at any time your family/visitors has questions or concerns they may approach the volunteer desk in the waiting area for assistance. Special Instructions: Bring medications in bag/list DOS. MEDICATIONS TO TAKE THE MORNING OF SURGERY WITH A SIP OF WATER:  Cardizem, Metoprolol, Ondansetron. I understand a pre-operative phone call will be made to verify my surgery time. In the event that I am not available, I give permission for a message to be left on my answering service and/or with another person?   Yes patient at [phone].         ___________________      __________   _________    Bearl Real of Patient)             (Witness)                (Date and Time)

## 2019-05-24 NOTE — PERIOP NOTES
Pt had medical clearance appointment with Leonardo Mckeon NP at [facility] on 5/23/2019. Called and sp/w Clarissa Cherry in Medical Records and requested latest office notes/H&P, Labs (CBC/CMP), EKG, and notes regarding Pulmonary referral appt scheduled for July 3, 2019. Clarissa siddiqui will fax all requested information to PAT at [facility]. 5/28/2019: TCT: PCP sp/w Nick Quevedo who states there is no pulmonary referral in pt chart. TCT: patient, pt states was seen by Dr. Ana Story due to Oncologist wanted her to be seen because \"the chemo did something to my throat and he is checking out my throat\". Notes rec'd from pulmonology, Kiki Lefort, NP reviewed pt records. Pt ok to proceed.

## 2019-06-04 ENCOUNTER — ANESTHESIA (OUTPATIENT)
Dept: SURGERY | Age: 84
End: 2019-06-04
Payer: MEDICARE

## 2019-06-04 ENCOUNTER — HOSPITAL ENCOUNTER (OUTPATIENT)
Age: 84
Setting detail: OUTPATIENT SURGERY
Discharge: HOME | End: 2019-06-04
Attending: SURGERY | Admitting: SURGERY
Payer: MEDICARE

## 2019-06-04 ENCOUNTER — ANESTHESIA EVENT (OUTPATIENT)
Dept: SURGERY | Age: 84
End: 2019-06-04
Payer: MEDICARE

## 2019-06-04 ENCOUNTER — APPOINTMENT (OUTPATIENT)
Dept: GENERAL RADIOLOGY | Age: 84
End: 2019-06-04
Attending: SURGERY
Payer: MEDICARE

## 2019-06-04 VITALS
HEIGHT: 67 IN | DIASTOLIC BLOOD PRESSURE: 67 MMHG | HEART RATE: 72 BPM | OXYGEN SATURATION: 99 % | BODY MASS INDEX: 18.86 KG/M2 | SYSTOLIC BLOOD PRESSURE: 141 MMHG | WEIGHT: 120.15 LBS | RESPIRATION RATE: 18 BRPM | TEMPERATURE: 98 F

## 2019-06-04 DIAGNOSIS — R52 PAIN: Primary | ICD-10-CM

## 2019-06-04 LAB
ALBUMIN SERPL-MCNC: 3.4 G/DL (ref 3.5–5)
ALBUMIN/GLOB SERPL: 1 {RATIO} (ref 1.1–2.2)
ALP SERPL-CCNC: 87 U/L (ref 45–117)
ALT SERPL-CCNC: 15 U/L (ref 12–78)
ANION GAP SERPL CALC-SCNC: 7 MMOL/L (ref 5–15)
AST SERPL-CCNC: 15 U/L (ref 15–37)
BILIRUB SERPL-MCNC: 0.4 MG/DL (ref 0.2–1)
BUN SERPL-MCNC: 20 MG/DL (ref 6–20)
BUN/CREAT SERPL: 13 (ref 12–20)
CALCIUM SERPL-MCNC: 8.9 MG/DL (ref 8.5–10.1)
CHLORIDE SERPL-SCNC: 108 MMOL/L (ref 97–108)
CO2 SERPL-SCNC: 25 MMOL/L (ref 21–32)
CREAT SERPL-MCNC: 1.6 MG/DL (ref 0.55–1.02)
ERYTHROCYTE [DISTWIDTH] IN BLOOD BY AUTOMATED COUNT: 14.6 % (ref 11.5–14.5)
GLOBULIN SER CALC-MCNC: 3.5 G/DL (ref 2–4)
GLUCOSE BLD STRIP.AUTO-MCNC: 108 MG/DL (ref 65–100)
GLUCOSE BLD STRIP.AUTO-MCNC: 70 MG/DL (ref 65–100)
GLUCOSE SERPL-MCNC: 74 MG/DL (ref 65–100)
HCT VFR BLD AUTO: 39.8 % (ref 35–47)
HGB BLD-MCNC: 12 G/DL (ref 11.5–16)
LIPASE SERPL-CCNC: 101 U/L (ref 73–393)
MCH RBC QN AUTO: 26.5 PG (ref 26–34)
MCHC RBC AUTO-ENTMCNC: 30.2 G/DL (ref 30–36.5)
MCV RBC AUTO: 88.1 FL (ref 80–99)
NRBC # BLD: 0 K/UL (ref 0–0.01)
NRBC BLD-RTO: 0 PER 100 WBC
PLATELET # BLD AUTO: 240 K/UL (ref 150–400)
PMV BLD AUTO: 9.5 FL (ref 8.9–12.9)
POTASSIUM SERPL-SCNC: 3.8 MMOL/L (ref 3.5–5.1)
PROT SERPL-MCNC: 6.9 G/DL (ref 6.4–8.2)
RBC # BLD AUTO: 4.52 M/UL (ref 3.8–5.2)
SERVICE CMNT-IMP: ABNORMAL
SERVICE CMNT-IMP: NORMAL
SODIUM SERPL-SCNC: 140 MMOL/L (ref 136–145)
WBC # BLD AUTO: 6 K/UL (ref 3.6–11)

## 2019-06-04 PROCEDURE — 77030035048 HC TRCR ENDOSC OPTCL COVD -B: Performed by: SURGERY

## 2019-06-04 PROCEDURE — 36415 COLL VENOUS BLD VENIPUNCTURE: CPT

## 2019-06-04 PROCEDURE — 74011000250 HC RX REV CODE- 250

## 2019-06-04 PROCEDURE — 74011250636 HC RX REV CODE- 250/636: Performed by: ANESTHESIOLOGY

## 2019-06-04 PROCEDURE — 88304 TISSUE EXAM BY PATHOLOGIST: CPT

## 2019-06-04 PROCEDURE — 76210000022 HC REC RM PH II 1.5 TO 2 HR: Performed by: SURGERY

## 2019-06-04 PROCEDURE — 85027 COMPLETE CBC AUTOMATED: CPT

## 2019-06-04 PROCEDURE — 77030008603 HC TRCR ENDOSC EPATH J&J -C: Performed by: SURGERY

## 2019-06-04 PROCEDURE — 82962 GLUCOSE BLOOD TEST: CPT

## 2019-06-04 PROCEDURE — 77030016151 HC PROTCTR LNS DFOG COVD -B: Performed by: SURGERY

## 2019-06-04 PROCEDURE — 74011250636 HC RX REV CODE- 250/636

## 2019-06-04 PROCEDURE — 77030002933 HC SUT MCRYL J&J -A: Performed by: SURGERY

## 2019-06-04 PROCEDURE — 80053 COMPREHEN METABOLIC PANEL: CPT

## 2019-06-04 PROCEDURE — 76210000016 HC OR PH I REC 1 TO 1.5 HR: Performed by: SURGERY

## 2019-06-04 PROCEDURE — 77030032490 HC SLV COMPR SCD KNE COVD -B: Performed by: SURGERY

## 2019-06-04 PROCEDURE — 77030037032 HC INSRT SCIS CLICKLLINE DISP STOR -B: Performed by: SURGERY

## 2019-06-04 PROCEDURE — 77030035045 HC TRCR ENDOSC VRSPRT BLDLSS COVD -B: Performed by: SURGERY

## 2019-06-04 PROCEDURE — 76060000033 HC ANESTHESIA 1 TO 1.5 HR: Performed by: SURGERY

## 2019-06-04 PROCEDURE — 74011250636 HC RX REV CODE- 250/636: Performed by: SURGERY

## 2019-06-04 PROCEDURE — 77030020256 HC SOL INJ NACL 0.9%  500ML: Performed by: SURGERY

## 2019-06-04 PROCEDURE — 77030008756 HC TU IRR SUC STRY -B: Performed by: SURGERY

## 2019-06-04 PROCEDURE — C1758 CATHETER, URETERAL: HCPCS | Performed by: SURGERY

## 2019-06-04 PROCEDURE — 77030008518 HC TBNG INSUF ENDO STRY -B: Performed by: SURGERY

## 2019-06-04 PROCEDURE — 77030031139 HC SUT VCRL2 J&J -A: Performed by: SURGERY

## 2019-06-04 PROCEDURE — 77030011640 HC PAD GRND REM COVD -A: Performed by: SURGERY

## 2019-06-04 PROCEDURE — 74011000250 HC RX REV CODE- 250: Performed by: SURGERY

## 2019-06-04 PROCEDURE — 74300 X-RAY BILE DUCTS/PANCREAS: CPT

## 2019-06-04 PROCEDURE — 83690 ASSAY OF LIPASE: CPT

## 2019-06-04 PROCEDURE — 77030020263 HC SOL INJ SOD CL0.9% LFCR 1000ML: Performed by: SURGERY

## 2019-06-04 PROCEDURE — 74011250637 HC RX REV CODE- 250/637: Performed by: ANESTHESIOLOGY

## 2019-06-04 PROCEDURE — 76010000149 HC OR TIME 1 TO 1.5 HR: Performed by: SURGERY

## 2019-06-04 PROCEDURE — 74011636320 HC RX REV CODE- 636/320: Performed by: SURGERY

## 2019-06-04 PROCEDURE — 74011000250 HC RX REV CODE- 250: Performed by: ANESTHESIOLOGY

## 2019-06-04 PROCEDURE — 77030039266 HC ADH SKN EXOFIN S2SG -A: Performed by: SURGERY

## 2019-06-04 RX ORDER — FENTANYL CITRATE 50 UG/ML
INJECTION, SOLUTION INTRAMUSCULAR; INTRAVENOUS AS NEEDED
Status: DISCONTINUED | OUTPATIENT
Start: 2019-06-04 | End: 2019-06-04 | Stop reason: HOSPADM

## 2019-06-04 RX ORDER — IBUPROFEN 400 MG/1
400 TABLET ORAL
Qty: 25 TAB | Refills: 1 | Status: SHIPPED | OUTPATIENT
Start: 2019-06-04 | End: 2019-06-09

## 2019-06-04 RX ORDER — SODIUM CHLORIDE 0.9 % (FLUSH) 0.9 %
5-40 SYRINGE (ML) INJECTION AS NEEDED
Status: DISCONTINUED | OUTPATIENT
Start: 2019-06-04 | End: 2019-06-04 | Stop reason: HOSPADM

## 2019-06-04 RX ORDER — SODIUM CHLORIDE 9 MG/ML
25 INJECTION, SOLUTION INTRAVENOUS CONTINUOUS
Status: DISCONTINUED | OUTPATIENT
Start: 2019-06-04 | End: 2019-06-04 | Stop reason: HOSPADM

## 2019-06-04 RX ORDER — PROPOFOL 10 MG/ML
INJECTION, EMULSION INTRAVENOUS AS NEEDED
Status: DISCONTINUED | OUTPATIENT
Start: 2019-06-04 | End: 2019-06-04 | Stop reason: HOSPADM

## 2019-06-04 RX ORDER — GABAPENTIN 300 MG/1
300 CAPSULE ORAL 2 TIMES DAILY
Qty: 10 CAP | Refills: 0 | Status: SHIPPED | OUTPATIENT
Start: 2019-06-04 | End: 2019-06-09

## 2019-06-04 RX ORDER — ROCURONIUM BROMIDE 10 MG/ML
INJECTION, SOLUTION INTRAVENOUS AS NEEDED
Status: DISCONTINUED | OUTPATIENT
Start: 2019-06-04 | End: 2019-06-04 | Stop reason: HOSPADM

## 2019-06-04 RX ORDER — SODIUM CHLORIDE, SODIUM LACTATE, POTASSIUM CHLORIDE, CALCIUM CHLORIDE 600; 310; 30; 20 MG/100ML; MG/100ML; MG/100ML; MG/100ML
100 INJECTION, SOLUTION INTRAVENOUS CONTINUOUS
Status: DISCONTINUED | OUTPATIENT
Start: 2019-06-04 | End: 2019-06-04 | Stop reason: HOSPADM

## 2019-06-04 RX ORDER — LIDOCAINE HYDROCHLORIDE 20 MG/ML
INJECTION, SOLUTION EPIDURAL; INFILTRATION; INTRACAUDAL; PERINEURAL AS NEEDED
Status: DISCONTINUED | OUTPATIENT
Start: 2019-06-04 | End: 2019-06-04 | Stop reason: HOSPADM

## 2019-06-04 RX ORDER — ACETAMINOPHEN 325 MG/1
650 TABLET ORAL 4 TIMES DAILY
Status: SHIPPED | COMMUNITY
Start: 2019-06-04 | End: 2019-06-11

## 2019-06-04 RX ORDER — ONDANSETRON 2 MG/ML
4 INJECTION INTRAMUSCULAR; INTRAVENOUS AS NEEDED
Status: DISCONTINUED | OUTPATIENT
Start: 2019-06-04 | End: 2019-06-04 | Stop reason: HOSPADM

## 2019-06-04 RX ORDER — FENTANYL CITRATE 50 UG/ML
25 INJECTION, SOLUTION INTRAMUSCULAR; INTRAVENOUS
Status: DISCONTINUED | OUTPATIENT
Start: 2019-06-04 | End: 2019-06-04 | Stop reason: HOSPADM

## 2019-06-04 RX ORDER — PHENYLEPHRINE HCL IN 0.9% NACL 0.4MG/10ML
SYRINGE (ML) INTRAVENOUS AS NEEDED
Status: DISCONTINUED | OUTPATIENT
Start: 2019-06-04 | End: 2019-06-04 | Stop reason: HOSPADM

## 2019-06-04 RX ORDER — MIDAZOLAM HYDROCHLORIDE 1 MG/ML
1 INJECTION, SOLUTION INTRAMUSCULAR; INTRAVENOUS AS NEEDED
Status: DISCONTINUED | OUTPATIENT
Start: 2019-06-04 | End: 2019-06-04 | Stop reason: HOSPADM

## 2019-06-04 RX ORDER — GLYCOPYRROLATE 0.2 MG/ML
INJECTION INTRAMUSCULAR; INTRAVENOUS AS NEEDED
Status: DISCONTINUED | OUTPATIENT
Start: 2019-06-04 | End: 2019-06-04 | Stop reason: HOSPADM

## 2019-06-04 RX ORDER — CEFAZOLIN SODIUM/WATER 2 G/20 ML
2 SYRINGE (ML) INTRAVENOUS ONCE
Status: COMPLETED | OUTPATIENT
Start: 2019-06-04 | End: 2019-06-04

## 2019-06-04 RX ORDER — ONDANSETRON 2 MG/ML
INJECTION INTRAMUSCULAR; INTRAVENOUS AS NEEDED
Status: DISCONTINUED | OUTPATIENT
Start: 2019-06-04 | End: 2019-06-04 | Stop reason: HOSPADM

## 2019-06-04 RX ORDER — ROPIVACAINE HYDROCHLORIDE 5 MG/ML
30 INJECTION, SOLUTION EPIDURAL; INFILTRATION; PERINEURAL AS NEEDED
Status: DISCONTINUED | OUTPATIENT
Start: 2019-06-04 | End: 2019-06-04 | Stop reason: HOSPADM

## 2019-06-04 RX ORDER — DIPHENHYDRAMINE HYDROCHLORIDE 50 MG/ML
12.5 INJECTION, SOLUTION INTRAMUSCULAR; INTRAVENOUS AS NEEDED
Status: DISCONTINUED | OUTPATIENT
Start: 2019-06-04 | End: 2019-06-04 | Stop reason: HOSPADM

## 2019-06-04 RX ORDER — SODIUM CHLORIDE, SODIUM LACTATE, POTASSIUM CHLORIDE, CALCIUM CHLORIDE 600; 310; 30; 20 MG/100ML; MG/100ML; MG/100ML; MG/100ML
INJECTION, SOLUTION INTRAVENOUS
Status: DISCONTINUED | OUTPATIENT
Start: 2019-06-04 | End: 2019-06-04 | Stop reason: HOSPADM

## 2019-06-04 RX ORDER — DEXTROSE 50 % IN WATER (D50W) INTRAVENOUS SYRINGE
5 ONCE
Status: COMPLETED | OUTPATIENT
Start: 2019-06-04 | End: 2019-06-04

## 2019-06-04 RX ORDER — SODIUM CHLORIDE 0.9 % (FLUSH) 0.9 %
5-40 SYRINGE (ML) INJECTION EVERY 8 HOURS
Status: DISCONTINUED | OUTPATIENT
Start: 2019-06-04 | End: 2019-06-04 | Stop reason: HOSPADM

## 2019-06-04 RX ORDER — LIDOCAINE HYDROCHLORIDE 10 MG/ML
0.1 INJECTION, SOLUTION EPIDURAL; INFILTRATION; INTRACAUDAL; PERINEURAL AS NEEDED
Status: DISCONTINUED | OUTPATIENT
Start: 2019-06-04 | End: 2019-06-04 | Stop reason: HOSPADM

## 2019-06-04 RX ORDER — MORPHINE SULFATE 10 MG/ML
2 INJECTION, SOLUTION INTRAMUSCULAR; INTRAVENOUS
Status: DISCONTINUED | OUTPATIENT
Start: 2019-06-04 | End: 2019-06-04 | Stop reason: HOSPADM

## 2019-06-04 RX ORDER — ONDANSETRON 4 MG/1
4 TABLET, ORALLY DISINTEGRATING ORAL
Qty: 8 TAB | Refills: 0 | Status: ON HOLD | OUTPATIENT
Start: 2019-06-04 | End: 2021-06-11

## 2019-06-04 RX ORDER — MIDAZOLAM HYDROCHLORIDE 1 MG/ML
0.5 INJECTION, SOLUTION INTRAMUSCULAR; INTRAVENOUS
Status: DISCONTINUED | OUTPATIENT
Start: 2019-06-04 | End: 2019-06-04 | Stop reason: HOSPADM

## 2019-06-04 RX ORDER — HYDROMORPHONE HYDROCHLORIDE 1 MG/ML
0.5 INJECTION, SOLUTION INTRAMUSCULAR; INTRAVENOUS; SUBCUTANEOUS
Status: DISCONTINUED | OUTPATIENT
Start: 2019-06-04 | End: 2019-06-04 | Stop reason: HOSPADM

## 2019-06-04 RX ORDER — ACETAMINOPHEN 10 MG/ML
INJECTION, SOLUTION INTRAVENOUS AS NEEDED
Status: DISCONTINUED | OUTPATIENT
Start: 2019-06-04 | End: 2019-06-04 | Stop reason: HOSPADM

## 2019-06-04 RX ORDER — DOCUSATE SODIUM 100 MG/1
100 CAPSULE, LIQUID FILLED ORAL 2 TIMES DAILY
Qty: 14 CAP | Refills: 0 | Status: ON HOLD | COMMUNITY
Start: 2019-06-04 | End: 2021-06-11

## 2019-06-04 RX ORDER — FENTANYL CITRATE 50 UG/ML
50 INJECTION, SOLUTION INTRAMUSCULAR; INTRAVENOUS AS NEEDED
Status: DISCONTINUED | OUTPATIENT
Start: 2019-06-04 | End: 2019-06-04 | Stop reason: HOSPADM

## 2019-06-04 RX ORDER — ACETAMINOPHEN 325 MG/1
650 TABLET ORAL ONCE
Status: COMPLETED | OUTPATIENT
Start: 2019-06-04 | End: 2019-06-04

## 2019-06-04 RX ORDER — OXYCODONE HYDROCHLORIDE 5 MG/1
5 TABLET ORAL
Qty: 12 TAB | Refills: 0 | Status: SHIPPED | OUTPATIENT
Start: 2019-06-04 | End: 2019-06-11

## 2019-06-04 RX ORDER — SODIUM CHLORIDE, SODIUM LACTATE, POTASSIUM CHLORIDE, CALCIUM CHLORIDE 600; 310; 30; 20 MG/100ML; MG/100ML; MG/100ML; MG/100ML
25 INJECTION, SOLUTION INTRAVENOUS CONTINUOUS
Status: DISCONTINUED | OUTPATIENT
Start: 2019-06-04 | End: 2019-06-04 | Stop reason: HOSPADM

## 2019-06-04 RX ORDER — BUPIVACAINE HYDROCHLORIDE AND EPINEPHRINE 5; 5 MG/ML; UG/ML
INJECTION, SOLUTION EPIDURAL; INTRACAUDAL; PERINEURAL AS NEEDED
Status: DISCONTINUED | OUTPATIENT
Start: 2019-06-04 | End: 2019-06-04 | Stop reason: HOSPADM

## 2019-06-04 RX ADMIN — ONDANSETRON 4 MG: 2 INJECTION INTRAMUSCULAR; INTRAVENOUS at 10:10

## 2019-06-04 RX ADMIN — LIDOCAINE HYDROCHLORIDE 100 MG: 20 INJECTION, SOLUTION EPIDURAL; INFILTRATION; INTRACAUDAL; PERINEURAL at 09:07

## 2019-06-04 RX ADMIN — SODIUM CHLORIDE, SODIUM LACTATE, POTASSIUM CHLORIDE, AND CALCIUM CHLORIDE 25 ML/HR: 600; 310; 30; 20 INJECTION, SOLUTION INTRAVENOUS at 08:32

## 2019-06-04 RX ADMIN — PROPOFOL 150 MG: 10 INJECTION, EMULSION INTRAVENOUS at 09:07

## 2019-06-04 RX ADMIN — Medication 120 MCG: at 09:14

## 2019-06-04 RX ADMIN — ROCURONIUM BROMIDE 20 MG: 10 INJECTION, SOLUTION INTRAVENOUS at 09:07

## 2019-06-04 RX ADMIN — ACETAMINOPHEN 1000 MG: 10 INJECTION, SOLUTION INTRAVENOUS at 09:15

## 2019-06-04 RX ADMIN — Medication 2 G: at 09:15

## 2019-06-04 RX ADMIN — DEXTROSE MONOHYDRATE 5 G: 25 INJECTION, SOLUTION INTRAVENOUS at 08:42

## 2019-06-04 RX ADMIN — Medication 120 MCG: at 09:27

## 2019-06-04 RX ADMIN — SODIUM CHLORIDE, SODIUM LACTATE, POTASSIUM CHLORIDE, CALCIUM CHLORIDE: 600; 310; 30; 20 INJECTION, SOLUTION INTRAVENOUS at 08:50

## 2019-06-04 RX ADMIN — GLYCOPYRROLATE 0.2 MG: 0.2 INJECTION INTRAMUSCULAR; INTRAVENOUS at 09:24

## 2019-06-04 RX ADMIN — ACETAMINOPHEN 650 MG: 325 TABLET ORAL at 08:35

## 2019-06-04 RX ADMIN — FENTANYL CITRATE 100 MCG: 50 INJECTION, SOLUTION INTRAMUSCULAR; INTRAVENOUS at 09:06

## 2019-06-04 RX ADMIN — Medication 120 MCG: at 09:24

## 2019-06-04 RX ADMIN — ONDANSETRON 4 MG: 2 INJECTION INTRAMUSCULAR; INTRAVENOUS at 09:47

## 2019-06-04 NOTE — PROGRESS NOTES
Patient discharged to home. Iv removed. Discharge instructions, scripts, and medication education done with patient and family member.

## 2019-06-04 NOTE — ANESTHESIA PREPROCEDURE EVALUATION
Relevant Problems   No relevant active problems       Anesthetic History   No history of anesthetic complications            Review of Systems / Medical History  Patient summary reviewed, nursing notes reviewed and pertinent labs reviewed    Pulmonary  Within defined limits                 Neuro/Psych   Within defined limits           Cardiovascular  Within defined limits  Hypertension          PAD    Exercise tolerance: >4 METS     GI/Hepatic/Renal  Within defined limits              Endo/Other  Within defined limits  Diabetes    Arthritis and cancer     Other Findings              Physical Exam    Airway  Mallampati: II  TM Distance: 4 - 6 cm  Neck ROM: decreased range of motion   Mouth opening: Normal     Cardiovascular  Regular rate and rhythm,  S1 and S2 normal,  no murmur, click, rub, or gallop             Dental    Dentition: Edentulous     Pulmonary  Breath sounds clear to auscultation               Abdominal  GI exam deferred       Other Findings            Anesthetic Plan    ASA: 3  Anesthesia type: general          Induction: Intravenous  Anesthetic plan and risks discussed with: Patient

## 2019-06-04 NOTE — OP NOTES
OTTO OPERATIVE REPORT    6/4/2019    Pre-operative Diagnosis: GALLSTONES, POSSIBLE MASS    Post-operative Diagnosis: chronic cholecystitis      OPERATIVE PROCEDURE:  1. Laparoscopic cholecystectomy. 2.  Intraoperative cholangiogram with intraoperative interpritation. Surgeon: Jami Monzon MD    Anesthesia: General plus Local    Estimated Blood Loss: Minimal    FINDINGS:   The patient was noted to have a gallbladder with thin wall, sludge and bile. There was a small amount of bile tinged ascites. Intraoperative cholangiogram was obtained. Radiologist was not present during the case. Intraoperative interpretation revealed filling of a normal length cystic duct, a common bile duct, and all proximal and distal structures with free flow of contrast into the duodenum without any filling defects noted. There was limited filling of the proximal ducts due to rapid flow of contrast into the duodenum. The cystic duct/common bile duct junction was however clearly identified. The liver appeared normal, there was some adhesions around the urostomy and in the pelvis. SPECIMEN:    ID Type Source Tests Collected by Time Destination   1 : gallbladder Preservative Gallbladder  Jeannette Alberts MD 6/4/2019 0932 Pathology        DRAINS:  None. COMPLICATIONS:  None. DESCRIPTION OF PROCEDURE:  After satisfactory induction of general  anesthesia, the patient was kept in the supine position. The patient's  abdomen was prepped and draped sterilely and the urostomy bag was draped out of the field with a 1010 drape. After infusion of the skin with local anesthetic, a small incision was made  and a 5-mm trocar was placed intra-abdominally under  Visualization to the left of midline. Pneumoperitoneum was achieved. The abdomen was explored  with findings noted above. A 12-mm port was placed in the patient's  epigastrium and an additional 5-mm port was placed in the patient's right  abdomen.   The gallbladder was retracted superiorly and laterally. The  reflection of the peritoneum was brought down exposing Calot's triangle. The cystic duct was  from surrounding tissues with the critical view obtained, including the cystic duct and the common bile duct junction. A single clip was placed at the junction of the cystic duct and gallbladder. A small ductotomy was created and intraoperative cholangiogram was obtained with the findings noted above. The cholangiogram catheter was removed. Three clips were placed just distal to the ductotomy on the cystic duct and the duct was transected. Next, the cystic artery was identified,  from its surrounding tissues, 1 clip placed proximally and 1 distally, and was transected as well. The spatula-tipped Bovie was used to separate the gallbladder from its bed in  the liver. Once freed, irrigation of the right upper quadrant and final  irrigation running clear, complete hemostasis was noted. Clips noted to be  in place without any extravasation of blood or bile. The pelvis was visualized and noted to be normal except for some adhesions. Ten mL of 0.5 Marcaine with epinephrine was sprayed over dome of the liver. The gallbladder was retrieved through the epigastric port site using a grasper. The remaining trocar sites were removed sequentially under visualization, and the pneumoperitoneum was allowed to escape. A figure-of-eight 0 Vicryl suture was used to close the epigastic fascial defect, followed by additional Vicryl  and local anesthetic in the soft tissue, followed by closure of all skin incisions with subcuticular 4-0 Monocryl and Dermabond. The patient remained stable during my presence in the operating room.       Lamond Krabbe, MD

## 2019-06-04 NOTE — PERIOP NOTES
TRANSFER - OUT REPORT:    Verbal report given to Ricco Iraheta RN(name) on [facility]  being transferred to Phase II (unit) for routine progression of care       Report consisted of patients Situation, Background, Assessment and   Recommendations(SBAR). Information from the following report(s) SBAR, OR Summary, MAR and Cardiac Rhythm NSR was reviewed with the receiving nurse. Opportunity for questions and clarification was provided.       Patient transported with:   Registered Nurse

## 2019-06-04 NOTE — H&P
Pre-Op History and Physical    Subjective:     Melissa Dodge is a 80 y.o. female who is here for Procedure(s):  [address] CHOLANGIOGRAMS    Past Medical History:   Diagnosis Date    Aneurysm ([facility] [address]     abdominal (pt not sure of blood vessel) repaired    Arthritis     hands    Cancer ([facility] [address]     bladder     Diabetes ([facility] [address]     oral hypoglycemics    Hypertension     Ill-defined condition     high cholesterol    Menopause       Past Surgical History:   Procedure Laterality Date    ABDOMEN SURGERY PROC UNLISTED  2017    aneurysm repair    BREAST SURGERY PROCEDURE UNLISTED      HX CATARACT REMOVAL      bilateral    HX GYN      hysterectomy    HX HEENT      Lasik on right    HX UROLOGICAL  2017    resection of bladder tumor     Family History   Problem Relation Age of Onset    Other Mother         abdominal aneurysm    Cancer Sister         lung    Lung Disease Brother         asbestos    Stroke Sister     Stroke Sister     Stroke Sister     Other Sister         brain aneurysm    No Known Problems Sister     Lung Disease Brother         asbestos    Heart Disease Brother         pacemaker      Social History     Tobacco Use    Smoking status: Former Smoker     Packs/day: 0.25     Years: 5.00     Pack years: 1.25     Last attempt to quit: 1969     Years since quittin.4    Smokeless tobacco: Never Used   Substance Use Topics    Alcohol use: No       Prior to Admission medications    Medication Sig Start Date End Date Taking? Authorizing Provider   ondansetron hcl (ZOFRAN) 8 mg tablet Take 8 mg by mouth every eight (8) hours as needed for Nausea. Yes Provider, Historical   prochlorperazine (COMPAZINE) 10 mg tablet Take 10 mg by mouth every six (6) hours as needed. 18  Yes Provider, Historical   dilTIAZem ER (CARDIZEM LA) 300 mg Tb24 tablet Take 300 mg by mouth daily.    Yes Bárbara Dewitt NP   metoprolol succinate (TOPROL XL) 50 mg XL tablet Take 50 mg by mouth daily. 11/16/17  Yes Tejal Paniagua, LISA   glipiZIDE SR (GLUCOTROL XL) 10 mg CR tablet Take 2.5 mg by mouth daily. Yes Provider, Historical   pravastatin (PRAVACHOL) 80 mg tablet Take 80 mg by mouth nightly. Yes Provider, Historical   MULTIVIT,CALC,MINS/IRON/FOLIC (ONE-A-DAY WOMENS FORMULA PO) Take 1 Tab by mouth daily. Yes Provider, Historical   POLYVINYL ALCOHOL/POVIDONE (ARTIFICIAL TEARS OP) Apply 1 Drop to eye as needed (DRY EYES). 1 DROP TO EACH EYE   Yes Provider, Historical     No Known Allergies     Review of Systems:  A comprehensive review of systems was negative except for that written in the History of Present Illness. Objective: Intake and Output:    No intake/output data recorded. No intake/output data recorded. Physical Exam:   Lungs: clear to auscultation bilaterally  Heart: regular rate and rhythm  Abdomen: soft, non-tender. No masses,  no organomegaly. Ostomy in place        Data Review:   No results found for this or any previous visit (from the past 24 hour(s)). Assessment:     Active Problems:    * No active hospital problems. *      Plan:       I Recommend we proceed with a Laparoscopic Cholecystectomy with Intraoperative Cholangiogram.    Risks, Benefits, and Alternatives of the procedure were discussed with Anyi Quevedo  including:  the risk of the anesthesia, bleeding, infection, injury to the intestines, injury to the ducts, conversion to an open procedure, and the lack of symptomatic improvement.   The patient is agreeable to proceed.         Signed By: Rupal Murdock MD     June 4, 2019

## 2019-06-04 NOTE — ANESTHESIA POSTPROCEDURE EVALUATION
Procedure(s):  LAPAROSCOPIC CHOLECYSTECTOMY WITH INTRAOPERATIVE CHOLANGIOGRAMS. general    Anesthesia Post Evaluation        Patient location during evaluation: PACU  Note status: Adequate. Level of consciousness: responsive to verbal stimuli and sleepy but conscious  Pain management: satisfactory to patient  Airway patency: patent  Anesthetic complications: no  Cardiovascular status: acceptable  Respiratory status: acceptable  Hydration status: acceptable  Comments: +Post-Anesthesia Evaluation and Assessment    Patient: Morro Hernandez MRN: [id number]  SSN: [id number]   YOB: 1934  Age: 80 y.o. Sex: female          Cardiovascular Function/Vital Signs    /62 (BP 1 Location: Right arm, BP Patient Position: At rest)   Pulse 75   Temp 36.4 °C (97.5 °F)   Resp 18   Ht 5' 7\" (1.702 m)   Wt 54.5 kg (120 lb 2.4 oz)   SpO2 98%   BMI 18.82 kg/m²     Patient is status post Procedure(s):  LAPAROSCOPIC CHOLECYSTECTOMY WITH INTRAOPERATIVE CHOLANGIOGRAMS. Nausea/Vomiting: Controlled. Postoperative hydration reviewed and adequate. Pain:  Pain Scale 1: FLACC (06/04/19 1030)  Pain Intensity 1: 0 (06/04/19 1020)   Managed. Neurological Status:   Neuro (WDL): Exceptions to WDL (06/04/19 1000)   At baseline. Mental Status and Level of Consciousness: Arousable. Pulmonary Status:   O2 Device: Room air (06/04/19 1045)   Adequate oxygenation and airway patent. Complications related to anesthesia: None    Post-anesthesia assessment completed. No concerns. I have evaluated the patient and the patient is stable and ready to be discharged from PACU . Signed By: Kem Guillaume MD    6/4/2019        Vitals Value Taken Time   /64 6/4/2019 11:00 AM   Temp 36.4 °C (97.5 °F) 6/4/2019 10:04 AM   Pulse 74 6/4/2019 11:06 AM   Resp 19 6/4/2019 11:06 AM   SpO2 99 % 6/4/2019 11:06 AM   Vitals shown include unvalidated device data.

## 2019-06-04 NOTE — PERIOP NOTES
Handoff Report from Operating Room to PACU    Report received from NORMA Johnson RN and Jabari Moulton CRNA regarding Ashley Bacon. Surgeon(s):  Dilan Lucas MD  And Procedure(s) (LRB):  LAPAROSCOPIC CHOLECYSTECTOMY WITH INTRAOPERATIVE CHOLANGIOGRAMS (N/A)  confirmed   with allergies, drains and dressings discussed. Anesthesia type, drugs, patient history, complications, estimated blood loss, vital signs, intake and output, and last pain medication, lines, reversal medications and temperature were reviewed.

## 2019-06-04 NOTE — DISCHARGE INSTRUCTIONS
Patient Education        Cholecystectomy: What to Expect at Home  Your Recovery  After your surgery, it is normal to feel weak and tired for several days after you return home. Your belly may be swollen. If you had laparoscopic surgery, you may also have pain in your shoulder for about 24 hours. You may have gas or need to burp a lot at first, and a few people get diarrhea. The diarrhea usually goes away in 2 to 4 weeks, but it may last longer. How quickly you recover depends on whether you had a laparoscopic or open surgery. · For a laparoscopic surgery, most people can go back to work or their normal routine in 1 to 2 weeks, but it may take longer, depending on the type of work you do. · For an open surgery, it will probably take 4 to 6 weeks before you get back to your normal routine. This care sheet gives you a general idea about how long it will take for you to recover. However, each person recovers at a different pace. Follow the steps below to get better as quickly as possible. How can you care for yourself at home? Activity    · Rest when you feel tired. Getting enough sleep will help you recover.     · Try to walk each day. Start out by walking a little more than you did the day before. Gradually increase the amount you walk. Walking boosts blood flow and helps prevent pneumonia and constipation.     · For about 2 to 4 weeks, avoid lifting anything that would make you strain. This may include a child, heavy grocery bags and milk containers, a heavy briefcase or backpack, cat litter or dog food bags, or a vacuum .     · Avoid strenuous activities, such as biking, jogging, weightlifting, and aerobic exercise, until your doctor says it is okay.     · You may shower 24 to 48 hours after surgery, if your doctor okays it. Pat the cut (incision) dry.  Do not take a bath for the first 2 weeks, or until your doctor tells you it is okay.     · You may drive when you are no longer taking pain medicine and can quickly move your foot from the gas pedal to the brake. You must also be able to sit comfortably for a long period of time, even if you do not plan to go far. You might get caught in traffic.     · For a laparoscopic surgery, most people can go back to work or their normal routine in 1 to 2 weeks, but it may take longer. For an open surgery, it will probably take 4 to 6 weeks before you get back to your normal routine.     · Your doctor will tell you when you can have sex again.    Diet    · Eat smaller meals more often instead of fewer larger meals. You can eat a normal diet, but avoid eating fatty foods for about 1 month. Fatty foods include hamburger, whole milk, cheese, and many snack foods. If your stomach is upset, try bland, low-fat foods like plain rice, broiled chicken, toast, and yogurt.     · Drink plenty of fluids (unless your doctor tells you not to).   · If you have diarrhea, try avoiding spicy foods, dairy products, fatty foods, and alcohol. You can also watch to see if specific foods cause it, and stop eating them. If the diarrhea continues for more than 2 weeks, talk to your doctor.     · You may notice that your bowel movements are not regular right after your surgery. This is common. Try to avoid constipation and straining with bowel movements. You may want to take a fiber supplement every day. If you have not had a bowel movement after a couple of days, ask your doctor about taking a mild laxative. Medicines    · Your doctor will tell you if and when you can restart your medicines. He or she will also give you instructions about taking any new medicines.     · If you take blood thinners, such as warfarin (Coumadin), clopidogrel (Plavix), or aspirin, be sure to talk to your doctor. He or she will tell you if and when to start taking those medicines again. Make sure that you understand exactly what your doctor wants you to do.     · Take pain medicines exactly as directed.   ? If the doctor gave you a prescription medicine for pain, take it as prescribed. ? If you are not taking a prescription pain medicine, take an over-the-counter medicine such as acetaminophen (Tylenol), ibuprofen (Advil, Motrin), or naproxen (Aleve). Read and follow all instructions on the label. ? Do not take two or more pain medicines at the same time unless the doctor told you to. Many pain medicines contain acetaminophen, which is Tylenol. Too much Tylenol can be harmful.     · If you think your pain medicine is making you sick to your stomach:  ? Take your medicine after meals (unless your doctor tells you not to). ? Ask your doctor for a different pain medicine.     · If your doctor prescribed antibiotics, take them as directed. Do not stop taking them just because you feel better. You need to take the full course of antibiotics. Incision care    · If you have strips of tape on the incision, or cut, leave the tape on for a week or until it falls off.     · After 24 to 48 hours, wash the area daily with warm, soapy water, and pat it dry.     · You may have staples to hold the cut together. Keep them dry until your doctor takes them out. This is usually in 7 to 10 days.     · Keep the area clean and dry. You may cover it with a gauze bandage if it weeps or rubs against clothing. Change the bandage every day.    Ice    · To reduce swelling and pain, put ice or a cold pack on your belly for 10 to 20 minutes at a time. Do this every 1 to 2 hours. Put a thin cloth between the ice and your skin. Follow-up care is a key part of your treatment and safety. Be sure to make and go to all appointments, and call your doctor if you are having problems. It's also a good idea to know your test results and keep a list of the medicines you take. When should you call for help? Call 911 anytime you think you may need emergency care. For example, call if:    · You passed out (lost consciousness).     · You are short of breath. Lawerance No your doctor now or seek immediate medical care if:    · You are sick to your stomach and cannot drink fluids.     · You have pain that does not get better when you take your pain medicine.     · You cannot pass stools or gas.     · You have signs of infection, such as:  ? Increased pain, swelling, warmth, or redness. ? Red streaks leading from the incision. ? Pus draining from the incision. ? A fever.     · Bright red blood has soaked through the bandage over your incision.     · You have loose stitches, or your incision comes open.     · You have signs of a blood clot in your leg (called a deep vein thrombosis), such as:  ? Pain in your calf, back of knee, thigh, or groin. ? Redness and swelling in your leg or groin.    Watch closely for any changes in your health, and be sure to contact your doctor if you have any problems. Where can you learn more? Go to http://marely-sarah.info/. Enter [phone] in the search box to learn more about \"Cholecystectomy: What to Expect at Home. \"  Current as of: March 27, 2018  Content Version: 11.9  © 9109-6070 [employer]. Care instructions adapted under license by [employer] (which disclaims liability or warranty for this information). If you have questions about a medical condition or this instruction, always ask your healthcare professional. [place] [address] any warranty or liability for your use of this information. TO PREVENT AN INFECTION      1. [address] YOUR HANDS     To prevent infection, good handwashing is the most important thing you or your caregiver can do.  Wash your hands with soap and water or use the hand  we gave you before you touch any wounds. 2. SHOWER     Use the antibacterial soap we gave you when you take a shower.  Shower with this soap until your wounds are healed.  To reach all areas of your body, you may need someone to help you.       Dont forget to clean your belly button with every shower. 3.  USE CLEAN SHEETS     Use freshly cleaned sheets on your bed after surgery.  To keep the surgery site clean, do not allow pets to sleep with you while your wound is still healing. 4. STOP SMOKING     Stop smoking, or at least cut back on smoking     Smoking slows your healing. 5.  CONTROL YOUR BLOOD SUGAR     High blood sugars slow wound healing. If you are diabetic, control your blood sugar levels before and after your surgery. DISCHARGE SUMMARY from Nurse    The following personal items are in your possession at time of discharge:    Dental Appliances: None  Visual Aid: None  Home Medications: None  Jewelry: None  Clothing: None (left in in pt. room)  Other Valuables: None             PATIENT INSTRUCTIONS:    After general anesthesia or intravenous sedation, for 24 hours or while taking prescription Narcotics:  · Limit your activities  · Do not drive and operate hazardous machinery  · Do not make important personal or business decisions  · Do  not drink alcoholic beverages  · If you have not urinated within 8 hours after discharge, please contact your surgeon on call. Report the following to your surgeon:  · Excessive pain, swelling, redness or odor of or around the surgical area  · Temperature over 100.5  · Nausea and vomiting lasting longer than 4 hours or if unable to take medications  · Any signs of decreased circulation or nerve impairment to extremity: change in color, persistent  numbness, tingling, coldness or increase pain  · Any questions    To prevent infection remember to refer to your handout on handwashing given to you by your nurse. *  Please give a list of your current medications to your Primary Care Provider. *  Please update this list whenever your medications are discontinued, doses are      changed, or new medications (including over-the-counter products) are added.     *  Please carry medication information at all times in case of emergency situations. These are general instructions for a healthy lifestyle:    No smoking/ No tobacco products/ Avoid exposure to second hand smoke    Surgeon General's Warning:  Quitting smoking now greatly reduces serious risk to your health. Obesity, smoking, and sedentary lifestyle greatly increases your risk for illness    A healthy diet, regular physical exercise & weight monitoring are important for maintaining a healthy lifestyle    You may be retaining fluid if you have a history of heart failure or if you experience any of the following symptoms:  Weight gain of 3 pounds or more overnight or 5 pounds in a week, increased swelling in our hands or feet or shortness of breath while lying flat in bed. Please call your doctor as soon as you notice any of these symptoms; do not wait until your next office visit. Recognize signs and symptoms of STROKE:    F-face looks uneven    A-arms unable to move or move unevenly    S-speech slurred or non-existent    T-time-call 911 as soon as signs and symptoms begin-DO NOT go       Back to bed or wait to see if you get better-TIME IS BRAIN. The discharge information has been reviewed with the patient. The patient verbalized understanding. Patient Education      Oxycodone, Rapid Release (ETH-Oxydose, Oxy IR, Roxicodone) - (By mouth)   Why this medicine is used:   Treats moderate to severe pain. This medicine is a narcotic pain reliever.   Contact a nurse or doctor right away if you have:  · Fast or slow heart beat, shallow breathing, blue lips, skin or fingernails  · Anxiety, restlessness, fever, sweating, muscle spasms, twitching, seeing or hearing things that are not there  · Extreme weakness, shallow breathing, slow heartbeat  · Severe confusion, lightheadedness, dizziness, fainting  · Sweating or cold, clammy skin, seizures  · Severe constipation, stomach pain, nausea, vomiting     Common side effects:  · Mild constipation  · Sleepiness, tiredness  © 2017 [facility] Information is for End User's use only and may not be sold, redistributed or otherwise used for commercial purposes.

## 2019-06-20 ENCOUNTER — OFFICE VISIT (OUTPATIENT)
Dept: SURGERY | Age: 84
End: 2019-06-20

## 2019-06-20 VITALS
DIASTOLIC BLOOD PRESSURE: 82 MMHG | WEIGHT: 121.6 LBS | OXYGEN SATURATION: 97 % | RESPIRATION RATE: 18 BRPM | SYSTOLIC BLOOD PRESSURE: 145 MMHG | HEIGHT: 67 IN | TEMPERATURE: 96.4 F | HEART RATE: 83 BPM | BODY MASS INDEX: 19.09 KG/M2

## 2019-06-20 DIAGNOSIS — Z09 POSTOPERATIVE EXAMINATION: Primary | ICD-10-CM

## 2021-06-10 ENCOUNTER — HOSPITAL ENCOUNTER (INPATIENT)
Age: 86
LOS: 3 days | Discharge: HOME HEALTH | DRG: 388 | End: 2021-06-14
Attending: EMERGENCY MEDICINE | Admitting: HOSPITALIST
Payer: MEDICARE

## 2021-06-10 DIAGNOSIS — J18.9 PNEUMONIA OF BOTH LOWER LOBES DUE TO INFECTIOUS ORGANISM: Primary | ICD-10-CM

## 2021-06-10 DIAGNOSIS — E44.0 MODERATE PROTEIN-CALORIE MALNUTRITION (HCC): ICD-10-CM

## 2021-06-10 DIAGNOSIS — Z66 DO NOT RESUSCITATE STATUS: ICD-10-CM

## 2021-06-10 DIAGNOSIS — Z71.89 COUNSELING REGARDING ADVANCE CARE PLANNING AND GOALS OF CARE: ICD-10-CM

## 2021-06-10 DIAGNOSIS — R10.84 ABDOMINAL PAIN, GENERALIZED: ICD-10-CM

## 2021-06-10 DIAGNOSIS — K56.609 SBO (SMALL BOWEL OBSTRUCTION) (HCC): ICD-10-CM

## 2021-06-10 DIAGNOSIS — K56.609 SMALL BOWEL OBSTRUCTION (HCC): ICD-10-CM

## 2021-06-10 PROCEDURE — 99285 EMERGENCY DEPT VISIT HI MDM: CPT

## 2021-06-11 ENCOUNTER — APPOINTMENT (OUTPATIENT)
Dept: GENERAL RADIOLOGY | Age: 86
DRG: 388 | End: 2021-06-11
Attending: SURGERY
Payer: MEDICARE

## 2021-06-11 PROBLEM — Z66 DO NOT RESUSCITATE STATUS: Status: ACTIVE | Noted: 2021-06-11

## 2021-06-11 PROBLEM — J18.9 PNEUMONIA: Status: ACTIVE | Noted: 2021-06-11

## 2021-06-11 PROBLEM — Z71.89 COUNSELING REGARDING ADVANCE CARE PLANNING AND GOALS OF CARE: Status: ACTIVE | Noted: 2021-06-11

## 2021-06-11 PROBLEM — K59.1 FUNCTIONAL DIARRHEA: Status: ACTIVE | Noted: 2021-06-11

## 2021-06-11 PROBLEM — K56.609 SBO (SMALL BOWEL OBSTRUCTION) (HCC): Status: ACTIVE | Noted: 2021-06-11

## 2021-06-11 PROBLEM — R63.0 POOR APPETITE: Status: ACTIVE | Noted: 2021-06-11

## 2021-06-11 PROBLEM — E44.0 MODERATE PROTEIN-CALORIE MALNUTRITION (HCC): Status: ACTIVE | Noted: 2021-06-11

## 2021-06-11 LAB
ALBUMIN SERPL-MCNC: 2.9 G/DL (ref 3.5–5)
ALBUMIN/GLOB SERPL: 0.8 {RATIO} (ref 1.1–2.2)
ALP SERPL-CCNC: 102 U/L (ref 45–117)
ALT SERPL-CCNC: 65 U/L (ref 12–78)
ANION GAP SERPL CALC-SCNC: 8 MMOL/L (ref 5–15)
APPEARANCE UR: ABNORMAL
AST SERPL-CCNC: 54 U/L (ref 15–37)
BACTERIA URNS QL MICRO: ABNORMAL /HPF
BASOPHILS # BLD: 0 K/UL (ref 0–0.1)
BASOPHILS NFR BLD: 0 % (ref 0–1)
BILIRUB SERPL-MCNC: 0.5 MG/DL (ref 0.2–1)
BILIRUB UR QL: NEGATIVE
BUN SERPL-MCNC: 68 MG/DL (ref 6–20)
BUN/CREAT SERPL: 29 (ref 12–20)
CALCIUM SERPL-MCNC: 8.1 MG/DL (ref 8.5–10.1)
CHLORIDE SERPL-SCNC: 102 MMOL/L (ref 97–108)
CO2 SERPL-SCNC: 26 MMOL/L (ref 21–32)
COLOR UR: ABNORMAL
COMMENT, HOLDF: NORMAL
COVID-19 RAPID TEST, COVR: NOT DETECTED
CREAT SERPL-MCNC: 2.31 MG/DL (ref 0.55–1.02)
DIFFERENTIAL METHOD BLD: ABNORMAL
EOSINOPHIL # BLD: 0 K/UL (ref 0–0.4)
EOSINOPHIL NFR BLD: 0 % (ref 0–7)
EPITH CASTS URNS QL MICRO: ABNORMAL /LPF
ERYTHROCYTE [DISTWIDTH] IN BLOOD BY AUTOMATED COUNT: 13.3 % (ref 11.5–14.5)
EST. AVERAGE GLUCOSE BLD GHB EST-MCNC: 117 MG/DL
GLOBULIN SER CALC-MCNC: 3.6 G/DL (ref 2–4)
GLUCOSE SERPL-MCNC: 116 MG/DL (ref 65–100)
GLUCOSE UR STRIP.AUTO-MCNC: NEGATIVE MG/DL
HBA1C MFR BLD: 5.7 % (ref 4–5.6)
HCT VFR BLD AUTO: 31.5 % (ref 35–47)
HEMOCCULT STL QL: POSITIVE
HGB BLD-MCNC: 9.6 G/DL (ref 11.5–16)
HGB UR QL STRIP: ABNORMAL
IMM GRANULOCYTES # BLD AUTO: 0 K/UL (ref 0–0.04)
IMM GRANULOCYTES NFR BLD AUTO: 0 % (ref 0–0.5)
KETONES UR QL STRIP.AUTO: NEGATIVE MG/DL
LACTATE BLD-SCNC: 1.17 MMOL/L (ref 0.4–2)
LEUKOCYTE ESTERASE UR QL STRIP.AUTO: ABNORMAL
LYMPHOCYTES # BLD: 0.4 K/UL (ref 0.8–3.5)
LYMPHOCYTES NFR BLD: 4 % (ref 12–49)
MCH RBC QN AUTO: 27.3 PG (ref 26–34)
MCHC RBC AUTO-ENTMCNC: 30.5 G/DL (ref 30–36.5)
MCV RBC AUTO: 89.5 FL (ref 80–99)
MONOCYTES # BLD: 0.3 K/UL (ref 0–1)
MONOCYTES NFR BLD: 3 % (ref 5–13)
MUCOUS THREADS URNS QL MICRO: ABNORMAL /LPF
NEUTS SEG # BLD: 9.4 K/UL (ref 1.8–8)
NEUTS SEG NFR BLD: 93 % (ref 32–75)
NITRITE UR QL STRIP.AUTO: NEGATIVE
NRBC # BLD: 0 K/UL (ref 0–0.01)
NRBC BLD-RTO: 0 PER 100 WBC
PH UR STRIP: 6 [PH] (ref 5–8)
PLATELET # BLD AUTO: 182 K/UL (ref 150–400)
PMV BLD AUTO: 10.1 FL (ref 8.9–12.9)
POTASSIUM SERPL-SCNC: 5 MMOL/L (ref 3.5–5.1)
PROT SERPL-MCNC: 6.5 G/DL (ref 6.4–8.2)
PROT UR STRIP-MCNC: 30 MG/DL
RBC # BLD AUTO: 3.52 M/UL (ref 3.8–5.2)
RBC #/AREA URNS HPF: ABNORMAL /HPF (ref 0–5)
RBC MORPH BLD: ABNORMAL
SAMPLES BEING HELD,HOLD: NORMAL
SODIUM SERPL-SCNC: 136 MMOL/L (ref 136–145)
SOURCE, COVRS: NORMAL
SP GR UR REFRACTOMETRY: 1.02 (ref 1–1.03)
UA: UC IF INDICATED,UAUC: ABNORMAL
UROBILINOGEN UR QL STRIP.AUTO: 0.2 EU/DL (ref 0.2–1)
WBC # BLD AUTO: 10.1 K/UL (ref 3.6–11)
WBC URNS QL MICRO: ABNORMAL /HPF (ref 0–4)

## 2021-06-11 PROCEDURE — 83036 HEMOGLOBIN GLYCOSYLATED A1C: CPT

## 2021-06-11 PROCEDURE — 99223 1ST HOSP IP/OBS HIGH 75: CPT | Performed by: NURSE PRACTITIONER

## 2021-06-11 PROCEDURE — 87324 CLOSTRIDIUM AG IA: CPT

## 2021-06-11 PROCEDURE — 65270000029 HC RM PRIVATE

## 2021-06-11 PROCEDURE — 74011000258 HC RX REV CODE- 258: Performed by: HOSPITALIST

## 2021-06-11 PROCEDURE — 85025 COMPLETE CBC W/AUTO DIFF WBC: CPT

## 2021-06-11 PROCEDURE — 87635 SARS-COV-2 COVID-19 AMP PRB: CPT

## 2021-06-11 PROCEDURE — 74011250636 HC RX REV CODE- 250/636: Performed by: HOSPITALIST

## 2021-06-11 PROCEDURE — 87040 BLOOD CULTURE FOR BACTERIA: CPT

## 2021-06-11 PROCEDURE — 81001 URINALYSIS AUTO W/SCOPE: CPT

## 2021-06-11 PROCEDURE — 89055 LEUKOCYTE ASSESSMENT FECAL: CPT

## 2021-06-11 PROCEDURE — 99222 1ST HOSP IP/OBS MODERATE 55: CPT | Performed by: SURGERY

## 2021-06-11 PROCEDURE — 74011000258 HC RX REV CODE- 258: Performed by: INTERNAL MEDICINE

## 2021-06-11 PROCEDURE — 82272 OCCULT BLD FECES 1-3 TESTS: CPT

## 2021-06-11 PROCEDURE — 36415 COLL VENOUS BLD VENIPUNCTURE: CPT

## 2021-06-11 PROCEDURE — 74018 RADEX ABDOMEN 1 VIEW: CPT

## 2021-06-11 PROCEDURE — 80053 COMPREHEN METABOLIC PANEL: CPT

## 2021-06-11 PROCEDURE — 83605 ASSAY OF LACTIC ACID: CPT

## 2021-06-11 PROCEDURE — 87506 IADNA-DNA/RNA PROBE TQ 6-11: CPT

## 2021-06-11 PROCEDURE — 2709999900 HC NON-CHARGEABLE SUPPLY

## 2021-06-11 PROCEDURE — 74011000636 HC RX REV CODE- 636: Performed by: SURGERY

## 2021-06-11 PROCEDURE — 87086 URINE CULTURE/COLONY COUNT: CPT

## 2021-06-11 RX ORDER — ACETAMINOPHEN 325 MG/1
650 TABLET ORAL
Status: DISCONTINUED | OUTPATIENT
Start: 2021-06-11 | End: 2021-06-14 | Stop reason: HOSPADM

## 2021-06-11 RX ORDER — HYDRALAZINE HYDROCHLORIDE 20 MG/ML
10 INJECTION INTRAMUSCULAR; INTRAVENOUS
Status: DISCONTINUED | OUTPATIENT
Start: 2021-06-11 | End: 2021-06-14 | Stop reason: HOSPADM

## 2021-06-11 RX ORDER — MAGNESIUM SULFATE 100 %
4 CRYSTALS MISCELLANEOUS AS NEEDED
Status: DISCONTINUED | OUTPATIENT
Start: 2021-06-11 | End: 2021-06-14 | Stop reason: HOSPADM

## 2021-06-11 RX ORDER — POLYETHYLENE GLYCOL 3350 17 G/17G
17 POWDER, FOR SOLUTION ORAL DAILY PRN
Status: DISCONTINUED | OUTPATIENT
Start: 2021-06-11 | End: 2021-06-14 | Stop reason: HOSPADM

## 2021-06-11 RX ORDER — SODIUM CHLORIDE 0.9 % (FLUSH) 0.9 %
5-40 SYRINGE (ML) INJECTION AS NEEDED
Status: DISCONTINUED | OUTPATIENT
Start: 2021-06-11 | End: 2021-06-14 | Stop reason: HOSPADM

## 2021-06-11 RX ORDER — ONDANSETRON 4 MG/1
4 TABLET, ORALLY DISINTEGRATING ORAL
Status: DISCONTINUED | OUTPATIENT
Start: 2021-06-11 | End: 2021-06-14 | Stop reason: HOSPADM

## 2021-06-11 RX ORDER — ONDANSETRON 2 MG/ML
4 INJECTION INTRAMUSCULAR; INTRAVENOUS
Status: DISCONTINUED | OUTPATIENT
Start: 2021-06-11 | End: 2021-06-14 | Stop reason: HOSPADM

## 2021-06-11 RX ORDER — ENOXAPARIN SODIUM 100 MG/ML
40 INJECTION SUBCUTANEOUS DAILY
Status: DISCONTINUED | OUTPATIENT
Start: 2021-06-11 | End: 2021-06-11

## 2021-06-11 RX ORDER — DEXTROSE MONOHYDRATE AND SODIUM CHLORIDE 5; .9 G/100ML; G/100ML
75 INJECTION, SOLUTION INTRAVENOUS CONTINUOUS
Status: DISCONTINUED | OUTPATIENT
Start: 2021-06-11 | End: 2021-06-13

## 2021-06-11 RX ORDER — DILTIAZEM HYDROCHLORIDE 180 MG/1
180 CAPSULE, COATED, EXTENDED RELEASE ORAL DAILY
COMMUNITY

## 2021-06-11 RX ORDER — SODIUM CHLORIDE 0.9 % (FLUSH) 0.9 %
5-40 SYRINGE (ML) INJECTION EVERY 8 HOURS
Status: DISCONTINUED | OUTPATIENT
Start: 2021-06-11 | End: 2021-06-14 | Stop reason: HOSPADM

## 2021-06-11 RX ORDER — METRONIDAZOLE 500 MG/1
500 TABLET ORAL 3 TIMES DAILY
COMMUNITY
End: 2021-06-14

## 2021-06-11 RX ORDER — CALCIUM CARBONATE 500(1250)
1 TABLET ORAL 3 TIMES DAILY
COMMUNITY

## 2021-06-11 RX ORDER — INSULIN LISPRO 100 [IU]/ML
INJECTION, SOLUTION INTRAVENOUS; SUBCUTANEOUS
Status: DISCONTINUED | OUTPATIENT
Start: 2021-06-11 | End: 2021-06-11

## 2021-06-11 RX ORDER — DEXTROSE 50 % IN WATER (D50W) INTRAVENOUS SYRINGE
12.5-25 AS NEEDED
Status: DISCONTINUED | OUTPATIENT
Start: 2021-06-11 | End: 2021-06-14 | Stop reason: HOSPADM

## 2021-06-11 RX ORDER — MIRTAZAPINE 45 MG/1
45 TABLET, FILM COATED ORAL
COMMUNITY

## 2021-06-11 RX ORDER — ACETAMINOPHEN 650 MG/1
650 SUPPOSITORY RECTAL
Status: DISCONTINUED | OUTPATIENT
Start: 2021-06-11 | End: 2021-06-14 | Stop reason: HOSPADM

## 2021-06-11 RX ADMIN — DIATRIZOATE MEGLUMINE AND DIATRIZOATE SODIUM 100 ML: 660; 100 LIQUID ORAL; RECTAL at 12:50

## 2021-06-11 RX ADMIN — Medication 10 ML: at 21:08

## 2021-06-11 RX ADMIN — Medication 10 ML: at 03:02

## 2021-06-11 RX ADMIN — Medication 10 ML: at 05:09

## 2021-06-11 RX ADMIN — PIPERACILLIN AND TAZOBACTAM 3.38 G: 3; .375 INJECTION, POWDER, LYOPHILIZED, FOR SOLUTION INTRAVENOUS at 09:03

## 2021-06-11 RX ADMIN — DEXTROSE AND SODIUM CHLORIDE 75 ML/HR: 5; 900 INJECTION, SOLUTION INTRAVENOUS at 12:50

## 2021-06-11 RX ADMIN — Medication 10 ML: at 14:00

## 2021-06-11 RX ADMIN — PIPERACILLIN AND TAZOBACTAM 3.38 G: 3; .375 INJECTION, POWDER, LYOPHILIZED, FOR SOLUTION INTRAVENOUS at 21:08

## 2021-06-11 NOTE — PROGRESS NOTES
End of Shift Note    Bedside shift change report given to Brandie (oncoming nurse) by Angelina León RN (offgoing nurse). Report included the following information Kardex, MAR and Recent Results    Shift worked:  7p-7a   Shift summary and any significant changes:     Pt has NG tube     Concerns for physician to address:     Zone phone for oncoming shift:   [phone]       Activity:     Number times ambulated in hallways past shift: 0  Number of times OOB to chair past shift: 0    Cardiac:   Cardiac Monitoring: No           Access:   Current line(s): PIV     Genitourinary:   Urinary status: urostomy    Respiratory:   O2 Device: None (Room air)  Chronic home O2 use?: NO  Incentive spirometer at bedside: NO     GI:     Current diet:  DIET NPO  Passing flatus: Tolerating current diet:        Pain Management:   Patient states pain is manageable on current regimen: N/A, no c/o pain    Skin:  Adam Score: 17  Interventions: float heels    Patient Safety:  Fall Score:  Total Score: 4  Interventions: bed/chair alarm and pt to call before getting OOB  High Fall Risk: Yes    Length of Stay:  Expected LOS: - - -  Actual LOS: 0      Angelina León, SEBASTIAN

## 2021-06-11 NOTE — PROGRESS NOTES
Spiritual Care Assessment/Progress Note  [facility]      NAME: Constanza Trejo      MRN: [id number]  AGE: 80 y.o.  SEX: female  Buddhist Affiliation: Religious   Language: English     6/11/2021     Total Time (in minutes): 5     Spiritual Assessment begun in [facility] 3 MED TELE through conversation with:         [x]Patient        [] Family    [] Friend(s)        Reason for Consult: Initial/Spiritual assessment, patient floor     Spiritual beliefs: (Please include comment if needed)     [x] Identifies with a eulalio tradition:         [] Supported by a eulalio community:            [] Claims no spiritual orientation:           [] Seeking spiritual identity:                [] Adheres to an individual form of spirituality:           [] Not able to assess:                           Identified resources for coping:      [x] Prayer                               [] Music                  [] Guided Imagery     [] Family/friends                 [] Pet visits     [] Devotional reading                         [] Unknown     [] Other:                                               Interventions offered during this visit: (See comments for more details)    Patient Interventions: Affirmation of emotions/emotional suffering, Affirmation of eulalio, Initial/Spiritual assessment, patient floor, Prayer (assurance of)           Plan of Care:     [x] Support spiritual and/or cultural needs    [] Support AMD and/or advance care planning process      [] Support grieving process   [] Coordinate Rites and/or Rituals    [] Coordination with community clergy   [] No spiritual needs identified at this time   [] Detailed Plan of Care below (See Comments)  [] Make referral to Music Therapy  [] Make referral to Pet Therapy     [] Make referral to Addiction services  [] Make referral to [facility]  [] Make referral to Spiritual Care Partner  [] No future visits requested        [x] Follow up upon further referrals     Provided support for this pt in [address]. Pt was laying in bed looking at her mobile device as if searching for a number. Pt's room was basically lit with natural light. Pt was alert and very responsive. Facilitated life review to assess potential support needs or coping strategies. Pt offered review of current situation. Provided pastoral support through active listening as pt processed thoughts and feelings about pt's current situation. Assured pt of prayers and affirmed ongoing availability of support. Mateo Frazier MDiv.  Staff   Request  Support/Spiritual Care Services via [address] [place] [address]

## 2021-06-11 NOTE — PROGRESS NOTES
Problem: Pressure Injury - Risk of  Goal: *Prevention of pressure injury  Description: Document Adam Scale and appropriate interventions in the flowsheet. Note: Pressure Injury Interventions:  Sensory Interventions: Float heels, Keep linens dry and wrinkle-free, Minimize linen layers, Monitor skin under medical devices, Pad between skin to skin, Pressure redistribution bed/mattress (bed type), Turn and reposition approx. every two hours (pillows and wedges if needed)    Moisture Interventions: Absorbent underpads, Apply protective barrier, creams and emollients, Check for incontinence Q2 hours and as needed, Maintain skin hydration (lotion/cream), Minimize layers, Moisture barrier    Activity Interventions: Increase time out of bed, Pressure redistribution bed/mattress(bed type)    Mobility Interventions: Float heels, HOB 30 degrees or less, Pressure redistribution bed/mattress (bed type), Turn and reposition approx.  every two hours(pillow and wedges)    Nutrition Interventions: Document food/fluid/supplement intake, Offer support with meals,snacks and hydration

## 2021-06-11 NOTE — PROGRESS NOTES
1900: Pt had first BM of the shift. Stool samples to be sent. Will not send C.diff as the consistency does not meet criteria (soft, tan BM from pt)    End of Shift Note    Bedside shift change report given to Evita Robbins (oncoming nurse) by Marlene Sosa RN (offgoing nurse). Report included the following information SBAR, Kardex, Intake/Output, MAR and Recent Results    Shift worked:  7a - 7p     Shift summary and any significant changes:    Pt had BM towards shift change. Stool samples sent except for C.diff (did not meet criteria) and O&A (no kits available on unit - this RN contacted other units and there were none). Night RN aware.    Concerns for physician to address:  n/a     Zone phone for oncoming shift:   [phone], RN

## 2021-06-11 NOTE — ACP (ADVANCE CARE PLANNING)
Advance Care Planning     Advance Care Planning (ACP) Physician/NP/PA Conversation      Date of Conversation: 6/10/2021  Conducted with: Patient with Decision Making Capacity    Healthcare Decision Maker:     Primary Decision Maker: Bao Serrano - Other Relative - [phone]    Secondary Decision Maker: Tariq Camejo - [phone]    Care Preferences:    Hospitalization: \"If your health worsens and it becomes clear that your chance of recovery is unlikely, what would be your preference regarding hospitalization? \"  The patient would prefer hospitalization. Ventilation: \"If you were unable to breathe on your own and your chance of recovery was unlikely, what would be your preference about the use of a ventilator (breathing machine) if it was available to you? \" The patient would NOT desire the use of a ventilator. Resuscitation: \"In the event your heart stopped as a result of an underlying serious health condition, would you want attempts to be made to restart your heart, or would you prefer a natural death? \" No, do NOT attempt to resuscitate. Additional topics discussed: treatment goals, benefit/burden of treatment options, artificial nutrition, ventilation preferences, hospitalization preferences, resuscitation preferences, end of life care preferences (vegetative state/imminent death) and hospice care    Conversation Outcomes / Follow-Up Plan:   ACP complete - no further action today  Reviewed DNR/DNI and patient confirms current DNR status - completed forms on file (place new order if needed)     AMD and POST forms completed and copies on chart    1) DNR/DNI   \"when it's my time I'm ready to go home\"  2) pt wants limited medical interventions; comfort care, cpap or bipap, IVs, IV abx, NGT, cardiac monitoring. Pt is ok with surgery ONLY if she can be discharged home; if surgery is going to cause decline where she cannot go      directly home she does not want it. 3) No feeding tubes. \"my  had a feeding tube after his stroke, I do not want that. \"    Length of Voluntary ACP Conversation in minutes:  35 minutes    Ted Madsen NP

## 2021-06-11 NOTE — PROGRESS NOTES
Physical Therapy Screening:    An [facility] screening referral was triggered for physical therapy based on results obtained during the nursing admission assessment. The patients chart was reviewed and the patient is appropriate for a skilled therapy evaluation if there is a decline in functional mobility from baseline. Please order a consult for physical therapy if you are in agreement and would like an evaluation to be completed. Thank you.     Adia Dumont, PT

## 2021-06-11 NOTE — PROGRESS NOTES
Comprehensive Nutrition Assessment    Type and Reason for Visit: Initial, Positive nutrition screen    Nutrition Recommendations/Plan:   TPN if in line with goals of care   Day 1: AA5% D20% @ 42 mL/hr   Day 2: AA5% D20% @ 63 mL/hr + 250 mL 20% lipids 3x/week (provides 1545 kcal, 76g protein, 302g CHO)    Nutrition Assessment:     Patient medically noted for SBO, pneumonia, malnutrition, UTI, and HTN. PMH Bladder cancer. Patient currently NPO;  NGT in place. Patient reported she is hungry at time of visit. She reports a decreased appetite over the last several weeks. She states she weighed 116# recently and then weighed 113# at a recent MD appointment. Current weight 122# and no weight history per EMR since 2019. Appears severely malnourished with muscle and fat wasting noted to temples, orbitals, clavicles, and triceps. If in line with goals of care, patient appropriate for early nutrition support given malnutrition. TPN recommendations provided above to meet estimated kcal/protein needs. Palliative care consulted for goals of care. Will monitor progress and plan of care. Wt Readings from Last 5 Encounters:   06/10/21 55.6 kg (122 lb 9.2 oz)   06/20/19 55.2 kg (121 lb 9.6 oz)   06/04/19 54.5 kg (120 lb 2.4 oz)   05/13/19 56.4 kg (124 lb 4.8 oz)   01/02/18 73.5 kg (162 lb)       Malnutrition Assessment:  Malnutrition Status:  Severe malnutrition    Context:  Chronic illness     Findings of the 6 clinical characteristics of malnutrition:   Energy Intake:  Mild decrease in energy intake (specify)  Weight Loss:  Unable to assess     Body Fat Loss:  7 - Severe body fat loss,     Muscle Mass Loss:  7 - Severe muscle mass loss,    Fluid Accumulation:  No significant fluid accumulation,     Strength:  Not performed     Estimated Daily Nutrient Needs:  Energy (kcal): 1573 kcal (BMR 1018 x 1.3AF +250kcal);  Weight Used for Energy Requirements: Current  Protein (g): 72g (1.3 g/kg bw); Weight Used for Protein Requirements: Current  Fluid (ml/day): 1600 mL; Method Used for Fluid Requirements: 1 ml/kcal    Nutrition Related Findings:       K+ 5.0  D5% IVF    Wounds:    None       Current Nutrition Therapies:  DIET NPO    Anthropometric Measures:  · Height:  5' 7\" (170.2 cm)  · Current Body Wt:  55.6 kg (122 lb 9.2 oz)   · BMI Category:  Underweight (BMI less than 22) age over 72       Nutrition Diagnosis:   · Inadequate protein-energy intake related to altered GI function as evidenced by NPO or clear liquid status due to medical condition    Nutrition Interventions:   Food and/or Nutrient Delivery: Start parenteral nutrition  Nutrition Education and Counseling: No recommendations at this time  Coordination of Nutrition Care: Continue to monitor while inpatient    Goals:  TPN started and advanced to goal next 2-4 days       Nutrition Monitoring and Evaluation:   Behavioral-Environmental Outcomes: None identified  Food/Nutrient Intake Outcomes: Diet advancement/tolerance, Parenteral nutrition intake/tolerance  Physical Signs/Symptoms Outcomes: Biochemical data, GI status, Hemodynamic status, Weight    Discharge Planning:     Too soon to determine     Electronically signed by Viky Weldon RD on 6/11/2021 at 2:48 PM    Contact: ext [phone]

## 2021-06-11 NOTE — PROGRESS NOTES
TRANSFER - IN REPORT:    Verbal report received from Kaylyn Johnson (name) on Cody Boards  being received from ED(unit) for routine progression of care      Report consisted of patients Situation, Background, Assessment and   Recommendations(SBAR). Information from the following report(s) ED Summary, MAR and Recent Results was reviewed with the receiving nurse. Opportunity for questions and clarification was provided. Assessment completed upon patients arrival to unit and care assumed.

## 2021-06-11 NOTE — CONSULTS
Palliative Medicine Consult  Mark: [phone])    Patient Name: Kelli Miles  YOB: 1934    Date of Initial Consult: 6/11/21  Reason for Consult: care decisions: palliative care assess realistic goals of care and recovery issues if needs surgery abdomen  Requesting Provider: Bradley Tom MD  Primary Care Physician: Owen Morales NP     SUMMARY:   Kelli Miles is a 80 y.o. with a past history of of bladder cancer with ileal conduit 4-5 years ago, AAA s/p repair, lap cholecystectomy, CKD, who was transferred on 6/10/2021 from [facility] with a diagnosis of SBO. Current medical issues leading to Palliative Medicine involvement include: elderly, frail, malnourished, renal insufficiency increases her risk for poor surgical outcomes. HPI:  6/10:  Pt presented to OSH with c/o abdominal pain x 3 weeks with associated vomiting and diarrhea, which got worse prompting her to come to ED. Labs from OSH revealed bacteruria, normal wbc and lactate, cr elevated at 2.2, mild transaminitis, and CT that revealed possible PNA, aortic ectasia measuring 5 cm, multiple fluid-filled bowel loops consistent with SBO. NGT placed at OSH, draining bilious drainage. 6/11: will need Gastrografin contrast to assess for mechanical vs functional SBO which will determine if pt will need surgical intervention. Will re-assess Monday. Psychosocial: lives alone, has a cousin Shae Goode [phone] who lives across the street from her and assists with cooking and driving; pt is independent with ADLs and pays her own bills, shops but does not drive. ,  Anshu Ramirez  [phone]. PALLIATIVE DIAGNOSES:   1. Abdominal pain  2. Vomiting   3. Diarrhea   4. Poor appetite  5. Malnutrition   6. UTI  7. PNA:  CXR bibasilar infiltrates suspicious for PNA. 8. SBO: high grade  9. Acute on chronic kidney failure  10. Care decisions  11. Code status   PLAN:   1.  Prior to visit, I completed an extensive review of patient's medical records, including medical documentation, vital signs, MARs, and results of various labs and other diagnostics. I also spoke with patient's surgeon Concepcionana Altamn. 2. Met with pt: discussed her current medical condition, goals of care and code status (see ACP note). Pt states that maintaining her independence is very important to her, her nieces and nephews have tried to get her to move in with them \"but I told them I'm going to stay in my home until I die! \"  She has good insight into her medical condition, is agreeable to minimally invasive procedures as long as it does not reduce her functional status and independence. She does not want CPR, intubation, life support, ICU level care. She elected her cousin Bebeto Marcus as [place] and her neighbor Michael as sPOA; I asked her about her [address] and pt stated she lived too far away and preferred to have people who lived closer to her as surrogate decision-makers. 3. Spoke with Karthikeyan Flores to let her know that pt elected her as pMPOA and discussed pt's wishes: she voiced concerns that pt did not choose Brandy Gemma and I told her what pt told me, but assured her that that doesn't mean she cannot include Brandycarolynn Menendez if decision-making on pt's behalf had to be addressed. Emiliano Lundy attempted to call Brandy Menendez, who did not answer her phone and VM was not set up, which is not helpful if we needed to make decisions for pt. 4. Will check back on Monday. 5. Initial consult note routed to primary continuity provider and/or primary health care team members  6.  Communicated plan of care with: Palliative [address] Team     GOALS OF CARE / TREATMENT PREFERENCES:     GOALS OF CARE:  Patient/Health Care Proxy Stated Goals: Prolong life    TREATMENT PREFERENCES:   Code Status: DNR    Advance Care Planning:  [x] [facility] Interdisciplinary Team has updated the ACP Navigator with [address] and Patient Capacity      Primary Decision Maker: Billy Brice - Other Relative - [phone]    Secondary Decision Maker: Claudell Camara - [phone]  Advance Care Planning 6/11/2021   Patient's Healthcare Decision Maker is: Named in scanned ACP document   Primary Decision Maker Name -   Primary Decision Maker Phone Number -   Primary Decision Maker Relationship to Patient -   Confirm Advance Directive Yes, on file   Patient Would Like to Complete Advance Directive -   Does the patient have other document types MOST/MOLST/POST/POLST     Medical Interventions: Limited additional interventions       Artificially Administered Nutrition: No feeding tube     Other:    As far as possible, the palliative care team has discussed with patient / health care proxy about goals of care / treatment preferences for patient.      HISTORY:     History obtained from: chart, patient    CHIEF COMPLAINT: abdominal pain and vomiting    HPI/SUBJECTIVE:    The patient is:   [x] Verbal and participatory  [] Non-participatory due to:     Clinical Pain Assessment (nonverbal scale for severity on nonverbal patients):   Clinical Pain Assessment  Severity: 0     Activity (Movement): Lying quietly, normal position    Duration: for how long has pt been experiencing pain (e.g., 2 days, 1 month, years)  Frequency: how often pain is an issue (e.g., several times per day, once every few days, constant)     FUNCTIONAL ASSESSMENT:     Palliative Performance Scale (PPS):  PPS: 20       PSYCHOSOCIAL/SPIRITUAL SCREENING:     Palliative IDT has assessed this patient for cultural preferences / practices and a referral made as appropriate to needs (Cultural Services, Patient Advocacy, Ethics, etc.)    Any spiritual / Baptist concerns:  [] Yes /  [x] No    Caregiver Burnout:  [] Yes /  [x] No /  [] No Caregiver Present      Anticipatory grief assessment:   [x] Normal  / [] Maladaptive       ESAS Anxiety: Anxiety: 0    ESAS Depression: Depression: 0        REVIEW OF SYSTEMS:     Positive and pertinent negative findings in ROS are noted above in HPI. The following systems were [x] reviewed / [] unable to be reviewed as noted in HPI  Other findings are noted below. Systems: constitutional, ears/nose/mouth/throat, respiratory, gastrointestinal, genitourinary, musculoskeletal, integumentary, neurologic, psychiatric, endocrine. Positive findings noted below. Modified ESAS Completed by: provider   Fatigue: 5 Drowsiness: 0   Depression: 0 Pain: 0   Anxiety: 0 Nausea: 1     Dyspnea: 0                    PHYSICAL EXAM:     From RN flowsheet:  Wt Readings from Last 3 Encounters:   06/10/21 122 lb 9.2 oz (55.6 kg)   06/20/19 121 lb 9.6 oz (55.2 kg)   06/04/19 120 lb 2.4 oz (54.5 kg)     Blood pressure 134/70, pulse 90, temperature 98.7 °F (37.1 °C), resp. rate 16, height 5' 7\" (1.702 m), weight 122 lb 9.2 oz (55.6 kg), SpO2 98 %. Pain Scale 1: Numeric (0 - 10)  Pain Intensity 1: 0                 Last bowel movement, if known:     Constitutional: elderly, frail, AAOx3, pleasant and cooperative  Eyes: pupils equal, anicteric  ENMT: no nasal discharge, dry mucous membranes, NGT clamped  Cardiovascular: regular rhythm, distal pulses intact  Respiratory: breathing not labored, symmetric  Gastrointestinal: soft non-tender, +bowel sounds  Musculoskeletal: no deformity, no tenderness to palpation  Skin: warm, dry  Neurologic: following commands, moving all extremities  Psychiatric: full affect, no hallucinations          HISTORY:     Active Problems:     Aortic aneurysm ([address] (9/26/2017)      SBO (small bowel obstruction) ([address] (6/11/2021)      Pneumonia (6/11/2021)      Moderate protein-calorie malnutrition ([address] (6/11/2021)      Functional diarrhea (6/11/2021)      Past Medical History:   Diagnosis Date    Aneurysm ([address]     abdominal (pt not sure of blood vessel) repaired    Arthritis     hands    Cancer ([address]     bladder     Diabetes ([address]     oral hypoglycemics  Hypertension     Ill-defined condition     high cholesterol    Menopause       Past Surgical History:   Procedure Laterality Date    HX CATARACT REMOVAL      bilateral    HX CHOLECYSTECTOMY  2019    lap cindy with IOC    HX GYN      hysterectomy    HX HEENT      Lasik on right    HX UROLOGICAL  2017    resection of bladder tumor    MN ABDOMEN SURGERY PROC UNLISTED  2017    aneurysm repair    MN BREAST SURGERY PROCEDURE UNLISTED        Family History   Problem Relation Age of Onset    Other Mother         abdominal aneurysm    Cancer Sister         lung    Lung Disease Brother         asbestos    Stroke Sister     Stroke Sister     Stroke Sister     Other Sister         brain aneurysm    No Known Problems Sister     Lung Disease Brother         asbestos    Heart Disease Brother         pacemaker      History reviewed, no pertinent family history.   Social History     Tobacco Use    Smoking status: Former Smoker     Packs/day: 0.25     Years: 5.00     Pack years: 1.25     Quit date:      Years since quittin.4    Smokeless tobacco: Never Used   Substance Use Topics    Alcohol use: No     No Known Allergies   Current Facility-Administered Medications   Medication Dose Route Frequency    sodium chloride (NS) flush 5-40 mL  5-40 mL IntraVENous Q8H    sodium chloride (NS) flush 5-40 mL  5-40 mL IntraVENous PRN    acetaminophen (TYLENOL) tablet 650 mg  650 mg Oral Q6H PRN    Or    acetaminophen (TYLENOL) suppository 650 mg  650 mg Rectal Q6H PRN    polyethylene glycol (MIRALAX) packet 17 g  17 g Oral DAILY PRN    ondansetron (ZOFRAN ODT) tablet 4 mg  4 mg Oral Q8H PRN    Or    ondansetron (ZOFRAN) injection 4 mg  4 mg IntraVENous Q6H PRN    hydrALAZINE (APRESOLINE) 20 mg/mL injection 10 mg  10 mg IntraVENous Q6H PRN    glucose chewable tablet 16 g  4 Tablet Oral PRN    dextrose (D50W) injection syrg 12.5-25 g  12.5-25 g IntraVENous PRN    glucagon (GLUCAGEN) injection 1 mg  1 mg IntraMUSCular PRN    piperacillin-tazobactam (ZOSYN) 3.375 g in 0.9% sodium chloride (MBP/ADV) 100 mL MBP  3.375 g IntraVENous Q12H    dextrose 5% and 0.9% NaCl infusion  75 mL/hr IntraVENous CONTINUOUS          LAB AND IMAGING FINDINGS:     Lab Results   Component Value Date/Time    WBC 10.1 06/11/2021 01:24 AM    HGB 9.6 (L) 06/11/2021 01:24 AM    PLATELET 060 01/63/0939 01:24 AM     Lab Results   Component Value Date/Time    Sodium 136 06/11/2021 01:24 AM    Potassium 5.0 06/11/2021 01:24 AM    Chloride 102 06/11/2021 01:24 AM    CO2 26 06/11/2021 01:24 AM    BUN 68 (H) 06/11/2021 01:24 AM    Creatinine 2.31 (H) 06/11/2021 01:24 AM    Calcium 8.1 (L) 06/11/2021 01:24 AM      Lab Results   Component Value Date/Time    Alk. phosphatase 102 06/11/2021 01:24 AM    Protein, total 6.5 06/11/2021 01:24 AM    Albumin 2.9 (L) 06/11/2021 01:24 AM    Globulin 3.6 06/11/2021 01:24 AM     Lab Results   Component Value Date/Time    INR 1.0 11/08/2017 01:28 PM    Prothrombin time 10.1 11/08/2017 01:28 PM    aPTT 25.9 11/08/2017 01:28 PM      No results found for: IRON, FE, TIBC, IBCT, PSAT, FERR   No results found for: PH, PCO2, PO2  No components found for: GLPOC   No results found for: CPK, CKMB             Total time: 75  Counseling / coordination time, spent as noted above: 65  > 50% counseling / coordination?: y    Prolonged service was provided for  []30 min   []75 min in face to face time in the presence of the patient, spent as noted above. Time Start:   Time End:   Note: this can only be billed with 15067 (initial) or 17780 (follow up). If multiple start / stop times, list each separately.

## 2021-06-11 NOTE — PROGRESS NOTES
Physician Progress Note      PATIENT:               Nayla Melendez  CSN #:                  [id number]  :                       1934  ADMIT DATE:       6/10/2021 11:26 PM  [address] [place] DATE:  RESPONDING  PROVIDER #:        Ruslan Mcduffie MD          QUERY TEXT:    Patient admitted with sbo, pna, noted to have buncr 68/2.31/20, ckd If possible, please document in progress notes and discharge summary if you are evaluating and/or treating any of the following: The medical record reflects the following:  Risk Factors: sbo  Clinical Indicators: buncr 68/2.31/20, H&P-CKD  Treatment: ivfs  Wei Villalobos RN/CDI   Options provided:  -- CKD Stage 2 GFR 60-90  -- CKD Stage 3a GFR 45-59  -- CKD Stage 3b GFR 30-44  -- CKD Stage 4 GFR 15-29  -- CKD Stage 5 GFR<15  -- Other - I will add my own diagnosis  -- Disagree - Not applicable / Not valid  -- Disagree - Clinically unable to determine / Unknown  -- Refer to Clinical Documentation Reviewer    PROVIDER RESPONSE TEXT:    amparo    Query created by: Jorge Ramires on 2021 10:06 AM      QUERY TEXT:    Pt admitted with UTI. Pt noted to have ileal conduit. If possible, please document in the progress notes and discharge summary if you are evaluating and/or treating any of the following: The medical record reflects the following:  Risk Factors:  bladder cancer with ileal conduit, ckd  Clinical Indicators: ua 1 bact, mod le, few epith, mod le, wbc 10-20  Treatment: cx pending,  Zosyn and azithromycin, Wei Ruelas RN/CDI   Options provided:  -- UTI due to ileal conduit  -- UTI not due to ileal conduit  -- Other - I will add my own diagnosis  -- Disagree - Not applicable / Not valid  -- Disagree - Clinically unable to determine / Unknown  -- Refer to Clinical Documentation Reviewer    PROVIDER RESPONSE TEXT:    UTI is due to ileal conduit.     Query created by: Jorge Ramires on 2021 10:12 AM      Electronically signed by:  Peña Pulido Stephanie Herbert MD 6/11/2021 2:34 PM

## 2021-06-11 NOTE — PROGRESS NOTES
Transition of Care Plan:    RUR: 17%  Disposition: TBD - SNF vs HH vs Hospice  Follow up appointments: TBD  DME needed: TBD - pt has a cane and RW  Transportation at Discharge: Pt's cousin  Venu Wallse or means to access home: Pt has keys        Medicare letter: To be given prior to d/c  Caregiver Contact: Anjum Hernandez ([phone])  Discharge Caregiver contacted prior to discharge? CM will contact cg prior to d/c    Reason for Admission:  PNA, Small bowel obstruction                     RUR Score:          17%           Plan for utilizing home health:      TBD    PCP: First and Last name:  Omar Tolbert NP     [facility] of [facility]   Are you a current patient: Yes/No: Yes   Approximate date of last visit: Last week   Can you participate in a virtual visit with your PCP: No                    Current Advanced Directive/Advance Care Plan: DNR      Healthcare Decision Maker:              Primary Decision Maker: Angel [facility] - [phone]    Secondary Decision Maker: Juan A [facility] [phone]                  Transition of Care Plan:        TBD - HH vs SNF vs Hospice      CM met with patient at the bedside to complete initial assessment. CM introduced role, verified demographics, and discussed discharge planning. Pt is a 79 yo female with an admitting diagnosis of PNA and small bowel obstruction. Pt's insurance is Medicare Part A & B and Medicaid of [place]. Pt uses [facility] in [address] [facility] for her prescriptions. Pt lives alone in a one story home with 3 TAZ. Pt is independent of all ADL/IADLs including driving. Pt has a cane and RW. Pt reports receiving HH a year ago however does not remember the name of the agency. Pt has no hx of SNF or IPR. Pt's cousin will transport her home once she is d/c from the hospital.    CM will continue to follow for discharge planning while patient is admitted on current unit.  Please contact this CM with questions or issues related to discharge. Care Management Interventions  PCP Verified by CM: Yes (Dr. Lesvia Sidhu - last visit was last week)  Mode of Transport at Discharge: Other (see comment) (Pt's cousin)  Transition of Care Consult (CM Consult): Discharge Planning  Discharge Durable Medical Equipment: No (Pt has a cane and RW)  Physical Therapy Consult: No  Occupational Therapy Consult: No  Speech Therapy Consult: No  Current Support Network: Lives Alone (Pt lives alone in a one story home with 4 TAZ)  Confirm Follow Up Transport: Family  Discharge Location  Discharge Placement: Unable to determine at this time [facility] TERM Located within [facility])  -----------------------------------  Advance Care Planning     General Advance Care Planning (ACP) Conversation      Date of Conversation: 6/11/2021  Conducted with: Patient with Decision Making Capacity    Healthcare Decision Maker:     Primary Decision Maker: Еелна Ruelas - Other Relative - [phone]    Secondary Decision Maker: Dean Desai - [phone]  Today we documented Decision Maker(s) consistent with ACP documents on file. Content/Action Overview:    Has ACP document(s) on file - reflects the patient's care preferences  Reviewed DNR/DNI and patient confirms current DNR status - completed forms on file (place new order if needed)    Length of Voluntary ACP Conversation in minutes:  <16 minutes (Non-Billable)    ANTONIO Elizabeth  Care Manager [id number] [facility]  [phone]

## 2021-06-11 NOTE — CONSULTS
Vascular Surgery Consult Note  2021    Subjective:     Karmen Reis is a 80 y.o. female with PMHx of HTN, Bladder cancer, CKD, DM, AAA (s/p EVAR 2017 without rupture) who presented to the ED via hospital transfer on 6/10/21 with complaints of SBO with N/V and pneumonia. CT abdomen done at outside hospital showed infiltrates suggestive for pneumonia, multiple fluid filled dilated loops of the small bowel, with scattered fluid level, worrisome for small bowel obstruction, and aneurysmal change of the descending thoracic aorta measuring 5cm. This morning Ms. Scotty Vieyra was in bed and conversational. She reports diminished N/V and has a NG tube in place. She denies abdominal or back pain.        Past Medical History:   Diagnosis Date    Aneurysm ([address]     abdominal (pt not sure of blood vessel) repaired    Arthritis     hands    Cancer ([address]     bladder     Diabetes ([address]     oral hypoglycemics    Hypertension     Ill-defined condition     high cholesterol    Menopause       Past Surgical History:   Procedure Laterality Date    HX CATARACT REMOVAL      bilateral    HX CHOLECYSTECTOMY  2019    lap cindy with IOC    HX GYN      hysterectomy    HX HEENT      Lasik on right    HX UROLOGICAL  2017    resection of bladder tumor    VT ABDOMEN SURGERY PROC UNLISTED  2017    aneurysm repair    VT BREAST SURGERY PROCEDURE UNLISTED       Family History   Problem Relation Age of Onset    Other Mother         abdominal aneurysm    Cancer Sister         lung    Lung Disease Brother         asbestos    Stroke Sister     Stroke Sister     Stroke Sister     Other Sister         brain aneurysm    No Known Problems Sister     Lung Disease Brother         asbestos    Heart Disease Brother         pacemaker      Social History     Tobacco Use    Smoking status: Former Smoker     Packs/day: 0.25     Years: 5.00     Pack years: 1.25     Quit date:      Years since quittin.4    Smokeless tobacco: Never Used   Substance Use Topics    Alcohol use: No       Prior to Admission medications    Medication Sig Start Date End Date Taking? Authorizing Provider   calcium carbonate (OS-ZULEMA) 500 mg calcium (1,250 mg) tablet Take  by mouth daily. Yes Provider, Historical   mirtazapine (REMERON) 45 mg tablet Take 45 mg by mouth nightly. Yes Provider, Historical   metroNIDAZOLE (FLAGYL) 500 mg tablet Take  by mouth three (3) times daily. Yes Provider, Historical   ondansetron (ZOFRAN ODT) 4 mg disintegrating tablet Take 1 Tab by mouth every eight (8) hours as needed for Nausea. 6/4/19   Osmar Workman MD   docusate sodium (COLACE) 100 mg capsule Take 1 Cap by mouth two (2) times a day. Stop taking if you have diarrhea  Indications: constipation 6/4/19   Osmar Workman MD   ondansetron hcl (ZOFRAN) 8 mg tablet Take 8 mg by mouth every eight (8) hours as needed for Nausea. Provider, Historical   prochlorperazine (COMPAZINE) 10 mg tablet Take 10 mg by mouth every six (6) hours as needed. 8/7/18   Provider, Historical   dilTIAZem ER (CARDIZEM LA) 300 mg Tb24 tablet Take 300 mg by mouth daily. Chancey Lesch, NP   metoprolol succinate (TOPROL XL) 50 mg XL tablet Take 50 mg by mouth daily. 11/16/17   Chancey Lesch, NP   glipiZIDE SR (GLUCOTROL XL) 10 mg CR tablet Take 2.5 mg by mouth daily. Patient not taking: Reported on 6/11/2021    Provider, Historical   pravastatin (PRAVACHOL) 80 mg tablet Take 80 mg by mouth nightly. Provider, Historical   MULTIVIT,CALC,MINS/IRON/FOLIC (ONE-A-DAY WOMENS FORMULA PO) Take 1 Tab by mouth daily. Provider, Historical   POLYVINYL ALCOHOL/POVIDONE (ARTIFICIAL TEARS OP) Apply 1 Drop to eye as needed (DRY EYES). 1 DROP TO [facility] EYE    Provider, Historical     No Known Allergies     Review of Systems:  Review of Systems   Constitutional: Positive for appetite change. Negative for activity change, chills, diaphoresis, fatigue and fever. HENT: Negative.     Eyes: Negative. Respiratory: Negative. Cardiovascular: Negative. Gastrointestinal: Negative for abdominal pain, diarrhea and nausea. Endocrine: Negative. Genitourinary: Negative. Negative for flank pain. Musculoskeletal: Negative for back pain. Skin: Negative. Allergic/Immunologic: Negative. Neurological: Negative. Hematological: Negative. Psychiatric/Behavioral: Negative. Objective:       Patient Vitals for the past 24 hrs:   BP Temp Pulse Resp SpO2 Height Weight   06/11/21 0727 130/80 -- 93 16 93 % -- --   06/11/21 0317 136/81 98.3 °F (36.8 °C) 85 18 96 % -- --   06/11/21 0145 128/67 98.2 °F (36.8 °C) 84 16 98 % -- --   06/11/21 0045 118/64 -- -- -- 94 % -- --   06/11/21 0015 118/65 -- -- -- 93 % -- --   06/10/21 2330 118/65 98.1 °F (36.7 °C) 80 16 97 % 5' 7\" (1.702 m) 55.6 kg (122 lb 9.2 oz)     ---------------------------------------------------------------------------------------------------------    Physical Exam:   Physical Exam  HENT:      Head: Normocephalic. Cardiovascular:      Rate and Rhythm: Normal rate. Pulses:           Radial pulses are 2+ on the right side and 2+ on the left side. Dorsalis pedis pulses are 1+ on the right side and 1+ on the left side. Posterior tibial pulses are 1+ on the right side and 1+ on the left side. Pulmonary:      Effort: Pulmonary effort is normal.      Breath sounds: Normal breath sounds. Abdominal:      Palpations: Abdomen is soft. Musculoskeletal:         General: No swelling or deformity. Skin:     General: Skin is warm and dry. Neurological:      Mental Status: She is alert and oriented to person, place, and time.          Pertinent Test Results:   Recent Results (from the past 24 hour(s))   CBC WITH AUTOMATED DIFF    Collection Time: 06/11/21  1:24 AM   Result Value Ref Range    WBC 10.1 3.6 - 11.0 K/uL    RBC 3.52 (L) 3.80 - 5.20 M/uL    HGB 9.6 (L) 11.5 - 16.0 g/dL    HCT 31.5 (L) 35.0 - 47.0 %    MCV 89.5 80.0 - 99.0 FL    MCH 27.3 26.0 - 34.0 PG    MCHC 30.5 30.0 - 36.5 g/dL    RDW 13.3 11.5 - 14.5 %    PLATELET 225 001 - 305 K/uL    MPV 10.1 8.9 - 12.9 FL    NRBC 0.0 0  WBC    ABSOLUTE NRBC 0.00 0.00 - 0.01 K/uL    NEUTROPHILS 93 (H) 32 - 75 %    LYMPHOCYTES 4 (L) 12 - 49 %    MONOCYTES 3 (L) 5 - 13 %    EOSINOPHILS 0 0 - 7 %    BASOPHILS 0 0 - 1 %    IMMATURE GRANULOCYTES 0 0.0 - 0.5 %    ABS. NEUTROPHILS 9.4 (H) 1.8 - 8.0 K/UL    ABS. LYMPHOCYTES 0.4 (L) 0.8 - 3.5 K/UL    ABS. MONOCYTES 0.3 0.0 - 1.0 K/UL    ABS. EOSINOPHILS 0.0 0.0 - 0.4 K/UL    ABS. BASOPHILS 0.0 0.0 - 0.1 K/UL    ABS. IMM. GRANS. 0.0 0.00 - 0.04 K/UL    DF SMEAR SCANNED      RBC COMMENTS NORMOCYTIC, NORMOCHROMIC     METABOLIC PANEL, COMPREHENSIVE    Collection Time: 06/11/21  1:24 AM   Result Value Ref Range    Sodium 136 136 - 145 mmol/L    Potassium 5.0 3.5 - 5.1 mmol/L    Chloride 102 97 - 108 mmol/L    CO2 26 21 - 32 mmol/L    Anion gap 8 5 - 15 mmol/L    Glucose 116 (H) 65 - 100 mg/dL    BUN 68 (H) 6 - 20 MG/DL    Creatinine 2.31 (H) 0.55 - 1.02 MG/DL    BUN/Creatinine ratio 29 (H) 12 - 20      GFR est AA 24 (L) >60 ml/min/1.73m2    GFR est non-AA 20 (L) >60 ml/min/1.73m2    Calcium 8.1 (L) 8.5 - 10.1 MG/DL    Bilirubin, total 0.5 0.2 - 1.0 MG/DL    ALT (SGPT) 65 12 - 78 U/L    AST (SGOT) 54 (H) 15 - 37 U/L    Alk.  phosphatase 102 45 - 117 U/L    Protein, total 6.5 6.4 - 8.2 g/dL    Albumin 2.9 (L) 3.5 - 5.0 g/dL    Globulin 3.6 2.0 - 4.0 g/dL    A-G Ratio 0.8 (L) 1.1 - 2.2     CULTURE, BLOOD    Collection Time: 06/11/21  1:24 AM    Specimen: Blood   Result Value Ref Range    Special Requests: NO SPECIAL REQUESTS      Culture result: NO GROWTH AFTER 4 HOURS     COVID-19 RAPID TEST    Collection Time: 06/11/21  1:24 AM   Result Value Ref Range    Specimen source Please find results under separate order      COVID-19 rapid test Not detected NOTD     HEMOGLOBIN A1C WITH EAG    Collection Time: 06/11/21  1:24 AM   Result Value Ref Range    Hemoglobin A1c 5.7 (H) 4.0 - 5.6 %    Est. average glucose 117 mg/dL   SAMPLES BEING HELD    Collection Time: 06/11/21  1:24 AM   Result Value Ref Range    SAMPLES BEING HELD BLDCS     COMMENT        Add-on orders for these samples will be processed based on acceptable specimen integrity and analyte stability, which may vary by analyte. POC LACTIC ACID    Collection Time: 06/11/21  1:33 AM   Result Value Ref Range    Lactic Acid (POC) 1.17 0.40 - 2.00 mmol/L   URINALYSIS W/ REFLEX CULTURE    Collection Time: 06/11/21  2:16 AM    Specimen: Urine   Result Value Ref Range    Color YELLOW/STRAW      Appearance CLOUDY (A) CLEAR      Specific gravity 1.016 1.003 - 1.030      pH (UA) 6.0 5.0 - 8.0      Protein 30 (A) NEG mg/dL    Glucose Negative NEG mg/dL    Ketone Negative NEG mg/dL    Bilirubin Negative NEG      Blood MODERATE (A) NEG      Urobilinogen 0.2 0.2 - 1.0 EU/dL    Nitrites Negative NEG      Leukocyte Esterase MODERATE (A) NEG      WBC 10-20 0 - 4 /hpf    RBC 0-5 0 - 5 /hpf    Epithelial cells FEW FEW /lpf    Bacteria 1+ (A) NEG /hpf    UA:UC IF INDICATED URINE CULTURE ORDERED (A) CNI      Mucus TRACE (A) NEG /lpf       Assessmen/Plan:    80 y.o. female with PMHx of HTN, Bladder cancer, CKD, DM, AAA (s/p EVAR 2017 without rupture) who presented to the ED via hospital transfer on 6/10/21 with complaints of SBO with N/V and pneumonia and aneurysmal change of the descending thoracic aorta measuring 5cm.       Descending thoracic aorta aneurysm  - CT abdomen without contrast done at outside hospital shows measurement 5cm  - Cr elevated: 2.31 (6/11/21)  - needs CT Chest Abd Pelvis when Cr normalized    AAA   - s/p EVAR 2017   - aortoiliac duplex on 11/2020 shows residual sac measuring 3.7cm, no endoleak    CKD  - elevated Cr. 2.31    Diarrhea   Pneumonia  Small Bowel Obstruction  DM  HTN  Hx of Bladder Ca  - with urostomy bag      VTE Prophylaxis: Held [address]    Disposition: After discussion with Dr. Gita Goodell patient will need a CT Chest, Abdomen, Pelvis with contrast when creatinine level as normalized. Will continue to follow.       Signed By: Leisa Silverman NP     June 11, 2021

## 2021-06-11 NOTE — PROGRESS NOTES
Patient seen and examined. Admitted by my partner early in a.m.     Small bowel obstruction  Community-acquired pneumonia  UTI  Thoracic aortic aneurysm    See orders continue present management

## 2021-06-11 NOTE — PROGRESS NOTES
Palliative care note reviewed, and if pt needs surgery on abd. Very high probability that she would require either SNF long term or for very protracted recovery,  Or home hospice if not doing well, but pt reportedly lives alone, and I called pt step dtr and I could not reach and VM not hooked up. Below is summary palliative care.    1) DNR/DNI   \"when it's my time I'm ready to go home\"  2) pt wants limited medical interventions; comfort care, cpap or bipap, IVs, IV abx, NGT, cardiac monitoring. Pt is ok with surgery ONLY if she can be discharged home; if surgery is going to cause decline where she cannot go      directly home she does not want it. 3) No feeding tubes. \"my  had a feeding tube after his stroke, I do not want that. \"       Cont non op rx for now and readdress surgery abd if not improving.

## 2021-06-11 NOTE — PROGRESS NOTES
Pharmacy Medication Reconciliation     The patient was interviewed regarding current PTA medication list, use and drug allergies. The patient was questioned regarding use of any other inhalers, topical products, over the counter medications, herbal medications, vitamin products or ophthalmic/nasal/otic medication use. Allergy Update: Patient has no known allergies. Recommendations/Findings: The following amendments were made to the patient's active medication list on file at [facility]:     1) Additions: none    2) Deletions:   - Zofran ODT  - Docusate  - Glipizide   - One a Day Womens   - Prochlorperazine     3) Changes:  (Old regimen: diltiazem ER 300mg /New regimen: Cartia XT 180mg)       Pertinent Findings:   - Patient picked up metronidazole 500mg on 6/4/21 for a 7 day course. The dispense report indicates that the medication should be 2 tablets daily but patient only recalled taking it once daily.   - Furosemide (20mg 1 every other day PRN swelling) was picked up on 5/25/21 but patient did not recall use. - Potassium chloride ER (20mEq 1QD) was picked up for a 10 day supply on 5/29/21 but patient did not recall use. Clarified PTA med list with patient and [facility] DR VERONIKA CABRAL. PTA medication list was corrected to the following:     Prior to Admission Medications   Prescriptions Last Dose Informant Taking? POLYVINYL ALCOHOL/POVIDONE (ARTIFICIAL TEARS OP) 6/4/2021 at Unknown time Self Yes   Sig: Apply 1 Drop to eye as needed (DRY EYES). 1 DROP TO EACH EYE   calcium carbonate (OS-ZULEMA) 500 mg calcium (1,250 mg) tablet 6/4/2021 at Unknown time Self Yes   Sig: Take 1 Tablet by mouth three (3) times daily. dilTIAZem ER (Cartia XT) 180 mg capsule 6/4/2021 at Unknown time Self Yes   Sig: Take 180 mg by mouth daily. metoprolol succinate (TOPROL XL) 50 mg XL tablet 6/4/2021 at Unknown time Self Yes   Sig: Take 50 mg by mouth daily.    metroNIDAZOLE (FLAGYL) 500 mg tablet 6/4/2021 at Unknown time Other Yes Sig: Take 500 mg by mouth three (3) times daily. mirtazapine (REMERON) 45 mg tablet 6/4/2021 at Unknown time Self Yes   Sig: Take 45 mg by mouth nightly. ondansetron hcl (Zofran) 4 mg tablet 6/4/2021 at Unknown time Other Yes   Sig: Take 4 mg by mouth every eight (8) hours as needed for Nausea. pravastatin (PRAVACHOL) 80 mg tablet 6/4/2021 at Unknown time Self Yes   Sig: Take 80 mg by mouth nightly.       Facility-Administered Medications: None        Thank you,  Bruce Resendiz  PharmD Candidate 2022

## 2021-06-11 NOTE — PROGRESS NOTES
Primary Nurse Adeline Paulino RN and Toño Bhat RN performed a dual skin assessment on this patient No impairment noted  Adam score is 17

## 2021-06-11 NOTE — ED NOTES
Patient is being transferred to [address], Room # 6145. Report given to Roro Montanez RN on Kat Givens for routine progression of care. Report consisted of the following information SBAR, ED Summary, MAR and Recent Results. Patient transferred to receiving unit by: Roro Montanez RN. Outstanding consults needed: No     Next labs due: No     The following personal items will be sent with the patient during transfer to the floor: All valuables:    Cardiac monitoring ordered: No     The following CURRENT information was reported to the receiving RN:    Code status: Full Code at time of transfer    Last set of vital signs:  Vital Signs  Level of Consciousness: Alert (0) (06/11/21 0145)  Temp: 98.2 °F (36.8 °C) (06/11/21 0145)  Temp Source: Oral (06/11/21 0145)  Pulse (Heart Rate): 84 (06/11/21 0145)  Resp Rate: 16 (06/11/21 0145)  BP: 128/67 (06/11/21 0145)  MAP (Monitor): 82 (06/11/21 0145)  MAP (Calculated): 87 (06/11/21 0145)  MEWS Score: 1 (06/11/21 0145)         Oxygen Therapy  O2 Sat (%): 98 % (06/11/21 0145)  Pulse via Oximetry: 84 beats per minute (06/11/21 0145)  O2 Device: None (Room air) (06/10/21 2330)      Last pain assessment:  Pain 1  Pain Scale 1: Numeric (0 - 10)  Pain Intensity 1: 0  Patient Stated Pain Goal: 0      Wounds: No     Urinary catheter: voiding  Is there a rose order: No     LDAs:            Opportunity for questions and clarification was provided.     Kristan Granados RN

## 2021-06-11 NOTE — H&P
Hospitalist Admission Note    NAME: Kat Locke   :  1934   MRN:  [id number]     Date/Time:  2021 12:23 AM    Patient PCP: Jesus Carvalho NP  _____________________________________________________________________  Given the patient's current clinical presentation, I have a high level of concern for decompensation if discharged from the emergency department. Complex decision making was performed, which includes reviewing the patient's available past medical records, laboratory results, and x-ray films. My assessment of this patient's clinical condition and my plan of care is as follows. Assessment / Plan:    Pneumonia POA  Small bowel obstruction  UTI POA   Acute on chronic kidney disease creatinine at outside facility was 2.19, unknown baseline, likely prerenal,  CT abdomen without contrast at outside hospital  Impression. Bibasilar infiltrates suspicious for pneumonia. Findings consistent with high-grade small bowel obstruction. Increasing aneurysmal change of the descending thoracic aorta. Status post abdominal aortoiliac Stent graft    Admit to hospitalist service  Continue IV antibiotics Zosyn and azithromycin  Follow-up culture  N.p.o.  ED physician spoke with general surgeon on-call who recommended admission to the medicine secondary to pneumonia and they will follow up the patient and manage small bowel obstruction, currently patient has NG tube to suction, will continue that  Continue IV fluid  We will hold p.o. medication secondary to n.p.o. status  Primary team to consider consulting vascular surgery for   Increasing aneurysmal change of the descending thoracic aorta. Hypertension. Hold p.o. medication. Will give IV hydralazine as needed    Patient is not on any diabetic medication anymore as he does not needed, per patient he used to be diabetic in the past.    History of laparoscopic cholecystectomy, history of abdominal aortic aneurysm surgery. 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 Patient is complaining of Intractable vomiting since last night and shortness of breath    HISTORY OF PRESENT ILLNESS:     Sky Mcdonald is a 80 y.o.  female who presents as a transfer from outside hospital to the ED for management of small bowel obstruction and UTI, acute on chronic kidney disease, pneumonia. Patient has a history of bladder cancer with ileal conduit, AAA s/p repair, CKD, presented to outside hospital for abdominal pain x3 weeks associated vomiting. Patient reports some associated diarrhea as well. Patient reports pain and vomiting became worse prompting her to go to the emergency department. Patient has a history of bladder cancer with a cystectomy and has ureterostomy bag in place sent to the outside facility from oncology office. At the outside hospital, patient had labs performed, this showed bacteriuria. Normal white count and lactate. Normal TSH. Mild anemia. Mild transaminitis. lipase within normal limit BNP 86 WBC 8 hemoglobin 11.2 troponin 0 0.02 magnesium 1.6 potassium 5.2 creatinine 2.19 BUN 65 UA positive nitrate and leukocyte Estrace       Patient had a CT performed at the outside hospital which showed possible pneumonia. There is also aortic ectasia measuring 5 cm. There are multiple fluid-filled small bowel loops consistent with small bowel obstruction. Patient appears to be Given Zosyn and fluids. Patient was discussed with on-call surgeon there.   Recommended transfer to outside hospital.     On my assessment patient reports improvement in abdominal pain and vomiting. She has no other complaints. Patient does endorse a sore throat from NG tube. There are no other complaints, changes, or physical findings at this time. PCP: Rvias Pina NP         We were asked to admit for work up and evaluation of the above problems. CT abdomen without IV contrast.  Done at outside hospital on 6/10/2021  Report. There are patchy increased markings and mild hazy opacity in the base of the left lower lobe and to be a lesser degree in the right middle lobe which may reflect pneumonia. Skeletal structures show no findings to suggest metastatic disease. Ectasia of the distal thoracic aorta is again evident, currently slightly larger measuring up to about 5 cm. There is a small focal outpouching of the aorta in this lesion, slightly more prominent currently. No surrounding fluid. More distally an aortoiliac stent graft is again in place. These findings are not well evaluated without contrast enhancement. There are multiple fluid filled dilated loops of the small bowel, with scattered fluid level, worrisome for small bowel obstruction. The largest loop has a maximum caliber of 6.5 cm. The colon shows moderate stool without obstruction. The stomach is relatively decompensated. Detail of the abdominal viscera is markedly limited without contrast enhancement and due to the extreme paucity of surrounding body fluid. No organomegaly. Post cholecystectomy. There is also artifact obscuring detail of the bowel in the right lower quadrant. Small left kidney compared to the right is before. No gross evidence of hydronephrosis. No evidence of ascites. No definite pelvic mass or adenopathy.     Past Medical History:   Diagnosis Date    Aneurysm ([address]     abdominal (pt not sure of blood vessel) repaired    Arthritis     hands    Cancer ([address]     bladder     Diabetes [facility]     oral hypoglycemics    Hypertension     Ill-defined condition     high cholesterol    Menopause         Past Surgical History:   Procedure Laterality Date    HX CATARACT REMOVAL      bilateral    HX CHOLECYSTECTOMY  2019    lap cindy with IOC    HX GYN      hysterectomy    HX HEENT      Lasik on right    HX UROLOGICAL  2017    resection of bladder tumor    [place] ABDOMEN SURGERY PROC UNLISTED  2017    aneurysm repair    [place] BREAST SURGERY PROCEDURE UNLISTED         Social History     Tobacco Use    Smoking status: Former Smoker     Packs/day: 0.25     Years: 5.00     Pack years: 1.25     Quit date:      Years since quittin.4    Smokeless tobacco: Never Used   Substance Use Topics    Alcohol use: No        Family History   Problem Relation Age of Onset    Other Mother         abdominal aneurysm    Cancer Sister         lung    Lung Disease Brother         asbestos    Stroke Sister     Stroke Sister     Stroke Sister     Other Sister         brain aneurysm    No Known Problems Sister     Lung Disease Brother         asbestos    Heart Disease Brother         pacemaker     No Known Allergies     Prior to Admission medications    Medication Sig Start Date End Date Taking? Authorizing Provider   ondansetron (ZOFRAN ODT) 4 mg disintegrating tablet Take 1 Tab by mouth every eight (8) hours as needed for Nausea. 19   Abhilash Morrison MD   docusate sodium (COLACE) 100 mg capsule Take 1 Cap by mouth two (2) times a day. Stop taking if you have diarrhea  Indications: constipation 19   Abhilash Morrison MD   ondansetron hcl (ZOFRAN) 8 mg tablet Take 8 mg by mouth every eight (8) hours as needed for Nausea. Provider, Historical   prochlorperazine (COMPAZINE) 10 mg tablet Take 10 mg by mouth every six (6) hours as needed. 18   Provider, Historical   dilTIAZem ER (CARDIZEM LA) 300 mg Tb24 tablet Take 300 mg by mouth daily.     Adolph Baird NP   metoprolol succinate (TOPROL XL) 50 mg XL tablet Take 50 mg by mouth daily. 11/16/17   Warden Garnett, NP   glipiZIDE SR (GLUCOTROL XL) 10 mg CR tablet Take 2.5 mg by mouth daily. Provider, Historical   pravastatin (PRAVACHOL) 80 mg tablet Take 80 mg by mouth nightly. Provider, Historical   MULTIVIT,CALC,MINS/IRON/FOLIC (ONE-A-DAY WOMENS FORMULA PO) Take 1 Tab by mouth daily. Provider, Historical   POLYVINYL ALCOHOL/POVIDONE (ARTIFICIAL TEARS OP) Apply 1 Drop to eye as needed (DRY EYES). 1 DROP TO [facility] EYE    Provider, Historical       REVIEW OF SYSTEMS:     I am not able to complete the review of systems because:    The patient is intubated and sedated    The patient has altered mental status due to his acute medical problems    The patient has baseline aphasia from prior stroke(s)    The patient has baseline dementia and is not reliable historian    The patient is in acute medical distress and unable to provide information           Total of 12 systems reviewed as follows:       POSITIVE= underlined text  Negative = text not underlined  General:  fever, chills, sweats, generalized weakness, weight loss/gain,      loss of appetite   Eyes:    blurred vision, eye pain, loss of vision, double vision  ENT:    rhinorrhea, pharyngitis   Respiratory:   cough, sputum production, SOB, [place], wheezing, pleuritic pain   Cardiology:   chest pain, palpitations, orthopnea, PND, edema, syncope   Gastrointestinal:  abdominal pain , N/V, diarrhea, dysphagia, constipation, bleeding   Genitourinary:  frequency, urgency, dysuria, hematuria, incontinence   Muskuloskeletal :  arthralgia, myalgia, back pain  Hematology:  easy bruising, nose or gum bleeding, lymphadenopathy   Dermatological: rash, ulceration, pruritis, color change / jaundice  Endocrine:   hot flashes or polydipsia   Neurological:  headache, dizziness, confusion, focal weakness, paresthesia,     Speech difficulties, memory loss, gait difficulty  Psychological: Feelings of anxiety, depression, agitation    Objective:   VITALS:    There were no vitals taken for this visit. PHYSICAL EXAM:    General:    Alert, cooperative, no distress, patient is cachectic  HEENT: Atraumatic, anicteric sclerae, pink conjunctivae     No oral ulcers, mucosa moist, throat clear, dentition fair  Neck:  Supple, symmetrical,  thyroid: non tender  Lungs:   Positive bilateral crackles. Chest wall:  No tenderness  No Accessory muscle use. Heart:   Regular  rhythm,  No  murmur   No edema  Abdomen:   Soft, non-tender. Not distended. Bowel sounds normal, ureterostomy bag is in place draining bloodstained urine  Extremities: No cyanosis. No clubbing,      Skin turgor normal, Capillary refill normal, Radial dial pulse 2+  Skin:     Not pale. Not Jaundiced  No rashes   Psych:  Good insight. Not depressed. Not anxious or agitated. Neurologic: EOMs intact. No facial asymmetry. No aphasia or slurred speech. Symmetrical strength, Sensation grossly intact. Alert and oriented X 4.     _______________________________________________________________________  Care Plan discussed with:    Comments   Patient y    Family      RN y    Care Manager                    Consultant:  pancho Gupta md   _______________________________________________________________________  Expected  Disposition:   Home with Family x   HH/PT/OT/RN    SNF/LTC    [facility]    ________________________________________________________________________  TOTAL TIME: 72   Minutes    Critical Care Provided     Minutes non procedure based      Comments    x Reviewed previous records   >50% of visit spent in counseling and coordination of care x Discussion with patient and/or family and questions answered       Given the patient's current clinical presentation, I have a high level of concern for decompensation if discharged from the ED. Complex decision making was performed which includes reviewing the patient's available past medical records, laboratory results, and Xray films.  I have also directly communicated my plan and discussed this case with the involved ED physician.     ____________________________________________________________________  Patti Bush MD    Procedures: see electronic medical records for all procedures/Xrays and details which were not copied into this note but were reviewed prior to creation of Plan. LAB DATA REVIEWED:    No results found for this or any previous visit (from the past 24 hour(s)).

## 2021-06-11 NOTE — CONSULTS
[address]     Subjective:      Pratima Arriola is a 80 y.o. female who presents for evaluation of 3-week history of predominantly diarrhea, occasional nausea and vomiting. She is a transfer from [place] [address].  Patient is 80years old has history of cystectomy and ileal conduit approximately 4-5 years ago, was treated with chemotherapy, also about 2019 had a laparoscopic cholecystectomy by Dr. Pao Beckman, patient also has had endovascular aortic aneurysm repair. Patient reportedly lives alone, I have reached out to her cousin who is listed as her daughter but it is actually her cousin, Karthikeyan Flores at phone number [phone] and she directed me to call patient's [employer] and I have called her at [phone] but got voicemail and voicemail was not set up so I could not leave a message. Patient seems alert and able to make her own medical decisions but seems very frail, chronically ill. She was transferred with a 3-week history of diarrhea predominantly, was on antibiotics for something according to patient recently, on labs does have evidence of bacteria and esterase leukocyte in her ileal conduit but this may be chronic, has some chronic anemia hemoglobin 9 g normal white blood cell count creatinine 2.31 do not know if this is chronic or not, last one here in 2019 was 1.6. Albumin is 2.9. Patient states she has not been able to eat very well but has had frequent diarrhea. I do not know when her last colonoscopy was. She does not report much nausea and vomiting and no abdominal pain. CT scan abdomen and pelvis at [address] [place] [address] suggested dilated loops of small bowel, comments about possible bowel obstruction but there is really no transition point noted. CT abdomen without IV contrast.  Done at outside hospital on 6/10/2021  Report.   There are patchy increased markings and mild hazy opacity in the base of the left lower lobe and to be a lesser degree in the right middle lobe which may reflect pneumonia. Skeletal structures show no findings to suggest metastatic disease. Ectasia of the distal thoracic aorta is again evident, currently slightly larger measuring up to about 5 cm. There is a small focal outpouching of the aorta in this lesion, slightly more prominent currently. No surrounding fluid. More distally an aortoiliac stent graft is again in place. These findings are not well evaluated without contrast enhancement. There are multiple fluid filled dilated loops of the small bowel, with scattered fluid level, worrisome for small bowel obstruction. The largest loop has a maximum caliber of 6.5 cm. The colon shows moderate stool without obstruction. The stomach is relatively decompensated. Detail of the abdominal viscera is markedly limited without contrast enhancement and due to the extreme paucity of surrounding body fluid. No organomegaly. Post cholecystectomy. There is also artifact obscuring detail of the bowel in the right lower quadrant. Small left kidney compared to the right is before. No gross evidence of hydronephrosis. No evidence of ascites. No definite pelvic mass or adenopathy. Patient reports she takes care of her self, ambulates, does shop, prepares her meals, has some assistance from her cousin.     CT also suggested some pneumonia but Covid is negative rapid chest x-ray relatively clear    Past Medical History:   Diagnosis Date    Aneurysm ([address]     abdominal (pt not sure of blood vessel) repaired    Arthritis     hands    Cancer ([address]     bladder     Diabetes ([address]     oral hypoglycemics    Hypertension     Ill-defined condition     high cholesterol    Menopause      Past Surgical History:   Procedure Laterality Date    HX CATARACT REMOVAL      bilateral    HX CHOLECYSTECTOMY  06/04/2019    lap cindy with IOC    HX GYN      hysterectomy    HX HEENT      Lasik on right    HX UROLOGICAL  2017    resection of bladder tumor    [place] ABDOMEN SURGERY PROC UNLISTED  2017    aneurysm repair    [place] BREAST SURGERY PROCEDURE UNLISTED        Family History   Problem Relation Age of Onset    Other Mother         abdominal aneurysm    Cancer Sister         lung    Lung Disease Brother         asbestos    Stroke Sister     Stroke Sister     Stroke Sister     Other Sister         brain aneurysm    No Known Problems Sister     Lung Disease Brother         asbestos    Heart Disease Brother         pacemaker     Social History     Tobacco Use    Smoking status: Former Smoker     Packs/day: 0.25     Years: 5.00     Pack years: 1.25     Quit date:      Years since quittin.4    Smokeless tobacco: Never Used   Substance Use Topics    Alcohol use: No      Prior to Admission medications    Medication Sig Start Date End Date Taking? Authorizing Provider   calcium carbonate (OS-ZULEMA) 500 mg calcium (1,250 mg) tablet Take  by mouth daily. Yes Provider, Historical   mirtazapine (REMERON) 45 mg tablet Take 45 mg by mouth nightly. Yes Provider, Historical   metroNIDAZOLE (FLAGYL) 500 mg tablet Take  by mouth three (3) times daily. Yes Provider, Historical   ondansetron (ZOFRAN ODT) 4 mg disintegrating tablet Take 1 Tab by mouth every eight (8) hours as needed for Nausea. 19   Suzan Tyler MD   docusate sodium (COLACE) 100 mg capsule Take 1 Cap by mouth two (2) times a day. Stop taking if you have diarrhea  Indications: constipation 19   Suzan Tyler MD   ondansetron hcl (ZOFRAN) 8 mg tablet Take 8 mg by mouth every eight (8) hours as needed for Nausea. Provider, Historical   prochlorperazine (COMPAZINE) 10 mg tablet Take 10 mg by mouth every six (6) hours as needed. 18   Provider, Historical   dilTIAZem ER (CARDIZEM LA) 300 mg Tb24 tablet Take 300 mg by mouth daily. Ramírez Deluca NP   metoprolol succinate (TOPROL XL) 50 mg XL tablet Take 50 mg by mouth daily.  17   Ramírez Deluca NP glipiZIDE SR (GLUCOTROL XL) 10 mg CR tablet Take 2.5 mg by mouth daily. Patient not taking: Reported on 2021    Provider, Historical   pravastatin (PRAVACHOL) 80 mg tablet Take 80 mg by mouth nightly. Provider, Historical   MULTIVIT,CALC,MINS/IRON/FOLIC (ONE-A-DAY WOMENS FORMULA PO) Take 1 Tab by mouth daily. Provider, Historical   POLYVINYL ALCOHOL/POVIDONE (ARTIFICIAL TEARS OP) Apply 1 Drop to eye as needed (DRY EYES). 1 DROP TO [facility] EYE    Provider, Historical      No Known Allergies    Review of Systems   Constitutional: Positive for fatigue. Respiratory: Negative. Cardiovascular: Negative. Gastrointestinal: Positive for diarrhea. No abdominal pain and minimal nausea and vomiting   Musculoskeletal: Positive for arthralgias. Psychiatric/Behavioral: Negative. All other systems reviewed and are negative. Objective:     Patient Vitals for the past 8 hrs:   BP Temp Pulse Resp SpO2   21 0727 130/80 -- 93 16 93 %   21 0317 136/81 98.3 °F (36.8 °C) 85 18 96 %   21 0145 128/67 98.2 °F (36.8 °C) 84 16 98 %   21 0045 118/64 -- -- -- 94 %       Temp (24hrs), Av.2 °F (36.8 °C), Min:98.1 °F (36.7 °C), Max:98.3 °F (36.8 °C)      Physical Exam  Vitals and nursing note reviewed. Exam conducted with a chaperone present (Nursing staff present at bedside). Constitutional:       Comments: Thin frail chronically ill-appearing female who appears cachectic but no acute distress   HENT:      Head:      Comments: Temporal wasting  Cardiovascular:      Rate and Rhythm: Normal rate and regular rhythm. Pulmonary:      Effort: Pulmonary effort is normal.      Breath sounds: Normal breath sounds. Abdominal:      General: Abdomen is flat. Palpations: Abdomen is soft.       Comments: Entirely nontender, nondistended, few bowel sounds present healed lower abdominal incision no obvious hernias ileal conduit pink right lower abdomen   Musculoskeletal:         General: Normal range of motion. Comments: General musculoskeletal weakness mild otherwise good strength   Skin:     General: Skin is warm and dry. Neurological:      General: No focal deficit present. Mental Status: She is oriented to person, place, and time. Comments: Seems alert awake oriented to person place and time and even phone numbers, remarkably good mentation for her age   Psychiatric:         Mood and Affect: Mood normal.         Behavior: Behavior normal.         Thought Content: Thought content normal.         Judgment: Judgment normal.         Assessment:     Active Problems: Aortic aneurysm ([facility] (9/26/2017)      SBO (small bowel obstruction) ([address] (6/11/2021)      Pneumonia (6/11/2021)      Moderate protein-calorie malnutrition ([address] (6/11/2021)      Functional diarrhea (6/11/2021)      Differential diagnosis includes mechanical bowel obstruction versus functional pseudoobstruction, puzzling picture since no nausea vomiting of significance and no abdominal pain and having diarrhea. Overall patient very frail and protein calorie malnourished  Plan:     #1-continue NG tube and will give Gastrografin contrast to assess for mechanical versus functional small bowel obstruction give this 24 hours to see if it makes it to the colon or not and help assess whether patient needs abdominal surgical intervention such as laparoscopy or laparotomy which would be more likely given small abdomen with dilated bowel or may not be much room for laparoscopy.   My concern is basically the patient's age, frailty, malnourishment, renal insufficiency despite her good mentation, I think her risk of morbidity and mortality are increased based on the above and goals of care to actually return home may be unrealistic and I have explained to patient she may need surgical intervention, but if so likely would not be able to return immediately home and depending on her recovery may never be able to return home to independent living again. I am not sure patient actually comprehends all of this and the potential morbidity mortality she faces if surgical intervention needed. #2 diarrhea, will order C. difficile toxin, and will defer to internal medicine whether to work-up the diarrhea or have GI see her, could be functional related to the above bowel issues also. #3-being treated for pneumonia based on CT scan    #4-palliative care consult to assess goals of care and make sure realistically she understands medical versus nonmedical therapy versus palliative comfort care if surgery needed. I have reached out to her stepdaughter with no response, cannot leave a message and her cousin who is listed as her daughter but it actually is her cousin, who deferred any discussions to her stepdaughter. Benefits risks alternatives recovery morbidity mortality reviewed with patient at length. Recommend consider PICC line and TPN since patient malnourished, and whether surgical or nonsurgical issues likely will not be eating a normal diet for some time.     FACE TO FACE time including review of any indicated imaging, discussion with patient, and other providers, exam and discussion with patient: 52       minutes    Signed By: Charity Hererra MD   [id number] [facility]    June 11, 2021

## 2021-06-12 ENCOUNTER — APPOINTMENT (OUTPATIENT)
Dept: GENERAL RADIOLOGY | Age: 86
DRG: 388 | End: 2021-06-12
Attending: SURGERY
Payer: MEDICARE

## 2021-06-12 LAB
ALBUMIN SERPL-MCNC: 2.5 G/DL (ref 3.5–5)
ALBUMIN/GLOB SERPL: 0.7 {RATIO} (ref 1.1–2.2)
ALP SERPL-CCNC: 89 U/L (ref 45–117)
ALT SERPL-CCNC: 42 U/L (ref 12–78)
ANION GAP SERPL CALC-SCNC: 6 MMOL/L (ref 5–15)
APTT PPP: 23.4 SEC (ref 22.1–31)
AST SERPL-CCNC: 23 U/L (ref 15–37)
BACTERIA SPEC CULT: NORMAL
BILIRUB SERPL-MCNC: 0.3 MG/DL (ref 0.2–1)
BUN SERPL-MCNC: 62 MG/DL (ref 6–20)
BUN/CREAT SERPL: 35 (ref 12–20)
C DIFF GDH STL QL: NEGATIVE
C DIFF TOX A+B STL QL IA: NEGATIVE
CALCIUM SERPL-MCNC: 7.9 MG/DL (ref 8.5–10.1)
CAMPYLOBACTER SPECIES, DNA: NEGATIVE
CHLORIDE SERPL-SCNC: 111 MMOL/L (ref 97–108)
CO2 SERPL-SCNC: 25 MMOL/L (ref 21–32)
COMMENT, HOLDF: NORMAL
CREAT SERPL-MCNC: 1.79 MG/DL (ref 0.55–1.02)
ENTEROTOXIGEN E COLI, DNA: NEGATIVE
ERYTHROCYTE [DISTWIDTH] IN BLOOD BY AUTOMATED COUNT: 13.4 % (ref 11.5–14.5)
GLOBULIN SER CALC-MCNC: 3.4 G/DL (ref 2–4)
GLUCOSE SERPL-MCNC: 106 MG/DL (ref 65–100)
HCT VFR BLD AUTO: 29.6 % (ref 35–47)
HGB BLD-MCNC: 8.8 G/DL (ref 11.5–16)
INR PPP: 1.1 (ref 0.9–1.1)
INTERPRETATION: NORMAL
MAGNESIUM SERPL-MCNC: 1.8 MG/DL (ref 1.6–2.4)
MCH RBC QN AUTO: 27 PG (ref 26–34)
MCHC RBC AUTO-ENTMCNC: 29.7 G/DL (ref 30–36.5)
MCV RBC AUTO: 90.8 FL (ref 80–99)
NRBC # BLD: 0 K/UL (ref 0–0.01)
NRBC BLD-RTO: 0 PER 100 WBC
P SHIGELLOIDES DNA STL QL NAA+PROBE: NEGATIVE
PLATELET # BLD AUTO: 194 K/UL (ref 150–400)
PMV BLD AUTO: 10.1 FL (ref 8.9–12.9)
POTASSIUM SERPL-SCNC: 4.5 MMOL/L (ref 3.5–5.1)
PROCALCITONIN SERPL-MCNC: 0.55 NG/ML
PROT SERPL-MCNC: 5.9 G/DL (ref 6.4–8.2)
PROTHROMBIN TIME: 11.1 SEC (ref 9–11.1)
RBC # BLD AUTO: 3.26 M/UL (ref 3.8–5.2)
SALMONELLA SPECIES, DNA: NEGATIVE
SAMPLES BEING HELD,HOLD: NORMAL
SERVICE CMNT-IMP: NORMAL
SHIGA TOXIN PRODUCING, DNA: NEGATIVE
SHIGELLA SP+EIEC IPAH STL QL NAA+PROBE: NEGATIVE
SODIUM SERPL-SCNC: 142 MMOL/L (ref 136–145)
THERAPEUTIC RANGE,PTTT: NORMAL SECS (ref 58–77)
VIBRIO SPECIES, DNA: NEGATIVE
WBC # BLD AUTO: 6.1 K/UL (ref 3.6–11)
WBC #/AREA STL HPF: NORMAL /HPF (ref 0–4)
Y. ENTEROCOLITICA, DNA: NEGATIVE

## 2021-06-12 PROCEDURE — 99232 SBSQ HOSP IP/OBS MODERATE 35: CPT | Performed by: SURGERY

## 2021-06-12 PROCEDURE — 85730 THROMBOPLASTIN TIME PARTIAL: CPT

## 2021-06-12 PROCEDURE — 2709999900 HC NON-CHARGEABLE SUPPLY

## 2021-06-12 PROCEDURE — 65270000029 HC RM PRIVATE

## 2021-06-12 PROCEDURE — 74018 RADEX ABDOMEN 1 VIEW: CPT

## 2021-06-12 PROCEDURE — 80053 COMPREHEN METABOLIC PANEL: CPT

## 2021-06-12 PROCEDURE — 85027 COMPLETE CBC AUTOMATED: CPT

## 2021-06-12 PROCEDURE — 85610 PROTHROMBIN TIME: CPT

## 2021-06-12 PROCEDURE — 74011000258 HC RX REV CODE- 258: Performed by: INTERNAL MEDICINE

## 2021-06-12 PROCEDURE — 87177 OVA AND PARASITES SMEARS: CPT

## 2021-06-12 PROCEDURE — 74011000258 HC RX REV CODE- 258: Performed by: HOSPITALIST

## 2021-06-12 PROCEDURE — 84145 PROCALCITONIN (PCT): CPT

## 2021-06-12 PROCEDURE — 74011250636 HC RX REV CODE- 250/636: Performed by: HOSPITALIST

## 2021-06-12 PROCEDURE — 83735 ASSAY OF MAGNESIUM: CPT

## 2021-06-12 PROCEDURE — 74011250636 HC RX REV CODE- 250/636: Performed by: INTERNAL MEDICINE

## 2021-06-12 PROCEDURE — 36415 COLL VENOUS BLD VENIPUNCTURE: CPT

## 2021-06-12 RX ORDER — DILTIAZEM HYDROCHLORIDE 180 MG/1
180 CAPSULE, COATED, EXTENDED RELEASE ORAL DAILY
Status: DISCONTINUED | OUTPATIENT
Start: 2021-06-13 | End: 2021-06-14 | Stop reason: HOSPADM

## 2021-06-12 RX ADMIN — Medication 10 ML: at 13:45

## 2021-06-12 RX ADMIN — PIPERACILLIN AND TAZOBACTAM 3.38 G: 3; .375 INJECTION, POWDER, LYOPHILIZED, FOR SOLUTION INTRAVENOUS at 16:27

## 2021-06-12 RX ADMIN — Medication 10 ML: at 05:13

## 2021-06-12 RX ADMIN — DEXTROSE AND SODIUM CHLORIDE 75 ML/HR: 5; 900 INJECTION, SOLUTION INTRAVENOUS at 03:13

## 2021-06-12 RX ADMIN — Medication 10 ML: at 21:51

## 2021-06-12 RX ADMIN — PIPERACILLIN AND TAZOBACTAM 3.38 G: 3; .375 INJECTION, POWDER, LYOPHILIZED, FOR SOLUTION INTRAVENOUS at 08:13

## 2021-06-12 NOTE — PROGRESS NOTES
Admit Date: 6/10/2021    Subjective:     Patient has no complaints. + BM. No NG o/p. Feels better. Objective:     Blood pressure 137/73, pulse 79, temperature 97.9 °F (36.6 °C), resp. rate 16, height 5' 7\" (1.702 m), weight 122 lb 9.2 oz (55.6 kg), SpO2 94 %. Temp (24hrs), Av.1 °F (36.7 °C), Min:97.7 °F (36.5 °C), Max:98.7 °F (37.1 °C)      Physical Exam:  GENERAL: alert, cooperative, no distress, appears stated age, LUNG: clear to auscultation bilaterally, HEART: regular rate and rhythm, ABDOMEN: soft, non-tender.  Bowel sounds normal. No masses,  no organomegaly, EXTREMITIES:  extremities normal, atraumatic, no cyanosis or edema    Labs:   Recent Results (from the past 24 hour(s))   WBC, STOOL    Collection Time: 21  7:47 PM   Result Value Ref Range    White blood cells, stool 0 TO 4 0 - 4 /HPF   OCCULT BLOOD, STOOL    Collection Time: 21  7:47 PM   Result Value Ref Range    Occult blood, stool Positive (A) NEG     MAGNESIUM    Collection Time: 21 12:19 AM   Result Value Ref Range    Magnesium 1.8 1.6 - 2.4 mg/dL   CBC W/O DIFF    Collection Time: 21 12:19 AM   Result Value Ref Range    WBC 6.1 3.6 - 11.0 K/uL    RBC 3.26 (L) 3.80 - 5.20 M/uL    HGB 8.8 (L) 11.5 - 16.0 g/dL    HCT 29.6 (L) 35.0 - 47.0 %    MCV 90.8 80.0 - 99.0 FL    MCH 27.0 26.0 - 34.0 PG    MCHC 29.7 (L) 30.0 - 36.5 g/dL    RDW 13.4 11.5 - 14.5 %    PLATELET 730 110 - 969 K/uL    MPV 10.1 8.9 - 12.9 FL    NRBC 0.0 0  WBC    ABSOLUTE NRBC 0.00 0.00 - 0.01 K/uL   PROTHROMBIN TIME + INR    Collection Time: 21 12:19 AM   Result Value Ref Range    INR 1.1 0.9 - 1.1      Prothrombin time 11.1 9.0 - 11.1 sec   PTT    Collection Time: 21 12:19 AM   Result Value Ref Range    aPTT 23.4 22.1 - 31.0 sec    aPTT, therapeutic range     58.0 - 28.6 SECS   METABOLIC PANEL, COMPREHENSIVE    Collection Time: 21 12:19 AM   Result Value Ref Range    Sodium 142 136 - 145 mmol/L    Potassium 4.5 3.5 - 5.1 mmol/L    Chloride 111 (H) 97 - 108 mmol/L    CO2 25 21 - 32 mmol/L    Anion gap 6 5 - 15 mmol/L    Glucose 106 (H) 65 - 100 mg/dL    BUN 62 (H) 6 - 20 MG/DL    Creatinine 1.79 (H) 0.55 - 1.02 MG/DL    BUN/Creatinine ratio 35 (H) 12 - 20      GFR est AA 32 (L) >60 ml/min/1.73m2    GFR est non-AA 27 (L) >60 ml/min/1.73m2    Calcium 7.9 (L) 8.5 - 10.1 MG/DL    Bilirubin, total 0.3 0.2 - 1.0 MG/DL    ALT (SGPT) 42 12 - 78 U/L    AST (SGOT) 23 15 - 37 U/L    Alk. phosphatase 89 45 - 117 U/L    Protein, total 5.9 (L) 6.4 - 8.2 g/dL    Albumin 2.5 (L) 3.5 - 5.0 g/dL    Globulin 3.4 2.0 - 4.0 g/dL    A-G Ratio 0.7 (L) 1.1 - 2.2     PROCALCITONIN    Collection Time: 06/12/21 12:19 AM   Result Value Ref Range    Procalcitonin 0.55 ng/mL   SAMPLES BEING HELD    Collection Time: 06/12/21 12:19 AM   Result Value Ref Range    SAMPLES BEING HELD PST     COMMENT        Add-on orders for these samples will be processed based on acceptable specimen integrity and analyte stability, which may vary by analyte. Data Review images and reports reviewed    Assessment:     Active Problems:     Aortic aneurysm ([facility] [address] (9/26/2017)      SBO (small bowel obstruction) ([address] (6/11/2021)      Pneumonia (6/11/2021)      Moderate protein-calorie malnutrition ([address] (6/11/2021)      Functional diarrhea (6/11/2021)      Poor appetite (6/11/2021)      Counseling regarding advance care planning and goals of care (6/11/2021)      Do not resuscitate status (6/11/2021)        Plan/Recommendations/Medical Decision Making:     Continue present treatment   D/c NGT  Clear liquid diet  Advance as zheng    Alec Ordoñez MD  [facility]

## 2021-06-12 NOTE — PROGRESS NOTES
Problem: Falls - Risk of  Goal: *Absence of Falls  Description: Document Michell Zamarripa Fall Risk and appropriate interventions in the flowsheet.   Outcome: Progressing Towards Goal  Note: Fall Risk Interventions:  Mobility Interventions: Bed/chair exit alarm, Communicate number of staff needed for ambulation/transfer, Patient to call before getting OOB, Utilize walker, cane, or other assistive device         Medication Interventions: Bed/chair exit alarm, Patient to call before getting OOB, Teach patient to arise slowly    Elimination Interventions: Bed/chair exit alarm, Call light in reach, Patient to call for help with toileting needs    History of Falls Interventions: Bed/chair exit alarm, Door open when patient unattended, Room close to nurse's station

## 2021-06-12 NOTE — PROGRESS NOTES
1039 NG tube removed without incident. Pt given cranberry juice to sip. End of Shift Note    Bedside shift change report given to Js Heart RN (oncoming nurse) by Brenton Mathew (offgoing nurse). Report included the following information SBAR, Kardex, Intake/Output, MAR and Med Rec Status    Shift worked:  6599-5862     Shift summary and any significant changes:     Pt tolerating clear liq without nausea. Pt reports one loose stool this shift. Concerns for physician to address:       Zone phone for oncoming shift:          Activity:  Activity Level: Up with Assistance  Number times ambulated in hallways past shift: 0  Number of times OOB to chair past shift: 0    Cardiac:   Cardiac Monitoring: No           Access:   Current line(s): PIV     Genitourinary:   Urinary status: urostomy    Respiratory:   O2 Device: None (Room air)  Chronic home O2 use?: NO  Incentive spirometer at bedside: NO     GI:  Last Bowel Movement Date: 06/12/21  Current diet:  ADULT DIET Clear Liquid  Passing flatus: Tolerating current diet: YES       Pain Management:   Patient states pain is manageable on current regimen: N/A    Skin:  Aadm Score: 17  Interventions: float heels, increase time out of bed, PT/OT consult, limit briefs and nutritional support     Patient Safety:  Fall Score:  Total Score: 4  Interventions: bed/chair alarm, assistive device (walker, cane, etc), gripper socks, pt to call before getting OOB and stay with me (per policy)  High Fall Risk: Yes    Length of Stay:  Expected LOS: 4d 16h  Actual LOS: 1      Brenton Mathew

## 2021-06-12 NOTE — PROGRESS NOTES
Hospitalist Progress Note    NAME: Bebe Alaniz   :  1934   MRN:  [id number]       Assessment / Plan:  Small bowel obstruction  -S/p NG tube with improvement of bilious  NG tube discontinued this a.m.  -Started on clear liquid diet  -General surgery is following    Suspected pneumonia  -Continue Zosyn. Follow culture results    Urinary tract infection  -Urine culture pending. Continue Zosyn. Adjust antibiotics based on urine culture results    Acute kidney injury  -Creatinine is improving with hydration. Check BMP in a.m. HTN  Dyslipidemia  -resume home meds      Body mass index is 19.2 kg/m². Code status: DNR  Prophylaxis: Lovenox  Recommended Disposition: Home w/Family     Subjective:     Chief Complaint / Reason for Physician Visit  Feeling better this am  abd pain is better. No N/V  NG tube removed this am     Review of Systems:  Symptom Y/N Comments  Symptom Y/N Comments   Fever/Chills    Chest Pain     Poor Appetite    Edema     Cough    Abdominal Pain     Sputum    Joint Pain     SOB/DE [place] SANTOS    Pruritis/Rash     Nausea/vomit    Tolerating PT/OT     Diarrhea    Tolerating Diet     Constipation    Other       Could NOT obtain due to:      Objective:     VITALS:   Last 24hrs VS reviewed since prior progress note. Most recent are:  Patient Vitals for the past 24 hrs:   Temp Pulse Resp BP SpO2   21 1549 98.4 °F (36.9 °C) 84 16 (!) 151/77 98 %   21 0756 97.9 °F (36.6 °C) 79 16 137/73 94 %   21 2313 97.7 °F (36.5 °C) 92 18 124/74 97 %       Intake/Output Summary (Last 24 hours) at 2021 1713  Last data filed at 2021 1554  Gross per 24 hour   Intake 1698.75 ml   Output 2450 ml   Net -751.25 ml        PHYSICAL EXAM:  General: cooperative, no acute distress    EENT:  EOMI. Anicteric sclerae. MMM  Resp:  CTA bilaterally, no wheezing or rales. No accessory muscle use  CV:  Regular  rhythm,  No edema  GI:  Soft, Non distended, Non tender.  Bowel sounds improving  Neurologic:  Alert and awake,, normal speech,   Psych:   Not anxious nor agitated  Skin:  No rashes.   No jaundice    Reviewed most current lab test results and cultures  YES  Reviewed most current radiology test results   YES  Review and summation of old records today    NO  Reviewed patient's current orders and MAR    YES  PMH/SH reviewed - no change compared to H&P          Current Facility-Administered Medications:     piperacillin-tazobactam (ZOSYN) 3.375 g in 0.9% sodium chloride (MBP/ADV) 100 mL MBP, 3.375 g, IntraVENous, Q8H, Gregor Shen MD, Last Rate: 25 mL/hr at 06/12/21 1627, 3.375 g at 06/12/21 1627    sodium chloride (NS) flush 5-40 mL, 5-40 mL, IntraVENous, Q8H, Tiff Dawn MD, 10 mL at 06/12/21 1345    sodium chloride (NS) flush 5-40 mL, 5-40 mL, IntraVENous, PRN, Din, Sheryle Rebel, MD    acetaminophen (TYLENOL) tablet 650 mg, 650 mg, Oral, Q6H PRN **OR** acetaminophen (TYLENOL) suppository 650 mg, 650 mg, Rectal, Q6H PRN, Din, Sheryle Rebel, MD    polyethylene glycol (MIRALAX) packet 17 g, 17 g, Oral, DAILY PRN, Din, Sheryle Rebel, MD    ondansetron (ZOFRAN ODT) tablet 4 mg, 4 mg, Oral, Q8H PRN **OR** ondansetron (ZOFRAN) injection 4 mg, 4 mg, IntraVENous, Q6H PRN, Din, Sheryle Rebel, MD    hydrALAZINE (APRESOLINE) 20 mg/mL injection 10 mg, 10 mg, IntraVENous, Q6H PRN, Tiff Dawn MD    glucose chewable tablet 16 g, 4 Tablet, Oral, PRN, Din, Sheryle Rebel, MD    dextrose (D50W) injection syrg 12.5-25 g, 12.5-25 g, IntraVENous, PRN, Din, Sheryle Rebel, MD    glucagon (GLUCAGEN) injection 1 mg, 1 mg, IntraMUSCular, PRN, Din, Sheryle Rebel, MD    dextrose 5% and 0.9% NaCl infusion, 75 mL/hr, IntraVENous, CONTINUOUS, Delfino Carpenter MD, Last Rate: 75 mL/hr at 06/12/21 0313, 75 mL/hr at 06/12/21 0313  ________________________________________________________________________  Care Plan discussed with:    Comments   Patient y    Family      RN y    Care Manager     Consultant                        Multidiciplinary team rounds were held today with , nursing, pharmacist and clinical coordinator. Patient's plan of care was discussed; medications were reviewed and discharge planning was addressed. ________________________________________________________________________  Total NON critical care TIME:  35    Minutes    Total CRITICAL CARE TIME Spent:   Minutes non procedure based      Comments   >50% of visit spent in counseling and coordination of care     ________________________________________________________________________  Darius Zarco MD     Procedures: see electronic medical records for all procedures/Xrays and details which were not copied into this note but were reviewed prior to creation of Plan. LABS:  I reviewed today's most current labs and imaging studies.   Pertinent labs include:  Recent Labs     06/12/21  0019 06/11/21  0124   WBC 6.1 10.1   HGB 8.8* 9.6*   HCT 29.6* 31.5*    182     Recent Labs     06/12/21  0019 06/11/21  0124    136   K 4.5 5.0   * 102   CO2 25 26   * 116*   BUN 62* 68*   CREA 1.79* 2.31*   CA 7.9* 8.1*   MG 1.8  --    ALB 2.5* 2.9*   TBILI 0.3 0.5   ALT 42 65   INR 1.1  --        Signed: Darius Zarco MD

## 2021-06-12 NOTE — PROGRESS NOTES
End of Shift Note    Bedside shift change report given to Marianna Dupont (oncoming nurse) by Nora Castillo RN (offgoing nurse).   Report included the following information Kardex, MAR and Recent Results    Shift worked:  7p-7a     Shift summary and any significant changes:     no significant changes   Concerns for physician to address:       Zone phone for oncoming shift:   [phone]

## 2021-06-13 LAB
ANION GAP SERPL CALC-SCNC: 4 MMOL/L (ref 5–15)
BUN SERPL-MCNC: 39 MG/DL (ref 6–20)
BUN/CREAT SERPL: 30 (ref 12–20)
CALCIUM SERPL-MCNC: 7.6 MG/DL (ref 8.5–10.1)
CHLORIDE SERPL-SCNC: 113 MMOL/L (ref 97–108)
CO2 SERPL-SCNC: 25 MMOL/L (ref 21–32)
CREAT SERPL-MCNC: 1.28 MG/DL (ref 0.55–1.02)
ERYTHROCYTE [DISTWIDTH] IN BLOOD BY AUTOMATED COUNT: 13.4 % (ref 11.5–14.5)
GLUCOSE SERPL-MCNC: 89 MG/DL (ref 65–100)
HCT VFR BLD AUTO: 28 % (ref 35–47)
HGB BLD-MCNC: 7.9 G/DL (ref 11.5–16)
MCH RBC QN AUTO: 26.5 PG (ref 26–34)
MCHC RBC AUTO-ENTMCNC: 28.2 G/DL (ref 30–36.5)
MCV RBC AUTO: 94 FL (ref 80–99)
NRBC # BLD: 0 K/UL (ref 0–0.01)
NRBC BLD-RTO: 0 PER 100 WBC
PLATELET # BLD AUTO: 165 K/UL (ref 150–400)
PMV BLD AUTO: 10 FL (ref 8.9–12.9)
POTASSIUM SERPL-SCNC: 3.8 MMOL/L (ref 3.5–5.1)
RBC # BLD AUTO: 2.98 M/UL (ref 3.8–5.2)
SODIUM SERPL-SCNC: 142 MMOL/L (ref 136–145)
WBC # BLD AUTO: 5.1 K/UL (ref 3.6–11)

## 2021-06-13 PROCEDURE — 74011000258 HC RX REV CODE- 258: Performed by: INTERNAL MEDICINE

## 2021-06-13 PROCEDURE — 65270000029 HC RM PRIVATE

## 2021-06-13 PROCEDURE — 99232 SBSQ HOSP IP/OBS MODERATE 35: CPT | Performed by: SURGERY

## 2021-06-13 PROCEDURE — 85027 COMPLETE CBC AUTOMATED: CPT

## 2021-06-13 PROCEDURE — 74011250636 HC RX REV CODE- 250/636: Performed by: INTERNAL MEDICINE

## 2021-06-13 PROCEDURE — 80048 BASIC METABOLIC PNL TOTAL CA: CPT

## 2021-06-13 PROCEDURE — 36415 COLL VENOUS BLD VENIPUNCTURE: CPT

## 2021-06-13 PROCEDURE — 74011250637 HC RX REV CODE- 250/637: Performed by: INTERNAL MEDICINE

## 2021-06-13 RX ADMIN — PIPERACILLIN AND TAZOBACTAM 3.38 G: 3; .375 INJECTION, POWDER, LYOPHILIZED, FOR SOLUTION INTRAVENOUS at 09:08

## 2021-06-13 RX ADMIN — PIPERACILLIN AND TAZOBACTAM 3.38 G: 3; .375 INJECTION, POWDER, LYOPHILIZED, FOR SOLUTION INTRAVENOUS at 02:03

## 2021-06-13 RX ADMIN — Medication 10 ML: at 21:03

## 2021-06-13 RX ADMIN — DEXTROSE AND SODIUM CHLORIDE 75 ML/HR: 5; 900 INJECTION, SOLUTION INTRAVENOUS at 16:06

## 2021-06-13 RX ADMIN — Medication 10 ML: at 05:02

## 2021-06-13 RX ADMIN — Medication 10 ML: at 16:06

## 2021-06-13 RX ADMIN — DILTIAZEM HYDROCHLORIDE 180 MG: 180 CAPSULE, COATED, EXTENDED RELEASE ORAL at 09:08

## 2021-06-13 RX ADMIN — PIPERACILLIN AND TAZOBACTAM 3.38 G: 3; .375 INJECTION, POWDER, LYOPHILIZED, FOR SOLUTION INTRAVENOUS at 16:06

## 2021-06-13 NOTE — PROGRESS NOTES
Admit Date: 6/10/2021    Subjective:     Patient has no complaints. + BM. Tolerating clears. Feels better. Objective:     Blood pressure 132/70, pulse 73, temperature 98.2 °F (36.8 °C), resp. rate 16, height 5' 7\" (1.702 m), weight 122 lb 9.2 oz (55.6 kg), SpO2 94 %. Temp (24hrs), Av.3 °F (36.8 °C), Min:98.2 °F (36.8 °C), Max:98.4 °F (36.9 °C)      Physical Exam:  GENERAL: alert, cooperative, no distress, appears stated age, LUNG: clear to auscultation bilaterally, HEART: regular rate and rhythm, ABDOMEN: soft, non-tender. Bowel sounds normal. No masses,  no organomegaly, EXTREMITIES:  extremities normal, atraumatic, no cyanosis or edema    Labs:   Recent Results (from the past 24 hour(s))   CBC W/O DIFF    Collection Time: 21  2:51 AM   Result Value Ref Range    WBC 5.1 3.6 - 11.0 K/uL    RBC 2.98 (L) 3.80 - 5.20 M/uL    HGB 7.9 (L) 11.5 - 16.0 g/dL    HCT 28.0 (L) 35.0 - 47.0 %    MCV 94.0 80.0 - 99.0 FL    MCH 26.5 26.0 - 34.0 PG    MCHC 28.2 (L) 30.0 - 36.5 g/dL    RDW 13.4 11.5 - 14.5 %    PLATELET 955 595 - 166 K/uL    MPV 10.0 8.9 - 12.9 FL    NRBC 0.0 0  WBC    ABSOLUTE NRBC 0.00 0.00 - 2.70 K/uL   METABOLIC PANEL, BASIC    Collection Time: 21  2:51 AM   Result Value Ref Range    Sodium 142 136 - 145 mmol/L    Potassium 3.8 3.5 - 5.1 mmol/L    Chloride 113 (H) 97 - 108 mmol/L    CO2 25 21 - 32 mmol/L    Anion gap 4 (L) 5 - 15 mmol/L    Glucose 89 65 - 100 mg/dL    BUN 39 (H) 6 - 20 MG/DL    Creatinine 1.28 (H) 0.55 - 1.02 MG/DL    BUN/Creatinine ratio 30 (H) 12 - 20      GFR est AA 48 (L) >60 ml/min/1.73m2    GFR est non-AA 39 (L) >60 ml/min/1.73m2    Calcium 7.6 (L) 8.5 - 10.1 MG/DL       Data Review images and reports reviewed    Assessment:     Active Problems:     Aortic aneurysm ([facility] (2017)      SBO (small bowel obstruction) ([facility] [address] (2021)      Pneumonia (2021)      Moderate protein-calorie malnutrition ([facility] [address] (2021)      Functional diarrhea (6/11/2021)      Poor appetite (6/11/2021)      Counseling regarding advance care planning and goals of care (6/11/2021)      Do not resuscitate status (6/11/2021)        Plan/Recommendations/Medical Decision Making:      Nae Clear liquid diet  Advance to low fiber diet  SBO resolved    Doris Messer MD  [id number] [facility]

## 2021-06-13 NOTE — PROGRESS NOTES
End of Shift Note    Bedside shift change report given to Patricia (oncoming nurse) by Adolfo Crystal (offgoing nurse).   Report included the following information SBAR, Kardex, Intake/Output, MAR, Accordion and Recent Results    Shift worked:  7A - 7 P     Shift summary and any significant changes:     2 small bm's; no other significant changes     Concerns for physician to address:       Zone phone for oncoming shift:   [phone] - unit         Adolfo Crystal

## 2021-06-13 NOTE — PROGRESS NOTES
Hospitalist Progress Note    NAME: Debra Cruz   :  1934   MRN:  [id number]       Assessment / Plan:  Small bowel obstruction  -S/p NG tube with improvement of ileus  -Continue clear liquid diet. Tolerating well. Stop IV fluids  -Appreciate help from Dr. Lewis Fisher      Suspected pneumonia  -Continue Zosyn. Blood cultures negative    Urinary tract infection ruled out  -Urine culture negative. Acute kidney injury  -Creatinine is back to baseline with IV fluids    HTN  Dyslipidemia  -Continue home Cardizem    5 cm Thoracic AAA  -plan per vascular surgery    AAA   - s/p EVAR    - aortoiliac duplex on 2020 shows residual sac measuring 3.7cm, no endoleak    Hx of Bladder cancer  -has urostomy bag    KRISTEN : . Body mass index is 19.2 kg/m². Code status: DNR  Prophylaxis: Lovenox  Recommended Disposition: Home w/Family     Subjective:     Chief Complaint / Reason for Physician Visit  She reports feeling much better. No abdominal pain. No nausea or vomiting  Tolerating liquid diet    Review of Systems:  Symptom Y/N Comments  Symptom Y/N Comments   Fever/Chills    Chest Pain     Poor Appetite    Edema     Cough    Abdominal Pain     Sputum    Joint Pain     SOB/DE [place] SANTOS    Pruritis/Rash     Nausea/vomit    Tolerating PT/OT     Diarrhea    Tolerating Diet     Constipation    Other       Could NOT obtain due to:      Objective:     VITALS:   Last 24hrs VS reviewed since prior progress note. Most recent are:  Patient Vitals for the past 24 hrs:   Temp Pulse Resp BP SpO2   21 1521 97.7 °F (36.5 °C) 74 16 133/70 97 %   21 0811 98.2 °F (36.8 °C) 73 16 132/70 94 %   21 2240 98.2 °F (36.8 °C) 76 15 133/69 96 %       Intake/Output Summary (Last 24 hours) at 2021 1822  Last data filed at 2021 1700  Gross per 24 hour   Intake 1520 ml   Output 1200 ml   Net 320 ml        PHYSICAL EXAM:  General: cooperative, no acute distress    EENT:  EOMI. Anicteric sclerae.  MMM  Resp:  CTA bilaterally, no wheezing or rales. No accessory muscle use  CV:  Regular  rhythm,  No edema  GI:  Soft, Non distended, Non tender. Bowel sounds improving  Neurologic:  Alert and awake,, normal speech,   Psych:   Not anxious nor agitated  Skin:  No rashes.   No jaundice    Reviewed most current lab test results and cultures  YES  Reviewed most current radiology test results   YES  Review and summation of old records today    NO  Reviewed patient's current orders and MAR    YES  PMH/SH reviewed - no change compared to H&P          Current Facility-Administered Medications:     piperacillin-tazobactam (ZOSYN) 3.375 g in 0.9% sodium chloride (MBP/ADV) 100 mL MBP, 3.375 g, IntraVENous, Q8H, Gregor Shen MD, Last Rate: 25 mL/hr at 06/13/21 1606, 3.375 g at 06/13/21 1606    dilTIAZem ER (CARDIZEM CD) capsule 180 mg, 180 mg, Oral, DAILY, Piedmont McDuffieGregor MD, 180 mg at 06/13/21 0908    sodium chloride (NS) flush 5-40 mL, 5-40 mL, IntraVENous, Q8H, Tiff Dawn MD, 10 mL at 06/13/21 1606    sodium chloride (NS) flush 5-40 mL, 5-40 mL, IntraVENous, PRN, Jason Dawn MD    acetaminophen (TYLENOL) tablet 650 mg, 650 mg, Oral, Q6H PRN **OR** acetaminophen (TYLENOL) suppository 650 mg, 650 mg, Rectal, Q6H PRN, Jason Dawn MD    polyethylene glycol (MIRALAX) packet 17 g, 17 g, Oral, DAILY PRN, Jason Dawn MD    ondansetron (ZOFRAN ODT) tablet 4 mg, 4 mg, Oral, Q8H PRN **OR** ondansetron (ZOFRAN) injection 4 mg, 4 mg, IntraVENous, Q6H PRN, Jason Dawn MD    hydrALAZINE (APRESOLINE) 20 mg/mL injection 10 mg, 10 mg, IntraVENous, Q6H PRN, Tiff Dawn MD    glucose chewable tablet 16 g, 4 Tablet, Oral, PRN, Din, Nizam U, MD    dextrose (D50W) injection syrg 12.5-25 g, 12.5-25 g, IntraVENous, PRN, López, Jason Michel MD    glucagon (GLUCAGEN) injection 1 mg, 1 mg, IntraMUSCular, PRN, López, Tiff GUAJARDO MD    dextrose 5% and 0.9% NaCl infusion, 75 mL/hr, IntraVENous, CONTINUOUS, Maxwell Shen MD, Last Rate: 75 mL/hr at 06/13/21 1606, 75 mL/hr at 06/13/21 1606  ________________________________________________________________________  Care Plan discussed with:    Comments   Patient y    Family      RN y    Care Manager     Consultant                        Multidiciplinary team rounds were held today with , nursing, pharmacist and clinical coordinator. Patient's plan of care was discussed; medications were reviewed and discharge planning was addressed. ________________________________________________________________________  Total NON critical care TIME:  35    Minutes    Total CRITICAL CARE TIME Spent:   Minutes non procedure based      Comments   >50% of visit spent in counseling and coordination of care     ________________________________________________________________________  Diego Tyler MD     Procedures: see electronic medical records for all procedures/Xrays and details which were not copied into this note but were reviewed prior to creation of Plan. LABS:  I reviewed today's most current labs and imaging studies.   Pertinent labs include:  Recent Labs     06/13/21 0251 06/12/21  0019 06/11/21  0124   WBC 5.1 6.1 10.1   HGB 7.9* 8.8* 9.6*   HCT 28.0* 29.6* 31.5*    194 182     Recent Labs     06/13/21 0251 06/12/21  0019 06/11/21  0124    142 136   K 3.8 4.5 5.0   * 111* 102   CO2 25 25 26   GLU 89 106* 116*   BUN 39* 62* 68*   CREA 1.28* 1.79* 2.31*   CA 7.6* 7.9* 8.1*   MG  --  1.8  --    ALB  --  2.5* 2.9*   TBILI  --  0.3 0.5   ALT  --  42 65   INR  --  1.1  --        Signed: Diego Tyler MD

## 2021-06-13 NOTE — PROGRESS NOTES
Problem: Pressure Injury - Risk of  Goal: *Prevention of pressure injury  Description: Document Adam Scale and appropriate interventions in the flowsheet. Outcome: Progressing Towards Goal  Note: Pressure Injury Interventions:  Sensory Interventions: Avoid rigorous massage over bony prominences, Float heels, Keep linens dry and wrinkle-free, Minimize linen layers, Monitor skin under medical devices, Pad between skin to skin, Pressure redistribution bed/mattress (bed type), Turn and reposition approx. every two hours (pillows and wedges if needed)    Moisture Interventions: Absorbent underpads, Apply protective barrier, creams and emollients, Internal/External urinary devices, Maintain skin hydration (lotion/cream), Minimize layers, Moisture barrier    Activity Interventions: Increase time out of bed, Pressure redistribution bed/mattress(bed type)    Mobility Interventions: Float heels, HOB 30 degrees or less, Pressure redistribution bed/mattress (bed type), Turn and reposition approx.  every two hours(pillow and wedges)    Nutrition Interventions: Document food/fluid/supplement intake, Offer support with meals,snacks and hydration

## 2021-06-13 NOTE — PROGRESS NOTES
Problem: Pressure Injury - Risk of  Goal: *Prevention of pressure injury  Description: Document Adam Scale and appropriate interventions in the flowsheet. Outcome: Progressing Towards Goal  Note: Pressure Injury Interventions:  Sensory Interventions: Float heels, Turn and reposition approx. every two hours (pillows and wedges if needed)    Moisture Interventions: Absorbent underpads, Apply protective barrier, creams and emollients, Assess need for specialty bed    Activity Interventions: Increase time out of bed    Mobility Interventions: Float heels, Pressure redistribution bed/mattress (bed type)    Nutrition Interventions: Document food/fluid/supplement intake                     Problem: Falls - Risk of  Goal: *Absence of Falls  Description: Document Tavo Fall Risk and appropriate interventions in the flowsheet.   Outcome: Progressing Towards Goal  Note: Fall Risk Interventions:  Mobility Interventions: Bed/chair exit alarm, Patient to call before getting OOB, Utilize walker, cane, or other assistive device         Medication Interventions: Patient to call before getting OOB, Bed/chair exit alarm, Teach patient to arise slowly    Elimination Interventions: Call light in reach, Bed/chair exit alarm, Patient to call for help with toileting needs, Toileting schedule/hourly rounds    History of Falls Interventions: Bed/chair exit alarm, Room close to nurse's station

## 2021-06-13 NOTE — PROGRESS NOTES
Bedside and Verbal shift change report given to Dada May (oncoming nurse) by Jayde Sifuentes (offgoing nurse). Report included the following information SBAR, Kardex, Intake/Output, MAR and Recent Results.

## 2021-06-14 VITALS
DIASTOLIC BLOOD PRESSURE: 72 MMHG | TEMPERATURE: 97.9 F | RESPIRATION RATE: 16 BRPM | SYSTOLIC BLOOD PRESSURE: 132 MMHG | WEIGHT: 122.58 LBS | HEART RATE: 71 BPM | OXYGEN SATURATION: 96 % | HEIGHT: 67 IN | BODY MASS INDEX: 19.24 KG/M2

## 2021-06-14 LAB
ANION GAP SERPL CALC-SCNC: 4 MMOL/L (ref 5–15)
BUN SERPL-MCNC: 27 MG/DL (ref 6–20)
BUN/CREAT SERPL: 23 (ref 12–20)
CALCIUM SERPL-MCNC: 7.5 MG/DL (ref 8.5–10.1)
CHLORIDE SERPL-SCNC: 113 MMOL/L (ref 97–108)
CO2 SERPL-SCNC: 26 MMOL/L (ref 21–32)
CREAT SERPL-MCNC: 1.18 MG/DL (ref 0.55–1.02)
ERYTHROCYTE [DISTWIDTH] IN BLOOD BY AUTOMATED COUNT: 13.3 % (ref 11.5–14.5)
GLUCOSE SERPL-MCNC: 118 MG/DL (ref 65–100)
HCT VFR BLD AUTO: 27 % (ref 35–47)
HGB BLD-MCNC: 7.7 G/DL (ref 11.5–16)
MCH RBC QN AUTO: 26.6 PG (ref 26–34)
MCHC RBC AUTO-ENTMCNC: 28.5 G/DL (ref 30–36.5)
MCV RBC AUTO: 93.4 FL (ref 80–99)
NRBC # BLD: 0 K/UL (ref 0–0.01)
NRBC BLD-RTO: 0 PER 100 WBC
PLATELET # BLD AUTO: 179 K/UL (ref 150–400)
PMV BLD AUTO: 9.9 FL (ref 8.9–12.9)
POTASSIUM SERPL-SCNC: 3.8 MMOL/L (ref 3.5–5.1)
RBC # BLD AUTO: 2.89 M/UL (ref 3.8–5.2)
SODIUM SERPL-SCNC: 143 MMOL/L (ref 136–145)
WBC # BLD AUTO: 5.1 K/UL (ref 3.6–11)

## 2021-06-14 PROCEDURE — 36415 COLL VENOUS BLD VENIPUNCTURE: CPT

## 2021-06-14 PROCEDURE — 97116 GAIT TRAINING THERAPY: CPT

## 2021-06-14 PROCEDURE — 74011250636 HC RX REV CODE- 250/636: Performed by: INTERNAL MEDICINE

## 2021-06-14 PROCEDURE — 85027 COMPLETE CBC AUTOMATED: CPT

## 2021-06-14 PROCEDURE — 74011250637 HC RX REV CODE- 250/637: Performed by: INTERNAL MEDICINE

## 2021-06-14 PROCEDURE — 97535 SELF CARE MNGMENT TRAINING: CPT | Performed by: OCCUPATIONAL THERAPIST

## 2021-06-14 PROCEDURE — 97165 OT EVAL LOW COMPLEX 30 MIN: CPT | Performed by: OCCUPATIONAL THERAPIST

## 2021-06-14 PROCEDURE — 80048 BASIC METABOLIC PNL TOTAL CA: CPT

## 2021-06-14 PROCEDURE — 99232 SBSQ HOSP IP/OBS MODERATE 35: CPT | Performed by: NURSE PRACTITIONER

## 2021-06-14 PROCEDURE — 99232 SBSQ HOSP IP/OBS MODERATE 35: CPT | Performed by: SURGERY

## 2021-06-14 PROCEDURE — 74011000258 HC RX REV CODE- 258: Performed by: INTERNAL MEDICINE

## 2021-06-14 PROCEDURE — 97161 PT EVAL LOW COMPLEX 20 MIN: CPT

## 2021-06-14 RX ORDER — AMOXICILLIN AND CLAVULANATE POTASSIUM 875; 125 MG/1; MG/1
1 TABLET, FILM COATED ORAL EVERY 12 HOURS
Qty: 6 TABLET | Refills: 0 | Status: SHIPPED | OUTPATIENT
Start: 2021-06-14 | End: 2021-06-17

## 2021-06-14 RX ORDER — AZITHROMYCIN 500 MG/1
500 TABLET, FILM COATED ORAL DAILY
Qty: 3 TABLET | Refills: 0 | Status: SHIPPED | OUTPATIENT
Start: 2021-06-14 | End: 2021-06-17

## 2021-06-14 RX ADMIN — PIPERACILLIN AND TAZOBACTAM 3.38 G: 3; .375 INJECTION, POWDER, LYOPHILIZED, FOR SOLUTION INTRAVENOUS at 01:24

## 2021-06-14 RX ADMIN — Medication 10 ML: at 05:36

## 2021-06-14 RX ADMIN — PIPERACILLIN AND TAZOBACTAM 3.38 G: 3; .375 INJECTION, POWDER, LYOPHILIZED, FOR SOLUTION INTRAVENOUS at 09:20

## 2021-06-14 RX ADMIN — DILTIAZEM HYDROCHLORIDE 180 MG: 180 CAPSULE, COATED, EXTENDED RELEASE ORAL at 09:19

## 2021-06-14 NOTE — PROGRESS NOTES
Music Therapy Assessment  [facility] [id number]     1934  80 y.o.  female    Patient Telephone Number: [phone] (home)   Mormonism Affiliation: [employer]   Language: English   Patient Active Problem List    Diagnosis Date Noted    SBO (small bowel obstruction) ([facility] 06/11/2021    Pneumonia 06/11/2021    Moderate protein-calorie malnutrition ([facility] 06/11/2021    Functional diarrhea 06/11/2021    Poor appetite 06/11/2021    Counseling regarding advance care planning and goals of care 06/11/2021    Do not resuscitate status 06/11/2021    Aortic aneurysm ([facility] 09/26/2017    Transitional cell bladder cancer ([facility] 07/25/2017        Date: 6/14/2021            Total Time (in minutes): 6          MRM 3 MED TELE    Mental Status:   [x] Alert [  ] Annitta Jakes [  ]  Confused  [  ] Minimally responsive  [  ] Sleeping    Communication Status: [  ] Impaired Speech [  ] Nonverbal -N/A    Physical Status:   [  ] Oxygen in use  [  ] Hard of Hearing [  ] Vision Impaired  [  ] Ambulatory  [  ] Ambulatory with assistance [  ] Non-ambulatory -N/A    Music Preferences, Background: N/A: Please See Session Observations Below    Clinical Problem addressed: N/A: Please See Session Observations Below     Goal(s) met in session: N/A: Please See Session Observations Below   Physical/Pain management (Scale of 1-10):    Pre-session rating: Pt denied pain  Post-session rating: N/A  [  ] Increased relaxation   [  ] Affected breathing patterns  [  ] Decreased muscle tension   [  ] Decreased agitation  [  ] Affected heart rate    [  ] Increased alertness     Emotional/Psychological:  [  ] Increased self-expression   [  ] Decreased aggressive behavior   [  ] Decreased feelings of stress  [  ] Discussed healthy coping skills     [  ] Improved mood    [  ] Decreased withdrawn behavior     Social:  [  ] Decreased feelings of isolation/loneliness [x] Positive social interaction   [  ] Provided support and/or comfort for family/friends    Spiritual:  [  ] Spiritual support    [  ] Expressed peace  [  ] Expressed eulalio    [  ] Discussed beliefs    Techniques Utilized (Check all that apply): N/A: Please See Session Observations Below   [  ] Procedural support MT [  ] Music for relaxation [  ] Patient preferred music  [  ] Cristina analysis  [  ] Murrel Baba choice  [  ] Music for validation  [  ] Entrainment  [  ] Movement to music [  ] Guided visualization  [  ] Alise Muse  [  ] Patient instrument playing [  ] Murrel Baba writing  [  ] Radha Room along   [  ] Ival Sers  [  ] Sensory stimulation  [  ] Active Listening  [  ] Music for spiritual support [  ] Making of CDs as gifts    Session Observations:  Referred by Nikolas Hull Palliative NP. Patient (pt) was alert, lying in bed with her cousins sitting at bedside. This Music Therapist Eligible (MTE) introduced self and asked the pt how she was feeling. She smiled and shared she was feeling really good. MTE mentioned her discharge and she expressed how much she was looking forward to it. MTE shared about music therapy and asked if she would be interested in trying a music therapy session. She declined this, saying she wanted to focus on her discharge. MTE expressed understanding and pt thanked the MTE for stopping by. MTE exited exited at this time.      Mike Jovel, Music Therapist Eligible   Spiritual Care Department

## 2021-06-14 NOTE — PROGRESS NOTES
Palliative Medicine Consult  Mark: [phone] ([phone])    Patient Name: Bebe Alaniz  YOB: 1934    Date of Initial Consult: 6/11/21  Reason for Consult: care decisions: palliative care assess realistic goals of care and recovery issues if needs surgery abdomen  Requesting Provider: Jazmin Zuniga MD  Primary Care Physician: Clari Oakley NP     SUMMARY:   Bebe Alaniz is a 80 y.o. with a past history of of bladder cancer with ileal conduit 4-5 years ago, AAA s/p repair, lap cholecystectomy, CKD, who was transferred on 6/10/2021 from [facility] with a diagnosis of SBO. Current medical issues leading to Palliative Medicine involvement include: elderly, frail, malnourished, renal insufficiency increases her risk for poor surgical outcomes. HPI:  6/10:  Pt presented to OSH with c/o abdominal pain x 3 weeks with associated vomiting and diarrhea, which got worse prompting her to come to ED. Labs from OSH revealed bacteruria, normal wbc and lactate, cr elevated at 2.2, mild transaminitis, and CT that revealed possible PNA, aortic ectasia measuring 5 cm, multiple fluid-filled bowel loops consistent with SBO. NGT placed at OSH, draining bilious drainage. 6/11: will need Gastrografin contrast to assess for mechanical vs functional SBO which will determine if pt will need surgical intervention. Will re-assess Monday. 6/14: improving without invasive interventions, now on GI light diet and progressing well. Psychosocial: lives alone, has a cousin Rae Duncan [phone] who lives across the street from her and assists with cooking and driving; pt is independent with ADLs and pays her own bills, shops but does not drive. ,  Beth Koch  [phone]. PALLIATIVE DIAGNOSES:   1. Abdominal pain  2. Vomiting   3. Diarrhea   4. Poor appetite  5. Malnutrition   6. UTI  7. PNA:  CXR bibasilar infiltrates suspicious for PNA. 8. SBO: high grade  9.  Acute on chronic kidney failure  10. Care decisions  11. Code status   PLAN:   1. Prior to visit, I completed a review of patient's medical records, including medical documentation, vital signs, MARs, and results of various labs and other diagnostics. 2. Per the request of pt's primary mPOA Chancegokul Grey, I reviewed pt's choice of secondary mPOA (neighbor Arabella Cerrato Try): after much discussion, pt states she chose Arabella Cerrato because he lived close by. We discussed importance of mPOA being aware of pt's medical history and if something happens to her medically what her wishes would be, she agreed that Arabella Cerrato would not be a good choice and decided to change her choice of secondary mPOA to her cousin Blayne Brambila, who takes her to her Dr. Tegan Stovall and is aware of her medical history and what her wishes would be. She asked that her stepdaughter Tonio Croft be added as a 3rd POA. I will update this information in the chart once we have obtained Jayme Rai phone number. 3. Initial consult note routed to primary continuity provider and/or primary health care team members  4.  Communicated plan of care with: Palliative IDT, Maria Giit [facility] Team     GOALS OF CARE / TREATMENT PREFERENCES:     GOALS OF CARE:  Patient/Health Care Proxy Stated Goals: Prolong life    TREATMENT PREFERENCES:   Code Status: DNR    Advance Care Planning:  [x] [facility] Interdisciplinary Team has updated the ACP Navigator with Health Care Decision Maker and Patient Capacity      Primary Decision Maker: Sarah Butterfield - Other Relative - [phone]  Advance Care Planning 6/11/2021   Patient's Healthcare Decision Maker is: Named in scanned ACP document   Primary Decision Maker Name -   Primary Decision Maker Phone Number -   Primary Decision Maker Relationship to Patient -   Confirm Advance Directive Yes, on file   Patient Would Like to Complete Advance Directive -   Does the patient have other document types MOST/MOLST/POST/POLST     Medical Interventions: Limited additional interventions       Artificially Administered Nutrition: No feeding tube     Other:    As far as possible, the palliative care team has discussed with patient / health care proxy about goals of care / treatment preferences for patient. HISTORY:     History obtained from: chart, patient    CHIEF COMPLAINT: abdominal pain and vomiting    HPI/SUBJECTIVE:    The patient is:   [x] Verbal and participatory  [] Non-participatory due to:     Clinical Pain Assessment (nonverbal scale for severity on nonverbal patients):   Clinical Pain Assessment  Severity: 0     Activity (Movement): Lying quietly, normal position    Duration: for how long has pt been experiencing pain (e.g., 2 days, 1 month, years)  Frequency: how often pain is an issue (e.g., several times per day, once every few days, constant)     FUNCTIONAL ASSESSMENT:     Palliative Performance Scale (PPS):  PPS: 20       PSYCHOSOCIAL/SPIRITUAL SCREENING:     Palliative IDT has assessed this patient for cultural preferences / practices and a referral made as appropriate to needs (Cultural Services, Patient Advocacy, Ethics, etc.)    Any spiritual / Mormonism concerns:  [] Yes /  [x] No    Caregiver Burnout:  [] Yes /  [x] No /  [] No Caregiver Present      Anticipatory grief assessment:   [x] Normal  / [] Maladaptive       ESAS Anxiety: Anxiety: 0    ESAS Depression: Depression: 0        REVIEW OF SYSTEMS:     Positive and pertinent negative findings in ROS are noted above in HPI. The following systems were [x] reviewed / [] unable to be reviewed as noted in HPI  Other findings are noted below. Systems: constitutional, ears/nose/mouth/throat, respiratory, gastrointestinal, genitourinary, musculoskeletal, integumentary, neurologic, psychiatric, endocrine. Positive findings noted below.   Modified ESAS Completed by: provider   Fatigue: 5 Drowsiness: 0   Depression: 0 Pain: 0   Anxiety: 0 Nausea: 1     Dyspnea: 0           Stool Occurrence(s): 1        PHYSICAL EXAM:     From RN flowsheet:  Wt Readings from Last 3 Encounters:   06/10/21 122 lb 9.2 oz (55.6 kg)   06/20/19 121 lb 9.6 oz (55.2 kg)   06/04/19 120 lb 2.4 oz (54.5 kg)     Blood pressure 132/72, pulse 71, temperature 97.9 °F (36.6 °C), resp. rate 16, height 5' 7\" (1.702 m), weight 122 lb 9.2 oz (55.6 kg), SpO2 96 %. Pain Scale 1: Numeric (0 - 10)  Pain Intensity 1: 0                 Last bowel movement, if known:     Constitutional: elderly, frail, AAOx3, pleasant and cooperative  Eyes: pupils equal, anicteric  ENMT: no nasal discharge, dry mucous membranes, NGT clamped  Cardiovascular: regular rhythm, distal pulses intact  Respiratory: breathing not labored, symmetric  Gastrointestinal: soft non-tender, +bowel sounds  Musculoskeletal: no deformity, no tenderness to palpation  Skin: warm, dry  Neurologic: following commands, moving all extremities  Psychiatric: full affect, no hallucinations          HISTORY:     Active Problems:     Aortic aneurysm ([address] (9/26/2017)      SBO (small bowel obstruction) ([address] (6/11/2021)      Pneumonia (6/11/2021)      Moderate protein-calorie malnutrition ([address] (6/11/2021)      Functional diarrhea (6/11/2021)      Poor appetite (6/11/2021)      Counseling regarding advance care planning and goals of care (6/11/2021)      Do not resuscitate status (6/11/2021)      Past Medical History:   Diagnosis Date    Aneurysm ([address]     abdominal (pt not sure of blood vessel) repaired    Arthritis     hands    Cancer ([address]     bladder     Diabetes ([address]     oral hypoglycemics    Hypertension     Ill-defined condition     high cholesterol    Menopause       Past Surgical History:   Procedure Laterality Date    HX CATARACT REMOVAL      bilateral    HX CHOLECYSTECTOMY  06/04/2019    lap cindy with IOC    HX GYN      hysterectomy    HX HEENT      Lasik on right    HX UROLOGICAL  2017    resection of bladder tumor    [facility] 2017    aneurysm repair    HI BREAST SURGERY PROCEDURE UNLISTED        Family History   Problem Relation Age of Onset    Other Mother         abdominal aneurysm    Cancer Sister         lung    Lung Disease Brother         asbestos    Stroke Sister     Stroke Sister     Stroke Sister     Other Sister         brain aneurysm    No Known Problems Sister     Lung Disease Brother         asbestos    Heart Disease Brother         pacemaker      History reviewed, no pertinent family history.   Social History     Tobacco Use    Smoking status: Former Smoker     Packs/day: 0.25     Years: 5.00     Pack years: 1.25     Quit date:      Years since quittin.4    Smokeless tobacco: Never Used   Substance Use Topics    Alcohol use: No     No Known Allergies   Current Facility-Administered Medications   Medication Dose Route Frequency    piperacillin-tazobactam (ZOSYN) 3.375 g in 0.9% sodium chloride (MBP/ADV) 100 mL MBP  3.375 g IntraVENous Q8H    dilTIAZem ER (CARDIZEM CD) capsule 180 mg  180 mg Oral DAILY    sodium chloride (NS) flush 5-40 mL  5-40 mL IntraVENous Q8H    sodium chloride (NS) flush 5-40 mL  5-40 mL IntraVENous PRN    acetaminophen (TYLENOL) tablet 650 mg  650 mg Oral Q6H PRN    Or    acetaminophen (TYLENOL) suppository 650 mg  650 mg Rectal Q6H PRN    polyethylene glycol (MIRALAX) packet 17 g  17 g Oral DAILY PRN    ondansetron (ZOFRAN ODT) tablet 4 mg  4 mg Oral Q8H PRN    Or    ondansetron (ZOFRAN) injection 4 mg  4 mg IntraVENous Q6H PRN    hydrALAZINE (APRESOLINE) 20 mg/mL injection 10 mg  10 mg IntraVENous Q6H PRN    glucose chewable tablet 16 g  4 Tablet Oral PRN    dextrose (D50W) injection syrg 12.5-25 g  12.5-25 g IntraVENous PRN    glucagon (GLUCAGEN) injection 1 mg  1 mg IntraMUSCular PRN          LAB AND IMAGING FINDINGS:     Lab Results   Component Value Date/Time    WBC 5.1 2021 01:23 AM    HGB 7.7 (L) 2021 01:23 AM    PLATELET 713  01:23 AM Lab Results   Component Value Date/Time    Sodium 143 06/14/2021 01:23 AM    Potassium 3.8 06/14/2021 01:23 AM    Chloride 113 (H) 06/14/2021 01:23 AM    CO2 26 06/14/2021 01:23 AM    BUN 27 (H) 06/14/2021 01:23 AM    Creatinine 1.18 (H) 06/14/2021 01:23 AM    Calcium 7.5 (L) 06/14/2021 01:23 AM    Magnesium 1.8 06/12/2021 12:19 AM      Lab Results   Component Value Date/Time    Alk. phosphatase 89 06/12/2021 12:19 AM    Protein, total 5.9 (L) 06/12/2021 12:19 AM    Albumin 2.5 (L) 06/12/2021 12:19 AM    Globulin 3.4 06/12/2021 12:19 AM     Lab Results   Component Value Date/Time    INR 1.1 06/12/2021 12:19 AM    Prothrombin time 11.1 06/12/2021 12:19 AM    aPTT 23.4 06/12/2021 12:19 AM      No results found for: IRON, FE, TIBC, IBCT, PSAT, FERR   No results found for: PH, PCO2, PO2  No components found for: GLPOC   No results found for: CPK, CKMB             Total time: 35  Counseling / coordination time, spent as noted above: 20  > 50% counseling / coordination?: y    Prolonged service was provided for  []30 min   []75 min in face to face time in the presence of the patient, spent as noted above. Time Start:   Time End:   Note: this can only be billed with 92259 (initial) or 92633 (follow up). If multiple start / stop times, list each separately.

## 2021-06-14 NOTE — PROGRESS NOTES
Admit Date: 6/10/2021    Subjective:     Patient has no complaints. + BM. Tolerating GI lite diet. Feels better. Objective:     Blood pressure 132/72, pulse 71, temperature 97.9 °F (36.6 °C), resp. rate 16, height 5' 7\" (1.702 m), weight 122 lb 9.2 oz (55.6 kg), SpO2 96 %. Temp (24hrs), Av.9 °F (36.6 °C), Min:97.7 °F (36.5 °C), Max:98 °F (36.7 °C)      Physical Exam:  GENERAL: alert, cooperative, no distress, appears stated age, LUNG: clear to auscultation bilaterally, HEART: regular rate and rhythm, ABDOMEN: soft, non-tender. Bowel sounds normal. No masses,  no organomegaly, EXTREMITIES:  extremities normal, atraumatic, no cyanosis or edema    Labs:   Recent Results (from the past 24 hour(s))   CBC W/O DIFF    Collection Time: 21  1:23 AM   Result Value Ref Range    WBC 5.1 3.6 - 11.0 K/uL    RBC 2.89 (L) 3.80 - 5.20 M/uL    HGB 7.7 (L) 11.5 - 16.0 g/dL    HCT 27.0 (L) 35.0 - 47.0 %    MCV 93.4 80.0 - 99.0 FL    MCH 26.6 26.0 - 34.0 PG    MCHC 28.5 (L) 30.0 - 36.5 g/dL    RDW 13.3 11.5 - 14.5 %    PLATELET 772 581 - 569 K/uL    MPV 9.9 8.9 - 12.9 FL    NRBC 0.0 0  WBC    ABSOLUTE NRBC 0.00 0.00 - 7.14 K/uL   METABOLIC PANEL, BASIC    Collection Time: 21  1:23 AM   Result Value Ref Range    Sodium 143 136 - 145 mmol/L    Potassium 3.8 3.5 - 5.1 mmol/L    Chloride 113 (H) 97 - 108 mmol/L    CO2 26 21 - 32 mmol/L    Anion gap 4 (L) 5 - 15 mmol/L    Glucose 118 (H) 65 - 100 mg/dL    BUN 27 (H) 6 - 20 MG/DL    Creatinine 1.18 (H) 0.55 - 1.02 MG/DL    BUN/Creatinine ratio 23 (H) 12 - 20      GFR est AA 53 (L) >60 ml/min/1.73m2    GFR est non-AA 43 (L) >60 ml/min/1.73m2    Calcium 7.5 (L) 8.5 - 10.1 MG/DL       Data Review images and reports reviewed    Assessment:     Active Problems:     Aortic [facility] ([facility] (2017)      SBO (small bowel obstruction) ([facility] (2021)      Pneumonia (2021)      Moderate protein-calorie malnutrition ([address] (2021)      Functional diarrhea (6/11/2021)      Poor appetite (6/11/2021)      Counseling regarding advance care planning and goals of care (6/11/2021)      Do not resuscitate status (6/11/2021)        Plan/Recommendations/Medical Decision Making:     SBO resolved  Will sign off.     Pauline Matta MD  ED [facility]

## 2021-06-14 NOTE — PROGRESS NOTES
Patient discharged home with family. Discharge instructions reviewed and copy of discharge instructions, given to patient prior to discharge. IV removed. Patient send to discharge lobby via wheelchair. All cabinets checked for patient belongings and given to patient.

## 2021-06-14 NOTE — PROGRESS NOTES
Vascular Surgery Progress Note  aKvita Ramon ACNP-BC  6/14/2021       Subjective:     Ms. Shannon Haile has a pmhx significant for HTN, bladder cancer, CKD, and DM. She is s/p EVAR in 2017 w/ Dr. Sander Torres. She is admitted to the hospital w/ SBO and PNA. On arrival her creatinine was elevated. A CTA from an OSH was significant for a TAAA. We were asked to evaluate. Nursing Data:     Patient Vitals for the past 24 hrs:   BP Temp Pulse Resp SpO2   06/14/21 0733 132/72 97.9 °F (36.6 °C) 71 16 96 %   06/13/21 2235 126/68 98 °F (36.7 °C) 76 16 98 %       Intake/Output Summary (Last 24 hours) at 6/14/2021 1614  Last data filed at 6/14/2021 0920  Gross per 24 hour   Intake 500 ml   Output 2700 ml   Net -2200 ml       Exam:     Physical Exam  HENT:      Head: Normocephalic. Nose: Nose normal.      Mouth/Throat:      Mouth: Mucous membranes are moist.   Eyes:      Pupils: Pupils are equal, round, and reactive to light. Cardiovascular:      Rate and Rhythm: Normal rate and regular rhythm. Comments: Feet are warm and perfused. Pulmonary:      Effort: Pulmonary effort is normal. No respiratory distress. Abdominal:      General: Abdomen is flat. There is no distension. Musculoskeletal:         General: Normal range of motion. Cervical back: Normal range of motion. Skin:     General: Skin is warm. Neurological:      General: No focal deficit present. Mental Status: She is alert. Mental status is at baseline. Psychiatric:         Mood and Affect: Mood normal.         Behavior: Behavior normal.       Lab Review:     .   Recent Results (from the past 24 hour(s))   CBC W/O DIFF    Collection Time: 06/14/21  1:23 AM   Result Value Ref Range    WBC 5.1 3.6 - 11.0 K/uL    RBC 2.89 (L) 3.80 - 5.20 M/uL    HGB 7.7 (L) 11.5 - 16.0 g/dL    HCT 27.0 (L) 35.0 - 47.0 %    MCV 93.4 80.0 - 99.0 FL    MCH 26.6 26.0 - 34.0 PG    MCHC 28.5 (L) 30.0 - 36.5 g/dL    RDW 13.3 11.5 - 14.5 %    PLATELET 230 306 - 206 K/uL    MPV 9.9 8.9 - 12.9 FL    NRBC 0.0 0  WBC    ABSOLUTE NRBC 0.00 0.00 - 9.73 K/uL   METABOLIC PANEL, BASIC    Collection Time: 06/14/21  1:23 AM   Result Value Ref Range    Sodium 143 136 - 145 mmol/L    Potassium 3.8 3.5 - 5.1 mmol/L    Chloride 113 (H) 97 - 108 mmol/L    CO2 26 21 - 32 mmol/L    Anion gap 4 (L) 5 - 15 mmol/L    Glucose 118 (H) 65 - 100 mg/dL    BUN 27 (H) 6 - 20 MG/DL    Creatinine 1.18 (H) 0.55 - 1.02 MG/DL    BUN/Creatinine ratio 23 (H) 12 - 20      GFR est AA 53 (L) >60 ml/min/1.73m2    GFR est non-AA 43 (L) >60 ml/min/1.73m2    Calcium 7.5 (L) 8.5 - 10.1 MG/DL          Assessment/Plan:      Descending thoracic aorta aneurysm  AAA  -s/p EVAR 2017  -currently asymptomatic  -BP is controlled   · The patient will return to the clinic w/ a CTA of the C/A/P w/ endograft protocol using revolution scanner in a hydration spot. KELLY  -resolved   CKD  SBO  Pneumonia  DM  HTN  Hx of bladder cancer    Management of comorbid conditions by primary team.    Disposition:   Home with [facility]     Vascular surgery signing off. We appreciate the opportunity to participate in the care of Ms. Rachel Jara. We will contact patient with her f/u appt.

## 2021-06-14 NOTE — DISCHARGE INSTRUCTIONS
Patient Discharge Instructions    Ari Arrington / [id number] : 1934    Admitted 6/10/2021 Discharged: 2021         DISCHARGE DIAGNOSIS:   Small bowel obstruction  Suspected pneumonia  Urinary tract infection ruled out  Acute kidney injury  HTN  Dyslipidemia  5 cm Thoracic AAA  AAA   Hx of Bladder cancer            Take Home Medications     {Medication reconciliation information is now added to the patient's AVS automatically when it is printed. There is no need to use this SmartLink in discharge instructions. Highlight this text and delete it to clear this message}      General drug facts      If you have a very bad allergy, wear an allergy ID at all times.  It is important that you take the medication exactly as they are prescribed.  Keep your medication in the bottles provided by the pharmacist.  Rd Cassidy a list of all your drugs (prescription, natural products, vitamins, OTC) with you. Give this list to your doctor.  Do not take other medications without consulting your doctor.   Do not share your drugs with others and do not take anyone else's drugs.  Keep all drugs out of the reach of children and pets.   Most drugs may be thrown away in household trash after mixing with coffee grounds or nic litter and sealing in a plastic bag.   Keep a list Call your doctor for help with any side effects. If in the [place] you may also call the FDA at 1-650-EXE-1606     Talk with the doctor before starting any new drug, including OTC, natural products, or vitamins. What to do at Home    1. Recommended diet: Regular     2. Recommended activity: Activity as tolerated    3. If you experience any of the following symptoms then please call your primary care physician or return to the emergency room if you cannot get hold of your doctor:    4. Wound Care: None    5. Lab work: Cbc and Bmp in 1 week     6. Bring these papers with you to your follow up appointments.  The papers will help your doctors be sure to continue the care plan from the hospital.    7. You will need a CT scan of your chest with Dr Reggie Stack when you see him in the clinic. If you have questions regarding the hospital related prescriptions or hospital related issues please call [facility] at [phone]. You can always direct your questions to your primary care doctor if you are unable to reach your hospital physician; your PCP works as an extension of your hospital doctor just like your hospital doctor is an extension of your PCP for your time at the hospital [facility])      Follow-up with:   PCP: Joanne Gomez NP  Follow-up Information     Follow up With Specialties Details Why Contact Info    Joanne Gomez NP Nurse Practitioner   [facility]  [phone]      Tosha Mtz MD Vascular Surgery Schedule an appointment as soon as possible for a visit in 1 week  [address]  [phone]             Please call for your own appointment        Information obtained by :  I understand that if any problems occur once I am at home I am to contact my physician. I understand and acknowledge receipt of the instructions indicated above.                                                                                                                                            Physician's or R.N.'s Signature                                                                  Date/Time                                                                                                                                              Patient or Representative Signature                                                          Date/Time

## 2021-06-14 NOTE — PROGRESS NOTES
Problem: Self Care Deficits Care Plan (Adult)  Goal: *Acute Goals and Plan of Care (Insert Text)  Description: FUNCTIONAL STATUS PRIOR TO ADMISSION: Independent with ADL and light IADL; no longer drives    HOME SUPPORT: The patient lived with a few cousins that live nearby and are available assist.    Occupational Therapy Goals  Initiated 6/14/2021  1. Patient will perform grooming standing at the sink with modified independence within 7 day(s). 2.  Patient will perform bathing with supervision/SBA within 7 day(s). 3.  Patient will perform lower body dressing with modified independence within 7 day(s). 4.  Patient will perform toilet transfers with modified independence within 7 day(s). 5.  Patient will perform all aspects of toileting with modified independence within 7 day(s). Outcome: Progressing Towards Goal    OCCUPATIONAL THERAPY EVALUATION  Patient: Kennedy Pollard (7 y.o. female)  Date: 6/14/2021  Primary Diagnosis: Pneumonia [J18.9]  SBO (small bowel obstruction) ([facility] [K56.609]        Precautions:  Fall    ASSESSMENT  Based on the objective data described below, the patient presents with minimal deficits in self-care, primarily due to decreased activity tolerance and mildly impaired dynamic balance. She has been using a RW recently, but continued to live alone with support of her cousins that live nearby. Patient is functioning slightly below her baseline and will benefit from skilled OT treatment to maximize independence and safety in ADL. Current Level of Function Impacting Discharge (ADLs/self-care): CGA to Mod I    Functional Outcome Measure: The patient scored 60/100 on the Barthel Index. Other factors to consider for discharge: would benefit from 24 hour supervision/assistance     Patient will benefit from skilled therapy intervention to address the above noted impairments.        PLAN :  Recommendations and Planned Interventions: self care training, functional mobility training, therapeutic exercise, balance training, therapeutic activities, endurance activities, neuromuscular re-education, patient education, home safety training, and family training/education    Frequency/Duration: Patient will be followed by occupational therapy 3 times a week to address goals. Recommendation for discharge: (in order for the patient to meet his/her long term goals)  Occupational therapy at least 2 days/week in the home AND ensure assist and/or supervision for safety. This discharge recommendation:  A follow-up discussion with the attending provider and/or case management is planned    IF patient discharges home will need the following DME: patient owns DME required for discharge       SUBJECTIVE:   Patient stated I just sponge bathe. I don't feel safe stepping over that tub.     OBJECTIVE DATA SUMMARY:   HISTORY:   Past Medical History:   Diagnosis Date    Aneurysm ([facility]     abdominal (pt not sure of blood vessel) repaired    Arthritis     hands    Cancer ([facility]     bladder     Diabetes ([facility]     oral hypoglycemics    Hypertension     Ill-defined condition     high cholesterol    Menopause      Past Surgical History:   Procedure Laterality Date    HX CATARACT REMOVAL      bilateral    HX CHOLECYSTECTOMY  06/04/2019    lap cindy with IOC    HX GYN      hysterectomy    HX HEENT      Lasik on right    HX UROLOGICAL  2017    resection of bladder tumor    VT ABDOMEN SURGERY PROC UNLISTED  09/2017    aneurysm repair    VT BREAST SURGERY PROCEDURE UNLISTED         Expanded or extensive additional review of patient history:     Home Situation  Home Environment: Private residence  # Steps to Enter: 4  Rails to Enter: Yes  Hand Rails : Bilateral  One/Two Story Residence: One story  Living Alone: Yes  Support Systems: Family member(s), Friends \ neighbors (cousins)  Patient Expects to be Discharged to[de-identified] [employer]  Current DME Used/Available at Home: Joseph Holley, mikhail, Shower chair, Cane, straight  Tub or [employer] Type: Tub/Shower combination (sponge bathes)    Hand dominance: Right    EXAMINATION OF PERFORMANCE DEFICITS:  Cognitive/Behavioral Status:  Neurologic State: Alert; Appropriate for age  Orientation Level: Oriented X4  Cognition: Appropriate for age attention/concentration; Follows commands  Perception: Appears intact  Perseveration: No perseveration noted  Safety/Judgement: Awareness of environment; Insight into deficits    Hearing: Auditory  Auditory Impairment: Hard of hearing, bilateral, Hearing aid(s) (right ear)  Hearing Aids/Status: Not in hospital    Vision/Perceptual:                           Acuity: Within Defined Limits    Corrective Lenses: Glasses    Range of Motion:  AROM: Within functional limits                         Strength:  Strength: Generally decreased, functional                Coordination:     Fine Motor Skills-Upper: Left Intact; Right Intact    Gross Motor Skills-Upper: Left Intact; Right Intact    Tone & Sensation:  Tone: Normal                         Balance:  Sitting: Intact  Standing: Impaired  Standing - Static: Good  Standing - Dynamic : Fair    Functional Mobility and Transfers for ADLs:  Bed Mobility:  Supine to Sit: Contact guard assistance; Additional time (using bed rail)  Scooting: Stand-by assistance; Additional time    Transfers:  Sit to Stand: Contact guard assistance  Stand to Sit: Contact guard assistance  Bed to Chair: Contact guard assistance  Bathroom Mobility: Contact guard assistance  Toilet Transfer : Contact guard assistance    ADL Assessment:  Feeding: Modified independent (additional time to cut foods)    Oral Facial Hygiene/Grooming: Modified Independent (seated; CGA standing)    Bathing: Contact guard assistance    Upper Body Dressing: Modified independent    Lower Body Dressing: Contact guard assistance    Toileting: Contact guard assistance                ADL Intervention and task modifications:  Discussed safety and fall prevention, including bathroom safety. Patient has only started using the RW recently, and requires cues for safety, especially during sit to/from stand to avoid pulling up on the RW. Initiated ADL training. Cognitive Retraining  Safety/Judgement: Awareness of environment; Insight into deficits    Functional Measure:  Barthel Index:    Bathin  Bladder: 5  Bowels: 10  Groomin  Dressin  Feedin  Mobility: 10  Stairs: 5  Toilet Use: 5  Transfer (Bed to Chair and Back): 10  Total: 60/100        The Barthel ADL Index: Guidelines  1. The index should be used as a record of what a patient does, not as a record of what a patient could do. 2. The main aim is to establish degree of independence from any help, physical or verbal, however minor and for whatever reason. 3. The need for supervision renders the patient not independent. 4. A patient's performance should be established using the best available evidence. Asking the patient, friends/relatives and nurses are the usual sources, but direct observation and common sense are also important. However direct testing is not needed. 5. Usually the patient's performance over the preceding 24-48 hours is important, but occasionally longer periods will be relevant. 6. Middle categories imply that the patient supplies over 50 per cent of the effort. 7. Use of aids to be independent is allowed. Duy Lombardo., Barthel, D.W. (4600). Functional evaluation: the Barthel Index. [facility] (14)2. GEETA Thurman, Geraldo Giraldo., Romero Robins., Ralph, [address] [place] [address] (). Measuring the change indisability after inpatient rehabilitation; comparison of the responsiveness of the Barthel Index and Functional [place] Measure. Journal of Neurology, Neurosurgery, and Psychiatry, 66(4), 561-715. Demond Arreaga, N.J.A, LESLIE Franco, & Dilcia Copeland M.A. (2004.) Assessment of post-stroke quality of life in cost-effectiveness studies:  The usefulness of the Barthel Index and the EuroQoL-5D. Quality of Life Research, 15, 525-21        Occupational Therapy Evaluation Charge Determination   History Examination Decision-Making   LOW Complexity : Brief history review  LOW Complexity : 1-3 performance deficits relating to physical, cognitive , or psychosocial skils that result in activity limitations and / or participation restrictions  LOW Complexity : No comorbidities that affect functional and no verbal or physical assistance needed to complete eval tasks       Based on the above components, the patient evaluation is determined to be of the following complexity level: LOW   Pain Rating:  No complaints    Activity Tolerance:   Fair    After treatment patient left in no apparent distress:    Sitting in chair, Call bell within reach, and Caregiver / family present    COMMUNICATION/EDUCATION:   The patients plan of care was discussed with: Physical therapist and Registered nurse. Patient/family agree to work toward stated goals and plan of care. This patients plan of care is appropriate for delegation to \[facility] [place] [facility]\"".     Thank you for this referral.  Misty Montague OTR/L  Time Calculation: 26 mins

## 2021-06-14 NOTE — PROGRESS NOTES
D/w Dr Harman Reyes regarding CTA for aortic aneurysm. He recommended out pt CTA and follow up in his clinic. He informed me that his clinic will set it up.

## 2021-06-14 NOTE — PROGRESS NOTES
Transition of Care Plan:     RUR: 14%  Disposition: Home with  - [facility] [facility]  Follow up appointments: TBD  DME needed: TBD - pt has a cane and RW  Transportation at Discharge: Pt's cousin  [address] or means to access home: Pt has keys       IM Medicare letter: Received on 6/14/21  Caregiver Contact: Marisa Moreiras ([phone])  Discharge Caregiver contacted prior to discharge? CM spoke to cg at bedside    CM acknowledged d/c orders. Pt will d/c home with [facility]. Pt's f/u appt is on the AVS. Pt's family is at bedside and will transport her home. Medicare pt has received, reviewed, and signed 2nd  letter informing them of their right to appeal the discharge. Signed copy has been placed on pt bedside chart. No further needs identified at this time. Patient is ready to d/c on a CM standpoint. RN aware. Care Management Interventions  PCP Verified by CM: Yes  Mode of Transport at Discharge:  Other (see comment) (Pt's friends)  Transition of Care Consult (CM Consult): [address]: No  Reason Outside [place]: Out of service area  Discharge Durable Medical Equipment: No (Pt has a cane and RW)  Physical Therapy Consult: No  Occupational Therapy Consult: No  Speech Therapy Consult: No  Current Support Network: Lives Alone  Confirm Follow Up Transport: Family  Discharge Location  Discharge Placement: Home with [facility] ([facility])    ANTONIO Arenas  Care Manager [facility]  [phone]

## 2021-06-14 NOTE — DISCHARGE SUMMARY
Hospitalist Discharge Summary     Patient ID:  Rita Almonte  [id number]  57 y.o.  1934  6/10/2021    PCP on record: Debby Blue NP    Admit date: 6/10/2021  Discharge date and time: 6/14/2021    DISCHARGE DIAGNOSIS:    Small bowel obstruction  Suspected pneumonia  Urinary tract infection ruled out  Acute kidney injury  HTN  Dyslipidemia  5 cm Thoracic AAA  AAA   Hx of Bladder cancer  Acute on chronic anemia    CONSULTATIONS:  IP CONSULT TO GENERAL SURGERY  IP CONSULT TO VASCULAR SURGERY  IP CONSULT TO PALLIATIVE CARE - PROVIDER    Excerpted HPI from H&P of Shaji Gonzales MD:  Kaya Cuevas is a 80 y.o.  female who presents as a transfer from outside hospital to the ED for management of small bowel obstruction and UTI, acute on chronic kidney disease, pneumonia.     Patient has a history of bladder cancer with ileal conduit, AAA s/p repair, CKD, presented to outside hospital for abdominal pain x3 weeks associated vomiting.  Patient reports some associated diarrhea as well.  Patient reports pain and vomiting became worse prompting her to go to the emergency department. Patient has a history of bladder cancer with a cystectomy and has ureterostomy bag in place sent to the outside facility from oncology office.      At the outside hospital, patient had labs performed, this showed bacteriuria.  Normal white count and lactate.  Normal TSH.  Mild anemia.  Mild transaminitis.  lipase within normal limit BNP 86 WBC 8 hemoglobin 11.2 troponin 0 0.02 magnesium 1.6 potassium 5.2 creatinine 2.19 BUN 65 UA positive nitrate and leukocyte Estrace        Patient had a CT performed at the outside hospital which showed possible pneumonia. Fernandez Lapping is also aortic ectasia measuring 5 cm.  There are multiple fluid-filled small bowel loops consistent with small bowel obstruction.  Patient appears to be Given Zosyn and fluids.     Patient was discussed with on-call surgeon there.  Recommended transfer to outside hospital.     On my assessment patient reports improvement in abdominal pain and vomiting.  She has no other complaints.  Patient does endorse a sore throat from NG tube. ______________________________________________________________________  DISCHARGE SUMMARY/HOSPITAL COURSE:  for full details see H&P, daily progress notes, labs, consult notes. Patient was admitted to hospital and diagnosed with small bowel obstruction. NG tube was placed with general surgery consultation was placed. Symptoms improved with NG tube placement. NG tube was later discontinued. She tolerated diet well. General surgery cleared the patient for discharge for outpatient follow-up. She was also suspected to have pneumonia. She was placed on Zosyn. Blood cultures were negative. Antibiotics were later changed to Augmentin and Zithromax. She was also suspected to have UTI but it was ruled out. She also had acute kidney injury which improved with IV fluids. She was also noted to have thoracic aortic aneurysm for which vascular surgery consultation was placed. Patient was seen by vascular surgery and Dr. Roland Henderson recommended outpatient CT angiogram of chest and outpatient follow-up in their clinic. She came in with a hemoglobin of 9.7 hemoglobin currently 7.7 but she received a lot of IV fluids for acute kidney injury. She was advised to have a repeat CBC in 1 week time and further follow-up with PCP for work-up of anemia. Patient seen and examined today, vitals stable, lab work is at baseline and was cleared by all consultant parties including general surgery and vascular surgery to be discharged for outpatient follow-up.    _______________________________________________________________________  Patient seen and examined by me on discharge day. Pertinent Findings:  Gen:    Not in distress  Chest: Clear lungs  CVS:   Regular rhythm.   No edema  Abd:  Soft, not distended, not tender  Neuro:  Alert, awake  _______________________________________________________________________  DISCHARGE MEDICATIONS:   Current Discharge Medication List      START taking these medications    Details   amoxicillin-clavulanate (Augmentin) 875-125 mg per tablet Take 1 Tablet by mouth every twelve (12) hours for 3 days. Qty: 6 Tablet, Refills: 0  Start date: 6/14/2021, End date: 6/17/2021      azithromycin (Zithromax) 500 mg tab Take 1 Tablet by mouth daily for 3 days. Qty: 3 Tablet, Refills: 0  Start date: 6/14/2021, End date: 6/17/2021         CONTINUE these medications which have NOT CHANGED    Details   calcium carbonate (OS-ZULEMA) 500 mg calcium (1,250 mg) tablet Take 1 Tablet by mouth three (3) times daily. mirtazapine (REMERON) 45 mg tablet Take 45 mg by mouth nightly. dilTIAZem ER (Cartia XT) 180 mg capsule Take 180 mg by mouth daily. ondansetron hcl (Zofran) 4 mg tablet Take 4 mg by mouth every eight (8) hours as needed for Nausea. metoprolol succinate (TOPROL XL) 50 mg XL tablet Take 50 mg by mouth daily. pravastatin (PRAVACHOL) 80 mg tablet Take 80 mg by mouth nightly. POLYVINYL ALCOHOL/POVIDONE (ARTIFICIAL TEARS OP) Apply 1 Drop to eye as needed (DRY EYES). 1 DROP TO EACH EYE         STOP taking these medications       metroNIDAZOLE (FLAGYL) 500 mg tablet Comments:   Reason for Stopping:                 Patient Follow Up Instructions:   1. Recommended diet: Regular     2. Recommended activity: Activity as tolerated    3. If you experience any of the following symptoms then please call your primary care physician or return to the emergency room if you cannot get hold of your doctor:    4. Wound Care: None    5. Lab work: Cbc and Bmp in 1 week     6. Bring these papers with you to your follow up appointments. The papers will help your doctors be sure to continue the care plan from the hospital.    7. You will need a CT scan of your chest with Dr Christen Alexander when you see him in the clinic. Follow-up Information     Follow up With Specialties Details Why Contact Info Ollen Phalen, NP Nurse Practitioner   Valeria Jasonsamuel  [facility]  [phone]      Brett Lance MD Vascular Surgery Schedule an appointment as soon as possible for a visit in 1 week  [facility]  [phone]          ________________________________________________________________    Risk of deterioration: High    Condition at Discharge:  Stable  __________________________________________________________________    Disposition  Home with family and home health services    ____________________________________________________________________    Code Status: DNR/DNI  ___________________________________________________________________      Total time in minutes spent coordinating this discharge (includes going over instructions, follow-up, prescriptions, and preparing report for sign off to her PCP) :  35  minutes    Signed:  Diego Tyler MD

## 2021-06-14 NOTE — PROGRESS NOTES
End of Shift Note    Bedside shift change report given to Patricia (oncoming nurse) by Victor Hugo Gordon RN (offgoing nurse).   Report included the following information Leonela, MAR and Recent Results    Shift worked:  7p-7a     Shift summary and any significant changes:     no significant changes     Concerns for physician to address:     Zone phone for oncoming shift:   [phone]

## 2021-06-14 NOTE — PROGRESS NOTES
Problem: Mobility Impaired (Adult and Pediatric)  Goal: *Acute Goals and Plan of Care (Insert Text)  Description: FUNCTIONAL STATUS PRIOR TO ADMISSION: Patient was modified independent using a rolling walker for functional mobility. History of 1 fall. Recently stopped driving. HOME SUPPORT PRIOR TO ADMISSION: The patient lived alone however states her 2 cousins check on her and assist as needed. Physical Therapy Goals  Initiated 6/14/2021  1. Patient will move from supine to sit and sit to supine , scoot up and down, and roll side to side in bed with independence within 7 day(s). 2.  Patient will transfer from bed to chair and chair to bed with supervision/set-up using the least restrictive device within 7 day(s). 3.  Patient will perform sit to stand with supervision/set-up within 7 day(s). 4.  Patient will ambulate with supervision/set-up for 250 feet with the least restrictive device within 7 day(s). 5.  Patient will ascend/descend 4 stairs with 1 handrail(s) with supervision/set-up within 7 day(s). Outcome: Progressing Towards Goal   PHYSICAL THERAPY EVALUATION  Patient: Pratima Arriola (42 y.o. female)  Date: 6/14/2021  Primary Diagnosis: Pneumonia [J18.9]  SBO (small bowel obstruction) ([facility] [K56.609]        Precautions:  Fall    ASSESSMENT  Based on the objective data described below, the patient presents with mild impairments in higher level balance, decreased insight/impaired safety awareness, generalized weakness, and overall impaired functional mobility. Pt required increased verbal cueing for general safety awareness throughout mobility, specifically for hand placement during transfers as well as for continued use of RW during bathroom mobility. Gait slow and shuffled however steady overall with RW support. Pt tolerated therapy session well and is likely close to her baseline mod I level however would benefit from increased family/caregiver support as well as HHPT at discharge. Current Level of Function Impacting Discharge (mobility/balance): CGAx1 with use of RW during ambulation    Functional Outcome Measure: The patient scored 60/100 on the Barthel Index outcome measure which is indicative of moderate impairment in ADLs and functional mobility . Other factors to consider for discharge: lives alone     Patient will benefit from skilled therapy intervention to address the above noted impairments. PLAN :  Recommendations and Planned Interventions: bed mobility training, transfer training, gait training, therapeutic exercises, patient and family training/education, and therapeutic activities      Frequency/Duration: Patient will be followed by physical therapy:  3 times a week to address goals. Recommendation for discharge: (in order for the patient to meet his/her long term goals)  Physical therapy at least 2 days/week in the home AND ensure assist and/or supervision for safety with functional mobility and ADLs    This discharge recommendation:  Has been made in collaboration with the attending provider and/or case management    IF patient discharges home will need the following DME: patient owns DME required for discharge         SUBJECTIVE:   Patient stated That walker is mine from home.     OBJECTIVE DATA SUMMARY:   HISTORY:    Past Medical History:   Diagnosis Date    Aneurysm ([address]     abdominal (pt not sure of blood vessel) repaired    Arthritis     hands    Cancer ([address]     bladder     Diabetes ([address]     oral hypoglycemics    Hypertension     Ill-defined condition     high cholesterol    Menopause      Past Surgical History:   Procedure Laterality Date    HX CATARACT REMOVAL      bilateral    HX CHOLECYSTECTOMY  06/04/2019    lap cindy with IOC    HX GYN      hysterectomy    HX HEENT      Lasik on right    HX UROLOGICAL  2017    resection of bladder tumor    MD ABDOMEN SURGERY PROC UNLISTED  09/2017    aneurysm repair    MD BREAST SURGERY PROCEDURE UNLISTED Personal factors and/or comorbidities impacting plan of care:     Home Situation  Home Environment: Private residence  # Steps to Enter: 4  Rails to Enter: Yes  Hand Rails : Bilateral  One/Two Story Residence: One story  Living Alone: Yes  Support Systems: Family member(s), Friends \ neighbors (cousins)  Patient Expects to be Discharged to[de-identified] [employer]  Current DME Used/Available at Home: Chrisandra Den, rolling, Shower chair, Cane, straight  Tub or Shower Type: Tub/Shower combination (sponge bathes)    EXAMINATION/PRESENTATION/DECISION MAKING:   Critical Behavior:  Neurologic State: Alert, Appropriate for age  Orientation Level: Oriented X4  Cognition: Appropriate for age attention/concentration, Follows commands  Safety/Judgement: Awareness of environment, Insight into deficits  Hearing: Auditory  Auditory Impairment: Hard of hearing, bilateral, Hearing aid(s) (right ear)  Hearing Aids/Status: Not in hospital  Skin:  intact  Edema: none noted  Range Of Motion:  AROM: Within functional limits                       Strength:    Strength: Generally decreased, functional                    Tone & Sensation:   Tone: Normal                              Coordination:     Vision:   Acuity: Within Defined Limits  Corrective Lenses: Glasses  Functional Mobility:  Bed Mobility:     Supine to Sit: Contact guard assistance; Additional time (using bed rail)     Scooting: Stand-by assistance; Additional time  Transfers:  Sit to Stand: Contact guard assistance  Stand to Sit: Contact guard assistance        Bed to Chair: Contact guard assistance              Balance:   Sitting: Intact  Standing: Impaired; With support (RW)  Standing - Static: Good  Standing - Dynamic : Fair  Ambulation/Gait Training:  Distance (ft): 100 Feet (ft)  Assistive Device: Walker, rolling;Gait belt  Ambulation - Level of Assistance: Contact guard assistance        Gait Abnormalities: Decreased step clearance        Base of Support: Narrowed     Speed/Inocencia: [id number] decreased (<100 feet/min)  Step Length: Left shortened;Right shortened                     Functional Measure:  Barthel Index:    Bathin  Bladder: 5  Bowels: 10  Groomin  Dressin  Feedin  Mobility: 10  Stairs: 5  Toilet Use: 5  Transfer (Bed to Chair and Back): 10  Total: 60/100       The Barthel ADL Index: Guidelines  1. The index should be used as a record of what a patient does, not as a record of what a patient could do. 2. The main aim is to establish degree of independence from any help, physical or verbal, however minor and for whatever reason. 3. The need for supervision renders the patient not independent. 4. A patient's performance should be established using the best available evidence. Asking the patient, friends/relatives and nurses are the usual sources, but direct observation and common sense are also important. However direct testing is not needed. 5. Usually the patient's performance over the preceding 24-48 hours is important, but occasionally longer periods will be relevant. 6. Middle categories imply that the patient supplies over 50 per cent of the effort. 7. Use of aids to be independent is allowed. Raman Juarez., Barthel, D.W. (2947). Functional evaluation: the Barthel Index. [facility] ([phone])2. dominguez Fraction tami GEETA Ragland, Ripley Brittle., Mayco Dhaliwal., [place], [address] [place] [address] (). Measuring the change indisability after inpatient rehabilitation; comparison of the responsiveness of the Barthel Index and Functional [place] Measure. Journal of Neurology, Neurosurgery, and Psychiatry, 66(4), 494-039. Clementina Fagan, N.KIM.ANNA, LESLIE Franco, & Geremias Cee MMauricioA. (2004.) Assessment of post-stroke quality of life in cost-effectiveness studies: The usefulness of the Barthel Index and the EuroQoL-5D.  Quality of Life Research, 15, 171-82           Physical Therapy Evaluation Charge Determination   History Examination Presentation Decision-Making   MEDIUM  Complexity : 1-2 comorbidities / personal factors will impact the outcome/ POC  MEDIUM Complexity : 3 Standardized tests and measures addressing body structure, function, activity limitation and / or participation in recreation  MEDIUM Complexity : Evolving with changing characteristics  MEDIUM Complexity : FOTO score of 26-74      Based on the above components, the patient evaluation is determined to be of the following complexity level: MEDIUM    Pain Rating:  Denied reports of pain    Activity Tolerance:   Good    After treatment patient left in no apparent distress:   Sitting in chair, Call bell within reach, and Caregiver / family present    COMMUNICATION/EDUCATION:   The patients plan of care was discussed with: Occupational therapist and Registered nurse. Fall prevention education was provided and the patient/caregiver indicated understanding., Patient/family have participated as able in goal setting and plan of care. , and Patient/family agree to work toward stated goals and plan of care.     Thank you for this referral.  Samir Perez, PT, DPT   Time Calculation: 21 mins

## 2021-06-15 ENCOUNTER — TRANSCRIBE ORDER (OUTPATIENT)
Dept: SCHEDULING | Age: 86
End: 2021-06-15

## 2021-06-15 DIAGNOSIS — I71.40 AAA (ABDOMINAL AORTIC ANEURYSM): Primary | ICD-10-CM

## 2021-06-16 LAB
O+P SPEC MICRO: NORMAL
O+P STL CONC: NORMAL
SPECIMEN SOURCE: NORMAL

## 2021-06-17 LAB
BACTERIA SPEC CULT: NORMAL
SERVICE CMNT-IMP: NORMAL

## 2021-06-28 ENCOUNTER — APPOINTMENT (OUTPATIENT)
Dept: CT IMAGING | Age: 86
End: 2021-06-28
Attending: SURGERY
Payer: MEDICARE

## 2021-06-28 ENCOUNTER — HOSPITAL ENCOUNTER (OUTPATIENT)
Dept: CT IMAGING | Age: 86
Discharge: HOME | End: 2021-06-28
Attending: SURGERY
Payer: MEDICARE

## 2021-06-28 DIAGNOSIS — I71.40 AAA (ABDOMINAL AORTIC ANEURYSM): ICD-10-CM

## 2021-06-28 PROCEDURE — 74174 CTA ABD&PLVS W/CONTRAST: CPT

## 2021-06-28 PROCEDURE — 71275 CT ANGIOGRAPHY CHEST: CPT

## 2021-06-28 PROCEDURE — 74011000636 HC RX REV CODE- 636: Performed by: SURGERY

## 2021-06-28 PROCEDURE — 74011250636 HC RX REV CODE- 250/636: Performed by: SURGERY

## 2021-06-28 RX ORDER — SODIUM CHLORIDE 9 MG/ML
250 INJECTION, SOLUTION INTRAVENOUS CONTINUOUS
Status: DISCONTINUED | OUTPATIENT
Start: 2021-06-28 | End: 2021-07-02 | Stop reason: HOSPADM

## 2021-06-28 RX ADMIN — SODIUM CHLORIDE 250 ML/HR: 900 INJECTION, SOLUTION INTRAVENOUS at 13:10

## 2021-06-28 RX ADMIN — IOPAMIDOL 100 ML: 755 INJECTION, SOLUTION INTRAVENOUS at 14:29

## 2021-07-20 ENCOUNTER — APPOINTMENT (OUTPATIENT)
Dept: GENERAL RADIOLOGY | Age: 86
DRG: 388 | End: 2021-07-20
Attending: EMERGENCY MEDICINE
Payer: MEDICARE

## 2021-07-20 ENCOUNTER — HOSPITAL ENCOUNTER (INPATIENT)
Age: 86
LOS: 10 days | Discharge: NURSING, REHAB OR LONG-TERM CARE | DRG: 388 | End: 2021-07-30
Attending: EMERGENCY MEDICINE | Admitting: STUDENT IN AN ORGANIZED HEALTH CARE EDUCATION/TRAINING PROGRAM
Payer: MEDICARE

## 2021-07-20 ENCOUNTER — APPOINTMENT (OUTPATIENT)
Dept: CT IMAGING | Age: 86
DRG: 388 | End: 2021-07-20
Attending: EMERGENCY MEDICINE
Payer: MEDICARE

## 2021-07-20 DIAGNOSIS — K92.2 LOWER GI BLEED: Primary | ICD-10-CM

## 2021-07-20 PROBLEM — N39.0 UTI (URINARY TRACT INFECTION): Status: ACTIVE | Noted: 2021-07-20

## 2021-07-20 PROBLEM — N17.9 AKI (ACUTE KIDNEY INJURY) (HCC): Status: ACTIVE | Noted: 2021-07-20

## 2021-07-20 PROBLEM — I95.9 HYPOTENSION: Status: ACTIVE | Noted: 2021-07-20

## 2021-07-20 LAB
ABO + RH BLD: NORMAL
ALBUMIN SERPL-MCNC: 2.8 G/DL (ref 3.5–5)
ALBUMIN/GLOB SERPL: 0.9 {RATIO} (ref 1.1–2.2)
ALP SERPL-CCNC: 115 U/L (ref 45–117)
ALT SERPL-CCNC: 17 U/L (ref 12–78)
ANION GAP SERPL CALC-SCNC: 5 MMOL/L (ref 5–15)
APPEARANCE UR: ABNORMAL
AST SERPL-CCNC: 18 U/L (ref 15–37)
BACTERIA URNS QL MICRO: ABNORMAL /HPF
BASOPHILS # BLD: 0 K/UL (ref 0–0.1)
BASOPHILS NFR BLD: 0 % (ref 0–1)
BILIRUB SERPL-MCNC: 0.3 MG/DL (ref 0.2–1)
BILIRUB UR QL: NEGATIVE
BLOOD GROUP ANTIBODIES SERPL: NORMAL
BUN SERPL-MCNC: 37 MG/DL (ref 6–20)
BUN/CREAT SERPL: 19 (ref 12–20)
CALCIUM SERPL-MCNC: 8.4 MG/DL (ref 8.5–10.1)
CHLORIDE SERPL-SCNC: 104 MMOL/L (ref 97–108)
CO2 SERPL-SCNC: 29 MMOL/L (ref 21–32)
COLOR UR: ABNORMAL
CREAT SERPL-MCNC: 2 MG/DL (ref 0.55–1.02)
DIFFERENTIAL METHOD BLD: ABNORMAL
EOSINOPHIL # BLD: 0.1 K/UL (ref 0–0.4)
EOSINOPHIL NFR BLD: 2 % (ref 0–7)
EPITH CASTS URNS QL MICRO: ABNORMAL /LPF
ERYTHROCYTE [DISTWIDTH] IN BLOOD BY AUTOMATED COUNT: 14.7 % (ref 11.5–14.5)
GLOBULIN SER CALC-MCNC: 3.2 G/DL (ref 2–4)
GLUCOSE SERPL-MCNC: 141 MG/DL (ref 65–100)
GLUCOSE UR STRIP.AUTO-MCNC: NEGATIVE MG/DL
HCT VFR BLD AUTO: 29.7 % (ref 35–47)
HEMOCCULT STL QL: POSITIVE
HGB BLD-MCNC: 8.7 G/DL (ref 11.5–16)
HGB UR QL STRIP: NEGATIVE
IMM GRANULOCYTES # BLD AUTO: 0 K/UL (ref 0–0.04)
IMM GRANULOCYTES NFR BLD AUTO: 1 % (ref 0–0.5)
INR PPP: 1.1 (ref 0.9–1.1)
KETONES UR QL STRIP.AUTO: NEGATIVE MG/DL
LEUKOCYTE ESTERASE UR QL STRIP.AUTO: ABNORMAL
LYMPHOCYTES # BLD: 0.9 K/UL (ref 0.8–3.5)
LYMPHOCYTES NFR BLD: 15 % (ref 12–49)
MCH RBC QN AUTO: 26.5 PG (ref 26–34)
MCHC RBC AUTO-ENTMCNC: 29.3 G/DL (ref 30–36.5)
MCV RBC AUTO: 90.5 FL (ref 80–99)
MONOCYTES # BLD: 0.5 K/UL (ref 0–1)
MONOCYTES NFR BLD: 8 % (ref 5–13)
MUCOUS THREADS URNS QL MICRO: ABNORMAL /LPF
NEUTS SEG # BLD: 4.1 K/UL (ref 1.8–8)
NEUTS SEG NFR BLD: 74 % (ref 32–75)
NITRITE UR QL STRIP.AUTO: NEGATIVE
NRBC # BLD: 0 K/UL (ref 0–0.01)
NRBC BLD-RTO: 0 PER 100 WBC
PH UR STRIP: 6.5 [PH] (ref 5–8)
PLATELET # BLD AUTO: 130 K/UL (ref 150–400)
PMV BLD AUTO: 10.4 FL (ref 8.9–12.9)
POTASSIUM SERPL-SCNC: 4.7 MMOL/L (ref 3.5–5.1)
PROT SERPL-MCNC: 6 G/DL (ref 6.4–8.2)
PROT UR STRIP-MCNC: NEGATIVE MG/DL
PROTHROMBIN TIME: 11.3 SEC (ref 9–11.1)
RBC # BLD AUTO: 3.28 M/UL (ref 3.8–5.2)
RBC #/AREA URNS HPF: ABNORMAL /HPF (ref 0–5)
SODIUM SERPL-SCNC: 138 MMOL/L (ref 136–145)
SP GR UR REFRACTOMETRY: 1.01 (ref 1–1.03)
SPECIMEN EXP DATE BLD: NORMAL
UA: UC IF INDICATED,UAUC: ABNORMAL
UROBILINOGEN UR QL STRIP.AUTO: 0.2 EU/DL (ref 0.2–1)
WBC # BLD AUTO: 5.6 K/UL (ref 3.6–11)
WBC URNS QL MICRO: ABNORMAL /HPF (ref 0–4)

## 2021-07-20 PROCEDURE — 87077 CULTURE AEROBIC IDENTIFY: CPT

## 2021-07-20 PROCEDURE — 85610 PROTHROMBIN TIME: CPT

## 2021-07-20 PROCEDURE — 99285 EMERGENCY DEPT VISIT HI MDM: CPT

## 2021-07-20 PROCEDURE — 74011250636 HC RX REV CODE- 250/636: Performed by: EMERGENCY MEDICINE

## 2021-07-20 PROCEDURE — 65660000000 HC RM CCU STEPDOWN

## 2021-07-20 PROCEDURE — 80053 COMPREHEN METABOLIC PANEL: CPT

## 2021-07-20 PROCEDURE — 36415 COLL VENOUS BLD VENIPUNCTURE: CPT

## 2021-07-20 PROCEDURE — 74011000250 HC RX REV CODE- 250: Performed by: STUDENT IN AN ORGANIZED HEALTH CARE EDUCATION/TRAINING PROGRAM

## 2021-07-20 PROCEDURE — 87186 SC STD MICRODIL/AGAR DIL: CPT

## 2021-07-20 PROCEDURE — 87086 URINE CULTURE/COLONY COUNT: CPT

## 2021-07-20 PROCEDURE — 74011250637 HC RX REV CODE- 250/637: Performed by: STUDENT IN AN ORGANIZED HEALTH CARE EDUCATION/TRAINING PROGRAM

## 2021-07-20 PROCEDURE — 85025 COMPLETE CBC W/AUTO DIFF WBC: CPT

## 2021-07-20 PROCEDURE — 87040 BLOOD CULTURE FOR BACTERIA: CPT

## 2021-07-20 PROCEDURE — 81001 URINALYSIS AUTO W/SCOPE: CPT

## 2021-07-20 PROCEDURE — 74011000258 HC RX REV CODE- 258: Performed by: EMERGENCY MEDICINE

## 2021-07-20 PROCEDURE — 86901 BLOOD TYPING SEROLOGIC RH(D): CPT

## 2021-07-20 PROCEDURE — 74176 CT ABD & PELVIS W/O CONTRAST: CPT

## 2021-07-20 PROCEDURE — 74011250636 HC RX REV CODE- 250/636: Performed by: STUDENT IN AN ORGANIZED HEALTH CARE EDUCATION/TRAINING PROGRAM

## 2021-07-20 PROCEDURE — 71045 X-RAY EXAM CHEST 1 VIEW: CPT

## 2021-07-20 PROCEDURE — 82272 OCCULT BLD FECES 1-3 TESTS: CPT

## 2021-07-20 PROCEDURE — C9113 INJ PANTOPRAZOLE SODIUM, VIA: HCPCS | Performed by: STUDENT IN AN ORGANIZED HEALTH CARE EDUCATION/TRAINING PROGRAM

## 2021-07-20 RX ORDER — ACETAMINOPHEN 325 MG/1
650 TABLET ORAL
Status: DISCONTINUED | OUTPATIENT
Start: 2021-07-20 | End: 2021-07-30 | Stop reason: HOSPADM

## 2021-07-20 RX ORDER — ONDANSETRON 4 MG/1
4 TABLET, ORALLY DISINTEGRATING ORAL
Status: DISCONTINUED | OUTPATIENT
Start: 2021-07-20 | End: 2021-07-30 | Stop reason: HOSPADM

## 2021-07-20 RX ORDER — MIRTAZAPINE 15 MG/1
45 TABLET, FILM COATED ORAL
Status: DISCONTINUED | OUTPATIENT
Start: 2021-07-20 | End: 2021-07-30 | Stop reason: HOSPADM

## 2021-07-20 RX ORDER — SODIUM CHLORIDE 0.9 % (FLUSH) 0.9 %
5-40 SYRINGE (ML) INJECTION EVERY 8 HOURS
Status: DISCONTINUED | OUTPATIENT
Start: 2021-07-20 | End: 2021-07-30 | Stop reason: HOSPADM

## 2021-07-20 RX ORDER — ONDANSETRON 2 MG/ML
4 INJECTION INTRAMUSCULAR; INTRAVENOUS
Status: DISCONTINUED | OUTPATIENT
Start: 2021-07-20 | End: 2021-07-21 | Stop reason: SDUPTHER

## 2021-07-20 RX ORDER — PRAVASTATIN SODIUM 40 MG/1
80 TABLET ORAL
Status: DISCONTINUED | OUTPATIENT
Start: 2021-07-20 | End: 2021-07-30 | Stop reason: HOSPADM

## 2021-07-20 RX ORDER — DILTIAZEM HYDROCHLORIDE 180 MG/1
180 CAPSULE, COATED, EXTENDED RELEASE ORAL DAILY
Status: DISCONTINUED | OUTPATIENT
Start: 2021-07-21 | End: 2021-07-30 | Stop reason: HOSPADM

## 2021-07-20 RX ORDER — ONDANSETRON 2 MG/ML
4 INJECTION INTRAMUSCULAR; INTRAVENOUS
Status: DISCONTINUED | OUTPATIENT
Start: 2021-07-20 | End: 2021-07-30 | Stop reason: HOSPADM

## 2021-07-20 RX ORDER — POLYETHYLENE GLYCOL 3350 17 G/17G
17 POWDER, FOR SOLUTION ORAL DAILY PRN
Status: DISCONTINUED | OUTPATIENT
Start: 2021-07-20 | End: 2021-07-23

## 2021-07-20 RX ORDER — ACETAMINOPHEN 650 MG/1
650 SUPPOSITORY RECTAL
Status: DISCONTINUED | OUTPATIENT
Start: 2021-07-20 | End: 2021-07-30 | Stop reason: HOSPADM

## 2021-07-20 RX ORDER — SODIUM CHLORIDE 0.9 % (FLUSH) 0.9 %
5-40 SYRINGE (ML) INJECTION AS NEEDED
Status: DISCONTINUED | OUTPATIENT
Start: 2021-07-20 | End: 2021-07-30 | Stop reason: HOSPADM

## 2021-07-20 RX ADMIN — PRAVASTATIN SODIUM 80 MG: 40 TABLET ORAL at 21:32

## 2021-07-20 RX ADMIN — Medication 10 ML: at 21:33

## 2021-07-20 RX ADMIN — MIRTAZAPINE 45 MG: 15 TABLET, FILM COATED ORAL at 21:32

## 2021-07-20 RX ADMIN — CEFTRIAXONE 1 G: 1 INJECTION, POWDER, FOR SOLUTION INTRAMUSCULAR; INTRAVENOUS at 18:29

## 2021-07-20 RX ADMIN — SODIUM CHLORIDE 40 MG: 9 INJECTION INTRAMUSCULAR; INTRAVENOUS; SUBCUTANEOUS at 21:32

## 2021-07-20 NOTE — ED NOTES
Pt arrives from home escorted by her cousin. Pt says she noticed black tarry stools on Friday that persisted through today. Pt denies taking PeptoBismol. Pt denies taking anti-coags. Pt says she feels dizzy when standing up and has been feeling weak.

## 2021-07-20 NOTE — H&P
Hospitalist Admission Note    NAME: Dalton Chapman   :  1934   MRN:  [id number]     Date/Time:  2021 6:39 PM    Patient PCP: Mariah Medina NP  ______________________________________________________________________  Given the patient's current clinical presentation, I have a high level of concern for decompensation if discharged from the emergency department. Complex decision making was performed, which includes reviewing the patient's available past medical records, laboratory results, and x-ray films. My assessment of this patient's clinical condition and my plan of care is as follows. Assessment / Plan:    Acute GI bleed-bright red blood per rectum  Anemia  -Hemoglobin 8.7, baseline is around 9.0.  -CT abdomen/pelvis without contrast pending.  -Bleeding scan pending. -GI consulted-planning to do bleeding scan.  -No abdominal pain, Protonix 40 mg IV twice daily. Acute kidney injury  -Creatinine 2.0, baseline is around 1.20.  -IV NS at 75 mL/h. Repeat BMP in a.m.    UTI  -UA suggestive of UTI, urine culture pending.  -Started on Rocephin. Hypertension  Insomnia  Hyperlipidemia  -Continue home medications of the beta-blocker because of low blood pressure. Code Status: Full code  Surrogate Decision Maker:    DVT Prophylaxis: SCD  GI Prophylaxis: not indicated    Baseline: Ambulatory at home      Subjective:   CHIEF COMPLAINT: Bleeding per rectum    HISTORY OF PRESENT ILLNESS:     Chani Bertrand is a 80 y.o.  female who presents with bleeding per rectum for the last 4 days started on Friday. Patient past medical history of hypertension, hyperlipidemia, insomnia. Patient stated for the last 4 days she has noticed some bleeding per rectum which has been going continuously, went to see her primary care doctor today which advised the patient to go to the ER for further evaluation.   Denies any abdominal pain, no nausea vomiting, no chest pain, no shortness of breath, no fevers or chills    We were asked to admit for work up and evaluation of the above problems. Past Medical History:   Diagnosis Date    [facility] ([facility]     abdominal (pt not sure of blood vessel) repaired    Arthritis     hands    Cancer ([facility]     bladder     Diabetes ([facility]     oral hypoglycemics    Hypertension     Ill-defined condition     high cholesterol    Menopause         Past Surgical History:   Procedure Laterality Date    HX CATARACT REMOVAL      bilateral    HX CHOLECYSTECTOMY  2019    lap cindy with IOC    HX GYN      hysterectomy    HX HEENT      Lasik on right    HX UROLOGICAL  2017    resection of bladder tumor    [place] ABDOMEN SURG[place] PROC UNLISTED  2017    aneurysm repair    [place] BREAST SURGERY PROCEDURE UNLISTED         Social History     Tobacco Use    Smoking status: Former Smoker     Packs/day: 0.25     Years: 5.00     Pack years: 1.25     Quit date:      Years since quittin.5    Smokeless tobacco: Never Used   Substance Use Topics    Alcohol use: No        Family History   Problem Relation Age of Onset    Other Mother         abdominal aneurysm    Cancer Sister         lung    Lung Disease Brother         asbestos    Stroke Sister     Stroke Sister     Stroke Sister     Other Sister         brain aneurysm    No Known Problems Sister     Lung Disease Brother         asbestos    Heart Disease Brother         pacemaker     No Known Allergies     Prior to Admission medications    Medication Sig Start Date End Date Taking? Authorizing Provider   calcium carbonate (OS-ZULEMA) 500 mg calcium (1,250 mg) tablet Take 1 Tablet by mouth three (3) times daily. Provider, Historical   mirtazapine (REMERON) 45 mg tablet Take 45 mg by mouth nightly. Provider, Historical   dilTIAZem ER (Cartia XT) 180 mg capsule Take 180 mg by mouth daily.     Provider, Historical   ondansetron hcl (Zofran) 4 mg tablet Take 4 mg by mouth every eight (8) hours as needed for Nausea. Provider, Historical   metoprolol succinate (TOPROL XL) 50 mg XL tablet Take 50 mg by mouth daily. 11/16/17   Melissa Loja NP   pravastatin (PRAVACHOL) 80 mg tablet Take 80 mg by mouth nightly. Provider, Historical   POLYVINYL ALCOHOL/POVIDONE (ARTIFICIAL TEARS OP) Apply 1 Drop to eye as needed (DRY EYES). 1 DROP TO [facility] EYE    Provider, Historical       REVIEW OF SYSTEMS:     I am not able to complete the review of systems because:    The patient is intubated and sedated    The patient has altered mental status due to his acute medical problems    The patient has baseline aphasia from prior stroke(s)    The patient has baseline dementia and is not reliable historian    The patient is in acute medical distress and unable to provide information           Total of 12 systems reviewed as follows:       POSITIVE= underlined text  Negative = text not underlined  General:  fever, chills, sweats, generalized weakness, weight loss/gain,      loss of appetite   Eyes:    blurred vision, eye pain, loss of vision, double vision  ENT:    rhinorrhea, pharyngitis   Respiratory:   cough, sputum production, SOB, [place], wheezing, pleuritic pain   Cardiology:   chest pain, palpitations, orthopnea, PND, edema, syncope   Gastrointestinal:  abdominal pain , N/V, diarrhea, dysphagia, constipation, bleeding   Genitourinary:  frequency, urgency, dysuria, hematuria, incontinence   Muskuloskeletal :  arthralgia, myalgia, back pain  Hematology:  easy bruising, nose or gum bleeding, lymphadenopathy   Dermatological: rash, ulceration, pruritis, color change / jaundice  Endocrine:   hot flashes or polydipsia   Neurological:  headache, dizziness, confusion, focal weakness, paresthesia,     Speech difficulties, memory loss, gait difficulty  Psychological: Feelings of anxiety, depression, agitation    Objective:   VITALS:    Visit Vitals  BP (!) 113/58   Pulse 67   Temp 98.4 °F (36.9 °C)   Resp 17   Ht 5' 7\" (1.702 m)   Wt 52.2 kg (115 lb 1.3 oz)   SpO2 100%   BMI 18.02 kg/m²       PHYSICAL EXAM:    General:    Alert, cooperative, no distress, appears stated age. HEENT: Atraumatic, anicteric sclerae, pink conjunctivae     No oral ulcers, mucosa moist, throat clear, dentition fair  Neck:  Supple, symmetrical,  thyroid: non tender  Lungs:   Clear to auscultation bilaterally. No Wheezing or Rhonchi. No rales. Chest wall:  No tenderness  No Accessory muscle use. Heart:   Regular  rhythm,  No  murmur   No edema  Abdomen:   Soft, non-tender. Not distended. Bowel sounds normal  Extremities: No cyanosis. No clubbing,      Skin turgor normal, Capillary refill normal, Radial dial pulse 2+  Skin:     Not pale. Not Jaundiced  No rashes   Psych:  Good insight. Not depressed. Not anxious or agitated. Neurologic: EOMs intact. No facial asymmetry. No aphasia or slurred speech. Symmetrical strength, Sensation grossly intact. Alert and oriented X 4.     _______________________________________________________________________  Care Plan discussed with:    Comments   Patient X    Family  X    RN X    Care Manager                    Consultant:  DAMEON    _______________________________________________________________________  Expected  Disposition:   Home with Family X   HH/PT/OT/RN    SNF/LTC    [facility]    ________________________________________________________________________  TOTAL TIME:  61 Minutes    Critical Care Provided     Minutes non procedure based      Comments     Reviewed previous records   >50% of visit spent in counseling and coordination of care  Discussion with patient and/or family and questions answered       ________________________________________________________________________  Signed: Judith Lara MD    Procedures: see electronic medical records for all procedures/Xrays and details which were not copied into this note but were reviewed prior to creation of Plan.     LAB DATA REVIEWED:    Recent Results (from the past 24 hour(s))   CBC WITH AUTOMATED DIFF    Collection Time: 07/20/21  2:54 PM   Result Value Ref Range    WBC 5.6 3.6 - 11.0 K/uL    RBC 3.28 (L) 3.80 - 5.20 M/uL    HGB 8.7 (L) 11.5 - 16.0 g/dL    HCT 29.7 (L) 35.0 - 47.0 %    MCV 90.5 80.0 - 99.0 FL    MCH 26.5 26.0 - 34.0 PG    MCHC 29.3 (L) 30.0 - 36.5 g/dL    RDW 14.7 (H) 11.5 - 14.5 %    PLATELET 165 (L) 965 - 400 K/uL    MPV 10.4 8.9 - 12.9 FL    NRBC 0.0 0  WBC    ABSOLUTE NRBC 0.00 0.00 - 0.01 K/uL    NEUTROPHILS 74 32 - 75 %    LYMPHOCYTES 15 12 - 49 %    MONOCYTES 8 5 - 13 %    EOSINOPHILS 2 0 - 7 %    BASOPHILS 0 0 - 1 %    IMMATURE GRANULOCYTES 1 (H) 0.0 - 0.5 %    ABS. NEUTROPHILS 4.1 1.8 - 8.0 K/UL    ABS. LYMPHOCYTES 0.9 0.8 - 3.5 K/UL    ABS. MONOCYTES 0.5 0.0 - 1.0 K/UL    ABS. EOSINOPHILS 0.1 0.0 - 0.4 K/UL    ABS. BASOPHILS 0.0 0.0 - 0.1 K/UL    ABS. IMM. GRANS. 0.0 0.00 - 0.04 K/UL    DF AUTOMATED     METABOLIC PANEL, COMPREHENSIVE    Collection Time: 07/20/21  2:54 PM   Result Value Ref Range    Sodium 138 136 - 145 mmol/L    Potassium 4.7 3.5 - 5.1 mmol/L    Chloride 104 97 - 108 mmol/L    CO2 29 21 - 32 mmol/L    Anion gap 5 5 - 15 mmol/L    Glucose 141 (H) 65 - 100 mg/dL    BUN 37 (H) 6 - 20 MG/DL    Creatinine 2.00 (H) 0.55 - 1.02 MG/DL    BUN/Creatinine ratio 19 12 - 20      GFR est AA 29 (L) >60 ml/min/1.73m2    GFR est non-AA 24 (L) >60 ml/min/1.73m2    Calcium 8.4 (L) 8.5 - 10.1 MG/DL    Bilirubin, total 0.3 0.2 - 1.0 MG/DL    ALT (SGPT) 17 12 - 78 U/L    AST (SGOT) 18 15 - 37 U/L    Alk.  phosphatase 115 45 - 117 U/L    Protein, total 6.0 (L) 6.4 - 8.2 g/dL    Albumin 2.8 (L) 3.5 - 5.0 g/dL    Globulin 3.2 2.0 - 4.0 g/dL    A-G Ratio 0.9 (L) 1.1 - 2.2     TYPE & SCREEN    Collection Time: 07/20/21  2:54 PM   Result Value Ref Range    Crossmatch Expiration 07/23/2021,2359     ABO/Rh(D) Castrorine Epley POSITIVE     Antibody screen NEG    PROTHROMBIN TIME + INR    Collection Time: 07/20/21  3:03 PM   Result Value Ref Range    INR 1.1 0.9 - 1.1 Prothrombin time 11.3 (H) 9.0 - 11.1 sec   OCCULT BLOOD, STOOL    Collection Time: 07/20/21  3:07 PM   Result Value Ref Range    Occult blood, stool Positive (A) NEG     URINALYSIS W/ REFLEX CULTURE    Collection Time: 07/20/21  4:27 PM    Specimen: Urine   Result Value Ref Range    Color YELLOW/STRAW      Appearance CLOUDY (A) CLEAR      Specific gravity 1.014 1.003 - 1.030      pH (UA) 6.5 5.0 - 8.0      Protein Negative NEG mg/dL    Glucose Negative NEG mg/dL    Ketone Negative NEG mg/dL    Bilirubin Negative NEG      Blood Negative NEG      Urobilinogen 0.2 0.2 - 1.0 EU/dL    Nitrites Negative NEG      Leukocyte Esterase MODERATE (A) NEG      WBC 20-50 0 - 4 /hpf    RBC 5-10 0 - 5 /hpf    Epithelial cells FEW FEW /lpf    Bacteria 3+ (A) NEG /hpf    UA:UC IF INDICATED URINE CULTURE ORDERED (A) CNI      Mucus 1+ (A) NEG /lpf

## 2021-07-20 NOTE — ED PROVIDER NOTES
EMERGENCY DEPARTMENT HISTORY AND PHYSICAL EXAM      Date: 7/20/2021  Patient Name: Jenn King    History of Presenting Illness     Chief Complaint   Patient presents with    Melena     sent by PCP because for 3-4 days shes been having dark red blood in her stool. hgb was 9.1 at PCP office yesterday which note says is stable for her       History Provided By: Patient    HPI: Jenn King, 80 y.o. female with a past medical history significant for Abdominal aortic aneurysm repair, history of bladder cancer, history of diabetes, medical problems as stated below presents to the ED with cc of moderate bright red blood per rectum over the last 5 days. Patient reports generalized weakness and blood in the stool. She is unsure if she has a GI physician. She reports she does not take any blood thinners. She denies any significant abdominal pain. No other associated symptoms. No other exacerbating ameliorating factors. There are no other complaints, changes, or physical findings at this time. PCP: Patel Amaya NP    No current facility-administered medications on file prior to encounter. Current Outpatient Medications on File Prior to Encounter   Medication Sig Dispense Refill    calcium carbonate (OS-ZULEMA) 500 mg calcium (1,250 mg) tablet Take 1 Tablet by mouth three (3) times daily.  mirtazapine (REMERON) 45 mg tablet Take 45 mg by mouth nightly.  dilTIAZem ER (Cartia XT) 180 mg capsule Take 180 mg by mouth daily.  ondansetron hcl (Zofran) 4 mg tablet Take 4 mg by mouth every eight (8) hours as needed for Nausea.  metoprolol succinate (TOPROL XL) 50 mg XL tablet Take 50 mg by mouth daily.  pravastatin (PRAVACHOL) 80 mg tablet Take 80 mg by mouth nightly.  POLYVINYL ALCOHOL/POVIDONE (ARTIFICIAL TEARS OP) Apply 1 Drop to eye as needed (DRY EYES).  1 DROP TO EACH EYE         Past History     Past Medical History:  Past Medical History:   Diagnosis Date    Aneurysm ([address]     abdominal (pt not sure of blood vessel) repaired    Arthritis     hands    Cancer ([address]     bladder     Diabetes ([address]     oral hypoglycemics    Hypertension     Ill-defined condition     high cholesterol    Menopause        Past Surgical History:  Past Surgical History:   Procedure Laterality Date    HX CATARACT REMOVAL      bilateral    HX CHOLECYSTECTOMY  2019    lap cindy with IOC    HX GYN      hysterectomy    HX HEENT      Lasik on right    HX UROLOGICAL  2017    resection of bladder tumor    CT ABDOMEN SURGERY PROC UNLISTED  2017    aneurysm repair    CT BREAST SURGERY PROCEDURE UNLISTED         Family History:  Family History   Problem Relation Age of Onset    Other Mother         abdominal aneurysm    Cancer Sister         lung    Lung Disease Brother         asbestos    Stroke Sister     Stroke Sister     Stroke Sister     Other Sister         brain aneurysm    No Known Problems Sister     Lung Disease Brother         asbestos    Heart Disease Brother         pacemaker       Social History:  Social History     Tobacco Use    Smoking status: Former Smoker     Packs/day: 0.25     Years: 5.00     Pack years: 1.25     Quit date:      Years since quittin.5    Smokeless tobacco: Never Used   Substance Use Topics    Alcohol use: No    Drug use: No       Allergies:  No Known Allergies      Review of Systems   Review of Systems   Constitutional: Positive for fatigue. Negative for chills, diaphoresis and fever. HENT: Negative for ear pain and sore throat. Eyes: Negative for pain and redness. Respiratory: Negative for cough and shortness of breath. Cardiovascular: Negative for chest pain and leg swelling. Gastrointestinal: Positive for blood in stool. Negative for abdominal pain, diarrhea, nausea and vomiting. Endocrine: Negative for cold intolerance and heat intolerance. Genitourinary: Negative for flank pain and hematuria.    Musculoskeletal: Negative for back pain and neck stiffness. Skin: Negative for rash and wound. Neurological: Positive for weakness. Negative for dizziness, syncope and headaches. All other systems reviewed and are negative. Physical Exam   Physical Exam  Vitals and nursing note reviewed. Constitutional:       General: She is not in acute distress. Appearance: She is well-developed. She is not ill-appearing. HENT:      Head: Normocephalic and atraumatic. Mouth/Throat:      Pharynx: No oropharyngeal exudate. Eyes:      Conjunctiva/sclera: Conjunctivae normal.      Pupils: Pupils are equal, round, and reactive to light. Cardiovascular:      Rate and Rhythm: Normal rate and regular rhythm. Heart sounds: No murmur heard. Pulmonary:      Effort: Pulmonary effort is normal. No respiratory distress. Breath sounds: Normal breath sounds. No wheezing. Abdominal:      General: Bowel sounds are normal. There is no distension. Palpations: Abdomen is soft. Tenderness: There is no abdominal tenderness. Genitourinary:     Comments: Bright red blood per rectum, heme positive  Musculoskeletal:         General: No deformity. Normal range of motion. Cervical back: Normal range of motion. Skin:     General: Skin is warm and dry. Findings: No rash. Neurological:      Mental Status: She is alert and oriented to person, place, and time.       Coordination: Coordination normal.   Psychiatric:         Behavior: Behavior normal.         Diagnostic Study Results     Labs -     Recent Results (from the past 24 hour(s))   CBC WITH AUTOMATED DIFF    Collection Time: 07/20/21  2:54 PM   Result Value Ref Range    WBC 5.6 3.6 - 11.0 K/uL    RBC 3.28 (L) 3.80 - 5.20 M/uL    HGB 8.7 (L) 11.5 - 16.0 g/dL    HCT 29.7 (L) 35.0 - 47.0 %    MCV 90.5 80.0 - 99.0 FL    MCH 26.5 26.0 - 34.0 PG    MCHC 29.3 (L) 30.0 - 36.5 g/dL    RDW 14.7 (H) 11.5 - 14.5 %    PLATELET 911 (L) 926 - 400 K/uL    MPV 10.4 8.9 - 12.9 FL    NRBC 0.0 0  WBC    ABSOLUTE NRBC 0.00 0.00 - 0.01 K/uL    NEUTROPHILS 74 32 - 75 %    LYMPHOCYTES 15 12 - 49 %    MONOCYTES 8 5 - 13 %    EOSINOPHILS 2 0 - 7 %    BASOPHILS 0 0 - 1 %    IMMATURE GRANULOCYTES 1 (H) 0.0 - 0.5 %    ABS. NEUTROPHILS 4.1 1.8 - 8.0 K/UL    ABS. LYMPHOCYTES 0.9 0.8 - 3.5 K/UL    ABS. MONOCYTES 0.5 0.0 - 1.0 K/UL    ABS. EOSINOPHILS 0.1 0.0 - 0.4 K/UL    ABS. BASOPHILS 0.0 0.0 - 0.1 K/UL    ABS. IMM. GRANS. 0.0 0.00 - 0.04 K/UL    DF AUTOMATED     METABOLIC PANEL, COMPREHENSIVE    Collection Time: 07/20/21  2:54 PM   Result Value Ref Range    Sodium 138 136 - 145 mmol/L    Potassium 4.7 3.5 - 5.1 mmol/L    Chloride 104 97 - 108 mmol/L    CO2 29 21 - 32 mmol/L    Anion gap 5 5 - 15 mmol/L    Glucose 141 (H) 65 - 100 mg/dL    BUN 37 (H) 6 - 20 MG/DL    Creatinine 2.00 (H) 0.55 - 1.02 MG/DL    BUN/Creatinine ratio 19 12 - 20      GFR est AA 29 (L) >60 ml/min/1.73m2    GFR est non-AA 24 (L) >60 ml/min/1.73m2    Calcium 8.4 (L) 8.5 - 10.1 MG/DL    Bilirubin, total 0.3 0.2 - 1.0 MG/DL    ALT (SGPT) 17 12 - 78 U/L    AST (SGOT) 18 15 - 37 U/L    Alk.  phosphatase 115 45 - 117 U/L    Protein, total 6.0 (L) 6.4 - 8.2 g/dL    Albumin 2.8 (L) 3.5 - 5.0 g/dL    Globulin 3.2 2.0 - 4.0 g/dL    A-G Ratio 0.9 (L) 1.1 - 2.2     TYPE & SCREEN    Collection Time: 07/20/21  2:54 PM   Result Value Ref Range    Crossmatch Expiration 07/23/2021,2359     ABO/Rh(D) Dorothy Peace POSITIVE     Antibody screen NEG    PROTHROMBIN TIME + INR    Collection Time: 07/20/21  3:03 PM   Result Value Ref Range    INR 1.1 0.9 - 1.1      Prothrombin time 11.3 (H) 9.0 - 11.1 sec   OCCULT BLOOD, STOOL    Collection Time: 07/20/21  3:07 PM   Result Value Ref Range    Occult blood, stool Positive (A) NEG     URINALYSIS W/ REFLEX CULTURE    Collection Time: 07/20/21  4:27 PM    Specimen: Urine   Result Value Ref Range    Color YELLOW/STRAW      Appearance CLOUDY (A) CLEAR      Specific gravity 1.014 1.003 - 1.030      pH (UA) 6.5 5.0 - 8.0      Protein Negative NEG mg/dL    Glucose Negative NEG mg/dL    Ketone Negative NEG mg/dL    Bilirubin Negative NEG      Blood Negative NEG      Urobilinogen 0.2 0.2 - 1.0 EU/dL    Nitrites Negative NEG      Leukocyte Esterase MODERATE (A) NEG      WBC 20-50 0 - 4 /hpf    RBC 5-10 0 - 5 /hpf    Epithelial cells FEW FEW /lpf    Bacteria 3+ (A) NEG /hpf    UA:UC IF INDICATED URINE CULTURE ORDERED (A) CNI      Mucus 1+ (A) NEG /lpf       Radiologic Studies -   XR CHEST PORT   Final Result   No acute cardiopulmonary process. Dilated loops of bowel in the abdomen. NM ACUTE GI BLEED SCAN    (Results Pending)   CT ABD PELV WO CONT    (Results Pending)     CT Results  (Last 48 hours)    None        CXR Results  (Last 48 hours)               07/20/21 1520  XR CHEST PORT Final result    Impression:  No acute cardiopulmonary process. Dilated loops of bowel in the abdomen. Narrative:  EXAM: XR CHEST PORT       HISTORY: GI bleed. COMPARISON: 11/20/2017       FINDINGS: Single view(s) of the chest. The lungs are well inflated. No focal   consolidation, pleural effusion, or pneumothorax. The heart is on the upper   limits of normal for size, but unchanged. . The bones are osteopenic. Dilated   loops of bowel are seen in the abdomen. The patient is status post aortic   abdominal aneurysm repair. Medical Decision Making   I am the first provider for this patient. I reviewed the vital signs, available nursing notes, past medical history, past surgical history, family history and social history. Vital Signs-Reviewed the patient's vital signs.   Patient Vitals for the past 12 hrs:   Temp Pulse Resp BP SpO2   07/20/21 1631 -- 68 -- (!) 106/53 --   07/20/21 1525 -- -- -- -- 100 %   07/20/21 1500 -- 71 19 (!) 113/57 99 %   07/20/21 1422 98.4 °F (36.9 °C) 83 14 104/62 100 %       Records Reviewed: Nursing records and medical records reviewed    MDM:  Patient presents with lower GI bleeding. Currently with stable vitals and benign abdominal exam.  DDx: AVM, diverticulosis, diverticulitis, IBD, IBS, colitis, hemorrhoids. Will obtain two large bore IV's, provide IVF hydration, check labs including type and screen, check rectal exam and monitor closely for the need for additional imaging. Provider Notes (Medical Decision Making):   Patient is 41-year-old female presenting with lower GI bleed, blood in the stool. Given her age and comorbidities, we will admit her to the hospital for serial hemoglobin levels, rule out active source of bleeding with bleeding scan and will assess her aortic graft with a Noncon CT scan. ED Course:   Initial assessment performed. The patients presenting problems have been discussed, and they are in agreement with the care plan formulated and outlined with them. I have encouraged them to ask questions as they arise throughout their visit. ED Course as of Jul 20 1800   Tue Jul 20, 2021   1759 Case discussed with Dr. Jesica Malik with GI., who agreed with admission to the hospital on bleeding scan. Trending hemoglobin levels. [CC]      ED Course User Index  [CC] Elodia Breaux MD               Critical Care:  None      Disposition:  Admit Note:  5:36 PM  Pt is being admitted by Dr. Claudio Ram. The results of their tests and reason(s) for their admission have been discussed with pt and/or available family. They convey agreement and understanding for the need to be admitted and for admission diagnosis. Diagnosis     Clinical Impression:   1. Lower GI bleed        Attestations:    Wilian Gutierrez MD    Please note that this dictation was completed with [employer], the computer voice recognition software. Quite often unanticipated grammatical, syntax, homophones, and other interpretive errors are inadvertently transcribed by the computer software. Please disregard these errors. Please excuse any errors that have escaped final proofreading. Thank you.

## 2021-07-20 NOTE — ED NOTES
TRANSFER - OUT REPORT:    Verbal report given to SEBASTIAN Copeland(name) on Vernon Saliva  being transferred to [place]) for routine progression of care       Report consisted of patients Situation, Background, Assessment and   Recommendations(SBAR). Information from the following report(s) SBAR, ED Summary, [facility] and Recent Results was reviewed with the receiving nurse. Lines:   Peripheral IV 64/26/12 Left Basilic (Active)   Site Assessment Clean, dry, & intact 07/20/21 1452   Phlebitis Assessment 0 07/20/21 1452   Infiltration Assessment 0 07/20/21 1452   Dressing Status Clean, dry, & intact 07/20/21 1452   Hub Color/Line Status Pink 07/20/21 1452        Opportunity for questions and clarification was provided.       Patient transported with:   Bid Nerd

## 2021-07-21 ENCOUNTER — APPOINTMENT (OUTPATIENT)
Dept: NUCLEAR MEDICINE | Age: 86
DRG: 388 | End: 2021-07-21
Attending: EMERGENCY MEDICINE
Payer: MEDICARE

## 2021-07-21 LAB
ANION GAP SERPL CALC-SCNC: 3 MMOL/L (ref 5–15)
BUN SERPL-MCNC: 34 MG/DL (ref 6–20)
BUN/CREAT SERPL: 20 (ref 12–20)
CALCIUM SERPL-MCNC: 8 MG/DL (ref 8.5–10.1)
CHLORIDE SERPL-SCNC: 108 MMOL/L (ref 97–108)
CO2 SERPL-SCNC: 26 MMOL/L (ref 21–32)
CREAT SERPL-MCNC: 1.68 MG/DL (ref 0.55–1.02)
ERYTHROCYTE [DISTWIDTH] IN BLOOD BY AUTOMATED COUNT: 14.8 % (ref 11.5–14.5)
GLUCOSE SERPL-MCNC: 80 MG/DL (ref 65–100)
HCT VFR BLD AUTO: 26.9 % (ref 35–47)
HGB BLD-MCNC: 7.9 G/DL (ref 11.5–16)
LACTATE SERPL-SCNC: 1.6 MMOL/L (ref 0.4–2)
MAGNESIUM SERPL-MCNC: 2.2 MG/DL (ref 1.6–2.4)
MCH RBC QN AUTO: 26.8 PG (ref 26–34)
MCHC RBC AUTO-ENTMCNC: 29.4 G/DL (ref 30–36.5)
MCV RBC AUTO: 91.2 FL (ref 80–99)
NRBC # BLD: 0 K/UL (ref 0–0.01)
NRBC BLD-RTO: 0 PER 100 WBC
PLATELET # BLD AUTO: 129 K/UL (ref 150–400)
PMV BLD AUTO: 10.5 FL (ref 8.9–12.9)
POTASSIUM SERPL-SCNC: 4.6 MMOL/L (ref 3.5–5.1)
RBC # BLD AUTO: 2.95 M/UL (ref 3.8–5.2)
SODIUM SERPL-SCNC: 137 MMOL/L (ref 136–145)
WBC # BLD AUTO: 5 K/UL (ref 3.6–11)

## 2021-07-21 PROCEDURE — 74011000258 HC RX REV CODE- 258: Performed by: STUDENT IN AN ORGANIZED HEALTH CARE EDUCATION/TRAINING PROGRAM

## 2021-07-21 PROCEDURE — 36415 COLL VENOUS BLD VENIPUNCTURE: CPT

## 2021-07-21 PROCEDURE — C9113 INJ PANTOPRAZOLE SODIUM, VIA: HCPCS | Performed by: STUDENT IN AN ORGANIZED HEALTH CARE EDUCATION/TRAINING PROGRAM

## 2021-07-21 PROCEDURE — 74011250637 HC RX REV CODE- 250/637: Performed by: STUDENT IN AN ORGANIZED HEALTH CARE EDUCATION/TRAINING PROGRAM

## 2021-07-21 PROCEDURE — 74011250636 HC RX REV CODE- 250/636: Performed by: STUDENT IN AN ORGANIZED HEALTH CARE EDUCATION/TRAINING PROGRAM

## 2021-07-21 PROCEDURE — 65660000000 HC RM CCU STEPDOWN

## 2021-07-21 PROCEDURE — 85027 COMPLETE CBC AUTOMATED: CPT

## 2021-07-21 PROCEDURE — 74011000258 HC RX REV CODE- 258: Performed by: INTERNAL MEDICINE

## 2021-07-21 PROCEDURE — 83605 ASSAY OF LACTIC ACID: CPT

## 2021-07-21 PROCEDURE — 83735 ASSAY OF MAGNESIUM: CPT

## 2021-07-21 PROCEDURE — 74011000250 HC RX REV CODE- 250: Performed by: STUDENT IN AN ORGANIZED HEALTH CARE EDUCATION/TRAINING PROGRAM

## 2021-07-21 PROCEDURE — A9560 TC99M LABELED RBC: HCPCS

## 2021-07-21 PROCEDURE — 99223 1ST HOSP IP/OBS HIGH 75: CPT | Performed by: SURGERY

## 2021-07-21 PROCEDURE — 80048 BASIC METABOLIC PNL TOTAL CA: CPT

## 2021-07-21 RX ORDER — DEXTROSE MONOHYDRATE AND SODIUM CHLORIDE 5; .9 G/100ML; G/100ML
75 INJECTION, SOLUTION INTRAVENOUS CONTINUOUS
Status: DISCONTINUED | OUTPATIENT
Start: 2021-07-21 | End: 2021-07-23

## 2021-07-21 RX ADMIN — DEXTROSE AND SODIUM CHLORIDE 75 ML/HR: 5; 900 INJECTION, SOLUTION INTRAVENOUS at 17:26

## 2021-07-21 RX ADMIN — SODIUM CHLORIDE 40 MG: 9 INJECTION INTRAMUSCULAR; INTRAVENOUS; SUBCUTANEOUS at 10:27

## 2021-07-21 RX ADMIN — Medication 10 ML: at 21:27

## 2021-07-21 RX ADMIN — SODIUM CHLORIDE 40 MG: 9 INJECTION INTRAMUSCULAR; INTRAVENOUS; SUBCUTANEOUS at 21:25

## 2021-07-21 RX ADMIN — Medication 10 ML: at 14:00

## 2021-07-21 RX ADMIN — DILTIAZEM HYDROCHLORIDE 180 MG: 180 CAPSULE, COATED, EXTENDED RELEASE ORAL at 10:27

## 2021-07-21 RX ADMIN — CEFTRIAXONE 1 G: 1 INJECTION, POWDER, FOR SOLUTION INTRAMUSCULAR; INTRAVENOUS at 17:26

## 2021-07-21 RX ADMIN — MIRTAZAPINE 45 MG: 15 TABLET, FILM COATED ORAL at 21:25

## 2021-07-21 RX ADMIN — Medication 10 ML: at 06:16

## 2021-07-21 RX ADMIN — PRAVASTATIN SODIUM 80 MG: 40 TABLET ORAL at 21:25

## 2021-07-21 NOTE — PROGRESS NOTES
End of Shift Note    Bedside shift change report given to Damon Torres RN (oncoming nurse) by Alonso Kelsey RN (offgoing nurse). Report included the following information SBAR, Kardex, Intake/Output, MAR and Recent Results    Shift worked:  [id number]     Shift summary and any significant changes:    No significant changes during shift.  Urostomy bag changed    Concerns for physician to address: None   Zone phone for oncoming shift:  [phone]

## 2021-07-21 NOTE — PROGRESS NOTES
Hospitalist Progress Note    NAME: Angelique Rogers   :  1934   MRN:  [id number]       Assessment / Plan:  Acute lower GI bleeding hemodynamically stable  Acute blood loss anemia  -Bleeding scan shows no evidence of bleeding  -Evaluated by GI and recommended conservative management given SBO  -Hemoglobin is 7.9  -GI is following    Suspected SBO  -CT abdomen shows multiple significantly dilated loops of small bowel in the abdomen and also right lower ileal conduit with mild hydronephrosis due to bowel distention  -On physical exam, abdomen is nontender, no guarding or rigidity and she does not have any abdominal pain, nausea or vomiting  -GI has consulted general surgery  -We will wait for general surgery recommendations  -Continue IV fluids  -keep NPO until surgery eval.    History of bladder cancer s/p urostomy  -Continue supportive care for urostomy    Incidental 7 mm pulmonary nodule  -We will repeat CT of chest in about 6 months    Acute kidney injury  -Baseline creatinine is normal.  Creatinine peaked at 2.0.  -Creatinine is trending down and is 1.68. UTI  -UA suggestive of UTI, urine culture pending.  -Continue Rocephin.     Hypertension  Insomnia  Hyperlipidemia  -Continue Cardizem        Code Status: Full code  Surrogate Decision Maker:     DVT Prophylaxis: SCD       Baseline: Ambulatory at home        Body mass index is 18.02 kg/m².          Subjective:     Chief Complaint / Reason for Physician Visit  Does not report any abdominal pain, nausea or vomiting  No further bleeding  No chest pain or shortness of breath    Review of Systems:  Symptom Y/N Comments  Symptom Y/N Comments   Fever/Chills    Chest Pain     Poor Appetite    Edema     Cough    Abdominal Pain     Sputum    Joint Pain     SOB/[place]    Pruritis/Rash     Nausea/vomit    Tolerating PT/OT     Diarrhea    Tolerating Diet     Constipation    Other       Could NOT obtain due to:      Objective:     VITALS:   Last 24hrs VS reviewed since prior progress note. Most recent are:  Patient Vitals for the past 24 hrs:   Temp Pulse Resp BP SpO2   07/21/21 1200 -- 73 -- -- --   07/21/21 1135 97.3 °F (36.3 °C) 73 16 (!) 154/81 97 %   07/21/21 0800 -- 61 -- -- --   07/21/21 0754 97.3 °F (36.3 °C) 61 17 (!) 116/57 99 %   07/21/21 0400 -- 60 -- -- --   07/21/21 0000 -- 62 -- -- --   07/20/21 2319 97.7 °F (36.5 °C) 65 16 132/77 99 %   07/20/21 2000 -- 69 -- -- --   07/20/21 1924 98.3 °F (36.8 °C) 71 16 (!) 151/78 98 %   07/20/21 1700 -- 67 17 (!) 113/58 100 %   07/20/21 1631 -- 68 -- (!) 106/53 --   07/20/21 1630 -- 69 21 125/61 100 %   07/20/21 1615 -- 68 18 (!) 106/53 100 %   07/20/21 1600 -- 68 20 (!) 104/55 100 %   07/20/21 1530 -- 69 16 (!) 102/53 100 %   07/20/21 1525 -- -- -- -- 100 %   07/20/21 1500 -- 71 19 (!) 113/57 99 %       Intake/Output Summary (Last 24 hours) at 7/21/2021 1457  Last data filed at 7/21/2021 0657  Gross per 24 hour   Intake --   Output 1000 ml   Net -1000 ml        PHYSICAL EXAM:  General: WD, WN. Alert, cooperative, no acute distress    EENT:  EOMI. Anicteric sclerae. MMM  Resp:  CTA bilaterally, no wheezing or rales. No accessory muscle use  CV:  Regular  rhythm,  No edema  GI:  Soft, Non distended, Non tender.  +Bowel sounds, urostomy in place  Neurologic:  Alert and oriented X 3, normal speech,   Psych:   Not anxious nor agitated  Skin:  No rashes.   No jaundice    Reviewed most current lab test results and cultures  YES  Reviewed most current radiology test results   YES  Review and summation of old records today    NO  Reviewed patient's current orders and MAR    YES  PMH/SH reviewed - no change compared to H&P          Current Facility-Administered Medications:     mirtazapine (REMERON) tablet 45 mg, 45 mg, Oral, QHS, Gerri Llanos MD, 45 mg at 07/20/21 2132    pravastatin (PRAVACHOL) tablet 80 mg, 80 mg, Oral, QHS, Gerri Llanos MD, 80 mg at 07/20/21 2132    dilTIAZem ER (CARDIZEM CD) capsule 180 mg, 180 mg, Oral, DAILY, Argenis Trevino MD, 180 mg at 07/21/21 1027    pantoprazole (PROTONIX) 40 mg in 0.9% sodium chloride 10 mL injection, 40 mg, IntraVENous, Q12H, Argenis Trevino MD, 40 mg at 07/21/21 1027    cefTRIAXone (ROCEPHIN) 1 g in 0.9% sodium chloride (MBP/ADV) 50 mL MBP, 1 g, IntraVENous, Q24H, Argenis Trevino MD    sodium chloride (NS) flush 5-40 mL, 5-40 mL, IntraVENous, Q8H, Argenis Trevino MD, 10 mL at 07/21/21 0616    sodium chloride (NS) flush 5-40 mL, 5-40 mL, IntraVENous, PRN, Argenis Trevino MD    acetaminophen (TYLENOL) tablet 650 mg, 650 mg, Oral, Q6H PRN **OR** acetaminophen (TYLENOL) suppository 650 mg, 650 mg, Rectal, Q6H PRN, Argenis Trevino MD    polyethylene glycol (MIRALAX) packet 17 g, 17 g, Oral, DAILY PRN, Argenis Trevino MD    ondansetron (ZOFRAN ODT) tablet 4 mg, 4 mg, Oral, Q8H PRN **OR** ondansetron (ZOFRAN) injection 4 mg, 4 mg, IntraVENous, Q6H PRN, Argenis Trevino MD  ________________________________________________________________________  Care Plan discussed with:    Comments   Patient y    Family      RN y    Care Manager     Consultant                        Multidiciplinary team rounds were held today with , nursing, pharmacist and clinical coordinator. Patient's plan of care was discussed; medications were reviewed and discharge planning was addressed. ________________________________________________________________________  Total NON critical care TIME:  35  Minutes    Total CRITICAL CARE TIME Spent:   Minutes non procedure based      Comments   >50% of visit spent in counseling and coordination of care     ________________________________________________________________________  Renny Retana MD     Procedures: see electronic medical records for all procedures/Xrays and details which were not copied into this note but were reviewed prior to creation of Plan. LABS:  I reviewed today's most current labs and imaging studies.   Pertinent labs include:  Recent Labs     07/21/21 0258 07/20/21  1454   WBC 5.0 5.6   HGB 7.9* 8.7*   HCT 26.9* 29.7*   * 130*     Recent Labs     07/21/21 0258 07/20/21  1503 07/20/21  1454     --  138   K 4.6  --  4.7     --  104   CO2 26  --  29   GLU 80  --  141*   BUN 34*  --  37*   CREA 1.68*  --  2.00*   CA 8.0*  --  8.4*   MG 2.2  --   --    ALB  --   --  2.8*   TBILI  --   --  0.3   ALT  --   --  17   INR  --  1.1  --        Signed: Pilar Liu MD

## 2021-07-21 NOTE — PROGRESS NOTES
SURGERY CONSULT    Patient has a history of an ileal conduit. I took her gallbladder out in 2018 for chronic cholecystitis. She was admitted last month with a partial small bowel obstruction which resolved with conservative management. She is back today noting some hematochezia on Tuesday morning  Noncontrast CT in the ER shows a partial small bowel obstruction    Her abdominal exam is benign. No more bowel movements since she has been here today. Denies nausea. Reports being hungry. Patient currently appears stable. I agree with current treatment. Keep n.p.o. while awaiting return of bowel function. May need further evaluation for this chronic/second episode pSBO over the last month. Full note to follow. I  will reevaluate in the morning.

## 2021-07-21 NOTE — PROGRESS NOTES
Problem: Falls - Risk of  Goal: *Absence of Falls  Description: Document Helen Марина Fall Risk and appropriate interventions in the flowsheet.   Outcome: Progressing Towards Goal  Note: Fall Risk Interventions:  Mobility Interventions: Communicate number of staff needed for ambulation/transfer              Elimination Interventions: Call light in reach              Problem: Patient Education: Go to Patient Education Activity  Goal: Patient/Family Education  Outcome: Progressing Towards Goal

## 2021-07-21 NOTE — CONSULTS
GI CONSULTATION NOTE  Selin Newman NP  [phone] NP in-hospital cell phone M-F until 4:30  After 5pm or on weekends, please call  for physician on call    NAME: Bartolo Mercedes   :  1934   MRN:  [id number]   Attending:   Primary GI:   Date/Time:  2021 10:31 AM  Assessment:   GI Bleeding - maroon stool x 4 days  Small Bowel Obstruction - incidental on CT scan - denies symptoms  - CT 21: Multiple significantly dilated loops of small bowel in the abdomen related to  small bowel obstruction. The transition site is not well seen. No evidence of perforation.  - Sudden onset of dark red stool on Friday  - No abdominal pain, nausea, vomiting  - Abdominal mildly firm, some high pitch bowel sounds - passing gas  - + weight loss unknown amount  - Hgb 7.8 - been about ~8 for the at least 1 month prior, last time was 12 in   - No NSAID use or anticoagulation.  - Colonoscopy years ago but unknown details, no previous EGD    Bladder cancer -remission  AAA repair    Plan:   1. NM bleeding study pending  2. NPO for now in case of need for procedure or surgery  3. Serial H/H - transfuse PRN  4. Continue BID PPI  5. Added Lactic acid  6. Surgery consult to Dr Trevin Rai to him personally about SBO  7. Rest per primary team.     Plan discussed with Dr Jessica Velasquez. Thank you for consultation. Subjective:     HISTORY OF PRESENT ILLNESS:     aBrtolo Mercedes is an 80 y.o. AA female who we are asked to see for complaint of lower GI bleed and small bowel obstruction. Medical history includes AAA repair, bladder cancer, DM. Pt presented to the hospital rectal bleeding x 4 days. Pt reports that she began seeing maroon colored blood on Friday at home about once per day. She is always weak in her legs but may be a bit worse. She reports losing weight but unsure of how much.  Incidental finding on CT scan noting small bowel obstruction but pt denies any nausea, vomiting and is tolerating her meals, she is actually hungry. She does have some loose stool though the last week. No abdominal pain. She has been anemic since  with hgb of ~8 and today it is again 7.9. Denies any NSAID use. NO anticoagulation. Hx of colonoscopy but years ago and unclear of any details. NO family hx of CRC.      CT scan 21:   1. Multiple significantly dilated loops of small bowel in the abdomen related to  small bowel obstruction. The transition site is not well seen. No evidence of  perforation. 2. Right lower quadrant ileal conduit with increased mild right hydronephrosis. This may be related to the bowel distention in the abdomen. 3. Unchanged severe left renal atrophy. 4. Unchanged 7 mm pulmonary nodule left lower lobe. Recommend follow-up chest CT  in 6 months. 5. Status post endovascular repair of the abdominal aorta. 6. Unchanged diffuse anasarca.     Past Medical History:   Diagnosis Date    Aneurysm ([address]     abdominal (pt not sure of blood vessel) repaired    Arthritis     hands    Cancer ([address]     bladder     Diabetes ([address]     oral hypoglycemics    Hypertension     Ill-defined condition     high cholesterol    Menopause       Past Surgical History:   Procedure Laterality Date    HX CATARACT REMOVAL      bilateral    HX CHOLECYSTECTOMY  2019    lap cindy with IOC    HX GYN      hysterectomy    HX HEENT      Lasik on right    HX UROLOGICAL  2017    resection of bladder tumor    AL ABDOMEN SURGERY PROC UNLISTED  2017    aneurysm repair    AL BREAST SURGERY PROCEDURE UNLISTED       Social History     Tobacco Use    Smoking status: Former Smoker     Packs/day: 0.25     Years: 5.00     Pack years: 1.25     Quit date:      Years since quittin.5    Smokeless tobacco: Never Used   Substance Use Topics    Alcohol use: No      Family History   Problem Relation Age of Onset    Other Mother         abdominal aneurysm    Cancer Sister         lung    Lung Disease Brother asbestos    Stroke Sister     Stroke Sister     Stroke Sister     Other Sister         brain aneurysm    No Known Problems Sister     Lung Disease Brother         asbestos    Heart Disease Brother         pacemaker      No Known Allergies   Prior to Admission medications    Medication Sig Start Date End Date Taking? Authorizing Provider   calcium carbonate (OS-ZULEMA) 500 mg calcium (1,250 mg) tablet Take 1 Tablet by mouth three (3) times daily. Provider, Historical   mirtazapine (REMERON) 45 mg tablet Take 45 mg by mouth nightly. Provider, Historical   dilTIAZem ER (Cartia XT) 180 mg capsule Take 180 mg by mouth daily. Provider, Historical   ondansetron hcl (Zofran) 4 mg tablet Take 4 mg by mouth every eight (8) hours as needed for Nausea. Provider, Historical   metoprolol succinate (TOPROL XL) 50 mg XL tablet Take 50 mg by mouth daily. 11/16/17   Eva Rodríguez NP   pravastatin (PRAVACHOL) 80 mg tablet Take 80 mg by mouth nightly. Provider, Historical   POLYVINYL ALCOHOL/POVIDONE (ARTIFICIAL TEARS OP) Apply 1 Drop to eye as needed (DRY EYES).  1 DROP TO [facility] EYE    Provider, Historical       Patient Active Problem List   Diagnosis Code    Transitional cell bladder cancer ([facility] C67.9    Aortic aneurysm ([address] I71.9    SBO (small bowel obstruction) ([address] K56.609    Pneumonia J18.9    Moderate protein-calorie malnutrition ([address] E44.0    Functional diarrhea K59.1    Poor appetite R63.0    Counseling regarding advance care planning and goals of care Z71.89    Do not resuscitate status Z66    UTI (urinary tract infection) N39.0    Hypotension I95.9    Acute GI bleeding K92.2    KELLY (acute kidney injury) ([address] N17.9       REVIEW OF SYSTEMS:    Constitutional: negative fever, negative chills, negative weight loss  Eyes:   negative visual changes  ENT:   negative sore throat, tongue or lip swelling   Respiratory:  negative cough, negative dyspnea  Cards:  negative for chest pain, palpitations, lower extremity edema  GI:   See HPI  :  negative for frequency, dysuria  Integument:  negative for rash and pruritus  Heme:  negative for easy bruising and gum/nose bleeding  Musculoskel: negative for myalgias,  back pain and muscle weakness  Neuro: negative for headaches, dizziness, vertigo  Psych:  negative for feelings of anxiety, depression     Pertinent Positives: maroon stools      Objective:   VITALS:    Visit Vitals  BP (!) 116/57   Pulse 61   Temp 97.3 °F (36.3 °C)   Resp 17   Ht 5' 7\" (1.702 m)   Wt 52.2 kg (115 lb 1.3 oz)   SpO2 99%   BMI 18.02 kg/m²       PHYSICAL EXAM:   General:          Alert, WD, WN, cooperative, no distress, appears stated age. Head:               Normocephalic, without obvious abnormality, atraumatic. Eyes:               Conjunctivae clear and pale, anicteric sclerae. Pupils are equal  Nose:               Nares normal.  Throat:             Lips, mucosa, and tongue normal.    Neck:               Supple, symmetrical,  no adenopathy  Back:               Symmetric,  No CVA tenderness. Lungs:             CTA bilaterally. No wheezing/rhonchi/rales. Chest wall:      No tenderness or deformity. Heart:              Regular rate and rhythm,  no murmur, rub or gallop. Abdomen:        Soft, some high pitch bowel sounds, non tender. Not distended. No masses  Extremities:     Atraumatic, No cyanosis. No edema. Skin:                Texture, turgor normal. No rashes/lesions/jaundice  Lymph:            Cervical, supraclavicular normal.  Psych:             Good insight. Not depressed. Not anxious or agitated. Neurologic:      EOMs intact. No facial asymmetry. No aphasia or slurred speech. Normal                        strength, A/O X 3.      LAB DATA REVIEWED:    Recent Results (from the past 24 hour(s))   CBC WITH AUTOMATED DIFF    Collection Time: 07/20/21  2:54 PM   Result Value Ref Range    WBC 5.6 3.6 - 11.0 K/uL    RBC 3.28 (L) 3.80 - 5.20 M/uL    HGB 8.7 (L) 11.5 - 16.0 g/dL    HCT 29.7 (L) 35.0 - 47.0 %    MCV 90.5 80.0 - 99.0 FL    MCH 26.5 26.0 - 34.0 PG    MCHC 29.3 (L) 30.0 - 36.5 g/dL    RDW 14.7 (H) 11.5 - 14.5 %    PLATELET 398 (L) 130 - 400 K/uL    MPV 10.4 8.9 - 12.9 FL    NRBC 0.0 0  WBC    ABSOLUTE NRBC 0.00 0.00 - 0.01 K/uL    NEUTROPHILS 74 32 - 75 %    LYMPHOCYTES 15 12 - 49 %    MONOCYTES 8 5 - 13 %    EOSINOPHILS 2 0 - 7 %    BASOPHILS 0 0 - 1 %    IMMATURE GRANULOCYTES 1 (H) 0.0 - 0.5 %    ABS. NEUTROPHILS 4.1 1.8 - 8.0 K/UL    ABS. LYMPHOCYTES 0.9 0.8 - 3.5 K/UL    ABS. MONOCYTES 0.5 0.0 - 1.0 K/UL    ABS. EOSINOPHILS 0.1 0.0 - 0.4 K/UL    ABS. BASOPHILS 0.0 0.0 - 0.1 K/UL    ABS. IMM. GRANS. 0.0 0.00 - 0.04 K/UL    DF AUTOMATED     METABOLIC PANEL, COMPREHENSIVE    Collection Time: 07/20/21  2:54 PM   Result Value Ref Range    Sodium 138 136 - 145 mmol/L    Potassium 4.7 3.5 - 5.1 mmol/L    Chloride 104 97 - 108 mmol/L    CO2 29 21 - 32 mmol/L    Anion gap 5 5 - 15 mmol/L    Glucose 141 (H) 65 - 100 mg/dL    BUN 37 (H) 6 - 20 MG/DL    Creatinine 2.00 (H) 0.55 - 1.02 MG/DL    BUN/Creatinine ratio 19 12 - 20      GFR est AA 29 (L) >60 ml/min/1.73m2    GFR est non-AA 24 (L) >60 ml/min/1.73m2    Calcium 8.4 (L) 8.5 - 10.1 MG/DL    Bilirubin, total 0.3 0.2 - 1.0 MG/DL    ALT (SGPT) 17 12 - 78 U/L    AST (SGOT) 18 15 - 37 U/L    Alk.  phosphatase 115 45 - 117 U/L    Protein, total 6.0 (L) 6.4 - 8.2 g/dL    Albumin 2.8 (L) 3.5 - 5.0 g/dL    Globulin 3.2 2.0 - 4.0 g/dL    A-G Ratio 0.9 (L) 1.1 - 2.2     TYPE & SCREEN    Collection Time: 07/20/21  2:54 PM   Result Value Ref Range    Crossmatch Expiration 07/23/2021,2359     ABO/Rh(D) Omar Araujo POSITIVE     Antibody screen NEG    PROTHROMBIN TIME + INR    Collection Time: 07/20/21  3:03 PM   Result Value Ref Range    INR 1.1 0.9 - 1.1      Prothrombin time 11.3 (H) 9.0 - 11.1 sec   OCCULT BLOOD, STOOL    Collection Time: 07/20/21  3:07 PM   Result Value Ref Range    Occult blood, stool Positive (A) NEG URINALYSIS W/ REFLEX CULTURE    Collection Time: 07/20/21  4:27 PM    Specimen: Urine   Result Value Ref Range    Color YELLOW/STRAW      Appearance CLOUDY (A) CLEAR      Specific gravity 1.014 1.003 - 1.030      pH (UA) 6.5 5.0 - 8.0      Protein Negative NEG mg/dL    Glucose Negative NEG mg/dL    Ketone Negative NEG mg/dL    Bilirubin Negative NEG      Blood Negative NEG      Urobilinogen 0.2 0.2 - 1.0 EU/dL    Nitrites Negative NEG      Leukocyte Esterase MODERATE (A) NEG      WBC 20-50 0 - 4 /hpf    RBC 5-10 0 - 5 /hpf    Epithelial cells FEW FEW /lpf    Bacteria 3+ (A) NEG /hpf    UA:UC IF INDICATED URINE CULTURE ORDERED (A) CNI      Mucus 1+ (A) NEG /lpf   CULTURE, BLOOD, PAIRED    Collection Time: 07/20/21  6:15 PM    Specimen: Blood   Result Value Ref Range    Special Requests: NO SPECIAL REQUESTS      Culture result: NO GROWTH AFTER 12 HOURS     METABOLIC PANEL, BASIC    Collection Time: 07/21/21  2:58 AM   Result Value Ref Range    Sodium 137 136 - 145 mmol/L    Potassium 4.6 3.5 - 5.1 mmol/L    Chloride 108 97 - 108 mmol/L    CO2 26 21 - 32 mmol/L    Anion gap 3 (L) 5 - 15 mmol/L    Glucose 80 65 - 100 mg/dL    BUN 34 (H) 6 - 20 MG/DL    Creatinine 1.68 (H) 0.55 - 1.02 MG/DL    BUN/Creatinine ratio 20 12 - 20      GFR est AA 35 (L) >60 ml/min/1.73m2    GFR est non-AA 29 (L) >60 ml/min/1.73m2    Calcium 8.0 (L) 8.5 - 10.1 MG/DL   MAGNESIUM    Collection Time: 07/21/21  2:58 AM   Result Value Ref Range    Magnesium 2.2 1.6 - 2.4 mg/dL   CBC W/O DIFF    Collection Time: 07/21/21  2:58 AM   Result Value Ref Range    WBC 5.0 3.6 - 11.0 K/uL    RBC 2.95 (L) 3.80 - 5.20 M/uL    HGB 7.9 (L) 11.5 - 16.0 g/dL    HCT 26.9 (L) 35.0 - 47.0 %    MCV 91.2 80.0 - 99.0 FL    MCH 26.8 26.0 - 34.0 PG    MCHC 29.4 (L) 30.0 - 36.5 g/dL    RDW 14.8 (H) 11.5 - 14.5 %    PLATELET 263 (L) 659 - 400 K/uL    MPV 10.5 8.9 - 12.9 FL    NRBC 0.0 0  WBC    ABSOLUTE NRBC 0.00 0.00 - 0.01 K/uL       IMAGING RESULTS:  I have personally reviewed the imaging reports    DATE: 7/20/21    EXAM: CT ABD PELV WO CONT     INDICATION: GI bleeding.     COMPARISON: CT 6/28/2021     CONTRAST:  None.     TECHNIQUE:   Thin axial images were obtained through the abdomen and pelvis. Coronal and  sagittal reformats were generated. Oral contrast was not administered. CT dose  reduction was achieved through use of a standardized protocol tailored for this  examination and automatic exposure control for dose modulation.      The absence of intravenous contrast material reduces the sensitivity for  evaluation of the vasculature and solid organs.     FINDINGS:   Active extravasation with GI bleeding cannot be evaluated without IV contrast.      LOWER THORAX: There is an unchanged 7 mm pulmonary nodule in the left lower  lobe. LIVER: No mass. BILIARY TREE: The patient is status post cholecystectomy. CBD is not dilated. SPLEEN: within normal limits. PANCREAS: No focal abnormality. ADRENALS: Unremarkable. KIDNEYS/URETERS: There is severe atrophy of the left kidney. There is  increased  mild right hydronephrosis. There is no hydronephrosis. There is a right lower  quadrant ileal conduit. STOMACH: Unremarkable. SMALL BOWEL: There are multiple significantly dilated loops of small bowel in  the abdomen related to obstruction. There are multiple air-fluid levels. There  is equalization of stool within multiple loops of small bowel. COLON: No dilatation or wall thickening. APPENDIX: Not well seen. PERITONEUM: No ascites or pneumoperitoneum. RETROPERITONEUM: The patient is status post endovascular repair of abdominal  aorta. REPRODUCTIVE ORGANS: Status post hysterectomy. URINARY BLADDER: Status post cystectomy. BONES: No destructive bone lesion. ABDOMINAL WALL: No mass or hernia. There is diffuse anasarca.   ADDITIONAL COMMENTS: N/A     IMPRESSION     1. Multiple significantly dilated loops of small bowel in the abdomen related to  small bowel obstruction. The transition site is not well seen. No evidence of  perforation. 2. Right lower quadrant ileal conduit with increased mild right hydronephrosis. This may be related to the bowel distention in the abdomen. 3. Unchanged severe left renal atrophy. 4. Unchanged 7 mm pulmonary nodule left lower lobe. Recommend follow-up chest CT  in 6 months. 5. Status post endovascular repair of the abdominal aorta. 6. Unchanged diffuse anasarca. Total time spent with patient: 50 minutes ________________________________________________________________________  Care Plan discussed with:  Patient y   Family     RN y - at bedside              Consultant:  amandeep Sanchez     CT  7/21/2021:  ________________________________________________________________________    ___________________________________________________  Consulting Provider:  Allison Leal NP      7/21/2021  10:31 AM

## 2021-07-21 NOTE — PROGRESS NOTES
End of Shift Note    Bedside shift change report given to virginie (oncoming nurse) by Steve Delgado RN (offgoing nurse). Report included the following information SBAR, Kardex, ED Summary, Procedure Summary, Intake/Output, MAR and Recent Results    Shift worked:  7a-7p     Shift summary and any significant changes:     patient has been NPO today. No complaints of pain or nausea. No stools. She has been resting comfortably in bed. Concerns for physician to address:  none     Zone phone for oncoming shift:   [phone]       Activity:  Activity Level: Up with Assistance  Number times ambulated in hallways past shift: 0  Number of times OOB to chair past shift: 0    Cardiac:   Cardiac Monitoring: Yes      Cardiac Rhythm: Sinus Rhythm    Access:   Current line(s): PIV     Genitourinary:   Urinary status: voiding    Respiratory:   O2 Device: None (Room air)  Chronic home O2 use?: NO  Incentive spirometer at bedside: NO  Actual Volume (ml): 750 ml  GI:  Last Bowel Movement Date: 07/20/21  Current diet:  DIET NPO  Passing flatus: YES  Tolerating current diet: YES       Pain Management:   Patient states pain is manageable on current regimen: YES    Skin:  Adam Score: 20  Interventions: increase time out of bed and nutritional support     Patient Safety:  Fall Score:  Total Score: 2  Interventions: bed/chair alarm, assistive device (walker, cane, etc), gripper socks and pt to call before getting OOB       Length of Stay:  Expected LOS: 3d 0h  Actual LOS: [address], RN

## 2021-07-21 NOTE — PROGRESS NOTES
Reason for Admission:  Acute GI bleed, KELLY                  RUR Score:  16%                   Plan for utilizing home health: not anticipated         PCP: First and Last name:  Tino Noland, NP     Name of Practice:    Are you a current patient: Yes/No: yes   Approximate date of last visit: 2021   Can you participate in a virtual visit with your PCP: no                    Current Advanced Directive/Advance Care Plan: Full Code [address] (ACP) Conversation      Date of Conversation: 7/21/21  Conducted with: Patient with Decision Making Capacity    Healthcare Decision Maker:     Primary Decision Maker: Doreen Pond - Jayna Relative - [phone]    Secondary Decision Maker: Jeff Solis - [phone]    Content/Action Overview: Has ACP document(s) on file - reflects the patient's care preferences    Length of Voluntary ACP Conversation in minutes:  <16 minutes (Non-Billable)    [facility] Decision Maker:   Click here to complete [address] including selection of the Healthcare Decision Maker Relationship (ie \"Primary\")             Primary Decision Maker: Doreen Pond - Jayna Relative - [phone]    Secondary Decision Maker: Jeff Solis - [phone]                  Transition of Care Plan:  CM met with pt at bedside. Prior to admission, pt was independent w/ADL/IADL to include driving. Patient has very supportive cousin to assist w/transportation as needed. No history of HH/Rehab. DME use includes, walker & shower chair. Patients support system includes, cousin's, Haskel Sol and neighbors. Patient has a daughter in [facility]. No needs or concerns identified at this time. CM will continue to follow. PCP-  Tino Noland, NP  Pharmacy-[facility] in [facility] Management Interventions  PCP Verified by CM: Yes (Tino Noland, NP)  Mode of Transport at Discharge:  Other (see comment) (Ami holley)  Transition of Care Consult (CM Consult): Discharge Planning  Discharge Durable Medical Equipment: No (walker, shower chair)  Physical Therapy Consult: No  Occupational Therapy Consult: No  Speech Therapy Consult: No  Current Support Network: Family Lives Nearby, Lives Alone (Lives in a one story home w/4 STE)  Confirm Follow Up Transport: Self  Discharge Location  Discharge Placement:  (Anticipate Home)      [address] [place] [address]    Transition of Care Plan:    RUR:16%  Disposition:Home  Follow up appointments:  DME needed:n/a  Transportation at Discharge:cousin, Helen Balbuena or means to access home:         Medicare Letter:2nd IM needed  BCPI-A Bundle Letter:n/a  Caregiver Contact:  Discharge Caregiver contacted prior to discharge?

## 2021-07-22 ENCOUNTER — APPOINTMENT (OUTPATIENT)
Dept: GENERAL RADIOLOGY | Age: 86
DRG: 388 | End: 2021-07-22
Attending: INTERNAL MEDICINE
Payer: MEDICARE

## 2021-07-22 LAB
ANION GAP SERPL CALC-SCNC: 6 MMOL/L (ref 5–15)
BUN SERPL-MCNC: 27 MG/DL (ref 6–20)
BUN/CREAT SERPL: 20 (ref 12–20)
CALCIUM SERPL-MCNC: 8.2 MG/DL (ref 8.5–10.1)
CHLORIDE SERPL-SCNC: 108 MMOL/L (ref 97–108)
CO2 SERPL-SCNC: 25 MMOL/L (ref 21–32)
CREAT SERPL-MCNC: 1.35 MG/DL (ref 0.55–1.02)
ERYTHROCYTE [DISTWIDTH] IN BLOOD BY AUTOMATED COUNT: 14.7 % (ref 11.5–14.5)
GLUCOSE SERPL-MCNC: 90 MG/DL (ref 65–100)
HCT VFR BLD AUTO: 29.3 % (ref 35–47)
HGB BLD-MCNC: 8.5 G/DL (ref 11.5–16)
MCH RBC QN AUTO: 26.7 PG (ref 26–34)
MCHC RBC AUTO-ENTMCNC: 29 G/DL (ref 30–36.5)
MCV RBC AUTO: 92.1 FL (ref 80–99)
NRBC # BLD: 0 K/UL (ref 0–0.01)
NRBC BLD-RTO: 0 PER 100 WBC
PLATELET # BLD AUTO: 146 K/UL (ref 150–400)
PMV BLD AUTO: 10.4 FL (ref 8.9–12.9)
POTASSIUM SERPL-SCNC: 4.3 MMOL/L (ref 3.5–5.1)
RBC # BLD AUTO: 3.18 M/UL (ref 3.8–5.2)
SODIUM SERPL-SCNC: 139 MMOL/L (ref 136–145)
WBC # BLD AUTO: 4.5 K/UL (ref 3.6–11)

## 2021-07-22 PROCEDURE — 74011250636 HC RX REV CODE- 250/636: Performed by: STUDENT IN AN ORGANIZED HEALTH CARE EDUCATION/TRAINING PROGRAM

## 2021-07-22 PROCEDURE — 74018 RADEX ABDOMEN 1 VIEW: CPT

## 2021-07-22 PROCEDURE — 80048 BASIC METABOLIC PNL TOTAL CA: CPT

## 2021-07-22 PROCEDURE — 74011000258 HC RX REV CODE- 258: Performed by: STUDENT IN AN ORGANIZED HEALTH CARE EDUCATION/TRAINING PROGRAM

## 2021-07-22 PROCEDURE — 74011000258 HC RX REV CODE- 258: Performed by: INTERNAL MEDICINE

## 2021-07-22 PROCEDURE — 85027 COMPLETE CBC AUTOMATED: CPT

## 2021-07-22 PROCEDURE — 65660000000 HC RM CCU STEPDOWN

## 2021-07-22 PROCEDURE — C9113 INJ PANTOPRAZOLE SODIUM, VIA: HCPCS | Performed by: STUDENT IN AN ORGANIZED HEALTH CARE EDUCATION/TRAINING PROGRAM

## 2021-07-22 PROCEDURE — 74011250637 HC RX REV CODE- 250/637: Performed by: STUDENT IN AN ORGANIZED HEALTH CARE EDUCATION/TRAINING PROGRAM

## 2021-07-22 PROCEDURE — 36415 COLL VENOUS BLD VENIPUNCTURE: CPT

## 2021-07-22 PROCEDURE — 74011000250 HC RX REV CODE- 250: Performed by: STUDENT IN AN ORGANIZED HEALTH CARE EDUCATION/TRAINING PROGRAM

## 2021-07-22 PROCEDURE — 99232 SBSQ HOSP IP/OBS MODERATE 35: CPT | Performed by: SURGERY

## 2021-07-22 RX ADMIN — SODIUM CHLORIDE 40 MG: 9 INJECTION INTRAMUSCULAR; INTRAVENOUS; SUBCUTANEOUS at 08:26

## 2021-07-22 RX ADMIN — MIRTAZAPINE 45 MG: 15 TABLET, FILM COATED ORAL at 21:28

## 2021-07-22 RX ADMIN — Medication 10 ML: at 17:35

## 2021-07-22 RX ADMIN — SODIUM CHLORIDE 40 MG: 9 INJECTION INTRAMUSCULAR; INTRAVENOUS; SUBCUTANEOUS at 21:28

## 2021-07-22 RX ADMIN — DEXTROSE AND SODIUM CHLORIDE 75 ML/HR: 5; 900 INJECTION, SOLUTION INTRAVENOUS at 22:32

## 2021-07-22 RX ADMIN — PRAVASTATIN SODIUM 80 MG: 40 TABLET ORAL at 21:28

## 2021-07-22 RX ADMIN — Medication 10 ML: at 21:28

## 2021-07-22 RX ADMIN — DEXTROSE AND SODIUM CHLORIDE 75 ML/HR: 5; 900 INJECTION, SOLUTION INTRAVENOUS at 08:26

## 2021-07-22 RX ADMIN — DILTIAZEM HYDROCHLORIDE 180 MG: 180 CAPSULE, COATED, EXTENDED RELEASE ORAL at 08:26

## 2021-07-22 RX ADMIN — CEFTRIAXONE 1 G: 1 INJECTION, POWDER, FOR SOLUTION INTRAMUSCULAR; INTRAVENOUS at 17:35

## 2021-07-22 RX ADMIN — Medication 10 ML: at 05:36

## 2021-07-22 NOTE — PROGRESS NOTES
Problem: Falls - Risk of  Goal: *Absence of Falls  Description: Document Wali Aguiar Fall Risk and appropriate interventions in the flowsheet.   Outcome: Progressing Towards Goal  Note: Fall Risk Interventions:  Mobility Interventions: Communicate number of staff needed for ambulation/transfer              Elimination Interventions: Call light in reach              Problem: Patient Education: Go to Patient Education Activity  Goal: Patient/Family Education  Outcome: Progressing Towards Goal

## 2021-07-22 NOTE — PROGRESS NOTES
Comprehensive Nutrition Assessment    Type and Reason for Visit: Initial    Nutrition Recommendations/Plan:   Advance diet as tolerated  Ensure clear TID for now  Once diet advanced beyond clear liquids, switch ensure clear to ensure enlive TID (high kcal/high protein)    Nutrition Assessment:     Patient medically noted for GI bleed, SBO, KELLY, and UTI. PMH Bladder cancer. Chart reviewed for low BMI. Patient reports being very hungry this afternoon; was happy to have liquids earlier for lunch. She's unable to report recent weights or if she's had any weight change. States she was eating well at home. Drinks ensure shakes as needed; agreeable to receiving supplements while in the hospital. She would like to gain some weight and regain strength (PT/OT consults?). Will add ensure clear for now given clear liquid diet; can advance ONS as diet advances. Encouraged intake of meals. Malnutrition Assessment:  Malnutrition Status:  Severe malnutrition    Context:  Chronic illness     Findings of the 6 clinical characteristics of malnutrition:   Energy Intake:  No significant decrease in energy intake  Weight Loss:  Unable to assess     Body Fat Loss:  7 - Severe body fat loss,     Muscle Mass Loss:  7 - Severe muscle mass loss,    Fluid Accumulation:  No significant fluid accumulation,     Strength:  Not performed     Estimated Daily Nutrient Needs:  Energy (kcal): 1534 kcal ( x 1.3AF +250kcal);  Weight Used for Energy Requirements: Current  Protein (g): 62-73g (1.2-1.4 g/kg bw); Weight Used for Protein Requirements: Current  Fluid (ml/day): 1550 mL; Method Used for Fluid Requirements: 1 ml/kcal    Nutrition Related Findings:       BG 90-  BM 7/20  Cardizem, Remeron, Protonix, Pravastatin, D5% IVF    Wounds:    None       Current Nutrition Therapies:  ADULT DIET Clear Liquid    Anthropometric Measures:  · Height:  5' 7\" (170.2 cm)  · Current Body Wt:  52.2 kg (115 lb 1.3 oz)   · BMI Category: Underweight (BMI less than 18.5)       Nutrition Diagnosis:   · Underweight related to  (hx of weight loss) as evidenced by  (BMI 18)    Nutrition Interventions:   Food and/or Nutrient Delivery: Modify current diet, Start oral nutrition supplement  Nutrition Education and Counseling: No recommendations at this time  Coordination of Nutrition Care: No recommendation at this time    Goals:  PO intake >50% of meals/supplements next 2-4 days       Nutrition Monitoring and Evaluation:   Behavioral-Environmental Outcomes: None identified  Food/Nutrient Intake Outcomes: Food and nutrient intake, Diet advancement/tolerance, Supplement intake  Physical Signs/Symptoms Outcomes: Biochemical data, Weight, GI status    Discharge Planning:     Too soon to determine     Electronically signed by Fredrick Trent RD on 7/22/2021 at 2:43 PM    Contact: ext [phone]

## 2021-07-22 NOTE — PROGRESS NOTES
End of Shift Note    Bedside shift change report given to Raquel (oncoming nurse) by Prabhakar Barnett RN (offgoing nurse). Report included the following information SBAR, Kardex, ED Summary, Procedure Summary, Intake/Output, MAR and Recent Results    Shift worked:  7a-7p     Shift summary and any significant changes:     Patient rested in bed throughout the shift. Diet was changed to clear liquids. No complaints of pain or nausea. Concerns for physician to address: none     Zone phone for oncoming shift:   [phone]       Activity:  Activity Level: Up with Assistance  Number times ambulated in hallways past shift: 0  Number of times OOB to chair past shift: 0    Cardiac:   Cardiac Monitoring: Yes      Cardiac Rhythm: Sinus Rhythm    Access:   Current line(s): PIV     Genitourinary:   Urinary status: rose    Respiratory:   O2 Device: None (Room air)  Chronic home O2 use?: NO  Incentive spirometer at bedside: YES  Actual Volume (ml): 750 ml  GI:  Last Bowel Movement Date: 07/20/21  Current diet:  ADULT DIET Clear Liquid  ADULT ORAL NUTRITION SUPPLEMENT Breakfast, Lunch, Dinner; Clear Liquid  Passing flatus: YES  Tolerating current diet: YES       Pain Management:   Patient states pain is manageable on current regimen: YES    Skin:  Adam Score: 20  Interventions: increase time out of bed and internal/external urinary devices    Patient Safety:  Fall Score:  Total Score: 2  Interventions: assistive device (walker, cane, etc), gripper socks and pt to call before getting OOB       Length of Stay:  Expected LOS: 3d 0h  Actual LOS: [address], RN

## 2021-07-22 NOTE — PROGRESS NOTES
Hospitalist Progress Note    NAME: Aguilar Henderson   :  1934   MRN:  [id number]       Assessment / Plan:  Acute lower GI bleeding hemodynamically stable  Acute blood loss anemia  -Bleeding scan shows no evidence of bleeding  -Evaluated by GI and recommended conservative management given SBO  -Hemoglobin is stable between 7-8  -GI recommended outpatient follow-up    Suspected recurrent SBO  -CT abdomen shows multiple significantly dilated loops of small bowel in the abdomen and also right lower ileal conduit with mild hydronephrosis due to bowel distention  -On physical exam, abdomen is nontender, no guarding or rigidity and she does not have any abdominal pain, nausea or vomiting  -Noted recommendations from Dr. Amy Lazaro get  Dr. Miguel Alaniz is expected to see today and provide final recommendations  -Started on clear liquid diet. Continue IV fluids. History of bladder cancer s/p urostomy  -Continue supportive care for urostomy    Incidental 7 mm pulmonary nodule  -We will repeat CT of chest in about 6 months    Acute kidney injury  -Baseline creatinine is normal.  Creatinine peaked at 2.0.  -Creatinine is trending down and is 1.3    UTI  -UA suggestive of UTI, urine culture growing GNR and will wait for final cultures  -Continue Rocephin.     Hypertension  Insomnia  Hyperlipidemia  -Continue Cardizem        Code Status: Full code  Surrogate Decision Maker:     DVT Prophylaxis: SCD       Baseline: Ambulatory at home        Body mass index is 18.02 kg/m². Subjective:     Chief Complaint / Reason for Physician Visit  Does not report any abdominal pain, nausea or vomiting.   Able to tolerate clear liquid diet      Review of Systems:  Symptom Y/N Comments  Symptom Y/N Comments   Fever/Chills    Chest Pain     Poor Appetite    Edema     Cough    Abdominal Pain     Sputum    Joint Pain     SOB/DE [place] SANTOS    Pruritis/Rash     Nausea/vomit    Tolerating PT/OT     Diarrhea    Tolerating Diet     Constipation    Other Could NOT obtain due to:      Objective:     VITALS:   Last 24hrs VS reviewed since prior progress note. Most recent are:  Patient Vitals for the past 24 hrs:   Temp Pulse Resp BP SpO2   07/22/21 1200 -- 70 -- -- --   07/22/21 0839 97.3 °F (36.3 °C) 67 16 126/64 98 %   07/22/21 0800 -- 67 -- -- --   07/22/21 0437 97.9 °F (36.6 °C) 72 16 (!) 105/59 98 %   07/22/21 0400 -- 90 -- -- --   07/22/21 0000 -- 75 -- -- --   07/21/21 2326 97.5 °F (36.4 °C) 73 16 113/73 100 %   07/21/21 2000 -- 65 -- -- --   07/21/21 1942 97.7 °F (36.5 °C) 66 16 127/71 100 %   07/21/21 1604 97.6 °F (36.4 °C) 64 16 130/69 99 %   07/21/21 1600 -- 64 -- -- --       Intake/Output Summary (Last 24 hours) at 7/22/2021 1333  Last data filed at 7/22/2021 5009  Gross per 24 hour   Intake 1032.5 ml   Output 2300 ml   Net -1267.5 ml        PHYSICAL EXAM:  General: WD, WN. Alert, cooperative, no acute distress    EENT:  EOMI. Anicteric sclerae. MMM  Resp:  CTA bilaterally, no wheezing or rales. No accessory muscle use  CV:  Regular  rhythm,  No edema  GI:  Soft, Non distended, Non tender.  +Bowel sounds, urostomy in place  Neurologic:  Alert and oriented X 3, normal speech,   Psych:   Not anxious nor agitated  Skin:  No rashes.   No jaundice    Reviewed most current lab test results and cultures  YES  Reviewed most current radiology test results   YES  Review and summation of old records today    NO  Reviewed patient's current orders and MAR    YES  PMH/SH reviewed - no change compared to H&P          Current Facility-Administered Medications:     dextrose 5% and 0.9% NaCl infusion, 75 mL/hr, IntraVENous, CONTINUOUS, Gregor Shen MD, Last Rate: 75 mL/hr at 07/22/21 0826, 75 mL/hr at 07/22/21 0826    mirtazapine (REMERON) tablet 45 mg, 45 mg, Oral, QHS, Terra Breaux MD, 45 mg at 07/21/21 2125    pravastatin (PRAVACHOL) tablet 80 mg, 80 mg, Oral, QHS, Terra Breaux MD, 80 mg at 07/21/21 2125    dilTIAZem ER (CARDIZEM CD) capsule 180 mg, 180 mg, Oral, DAILY, Marshal So MD, 180 mg at 07/22/21 0826    pantoprazole (PROTONIX) 40 mg in 0.9% sodium chloride 10 mL injection, 40 mg, IntraVENous, Q12H, Dorothy Bell MD, 40 mg at 07/22/21 0826    cefTRIAXone (ROCEPHIN) 1 g in 0.9% sodium chloride (MBP/ADV) 50 mL MBP, 1 g, IntraVENous, Q24H, Dorothy Bell MD, Last Rate: 100 mL/hr at 07/21/21 1726, 1 g at 07/21/21 1726    sodium chloride (NS) flush 5-40 mL, 5-40 mL, IntraVENous, Q8H, Dorothy Bell MD, 10 mL at 07/22/21 0536    sodium chloride (NS) flush 5-40 mL, 5-40 mL, IntraVENous, PRN, Dorothy Bell MD    acetaminophen (TYLENOL) tablet 650 mg, 650 mg, Oral, Q6H PRN **OR** acetaminophen (TYLENOL) suppository 650 mg, 650 mg, Rectal, Q6H PRN, Dorothy Bell MD    polyethylene glycol (MIRALAX) packet 17 g, 17 g, Oral, DAILY PRN, Dorothy Bell MD    ondansetron (ZOFRAN ODT) tablet 4 mg, 4 mg, Oral, Q8H PRN **OR** ondansetron (ZOFRAN) injection 4 mg, 4 mg, IntraVENous, Q6H PRN, Dorothy Bell MD  ________________________________________________________________________  Care Plan discussed with:    Comments   Patient y    Family      RN y    Care Manager     Consultant                        Multidiciplinary team rounds were held today with , nursing, pharmacist and clinical coordinator. Patient's plan of care was discussed; medications were reviewed and discharge planning was addressed. ________________________________________________________________________  Total NON critical care TIME:  35  Minutes    Total CRITICAL CARE TIME Spent:   Minutes non procedure based      Comments   >50% of visit spent in counseling and coordination of care     ________________________________________________________________________  Ayush Gomez MD     Procedures: see electronic medical records for all procedures/Xrays and details which were not copied into this note but were reviewed prior to creation of Plan.       LABS:  I reviewed today's most current labs and imaging studies.   Pertinent labs include:  Recent Labs     07/22/21 0231 07/21/21  0258 07/20/21  1454   WBC 4.5 5.0 5.6   HGB 8.5* 7.9* 8.7*   HCT 29.3* 26.9* 29.7*   * 129* 130*     Recent Labs     07/22/21 0231 07/21/21  0258 07/20/21  1503 07/20/21  1454    137  --  138   K 4.3 4.6  --  4.7    108  --  104   CO2 25 26  --  29   GLU 90 80  --  141*   BUN 27* 34*  --  37*   CREA 1.35* 1.68*  --  2.00*   CA 8.2* 8.0*  --  8.4*   MG  --  2.2  --   --    ALB  --   --   --  2.8*   TBILI  --   --   --  0.3   ALT  --   --   --  17   INR  --   --  1.1  --        Signed: Jason Moralez MD

## 2021-07-22 NOTE — PROGRESS NOTES
Interdisciplinary Rounds were completed on 07/22/21 for this patient. Rounds included nursing, clinical care leader, pharmacy, and case management. Plan of care discussed. See clinical pathway and/or care plan for interventions and desired outcomes.

## 2021-07-22 NOTE — PROGRESS NOTES
GI Progress Note [place]  NAME:Leann Malave [facility] :1934 KPS:[id number]   PCP: Peri Coffey NP  Date/Time:  2021 9:24 AM   Assessment:   - Hematochezia; resolved  - Anemia; though stable  - KELLY on CKD  - AAA repair s/p endovascular repair and stenting  - SBO based on my extensive review of imaging though clinically pt appears much better than CT  - Negative NM bleeding scan     Plan:   - SBO combined with hematochezia raises my concern for ischemia though pt looks considerably clinically better than this. I have personally d/w Dr. Francia Grey who will evaluate pt. Dr. Swapna Crystal evaluation noted and much appreciated. Can start CLD today if ok with Dr. Francia Grey. 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 This is not present on CT (though non-contrast is suboptimal) but was not present on excellent contrast enhanced study from  (just 1 month ago)  - SBO is contraindication to both EGD/Colon so recommend supportive care    Discharge home when PSBO has improved/resolved. No plans for endoscopic evaluation planned at this time     Subjective:   Feels well. No AP/n/v. No BM's or bleeding    Complaint Y/N Description   Abdominal Pain     Hematemesis     Hematochezia     Melena     Constipation     Diarrhea     Dyspepsia     Dysphagia     Jaundiced     Nausea/vomiting       Review of Systems:  Symptom Y/N Comments  Symptom Y/N Comments   Fever/Chills    Chest Pain     Cough    Headaches     Sputum    Joint Pain     SOB/DE [place]    Pruritis/Rash     Tolerating Diet    Other       Could NOT obtain due to:      Objective:   VITALS:   Last 24hrs VS reviewed since prior progress note.  Most recent are:  Visit Vitals  /64   Pulse 67   Temp 97.3 °F (36.3 °C)   Resp 16   Ht 5' 7\" (1.702 m)   Wt 52.2 kg (115 lb 1.3 oz)   SpO2 98%   BMI 18.02 kg/m²       Intake/Output Summary (Last 24 hours) at 2021 0924  Last data filed at 2021 9032  Gross per 24 hour   Intake 1032.5 ml   Output 2300 ml   Net -1267.5 ml     PHYSICAL EXAM:  General: WD, WN. Alert, cooperative, no acute distress    HEENT: NC, Atraumatic. PERRLA, EOMI. Anicteric sclerae. Lungs:  CTA Bilaterally. No Wheezing/Rhonchi/Rales. Heart:  Regular  rhythm,  No murmur (), No Rubs, No Gallops  Abdomen: Soft, Non distended, Non tender. +ileostomy  Extremities: No c/c/e  Neurologic:  No acute neurological distress   Psych:   Good insight. Not anxious nor agitated. Lab and Radiology Data Reviewed: (see below)    Medications Reviewed: (see below)  PMH/SH reviewed - no change compared to H&P  ________________________________________________________________________    Care Plan discussed with:  Patient x   Family     RN               Consultant:       Angel Feliciano MD     Procedures: see electronic medical records for all procedures/Xrays and details which were not copied into this note but were reviewed prior to creation of Plan. LABS:  Recent Labs     07/22/21  0231 07/21/21  0258   WBC 4.5 5.0   HGB 8.5* 7.9*   HCT 29.3* 26.9*   * 129*     Recent Labs     07/22/21  0231 07/21/21  0258 07/20/21  1454    137 138   K 4.3 4.6 4.7    108 104   CO2 25 26 29   BUN 27* 34* 37*   CREA 1.35* 1.68* 2.00*   GLU 90 80 141*   CA 8.2* 8.0* 8.4*   MG  --  2.2  --      Recent Labs     07/20/21  1454      TP 6.0*   ALB 2.8*   GLOB 3.2     Recent Labs     07/20/21  1503   INR 1.1   PTP 11.3*      No results for input(s): FE, TIBC, PSAT, FERR in the last 72 hours. No results found for: FOL, RBCF  No results for input(s): PH, PCO2, PO2 in the last 72 hours. No results for input(s): CPK, CKMB in the last 72 hours.     No lab exists for component: TROPONINI  Lab Results   Component Value Date/Time    Color YELLOW/STRAW 07/20/2021 04:27 PM    Appearance CLOUDY (A) 07/20/2021 04:27 PM    Specific gravity 1.014 07/20/2021 04:27 PM    pH (UA) 6.5 07/20/2021 04:27 PM    Protein Negative 07/20/2021 04:27 PM    Glucose Negative 07/20/2021 04:27 PM    Ketone Negative 07/20/2021 04:27 PM    Bilirubin Negative 07/20/2021 04:27 PM    Urobilinogen 0.2 07/20/2021 04:27 PM    Nitrites Negative 07/20/2021 04:27 PM    Leukocyte Esterase MODERATE (A) 07/20/2021 04:27 PM    Epithelial cells FEW 07/20/2021 04:27 PM    Bacteria 3+ (A) 07/20/2021 04:27 PM    WBC 20-50 07/20/2021 04:27 PM    RBC 5-10 07/20/2021 04:27 PM       MEDICATIONS:  Current Facility-Administered Medications   Medication Dose Route Frequency    dextrose 5% and 0.9% NaCl infusion  75 mL/hr IntraVENous CONTINUOUS    mirtazapine (REMERON) tablet 45 mg  45 mg Oral QHS    pravastatin (PRAVACHOL) tablet 80 mg  80 mg Oral QHS    dilTIAZem ER (CARDIZEM CD) capsule 180 mg  180 mg Oral DAILY    pantoprazole (PROTONIX) 40 mg in 0.9% sodium chloride 10 mL injection  40 mg IntraVENous Q12H    cefTRIAXone (ROCEPHIN) 1 g in 0.9% sodium chloride (MBP/ADV) 50 mL MBP  1 g IntraVENous Q24H    sodium chloride (NS) flush 5-40 mL  5-40 mL IntraVENous Q8H    sodium chloride (NS) flush 5-40 mL  5-40 mL IntraVENous PRN    acetaminophen (TYLENOL) tablet 650 mg  650 mg Oral Q6H PRN    Or    acetaminophen (TYLENOL) suppository 650 mg  650 mg Rectal Q6H PRN    polyethylene glycol (MIRALAX) packet 17 g  17 g Oral DAILY PRN    ondansetron (ZOFRAN ODT) tablet 4 mg  4 mg Oral Q8H PRN    Or    ondansetron (ZOFRAN) injection 4 mg  4 mg IntraVENous Q6H PRN

## 2021-07-22 NOTE — CONSULTS
Consult Date: 7/21/2021    IP CONSULT TO GENERAL SURGERY  Consult performed by: Vikas Montero MD  Consult ordered by: Trino Hendricks MD  Reason for consult: Evaluate for bowel obstruction  Assessment/Recommendations:   Patient currently appears stable. I agree with current treatment. Keep n.p.o. while awaiting return of bowel function. May need operative intervention if her symptoms worsen or do not improve. Provider recurrent episode does improve, she may need further evaluation for this chronic/second episode pSBO over the last month. We can consider doing a outpatient small bowel follow-through in 2 weeks. I  will reevaluate in the morning. Thank you for this consult. Subjective      Patient has a history of an ileal conduit. I took her gallbladder out in 2018 for chronic cholecystitis. She was admitted last month with a partial small bowel obstruction which resolved with conservative management.     She is back today noting some hematochezia on Tuesday morning  Noncontrast CT in the ER shows a partial small bowel obstruction     Her abdominal exam is benign. No more bowel movements since she has been here today.     Denies nausea.   Reports being hungry.         Past Medical History:   Diagnosis Date    Aneurysm ([address]     abdominal (pt not sure of blood vessel) repaired    Arthritis     hands    Cancer ([address]     bladder     Diabetes ([address]     oral hypoglycemics    Hypertension     Ill-defined condition     high cholesterol    Menopause       Past Surgical History:   Procedure Laterality Date    HX CATARACT REMOVAL      bilateral    HX CHOLECYSTECTOMY  06/04/2019    lap cindy with IOC    HX GYN      hysterectomy    HX HEENT      Lasik on right    HX UROLOGICAL  2017    resection of bladder tumor    [place] ABDOMEN SURGERY PROC UNLISTED  09/2017    aneurysm repair    [place] BREAST SURGERY PROCEDURE UNLISTED       Family History   Problem Relation Age of Onset    Other Mother abdominal aneurysm    Cancer Sister         lung    Lung Disease Brother         asbestos    Stroke Sister     Stroke Sister     Stroke Sister     Other Sister         brain aneurysm    No Known Problems Sister     Lung Disease Brother         asbestos    Heart Disease Brother         pacemaker      Social History     Tobacco Use    Smoking status: Former Smoker     Packs/day: 0.25     Years: 5.00     Pack years: 1.25     Quit date:      Years since quittin.5    Smokeless tobacco: Never Used   Substance Use Topics    Alcohol use: No       Current Facility-Administered Medications   Medication Dose Route Frequency Provider Last Rate Last Admin    dextrose 5% and 0.9% NaCl infusion  75 mL/hr IntraVENous CONTINUOUS Tanya Gardner MD 75 mL/hr at 21 0826 75 mL/hr at 21 0826    mirtazapine (REMERON) tablet 45 mg  45 mg Oral QHS Deana Danielle MD   45 mg at 21    pravastatin (PRAVACHOL) tablet 80 mg  80 mg Oral QHS Deana Danielle MD   80 mg at 21    dilTIAZem ER (CARDIZEM CD) capsule 180 mg  180 mg Oral DAILY Tanya Gardner MD   180 mg at 21 08    pantoprazole (PROTONIX) 40 mg in 0.9% sodium chloride 10 mL injection  40 mg IntraVENous Q12H Deana Danielle MD   40 mg at 21 0826    cefTRIAXone (ROCEPHIN) 1 g in 0.9% sodium chloride (MBP/ADV) 50 mL MBP  1 g IntraVENous Q24H Deana Danielle  mL/hr at 21 1726 1 g at 21 1726    sodium chloride (NS) flush 5-40 mL  5-40 mL IntraVENous Q8H Deana Danielle MD   10 mL at 21 0536    sodium chloride (NS) flush 5-40 mL  5-40 mL IntraVENous PRN Deana Danielle MD        acetaminophen (TYLENOL) tablet 650 mg  650 mg Oral Q6H PRN Deana Danielle MD        Or    acetaminophen (TYLENOL) suppository 650 mg  650 mg Rectal Q6H PRN Deana Danielle MD        polyethylene glycol (MIRALAX) packet 17 g  17 g Oral DAILY PRN Deana Danielle MD        ondansetron (ZOFRAN ODT) tablet 4 mg  4 mg Oral Q8H PRN Shawn Thomas MD        Or    ondansetron (ZOFRAN) injection 4 mg  4 mg IntraVENous Q6H PRN Shawn Thomas MD            Review of Systems   Constitutional: Negative for chills and fever. HENT: Negative for nosebleeds, sore throat and trouble swallowing. Gastrointestinal: Negative for abdominal pain, constipation, nausea and vomiting. Musculoskeletal: Negative for neck pain and neck stiffness. Skin: Negative for rash. Neurological: Negative for dizziness and seizures. Psychiatric/Behavioral: Negative for agitation and hallucinations.        Objective     Vital signs for last 24 hours:  Visit Vitals  BP (!) 155/80   Pulse 73   Temp 97.6 °F (36.4 °C)   Resp 16   Ht 5' 7\" (1.702 m)   Wt 52.2 kg (115 lb 1.3 oz)   SpO2 100%   BMI 18.02 kg/m²       Intake/Output this shift:  Current Shift: 07/22 0701 - 07/22 1900  In: 152.5 [I.V.:152.5]  Out: -   Last 3 Shifts: 07/20 1901 - 07/22 0700  In: 880 [I.V.:880]  Out: 3300 [Urine:3300]    Data Review:   Recent Results (from the past 24 hour(s))   CBC W/O DIFF    Collection Time: 07/22/21  2:31 AM   Result Value Ref Range    WBC 4.5 3.6 - 11.0 K/uL    RBC 3.18 (L) 3.80 - 5.20 M/uL    HGB 8.5 (L) 11.5 - 16.0 g/dL    HCT 29.3 (L) 35.0 - 47.0 %    MCV 92.1 80.0 - 99.0 FL    MCH 26.7 26.0 - 34.0 PG    MCHC 29.0 (L) 30.0 - 36.5 g/dL    RDW 14.7 (H) 11.5 - 14.5 %    PLATELET 012 (L) 133 - 400 K/uL    MPV 10.4 8.9 - 12.9 FL    NRBC 0.0 0  WBC    ABSOLUTE NRBC 0.00 0.00 - 4.23 K/uL   METABOLIC PANEL, BASIC    Collection Time: 07/22/21  2:31 AM   Result Value Ref Range    Sodium 139 136 - 145 mmol/L    Potassium 4.3 3.5 - 5.1 mmol/L    Chloride 108 97 - 108 mmol/L    CO2 25 21 - 32 mmol/L    Anion gap 6 5 - 15 mmol/L    Glucose 90 65 - 100 mg/dL    BUN 27 (H) 6 - 20 MG/DL    Creatinine 1.35 (H) 0.55 - 1.02 MG/DL    BUN/Creatinine ratio 20 12 - 20      GFR est AA 45 (L) >60 ml/min/1.73m2    GFR est non-AA 37 (L) >60 ml/min/1.73m2    Calcium 8.2 (L) 8.5 - 10.1 MG/DL       Physical Exam  Constitutional:       General: She is not in acute distress. Appearance: She is well-developed. She is not diaphoretic. HENT:      Head: Normocephalic and atraumatic. Mouth/Throat:      Pharynx: No oropharyngeal exudate. Eyes:      Pupils: Pupils are equal, round, and reactive to light. Neck:      Trachea: No tracheal deviation. Cardiovascular:      Rate and Rhythm: Normal rate and regular rhythm. Heart sounds: Normal heart sounds. No murmur heard. Pulmonary:      Effort: Pulmonary effort is normal. No respiratory distress. Breath sounds: Normal breath sounds. No wheezing. Abdominal:      General: Bowel sounds are normal. There is no distension. Palpations: Abdomen is soft. There is no mass. Tenderness: There is abdominal tenderness in the left lower quadrant. There is no guarding or rebound. Musculoskeletal:         General: No tenderness. Normal range of motion. Cervical back: Normal range of motion. Lymphadenopathy:      Cervical: No cervical adenopathy. Skin:     General: Skin is warm. Findings: No erythema or rash. Neurological:      Mental Status: She is alert and oriented to person, place, and time.    Psychiatric:         Behavior: Behavior normal.

## 2021-07-22 NOTE — PROGRESS NOTES
Surgical Note    Date/Time:  2021        Assessment :    Plan:  Patient Active Problem List   Diagnosis Code    Transitional cell bladder cancer ([facility] C67.9    Aortic aneurysm ([facility] I71.9    SBO (small bowel obstruction) ([facility] K56.609    Pneumonia J18.9    Moderate protein-calorie malnutrition ([facility] E44.0    Functional diarrhea K59.1    Poor appetite R63.0    Counseling regarding advance care planning and goals of care Z71.89    Do not resuscitate status Z66    UTI (urinary tract infection) N39.0    Hypotension I95.9    Acute GI bleeding K92.2    KELLY (acute kidney injury) ([facility] N17.9        Patient feeling well, hungry    Tolerating clears  Advance tomorrow if continues to tolerate           Subjective:     Chief Complaint:      Review of Systems:  Y  N       Y  N  [] [x]  Fever/chills                                               [] [x]  Chest Pain  [] [x]  Cough                                                       [] [x]  Diarrhea   [] [x]  Sputum                                                     [] [x]  Constipation  [] [x]  SOB/DE [place] SANTOS                                                [] [x]  Nausea/Vomit  [] [x]  Abd Pain                                                    [x] []  Tolerating diet  [] [x]  Dysuria                                                           []Unable to obtain  ROS due to  []mental status change  []sedated   []intubated    Objective:     VITALS:   Last 24hrs VS reviewed since prior hospitalist progress note.  Most recent are:  Visit Vitals  BP (!) 155/80   Pulse 73   Temp 97.6 °F (36.4 °C)   Resp 16   Ht 5' 7\" (1.702 m)   Wt 52.2 kg (115 lb 1.3 oz)   SpO2 100%   BMI 18.02 kg/m²     Temp (24hrs), Av.6 °F (36.4 °C), Min:97.3 °F (36.3 °C), Max:97.9 °F (36.6 °C)      Intake/Output Summary (Last 24 hours) at 2021 1711  Last data filed at 2021 7928  Gross per 24 hour   Intake 1032.5 ml   Output 2300 ml   Net -1267.5 ml         []Robledo []NGT  []Intubated on vent    PHYSICAL EXAM:  Gen:  [] A&O  []non-toxic  [x] No acute distress             [] ill apearing  []  Critical        HEENT:   [x]NC/AT/PERRLA/EOMI    []pink conjunctivae      []pale conjunctivae                  PERRL  []yes  []no      []moist mucosa    []dry mucosa    hearing intact to voice []yes  []No    RESP:   [x]CTA bialterally/no wheezing/rhonchi/rales/crackles    []clear bilaterally  []wheezing   []rhonchi   []crackles     use of accessory muscles []yes []no    CARD:   [x] regular rate and rhythm/No murmurs/rubs/gallops    murmur  []yes ()  []no      Rubs  []yes  []no       Gallops []yes  []no    Rate [] regular  [] irregular        carotid bruits  []Right  [] Left                 LE edema []yes  []no           JVP  [] yes   [] no    ABD:    [x]soft  [x]non distended  [x]non tender  [] NABS    SKIN:   Rashes [] yes   [x] no    Ulcers [] yes   [x] no               [x]normal   []tight to palpitation    skin turgor [] good  []poor  []decreased               Cyanosis/clubbing [] yes [x] no    NEUR:   [x]cranial nerves II-XII grossly intact       []Cranial nerves deficit                 [] facial droop    [] slurred speech   []aphasic     []Strength normal     [] weakness  [] LUE  []  RUE/ [] LLE  []  RLE    follows commands  [] yes [] no           PSYCH:   insight []poor [x]good   Alert and Oriented to  [x]person  [x]place  [x] time                    []depressed []anxious []agitated  []lethargic []stuporous  []sedated           Lab Data Reviewed: (see below)    Medications Reviewed: (see below)    PMH/SH reviewed - no change compared to H&P    Care Plan discussed with:  [x]Patient   []Family    []Care Manager   []RN    []Consultant/Specialist :    Prophylaxis:  []Lovenox  []Coumadin  []Hep SQ  []SCDs  []H2B/PPI    Disposition:  []Home w/ Family   []HH PT,OT,RN   []SNF/LTC   []SAH/Rehab    Ancillary Serices:   [] PT     []OT      []SW      []Nut      []HH ________________________________________________________________________  LABS:  Recent Labs     07/22/21  0231 07/21/21  0258   WBC 4.5 5.0   HGB 8.5* 7.9*   HCT 29.3* 26.9*   * 129*     Recent Labs     07/22/21  0231 07/21/21  0258 07/20/21  1454    137 138   K 4.3 4.6 4.7    108 104   CO2 25 26 29   BUN 27* 34* 37*   CREA 1.35* 1.68* 2.00*   GLU 90 80 141*   CA 8.2* 8.0* 8.4*   MG  --  2.2  --      Recent Labs     07/20/21  1454   ALT 17      TBILI 0.3   TP 6.0*   ALB 2.8*   GLOB 3.2     Recent Labs     07/20/21  1503   INR 1.1   PTP 11.3*      No results for input(s): FE, TIBC, PSAT, FERR in the last 72 hours. No results for input(s): PH, PCO2, PO2 in the last 72 hours.         MEDICATIONS:  Current Facility-Administered Medications   Medication Dose Route Frequency    dextrose 5% and 0.9% NaCl infusion  75 mL/hr IntraVENous CONTINUOUS    mirtazapine (REMERON) tablet 45 mg  45 mg Oral QHS    pravastatin (PRAVACHOL) tablet 80 mg  80 mg Oral QHS    dilTIAZem ER (CARDIZEM CD) capsule 180 mg  180 mg Oral DAILY    pantoprazole (PROTONIX) 40 mg in 0.9% sodium chloride 10 mL injection  40 mg IntraVENous Q12H    cefTRIAXone (ROCEPHIN) 1 g in 0.9% sodium chloride (MBP/ADV) 50 mL MBP  1 g IntraVENous Q24H    sodium chloride (NS) flush 5-40 mL  5-40 mL IntraVENous Q8H    sodium chloride (NS) flush 5-40 mL  5-40 mL IntraVENous PRN    acetaminophen (TYLENOL) tablet 650 mg  650 mg Oral Q6H PRN    Or    acetaminophen (TYLENOL) suppository 650 mg  650 mg Rectal Q6H PRN    polyethylene glycol (MIRALAX) packet 17 g  17 g Oral DAILY PRN    ondansetron (ZOFRAN ODT) tablet 4 mg  4 mg Oral Q8H PRN    Or    ondansetron (ZOFRAN) injection 4 mg  4 mg IntraVENous Q6H PRN

## 2021-07-22 NOTE — PROGRESS NOTES
End of Shift Note    Bedside shift change report given to Dalila Potts RN (oncoming nurse) by Sara Mccoy RN (offgoing nurse). Report included the following information SBAR, Kardex, Intake/Output, MAR and Recent Results    Shift worked: 2069-2382   Shift summary and any significant changes:    X-ray of abdomen done this morning at 0438. No other changes during shift.    Concerns for physician to address: None   Zone phone for oncoming shift:  [phone]

## 2021-07-23 DIAGNOSIS — K56.609 SMALL BOWEL OBSTRUCTION (HCC): Primary | ICD-10-CM

## 2021-07-23 LAB
ANION GAP SERPL CALC-SCNC: 3 MMOL/L (ref 5–15)
BACTERIA SPEC CULT: ABNORMAL
BACTERIA SPEC CULT: ABNORMAL
BUN SERPL-MCNC: 19 MG/DL (ref 6–20)
BUN/CREAT SERPL: 18 (ref 12–20)
CALCIUM SERPL-MCNC: 7.6 MG/DL (ref 8.5–10.1)
CC UR VC: ABNORMAL
CHLORIDE SERPL-SCNC: 110 MMOL/L (ref 97–108)
CO2 SERPL-SCNC: 27 MMOL/L (ref 21–32)
CREAT SERPL-MCNC: 1.03 MG/DL (ref 0.55–1.02)
ERYTHROCYTE [DISTWIDTH] IN BLOOD BY AUTOMATED COUNT: 14.7 % (ref 11.5–14.5)
GLUCOSE SERPL-MCNC: 84 MG/DL (ref 65–100)
HCT VFR BLD AUTO: 30.6 % (ref 35–47)
HGB BLD-MCNC: 8.9 G/DL (ref 11.5–16)
MCH RBC QN AUTO: 26.5 PG (ref 26–34)
MCHC RBC AUTO-ENTMCNC: 29.1 G/DL (ref 30–36.5)
MCV RBC AUTO: 91.1 FL (ref 80–99)
NRBC # BLD: 0 K/UL (ref 0–0.01)
NRBC BLD-RTO: 0 PER 100 WBC
PLATELET # BLD AUTO: 139 K/UL (ref 150–400)
PMV BLD AUTO: 10.6 FL (ref 8.9–12.9)
POTASSIUM SERPL-SCNC: 3.9 MMOL/L (ref 3.5–5.1)
RBC # BLD AUTO: 3.36 M/UL (ref 3.8–5.2)
SERVICE CMNT-IMP: ABNORMAL
SODIUM SERPL-SCNC: 140 MMOL/L (ref 136–145)
WBC # BLD AUTO: 3.8 K/UL (ref 3.6–11)

## 2021-07-23 PROCEDURE — C9113 INJ PANTOPRAZOLE SODIUM, VIA: HCPCS | Performed by: STUDENT IN AN ORGANIZED HEALTH CARE EDUCATION/TRAINING PROGRAM

## 2021-07-23 PROCEDURE — 97116 GAIT TRAINING THERAPY: CPT | Performed by: PHYSICAL THERAPIST

## 2021-07-23 PROCEDURE — 74011250637 HC RX REV CODE- 250/637: Performed by: STUDENT IN AN ORGANIZED HEALTH CARE EDUCATION/TRAINING PROGRAM

## 2021-07-23 PROCEDURE — 74011250637 HC RX REV CODE- 250/637: Performed by: SURGERY

## 2021-07-23 PROCEDURE — 99232 SBSQ HOSP IP/OBS MODERATE 35: CPT | Performed by: SURGERY

## 2021-07-23 PROCEDURE — 65660000000 HC RM CCU STEPDOWN

## 2021-07-23 PROCEDURE — 97161 PT EVAL LOW COMPLEX 20 MIN: CPT | Performed by: PHYSICAL THERAPIST

## 2021-07-23 PROCEDURE — 74011250636 HC RX REV CODE- 250/636: Performed by: STUDENT IN AN ORGANIZED HEALTH CARE EDUCATION/TRAINING PROGRAM

## 2021-07-23 PROCEDURE — 74011000250 HC RX REV CODE- 250: Performed by: STUDENT IN AN ORGANIZED HEALTH CARE EDUCATION/TRAINING PROGRAM

## 2021-07-23 PROCEDURE — 85027 COMPLETE CBC AUTOMATED: CPT

## 2021-07-23 PROCEDURE — 36415 COLL VENOUS BLD VENIPUNCTURE: CPT

## 2021-07-23 PROCEDURE — 74011250637 HC RX REV CODE- 250/637: Performed by: INTERNAL MEDICINE

## 2021-07-23 PROCEDURE — 80048 BASIC METABOLIC PNL TOTAL CA: CPT

## 2021-07-23 RX ORDER — FACIAL-BODY WIPES
10 EACH TOPICAL DAILY
Status: DISCONTINUED | OUTPATIENT
Start: 2021-07-23 | End: 2021-07-30 | Stop reason: HOSPADM

## 2021-07-23 RX ORDER — POLYETHYLENE GLYCOL 3350 17 G/17G
17 POWDER, FOR SOLUTION ORAL DAILY
Status: DISCONTINUED | OUTPATIENT
Start: 2021-07-23 | End: 2021-07-30 | Stop reason: HOSPADM

## 2021-07-23 RX ADMIN — Medication 10 ML: at 13:16

## 2021-07-23 RX ADMIN — POLYETHYLENE GLYCOL 3350 17 G: 17 POWDER, FOR SOLUTION ORAL at 11:54

## 2021-07-23 RX ADMIN — SODIUM CHLORIDE 40 MG: 9 INJECTION INTRAMUSCULAR; INTRAVENOUS; SUBCUTANEOUS at 21:14

## 2021-07-23 RX ADMIN — BISACODYL 10 MG: 10 SUPPOSITORY RECTAL at 11:54

## 2021-07-23 RX ADMIN — Medication 10 ML: at 06:15

## 2021-07-23 RX ADMIN — DILTIAZEM HYDROCHLORIDE 180 MG: 180 CAPSULE, COATED, EXTENDED RELEASE ORAL at 10:25

## 2021-07-23 RX ADMIN — Medication 10 ML: at 21:15

## 2021-07-23 RX ADMIN — MIRTAZAPINE 45 MG: 15 TABLET, FILM COATED ORAL at 21:14

## 2021-07-23 RX ADMIN — SODIUM CHLORIDE 40 MG: 9 INJECTION INTRAMUSCULAR; INTRAVENOUS; SUBCUTANEOUS at 10:25

## 2021-07-23 RX ADMIN — PRAVASTATIN SODIUM 80 MG: 40 TABLET ORAL at 21:14

## 2021-07-23 NOTE — PROGRESS NOTES
Physical Therapy  PT evaluation complete and full note to follow. Patient reports that she does not want rehab or [facility] PT follow up but is willing to work with PT in hospital.  Will continue to follow.   Agueda Tavera, PT, DPT

## 2021-07-23 NOTE — INTERDISCIPLINARY ROUNDS
Interdisciplinary Rounds were completed on 07/23/21 for this patient. Rounds included nursing, clinical care leader, pharmacy, and case management. Plan of care discussed. See clinical pathway and/or care plan for interventions and desired outcomes.

## 2021-07-23 NOTE — PROGRESS NOTES
End of Shift Note    Bedside shift change report given to SEBASTIAN García (oncoming nurse) by Bibi Lynne (offgoing nurse). Report included the following information SBAR, Kardex, Intake/Output and MAR    Shift worked:  7a-7p     Shift summary and any significant changes:    Patient diet advanced to regular, easy to chew and pt. tolerated her lunch and dinner. Miralax and dulcolax suppository ordered by Dr. Tucker Villarreal this shift, administered at  and patient has been up to bathroom with assist x 1, passing flatus and small hard bloody BM x 1. Urostomy is intact, patent and draining to bedside bag. No c/o pain, nausea or vomiting noted this shift. PT consulted for any HH needs prior to being D/C, see PT flow sheets. Concerns for physician to address:       Zone phone for oncoming shift:          Activity:  Activity Level: Up with Assistance  Number times ambulated in hallways past shift: 0  Number of times OOB to chair past shift: 2    Cardiac:   Cardiac Monitoring: Yes      Cardiac Rhythm: Sinus Rhythm    Access:   Current line(s): PIV     Genitourinary:   Urinary status: urostomy    Respiratory:   O2 Device: None (Room air)  Chronic home O2 use?: NO  Incentive spirometer at bedside: YES  Actual Volume (ml): 750 ml  GI:  Last Bowel Movement Date: 07/20/21  Current diet:  ADULT ORAL NUTRITION SUPPLEMENT Breakfast, Lunch, Dinner; Standard High Calorie/High Protein  ADULT DIET Easy to Chew; Low Fiber  Passing flatus: YES  Tolerating current diet: YES       Pain Management:   Patient states pain is manageable on current regimen: YES    Skin:  Adam Score: 20  Interventions: float heels, increase time out of bed, PT/OT consult and nutritional support     Patient Safety:  Fall Score:  Total Score: 2  Interventions: gripper socks and pt to call before getting OOB       Length of Stay:  Expected LOS: 3d 0h  Actual LOS: [address]

## 2021-07-23 NOTE — PROGRESS NOTES
Hospitalist Progress Note    NAME: Ariel Aranda   :  1934   MRN:  [id number]       Assessment / Plan:  Acute lower GI bleeding hemodynamically stable  Acute blood loss anemia  -Bleeding scan shows no evidence of bleeding  -Evaluated by GI and recommended conservative management given SBO  -Hemoglobin is stable between 7-8  -GI recommended outpatient follow-up    Recurrent SBO  -CT abdomen shows multiple significantly dilated loops of small bowel in the abdomen and also right lower ileal conduit with mild hydronephrosis due to bowel distention  -Discussed with Dr. Tucker Villarreal  today  -Advance to regular diet  -Start scheduled laxatives as no flatus yet  -We will need small bowel follow-through in about 2 weeks on outpatient basis with Dr. Tucker Villarreal      History of bladder cancer s/p urostomy  -Continue supportive care for urostomy    Incidental 7 mm pulmonary nodule  -We will repeat CT of chest in about 6 months    Acute kidney injury  -Baseline creatinine is normal.  Creatinine peaked at 2.0.  -Creatinine is trending down and is 1.3    UTI  -Urine culture grew Klebsiella. S/p 3 days of Rocephin.     Hypertension  Insomnia  Hyperlipidemia  -Continue Cardizem        Code Status: Full code  Surrogate Decision Maker:     DVT Prophylaxis: SCD       Baseline: Ambulatory at home        Body mass index is 18.02 kg/m². Subjective:     Chief Complaint / Reason for Physician Visit  Does not have any further evidence of bleeding. Able to tolerate liquid diet.   Does not report any abdominal pain, nausea or vomiting  No flatus or bowel movement yet  No fever      Review of Systems:  Symptom Y/N Comments  Symptom Y/N Comments   Fever/Chills    Chest Pain     Poor Appetite    Edema     Cough    Abdominal Pain     Sputum    Joint Pain     SOB/DE [place] SANTOS    Pruritis/Rash     Nausea/vomit    Tolerating PT/OT     Diarrhea    Tolerating Diet     Constipation    Other       Could NOT obtain due to:      Objective:     VITALS: Last 24hrs VS reviewed since prior progress note. Most recent are:  Patient Vitals for the past 24 hrs:   Temp Pulse Resp BP SpO2   07/23/21 1432 97.5 °F (36.4 °C) 94 17 (!) 168/93 98 %   07/23/21 1109 97.9 °F (36.6 °C) 87 17 (!) 162/86 98 %   07/23/21 0753 97.9 °F (36.6 °C) 77 16 (!) 141/74 96 %   07/23/21 0311 98.3 °F (36.8 °C) 83 15 128/69 98 %   07/22/21 2335 98.6 °F (37 °C) 86 16 127/68 100 %   07/22/21 2013 98.1 °F (36.7 °C) 86 16 131/76 100 %   07/22/21 1600 -- 73 -- -- --       Intake/Output Summary (Last 24 hours) at 7/23/2021 1457  Last data filed at 7/23/2021 0750  Gross per 24 hour   Intake 1898.75 ml   Output 2150 ml   Net -251.25 ml        PHYSICAL EXAM:  General: WD, WN. Alert, cooperative, no acute distress    EENT:  EOMI. Anicteric sclerae. MMM  Resp:  CTA bilaterally, no wheezing or rales. No accessory muscle use  CV:  Regular  rhythm,  No edema  GI:  Soft, Non distended, Non tender.  +Bowel sounds, urostomy in place  Neurologic:  Alert and awake, normal speech,   Psych:   Not anxious nor agitated  Skin:  No rashes.   No jaundice    Reviewed most current lab test results and cultures  YES  Reviewed most current radiology test results   YES  Review and summation of old records today    NO  Reviewed patient's current orders and MAR    YES  PMH/SH reviewed - no change compared to H&P          Current Facility-Administered Medications:     bisacodyL (DULCOLAX) suppository 10 mg, 10 mg, Rectal, DAILY, Troy Oliveira MD, 10 mg at 07/23/21 1154    polyethylene glycol (MIRALAX) packet 17 g, 17 g, Oral, DAILY, Troy Oliveira MD, 17 g at 07/23/21 1154    dextrose 5% and 0.9% NaCl infusion, 75 mL/hr, IntraVENous, CONTINUOUS, Gregor Shen MD, Last Rate: 75 mL/hr at 07/22/21 2232, 75 mL/hr at 07/22/21 2232    mirtazapine (REMERON) tablet 45 mg, 45 mg, Oral, QHS, Bossman Sotelo MD, 45 mg at 07/22/21 2128    pravastatin (PRAVACHOL) tablet 80 mg, 80 mg, Oral, QHS, Bossman Sotelo MD, 80 mg at 07/22/21 2128    dilTIAZem ER (CARDIZEM CD) capsule 180 mg, 180 mg, Oral, DAILY, Gregor Shen MD, 180 mg at 07/23/21 1025    pantoprazole (PROTONIX) 40 mg in 0.9% sodium chloride 10 mL injection, 40 mg, IntraVENous, Q12H, Chris Donis MD, 40 mg at 07/23/21 1025    sodium chloride (NS) flush 5-40 mL, 5-40 mL, IntraVENous, Q8H, Gerri Llanos MD, 10 mL at 07/23/21 1316    sodium chloride (NS) flush 5-40 mL, 5-40 mL, IntraVENous, PRN, Chris Donis MD    acetaminophen (TYLENOL) tablet 650 mg, 650 mg, Oral, Q6H PRN **OR** acetaminophen (TYLENOL) suppository 650 mg, 650 mg, Rectal, Q6H PRN, Chris Donis MD    ondansetron (ZOFRAN ODT) tablet 4 mg, 4 mg, Oral, Q8H PRN **OR** ondansetron (ZOFRAN) injection 4 mg, 4 mg, IntraVENous, Q6H PRN, Chris Donis MD  ________________________________________________________________________  Care Plan discussed with:    Comments   Patient y    Family      RN y    Care Manager     Consultant                        Multidiciplinary team rounds were held today with , nursing, pharmacist and clinical coordinator. Patient's plan of care was discussed; medications were reviewed and discharge planning was addressed. ________________________________________________________________________  Total NON critical care TIME:  35  Minutes    Total CRITICAL CARE TIME Spent:   Minutes non procedure based      Comments   >50% of visit spent in counseling and coordination of care     ________________________________________________________________________  Sidney Ortega MD     Procedures: see electronic medical records for all procedures/Xrays and details which were not copied into this note but were reviewed prior to creation of Plan. LABS:  I reviewed today's most current labs and imaging studies.   Pertinent labs include:  Recent Labs     07/23/21  0248 07/22/21  0231 07/21/21  0258   WBC 3.8 4.5 5.0   HGB 8.9* 8.5* 7.9*   HCT 30.6* 29.3* 26.9*   PLT 139* 146* 129*     Recent Labs     07/23/21  0248 07/22/21  0231 07/21/21  0258 07/20/21  1503    139 137  --    K 3.9 4.3 4.6  --    * 108 108  --    CO2 27 25 26  --    GLU 84 90 80  --    BUN 19 27* 34*  --    CREA 1.03* 1.35* 1.68*  --    CA 7.6* 8.2* 8.0*  --    MG  --   --  2.2  --    INR  --   --   --  1.1       Signed: Carman Mcardle, MD

## 2021-07-23 NOTE — PROGRESS NOTES
Transition of Care Plan:     RUR:15%  Disposition:Home  Follow up appointments:  DME needed:n/a  Transportation at Discharge:cousin, Brenita Number or means to access home:        IM Medicare Letter:2nd  provided  BCPI-A Bundle Letter:n/a  Caregiver Contact:  Discharge Caregiver contacted prior to discharge? Patient is expected to d/c home tomorrow. Patient has declined HH. CM will continue to follow.     Ms. Ady Jc

## 2021-07-23 NOTE — PROGRESS NOTES
Surgical Note    Date/Time:  2021 10:53 AM        Assessment :    Plan:  Patient Active Problem List   Diagnosis Code    Transitional cell bladder cancer ([facility] C67.9    Aortic aneurysm ([facility] I71.9    SBO (small bowel obstruction) ([facility] K56.609    Pneumonia J18.9    Moderate protein-calorie malnutrition ([facility] E44.0    Functional diarrhea K59.1    Poor appetite R63.0    Counseling regarding advance care planning and goals of care Z71.89    Do not resuscitate status Z66    UTI (urinary tract infection) N39.0    Hypotension I95.9    Acute GI bleeding K92.2    KELLY (acute kidney injury) ([facility] N17.9        zheng clears but still no flatus/BM    Not receiving MiraLax/dulcolax, will change to scheduled to encourage nursing staff to give it. Find her a chair so she can get out of bed    Home 1-2 days if bowel function returns    Will schedule outpt SBFT in 2 weeks to evaluate cause of recurrent SBOs    Discussed with Dr. Kenji Zhong:     Chief Complaint:      Review of Systems:  Y  N       Y  N  [] [x]  Fever/chills                                               [] [x]  Chest Pain  [] [x]  Cough                                                       [] [x]  Diarrhea   [] [x]  Sputum                                                     [] [x]  Constipation  [] [x]  SOB/DE [place] SANTOS                                                [] [x]  Nausea/Vomit  [] [x]  Abd Pain                                                    [x] []  Tolerating diet  [] [x]  Dysuria                                                           []Unable to obtain  ROS due to  []mental status change  []sedated   []intubated    Objective:     VITALS:   Last 24hrs VS reviewed since prior hospitalist progress note.  Most recent are:  Visit Vitals  BP (!) 141/74   Pulse 77   Temp 97.9 °F (36.6 °C)   Resp 16   Ht 5' 7\" (1.702 m)   Wt 52.2 kg (115 lb 1.3 oz)   SpO2 96%   BMI 18.02 kg/m²     Temp (24hrs), Av.1 °F (36.7 °C), Min:97.6 °F (36.4 °C), Max:98.6 °F (37 °C)      Intake/Output Summary (Last 24 hours) at 7/23/2021 1053  Last data filed at 7/23/2021 0615  Gross per 24 hour   Intake 1698.75 ml   Output 2150 ml   Net -451.25 ml         []Robledo []NGT  []Intubated on vent    PHYSICAL EXAM:  Gen:  [] A&O  []non-toxic  [x] No acute distress             [] ill apearing  []  Critical        HEENT:   [x]NC/AT/PERRLA/EOMI    []pink conjunctivae      []pale conjunctivae                  PERRL  []yes  []no      []moist mucosa    []dry mucosa    hearing intact to voice []yes  []No    RESP:   [x]CTA bialterally/no wheezing/rhonchi/rales/crackles    []clear bilaterally  []wheezing   []rhonchi   []crackles     use of accessory muscles []yes []no    CARD:   [x] regular rate and rhythm/No murmurs/rubs/gallops    murmur  []yes ()  []no      Rubs  []yes  []no       Gallops []yes  []no    Rate [] regular  [] irregular        carotid bruits  []Right  [] Left                 LE edema []yes  []no           JVP  [] yes   [] no    ABD:    [x]softer  [x]non distended  [x]non tender  [] NABS    SKIN:   Rashes [] yes   [x] no    Ulcers [] yes   [x] no               [x]normal   []tight to palpitation    skin turgor [] good  []poor  []decreased               Cyanosis/clubbing [] yes [x] no    NEUR:   [x]cranial nerves II-XII grossly intact       []Cranial nerves deficit                 [] facial droop    [] slurred speech   []aphasic     []Strength normal     [] weakness  [] LUE  []  RUE/ [] LLE  []  RLE    follows commands  [] yes [] no           PSYCH:   insight []poor [x]good   Alert and Oriented to  [x]person  [x]place  [x] time                    []depressed []anxious []agitated  []lethargic []stuporous  []sedated           Lab Data Reviewed: (see below)    Medications Reviewed: (see below)    PMH/SH reviewed - no change compared to H&P    Care Plan discussed with:  [x]Patient   []Family    []Care Manager   [x]RN    [x]Consultant/Specialist :    Prophylaxis:  []Lovenox []Coumadin  []Hep SQ  []SCDs  []H2B/PPI    Disposition:  []Home w/ Family   []HH PT,OT,RN   []SNF/LTC   []SAH/Rehab    Ancillary Serices:   [] PT     []OT      []SW      []Nut      []HH ________________________________________________________________________  LABS:  Recent Labs     07/23/21 0248 07/22/21  0231   WBC 3.8 4.5   HGB 8.9* 8.5*   HCT 30.6* 29.3*   * 146*     Recent Labs     07/23/21 0248 07/22/21  0231 07/21/21  0258    139 137   K 3.9 4.3 4.6   * 108 108   CO2 27 25 26   BUN 19 27* 34*   CREA 1.03* 1.35* 1.68*   GLU 84 90 80   CA 7.6* 8.2* 8.0*   MG  --   --  2.2     Recent Labs     07/20/21  1454   ALT 17      TBILI 0.3   TP 6.0*   ALB 2.8*   GLOB 3.2     Recent Labs     07/20/21  1503   INR 1.1   PTP 11.3*      No results for input(s): FE, TIBC, PSAT, FERR in the last 72 hours. No results for input(s): PH, PCO2, PO2 in the last 72 hours. No results for input(s): CPK, CKMB in the last 72 hours.     No lab exists for component: TROPONINI  Lab Results   Component Value Date/Time    Glucose (POC) 108 (H) 06/04/2019 10:02 AM    Glucose (POC) 70 06/04/2019 08:27 AM    Glucose (POC) 126 (H) 11/26/2017 07:27 AM    Glucose (POC) 136 (H) 11/25/2017 09:50 PM    Glucose (POC) 114 (H) 11/25/2017 05:12 PM       MEDICATIONS:  Current Facility-Administered Medications   Medication Dose Route Frequency    bisacodyL (DULCOLAX) suppository 10 mg  10 mg Rectal DAILY    polyethylene glycol (MIRALAX) packet 17 g  17 g Oral DAILY    dextrose 5% and 0.9% NaCl infusion  75 mL/hr IntraVENous CONTINUOUS    mirtazapine (REMERON) tablet 45 mg  45 mg Oral QHS    pravastatin (PRAVACHOL) tablet 80 mg  80 mg Oral QHS    dilTIAZem ER (CARDIZEM CD) capsule 180 mg  180 mg Oral DAILY    pantoprazole (PROTONIX) 40 mg in 0.9% sodium chloride 10 mL injection  40 mg IntraVENous Q12H    sodium chloride (NS) flush 5-40 mL  5-40 mL IntraVENous Q8H    sodium chloride (NS) flush 5-40 mL  5-40 mL IntraVENous PRN    acetaminophen (TYLENOL) tablet 650 mg  650 mg Oral Q6H PRN    Or    acetaminophen (TYLENOL) suppository 650 mg  650 mg Rectal Q6H PRN    ondansetron (ZOFRAN ODT) tablet 4 mg  4 mg Oral Q8H PRN    Or    ondansetron (ZOFRAN) injection 4 mg  4 mg IntraVENous Q6H PRN

## 2021-07-23 NOTE — PROGRESS NOTES
Problem: Falls - Risk of  Goal: *Absence of Falls  Description: Document Hiram Sites Fall Risk and appropriate interventions in the flowsheet.   Outcome: Progressing Towards Goal  Note: Fall Risk Interventions:  Mobility Interventions: Communicate number of staff needed for ambulation/transfer              Elimination Interventions: Call light in reach, Patient to call for help with toileting needs              Problem: Patient Education: Go to Patient Education Activity  Goal: Patient/Family Education  Outcome: Progressing Towards Goal

## 2021-07-23 NOTE — PROGRESS NOTES
GI PROGRESS NOTE  Alise Avery, LISA  [phone] NP in-hospital cell phone M-F until 4:30  After 5pm or on weekends, please call  for physician on call    NAME:Leann Murillo :1934 NRB:[id number]   ATTG: Dr Laila Cassidy  PCP: Dawson Garvey NP  Date/Time:  2021 12:07 PM     Primary GI: Dr. Betsey Poon:   - Hematochezia; resolved  - Anemia; though stable  - KELLY on CKD  - AAA repair s/p endovascular repair and stenting  - SBO based on my extensive review of imaging though clinically pt appears much better than CT - hx of SBO in 2021 as well  - Negative NM bleeding scan     Plan:   - SBO combined with hematochezia raises my concern for ischemia though pt looks much better over the last 2 days. - Appreciate Dr Alexys Salguero assistance, he will follow up outpatient for SBO.   - Tolerating CLD, okay to advance per GI but will leave to primary team/Dr Alexys Salguero.     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 This is not present on CT (though non-contrast is suboptimal) but was not present on excellent contrast enhanced study from  (just 1 month ago)  - SBO is contraindication to both EGD/Colon so recommend supportive care     Nothing further to add per GI standpoint. Will sign off. Call if anything changes. Plan discussed with Dr Milla Malhotra and Dr Laila Cassidy.          Subjective:   Discussed with RN events overnight. Doing well today, eager to have more food. Complaint Y/N Description   Abdominal Pain n    Hematemesis n    Hematochezia n    Melena n    Constipation  No stool and wants to have one   Diarrhea n    Dyspepsia n    Dysphagia n    Jaundiced n    Nausea/vomiting n      Review of Systems:  Symptom Y/N Comments  Symptom Y/N Comments   Fever/Chills    Chest Pain     Cough    Headaches     Sputum    Joint Pain     SOB/[place]    Pruritis/Rash     Tolerating Diet y clears  Other       Could NOT obtain due to:      Objective:   VITALS:   Last 24hrs VS reviewed since prior progress note.  Most recent are:  Visit Vitals  BP (!) 162/86   Pulse 87   Temp 97.9 °F (36.6 °C)   Resp 17   Ht 5' 7\" (1.702 m)   Wt 52.2 kg (115 lb 1.3 oz)   SpO2 98%   BMI 18.02 kg/m²       Intake/Output Summary (Last 24 hours) at 7/23/2021 1207  Last data filed at 7/23/2021 0750  Gross per 24 hour   Intake 1898.75 ml   Output 2150 ml   Net -251.25 ml     PHYSICAL EXAM:  General: WD, WN. Alert, cooperative, no acute distress    HEENT: NC, Atraumatic. Anicteric sclerae. Lungs:  CTA Bilaterally. No Wheezing/Rhonchi/Rales. Heart:  Regular  rhythm,  No murmur, No Rubs, No Gallops  Abdomen: Firm but not rigid. , midly distended, Non tender.  +Bowel sounds, no HSM  Extremities: No c/c/e  Neurologic:  Alert and oriented X 3. No acute neurological distress   Psych:   Good insight. Not anxious nor agitated. Lab and Radiology Data Reviewed: (see below)    Medications Reviewed: (see below)  PMH/SH reviewed - no change compared to H&P  ________________________________________________________________________  Total time spent with patient: 20 minutes ________________________________________________________________________  Care Plan discussed with:  Patient y   Family     RN y               Consultant:  amandeep Simms NP     Procedures: see electronic medical records for all procedures/Xrays and details which were not copied into this note but were reviewed prior to creation of Plan. LABS:  Recent Labs     07/23/21  0248 07/22/21  0231   WBC 3.8 4.5   HGB 8.9* 8.5*   HCT 30.6* 29.3*   * 146*     Recent Labs     07/23/21  0248 07/22/21  0231 07/21/21  0258    139 137   K 3.9 4.3 4.6   * 108 108   CO2 27 25 26   BUN 19 27* 34*   CREA 1.03* 1.35* 1.68*   GLU 84 90 80   CA 7.6* 8.2* 8.0*   MG  --   --  2.2     Recent Labs     07/20/21  1454      TP 6.0*   ALB 2.8*   GLOB 3.2     Recent Labs     07/20/21  1503   INR 1.1   PTP 11.3*      No results for input(s): FE, TIBC, PSAT, FERR in the last 72 hours. No results found for: FOL, RBCF  No results for input(s): PH, PCO2, PO2 in the last 72 hours. No results for input(s): CPK, CKMB in the last 72 hours.     No lab exists for component: TROPONINI  Lab Results   Component Value Date/Time    Color YELLOW/STRAW 07/20/2021 04:27 PM    Appearance CLOUDY (A) 07/20/2021 04:27 PM    Specific gravity 1.014 07/20/2021 04:27 PM    pH (UA) 6.5 07/20/2021 04:27 PM    Protein Negative 07/20/2021 04:27 PM    Glucose Negative 07/20/2021 04:27 PM    Ketone Negative 07/20/2021 04:27 PM    Bilirubin Negative 07/20/2021 04:27 PM    Urobilinogen 0.2 07/20/2021 04:27 PM    Nitrites Negative 07/20/2021 04:27 PM    Leukocyte Esterase MODERATE (A) 07/20/2021 04:27 PM    Epithelial cells FEW 07/20/2021 04:27 PM    Bacteria 3+ (A) 07/20/2021 04:27 PM    WBC 20-50 07/20/2021 04:27 PM    RBC 5-10 07/20/2021 04:27 PM       MEDICATIONS:  Current Facility-Administered Medications   Medication Dose Route Frequency    bisacodyL (DULCOLAX) suppository 10 mg  10 mg Rectal DAILY    polyethylene glycol (MIRALAX) packet 17 g  17 g Oral DAILY    dextrose 5% and 0.9% NaCl infusion  75 mL/hr IntraVENous CONTINUOUS    mirtazapine (REMERON) tablet 45 mg  45 mg Oral QHS    pravastatin (PRAVACHOL) tablet 80 mg  80 mg Oral QHS    dilTIAZem ER (CARDIZEM CD) capsule 180 mg  180 mg Oral DAILY    pantoprazole (PROTONIX) 40 mg in 0.9% sodium chloride 10 mL injection  40 mg IntraVENous Q12H    sodium chloride (NS) flush 5-40 mL  5-40 mL IntraVENous Q8H    sodium chloride (NS) flush 5-40 mL  5-40 mL IntraVENous PRN    acetaminophen (TYLENOL) tablet 650 mg  650 mg Oral Q6H PRN    Or    acetaminophen (TYLENOL) suppository 650 mg  650 mg Rectal Q6H PRN    ondansetron (ZOFRAN ODT) tablet 4 mg  4 mg Oral Q8H PRN    Or    ondansetron (ZOFRAN) injection 4 mg  4 mg IntraVENous Q6H PRN

## 2021-07-23 NOTE — PROGRESS NOTES
Problem: Mobility Impaired (Adult and Pediatric)  Goal: *Acute Goals and Plan of Care (Insert Text)  Description: FUNCTIONAL STATUS PRIOR TO ADMISSION: Patient was modified independent using a rolling walker for functional mobility. HOME SUPPORT PRIOR TO ADMISSION: The patient lived alone but had cousin to check in on her. Physical Therapy Goals  Initiated 7/23/2021  1. Patient will move from supine to sit and sit to supine  in bed with modified independence within 7 day(s). 2.  Patient will transfer from bed to chair and chair to bed with modified independence using the least restrictive device within 7 day(s). 3.  Patient will perform sit to stand with modified independence within 7 day(s). 4.  Patient will ambulate with modified independence for 150 feet with the least restrictive device within 7 day(s). 5.  Patient will ascend/descend 4 stairs with 1 handrail(s) with modified independence within 7 day(s). Outcome: Progressing Towards Goal   PHYSICAL THERAPY EVALUATION  Patient: Shaneka Marx (47 y.o. female)  Date: 7/23/2021  Primary Diagnosis: Acute GI bleeding [K92.2]  KELLY (acute kidney injury) ([facility] [address] [N17.9]  UTI (urinary tract infection) [N39.0]  Hypotension [I95.9]        Precautions:   Fall    ASSESSMENT  Based on the objective data described below, the patient presents with decreased functional mobility from baseline level of function. Patient currently limited by impaired balance, gait instability, and decreased activity tolerance. Patient currently needing supervision to come to EOB with extra time to complete task. Sit to stand with CGA and able to amb approx 40 feet in the room with RW and CGA with no overt LOB. Patient states that she has no interest in [facility] PT or SNF rehab. Other factors to consider for discharge: at risk for falls, lives alone     Patient will benefit from skilled therapy intervention to address the above noted impairments.        PLAN :  Recommendations and Planned Interventions: bed mobility training, transfer training, gait training, therapeutic exercises, neuromuscular re-education, patient and family training/education, and therapeutic activities      Frequency/Duration: Patient will be followed by physical therapy:  5 times a week to address goals. Recommendation for discharge: (in order for the patient to meet his/her long term goals)  Physical therapy at least 2 days/week in the home minimally, however, patient declines SNF or HH PT       IF patient discharges home will need the following DME: patient owns DME required for discharge         SUBJECTIVE:   Patient stated I just take my time at home. I don't need any of that therapy.     OBJECTIVE DATA SUMMARY:   HISTORY:    Past Medical History:   Diagnosis Date    Aneurysm ([facility] [address]     abdominal (pt not sure of blood vessel) repaired    Arthritis     hands    Cancer ([facility] [address]     bladder     Diabetes ([facility] [address]     oral hypoglycemics    Hypertension     Ill-defined condition     high cholesterol    Menopause      Past Surgical History:   Procedure Laterality Date    HX CATARACT REMOVAL      bilateral    HX CHOLECYSTECTOMY  06/04/2019    lap cindy with IOC    HX GYN      hysterectomy    HX HEENT      Lasik on right    HX UROLOGICAL  2017    resection of bladder tumor    DC ABDOMEN SURGERY PROC UNLISTED  09/2017    aneurysm repair    DC BREAST SURGERY PROCEDURE UNLISTED         Personal factors and/or comorbidities impacting plan of care:     Home Situation  Home Environment: Private residence  # Steps to Enter: 4  Rails to Enter: Yes  One/Two Story Residence: One story  Living Alone: Yes  Support Systems: Family member(s), Friends \ neighbors  Patient Expects to be Discharged to[de-identified] [employer]  Current DME Used/Available at Home: [employer] chair    EXAMINATION/PRESENTATION/DECISION MAKING:   Critical Behavior:  Neurologic State: Alert  Orientation Level: Oriented X4 Hearing: Auditory  Auditory Impairment: Hard of hearing, bilateral    Range Of Motion:  AROM: Generally decreased, functional                       Strength:    Strength: Generally decreased, functional        Functional Mobility:  Bed Mobility:  Rolling: Supervision  Supine to Sit: Supervision; Additional time     Scooting: Supervision; Additional time  Transfers:  Sit to Stand: Contact guard assistance;Assist x1  Stand to Sit: Contact guard assistance;Assist x1                       Balance:   Sitting: Intact  Standing: Impaired  Standing - Static: Constant support;Good  Standing - Dynamic : Constant support; Fair  Ambulation/Gait Training:  Distance (ft): 40 Feet (ft)  Assistive Device: Gait belt;Walker, rolling  Ambulation - Level of Assistance: Contact guard assistance;Assist x1     Gait Description (WDL): Exceptions to WDL  Gait Abnormalities: Decreased step clearance;Shuffling gait        Base of Support: Widened     Speed/Inocencia: Pace decreased (<100 feet/min); Shuffled; Slow  Step Length: Left shortened;Right shortened       Pain Rating:  No c/o pain    Activity Tolerance:   Fair and requires rest breaks    After treatment patient left in no apparent distress:   Sitting in chair and Call bell within reach    COMMUNICATION/EDUCATION:   The patients plan of care was discussed with: Physical therapist, Occupational therapist, and Registered nurse. Fall prevention education was provided and the patient/caregiver indicated understanding., Patient/family have participated as able in goal setting and plan of care. , and Patient/family agree to work toward stated goals and plan of care.     Thank you for this referral.  Yolanda Pond, PT, DPT   Time Calculation: 12 mins

## 2021-07-23 NOTE — PROGRESS NOTES
End of Shift Note    Bedside shift change report given [facility] L[facility] (oncoming nurse) by Philippe Cota RN (offgoing nurse). Report included the following information SBAR, Kardex, MAR and Recent Results    Shift worked:  3452-5644     Shift summary and any significant changes:     Pt had an uneventful shift, no complaints of pain. Rested well throughout the night. Concerns for physician to address:       Zone phone for oncoming shift:   [phone]       Activity:  Activity Level: Up with Assistance  Number times ambulated in hallways past shift: 0  Number of times OOB to chair past shift: 0    Cardiac:   Cardiac Monitoring: Yes      Cardiac Rhythm: Sinus Rhythm    Access:   Current line(s): PIV     Genitourinary:   Urinary status: urostomy    Respiratory:   O2 Device: None (Room air)  Chronic home O2 use?: NO  Incentive spirometer at bedside: YES  Actual Volume (ml): 750 ml  GI:  Last Bowel Movement Date: 07/20/21  Current diet:  ADULT DIET Clear Liquid  ADULT ORAL NUTRITION SUPPLEMENT Breakfast, Lunch, Dinner; Clear Liquid  Passing flatus: YES  Tolerating current diet: YES       Pain Management:   Patient states pain is manageable on current regimen: YES    Skin:  Adam Score: 20  Interventions: float heels and increase time out of bed    Patient Safety:  Fall Score:  Total Score: 2  Interventions: assistive device (walker, cane, etc), gripper socks and pt to call before getting OOB       Length of Stay:  Expected LOS: 3d 0h  Actual LOS: [address] RN

## 2021-07-24 LAB
ANION GAP SERPL CALC-SCNC: 5 MMOL/L (ref 5–15)
BUN SERPL-MCNC: 14 MG/DL (ref 6–20)
BUN/CREAT SERPL: 13 (ref 12–20)
CALCIUM SERPL-MCNC: 7.8 MG/DL (ref 8.5–10.1)
CHLORIDE SERPL-SCNC: 110 MMOL/L (ref 97–108)
CO2 SERPL-SCNC: 25 MMOL/L (ref 21–32)
CREAT SERPL-MCNC: 1.12 MG/DL (ref 0.55–1.02)
GLUCOSE SERPL-MCNC: 76 MG/DL (ref 65–100)
POTASSIUM SERPL-SCNC: 4.1 MMOL/L (ref 3.5–5.1)
SODIUM SERPL-SCNC: 140 MMOL/L (ref 136–145)

## 2021-07-24 PROCEDURE — 36415 COLL VENOUS BLD VENIPUNCTURE: CPT

## 2021-07-24 PROCEDURE — 65660000000 HC RM CCU STEPDOWN

## 2021-07-24 PROCEDURE — 80048 BASIC METABOLIC PNL TOTAL CA: CPT

## 2021-07-24 PROCEDURE — 74011250637 HC RX REV CODE- 250/637: Performed by: SURGERY

## 2021-07-24 PROCEDURE — 74011000250 HC RX REV CODE- 250: Performed by: STUDENT IN AN ORGANIZED HEALTH CARE EDUCATION/TRAINING PROGRAM

## 2021-07-24 PROCEDURE — 74011250637 HC RX REV CODE- 250/637: Performed by: INTERNAL MEDICINE

## 2021-07-24 PROCEDURE — 99231 SBSQ HOSP IP/OBS SF/LOW 25: CPT | Performed by: SURGERY

## 2021-07-24 PROCEDURE — C9113 INJ PANTOPRAZOLE SODIUM, VIA: HCPCS | Performed by: STUDENT IN AN ORGANIZED HEALTH CARE EDUCATION/TRAINING PROGRAM

## 2021-07-24 PROCEDURE — 74011250636 HC RX REV CODE- 250/636: Performed by: STUDENT IN AN ORGANIZED HEALTH CARE EDUCATION/TRAINING PROGRAM

## 2021-07-24 PROCEDURE — 74011250637 HC RX REV CODE- 250/637: Performed by: STUDENT IN AN ORGANIZED HEALTH CARE EDUCATION/TRAINING PROGRAM

## 2021-07-24 RX ORDER — AMOXICILLIN 250 MG
1 CAPSULE ORAL 2 TIMES DAILY
Status: DISPENSED | OUTPATIENT
Start: 2021-07-24 | End: 2021-07-26

## 2021-07-24 RX ADMIN — DOCUSATE SODIUM 50MG AND SENNOSIDES 8.6MG 1 TABLET: 8.6; 5 TABLET, FILM COATED ORAL at 15:17

## 2021-07-24 RX ADMIN — Medication 10 ML: at 14:00

## 2021-07-24 RX ADMIN — POLYETHYLENE GLYCOL 3350 17 G: 17 POWDER, FOR SOLUTION ORAL at 09:12

## 2021-07-24 RX ADMIN — Medication 10 ML: at 05:51

## 2021-07-24 RX ADMIN — SODIUM CHLORIDE 40 MG: 9 INJECTION INTRAMUSCULAR; INTRAVENOUS; SUBCUTANEOUS at 21:47

## 2021-07-24 RX ADMIN — DILTIAZEM HYDROCHLORIDE 180 MG: 180 CAPSULE, COATED, EXTENDED RELEASE ORAL at 09:13

## 2021-07-24 RX ADMIN — PRAVASTATIN SODIUM 80 MG: 40 TABLET ORAL at 21:47

## 2021-07-24 RX ADMIN — SODIUM CHLORIDE 40 MG: 9 INJECTION INTRAMUSCULAR; INTRAVENOUS; SUBCUTANEOUS at 09:13

## 2021-07-24 RX ADMIN — Medication 10 ML: at 21:48

## 2021-07-24 RX ADMIN — MIRTAZAPINE 45 MG: 15 TABLET, FILM COATED ORAL at 21:47

## 2021-07-24 NOTE — PROGRESS NOTES
Problem: Falls - Risk of  Goal: *Absence of Falls  Description: Document Nikkybong Payne Fall Risk and appropriate interventions in the flowsheet.   Outcome: Progressing Towards Goal  Note: Fall Risk Interventions:  Mobility Interventions: Patient to call before getting OOB              Elimination Interventions: Call light in reach, Patient to call for help with toileting needs              Problem: Patient Education: Go to Patient Education Activity  Goal: Patient/Family Education  Outcome: Progressing Towards Goal     Problem: Patient Education: Go to Patient Education Activity  Goal: Patient/Family Education  Outcome: Progressing Towards Goal

## 2021-07-24 NOTE — PROGRESS NOTES
End of Shift Note    Bedside shift change report given to SEBASTIAN Sanchez (oncoming nurse) by Jone Justice RN (offgoing nurse). Report included the following information SBAR, Kardex and MAR    Shift worked:  [id number]     Shift summary and any significant changes:     Pt rested in bed throughout the shift, no complaints of pain or discomfort. Uneventful shift. Concerns for physician to address:       Zone phone for oncoming shift:   [phone]       Activity:  Activity Level: Up with Assistance  Number times ambulated in hallways past shift: 0  Number of times OOB to chair past shift: 0    Cardiac:   Cardiac Monitoring: Yes      Cardiac Rhythm: Sinus Tach    Access:   Current line(s): PIV     Genitourinary:   Urinary status: urostomy    Respiratory:   O2 Device: None (Room air)  Chronic home O2 use?: NO  Incentive spirometer at bedside: YES  Actual Volume (ml): 750 ml  GI:  Last Bowel Movement Date: 07/23/21  Current diet:  ADULT ORAL NUTRITION SUPPLEMENT Breakfast, Lunch, Dinner; Standard High Calorie/High Protein  ADULT DIET Easy to Chew; Low Fiber  Passing flatus: YES  Tolerating current diet: YES       Pain Management:   Patient states pain is manageable on current regimen: YES    Skin:  Adam Score: 20  Interventions: float heels and increase time out of bed    Patient Safety:  Fall Score:  Total Score: 2  Interventions: gripper socks and pt to call before getting OOB       Length of Stay:  Expected LOS: 3d 0h  Actual LOS: [address], RN

## 2021-07-24 NOTE — PROGRESS NOTES
SURGERY PROGRESS NOTE      Admit Date: 2021    POD * No surgery found *    Procedure: * No surgery found *      Subjective:     Patient states she is has tolerated advancement of her diet. She denies nausea and is able to eat solids. She still feels bloated. She denies abdominal pain. She states she had flatus and a small BM yesterday but no gas so far today. Objective:     Visit Vitals  /75 (BP 1 Location: Right upper arm, BP Patient Position: Supine; At rest)   Pulse 87   Temp 98.3 °F (36.8 °C)   Resp 18   Ht 5' 7\" (1.702 m)   Wt 52.2 kg (115 lb 1.3 oz)   SpO2 96%   BMI 18.02 kg/m²        Temp (24hrs), Av °F (36.7 °C), Min:97.5 °F (36.4 °C), Max:98.7 °F (37.1 °C)      No intake/output data recorded.    1901 -  0700  In: 2707.5 [P.O.:640; I.V.:2067.5]  Out: 3000 [Urine:3000]    Physical Exam:    General:  alert, cooperative, no distress, appears stated age   Abdomen: soft, distended, tympanic, bowel sounds hypoactive, non-tender           Lab Results   Component Value Date/Time    WBC 3.8 2021 02:48 AM    HGB 8.9 (L) 2021 02:48 AM    Hemoglobin (POC) 10.9 (L) 2017 03:15 PM    HCT 30.6 (L) 2021 02:48 AM    Hematocrit (POC) 32 (L) 2017 03:15 PM    PLATELET 666 (L)  02:48 AM    MCV 91.1 2021 02:48 AM     Lab Results   Component Value Date/Time    GFR est non-AA 46 (L) 2021 03:15 AM    GFRNA, POC 43 (L) 2017 03:15 PM    GFR est AA 56 (L) 2021 03:15 AM    GFRAA, POC 52 (L) 2017 03:15 PM    Creatinine 1.12 (H) 2021 03:15 AM    Creatinine (POC) 1.2 2017 03:15 PM    BUN 14 2021 03:15 AM    BUN (POC) 17 2017 03:15 PM    Sodium 140 2021 03:15 AM    Sodium (POC) 138 2017 03:15 PM    Potassium 4.1 2021 03:15 AM    Potassium (POC) 3.7 2017 03:15 PM    Chloride 110 (H) 2021 03:15 AM    Chloride (POC) 97 (L) 2017 03:15 PM    CO2 25 2021 03:15 AM    Magnesium 2.2 07/21/2021 02:58 AM       Assessment:     Active Problems:    UTI (urinary tract infection) (7/20/2021)      Hypotension (7/20/2021)      Acute GI bleeding (7/20/2021)      KELLY (acute kidney injury) ([facility] [address] (7/20/2021)    slowly improving partial SBO    Plan:       Continue present treatment

## 2021-07-25 LAB
BACTERIA SPEC CULT: NORMAL
SERVICE CMNT-IMP: NORMAL

## 2021-07-25 PROCEDURE — 74011250637 HC RX REV CODE- 250/637: Performed by: INTERNAL MEDICINE

## 2021-07-25 PROCEDURE — 74011000250 HC RX REV CODE- 250: Performed by: STUDENT IN AN ORGANIZED HEALTH CARE EDUCATION/TRAINING PROGRAM

## 2021-07-25 PROCEDURE — 74011250636 HC RX REV CODE- 250/636: Performed by: STUDENT IN AN ORGANIZED HEALTH CARE EDUCATION/TRAINING PROGRAM

## 2021-07-25 PROCEDURE — 65660000000 HC RM CCU STEPDOWN

## 2021-07-25 PROCEDURE — C9113 INJ PANTOPRAZOLE SODIUM, VIA: HCPCS | Performed by: STUDENT IN AN ORGANIZED HEALTH CARE EDUCATION/TRAINING PROGRAM

## 2021-07-25 PROCEDURE — 74011250637 HC RX REV CODE- 250/637: Performed by: SURGERY

## 2021-07-25 PROCEDURE — 74011250637 HC RX REV CODE- 250/637: Performed by: STUDENT IN AN ORGANIZED HEALTH CARE EDUCATION/TRAINING PROGRAM

## 2021-07-25 PROCEDURE — 99232 SBSQ HOSP IP/OBS MODERATE 35: CPT | Performed by: SURGERY

## 2021-07-25 RX ORDER — AMOXICILLIN AND CLAVULANATE POTASSIUM 500; 125 MG/1; MG/1
1 TABLET, FILM COATED ORAL EVERY 12 HOURS
Status: COMPLETED | OUTPATIENT
Start: 2021-07-25 | End: 2021-07-28

## 2021-07-25 RX ADMIN — POLYETHYLENE GLYCOL 3350 17 G: 17 POWDER, FOR SOLUTION ORAL at 09:53

## 2021-07-25 RX ADMIN — DILTIAZEM HYDROCHLORIDE 180 MG: 180 CAPSULE, COATED, EXTENDED RELEASE ORAL at 09:53

## 2021-07-25 RX ADMIN — Medication 10 ML: at 22:45

## 2021-07-25 RX ADMIN — Medication 10 ML: at 14:00

## 2021-07-25 RX ADMIN — AMOXICILLIN AND CLAVULANATE POTASSIUM 1 TABLET: 500; 125 TABLET, FILM COATED ORAL at 21:00

## 2021-07-25 RX ADMIN — SODIUM CHLORIDE 40 MG: 9 INJECTION INTRAMUSCULAR; INTRAVENOUS; SUBCUTANEOUS at 09:53

## 2021-07-25 RX ADMIN — DOCUSATE SODIUM 50MG AND SENNOSIDES 8.6MG 1 TABLET: 8.6; 5 TABLET, FILM COATED ORAL at 09:53

## 2021-07-25 RX ADMIN — AMOXICILLIN AND CLAVULANATE POTASSIUM 1 TABLET: 500; 125 TABLET, FILM COATED ORAL at 12:59

## 2021-07-25 RX ADMIN — PRAVASTATIN SODIUM 80 MG: 40 TABLET ORAL at 22:43

## 2021-07-25 RX ADMIN — Medication 10 ML: at 05:59

## 2021-07-25 RX ADMIN — MIRTAZAPINE 45 MG: 15 TABLET, FILM COATED ORAL at 22:43

## 2021-07-25 RX ADMIN — SODIUM CHLORIDE 40 MG: 9 INJECTION INTRAMUSCULAR; INTRAVENOUS; SUBCUTANEOUS at 22:46

## 2021-07-25 NOTE — PROGRESS NOTES
Admit Date: 2021    Subjective:     Patient states she is feeling better. + BM again yesterday and + flatus. Still some bloating. Objective:     Blood pressure 132/81, pulse 90, temperature 98.4 °F (36.9 °C), resp. rate 16, height 5' 7\" (1.702 m), weight 115 lb 1.3 oz (52.2 kg), SpO2 100 %. Temp (24hrs), Av °F (36.7 °C), Min:97.7 °F (36.5 °C), Max:98.4 °F (36.9 °C)      Physical Exam:  GENERAL: alert, cooperative, no distress, appears stated age, LUNG: clear to auscultation bilaterally, HEART: regular rate and rhythm, ABDOMEN: soft, NT, mildly distended, EXTREMITIES:  extremities normal, atraumatic, no cyanosis or edema    Labs: No results found for this or any previous visit (from the past 24 hour(s)). Data Review images and reports reviewed    Assessment:     Active Problems:    UTI (urinary tract infection) (2021)      Hypotension (2021)      Acute GI bleeding (2021)      KELLY (acute kidney injury) ([facility] [address] (2021)        Plan/Recommendations/Medical Decision Making:     Continue present treatment   Appears to be tolerating regular diet. Not quite back to baseline yet.     Ayla Mccoy MD  [id number] [facility]

## 2021-07-25 NOTE — PROGRESS NOTES
Hospitalist Progress Note    NAME: Evin Herman   :  1934   MRN:  [id number]       Assessment / Plan:  Acute lower GI bleeding hemodynamically stable  Acute blood loss anemia  -Bleeding scan shows no evidence of bleeding  -Evaluated by GI and recommended conservative management given SBO  -Hemoglobin is stable between 7-8  -GI recommended outpatient follow-up    Recurrent SBO  -CT abdomen shows multiple significantly dilated loops of small bowel in the abdomen and also right lower ileal conduit with mild hydronephrosis due to bowel distention  -Discussed with Dr. Jeovany King  today  -cont regular diet  -Cont scheduled laxatives  -We will need small bowel follow-through in about 2 weeks on outpatient basis with Dr. Jeovany King      History of bladder cancer s/p urostomy  -Continue supportive care for urostomy    Incidental 7 mm pulmonary nodule  -We will repeat CT of chest in about 6 months    Acute kidney injury  -Baseline creatinine is normal.  Creatinine peaked at 2.0.  -Creatinine is trending down and is 1.3    UTI  -Urine culture grew Klebsiella. It is complicated UTI due to urostomy so will treat for a total of 7 days. Start Augmentin.     Hypertension  Insomnia  Hyperlipidemia  -Continue Cardizem        Code Status: Full code  Surrogate Decision Maker:     DVT Prophylaxis: SCD       Baseline: Ambulatory at home        Body mass index is 18.02 kg/m². Subjective:     Chief Complaint / Reason for Physician Visit  Still has some abdominal pain. No bm yet      Review of Systems:  Symptom Y/N Comments  Symptom Y/N Comments   Fever/Chills    Chest Pain     Poor Appetite    Edema     Cough    Abdominal Pain     Sputum    Joint Pain     SOB/[place]    Pruritis/Rash     Nausea/vomit    Tolerating PT/OT     Diarrhea    Tolerating Diet     Constipation    Other       Could NOT obtain due to:      Objective:     VITALS:   Last 24hrs VS reviewed since prior progress note.  Most recent are:  Patient Vitals for the past 24 hrs:   Temp Pulse Resp BP SpO2   07/24/21 1920 98 °F (36.7 °C) 96 18 135/75 98 %   07/24/21 1508 97.7 °F (36.5 °C) 90 18 (!) 155/86 99 %   07/24/21 1123 98 °F (36.7 °C) 80 18 (!) 150/87 95 %   07/24/21 0727 98.3 °F (36.8 °C) 87 18 121/75 96 %   07/24/21 0348 97.9 °F (36.6 °C) 93 18 121/73 98 %   07/23/21 2358 97.7 °F (36.5 °C) 97 18 128/79 99 %       Intake/Output Summary (Last 24 hours) at 7/24/2021 2125  Last data filed at 7/24/2021 1920  Gross per 24 hour   Intake 860 ml   Output 2150 ml   Net -1290 ml        PHYSICAL EXAM:  General: WD, WN. Alert, cooperative, no acute distress    EENT:  EOMI. Anicteric sclerae. MMM  Resp:  CTA bilaterally, no wheezing or rales. No accessory muscle use  CV:  Regular  rhythm,  No edema  GI:  Soft, Non distended, Non tender.  +Bowel sounds, urostomy in place  Neurologic:  Alert and awake, normal speech,   Psych:   Not anxious nor agitated  Skin:  No rashes.   No jaundice    Reviewed most current lab test results and cultures  YES  Reviewed most current radiology test results   YES  Review and summation of old records today    NO  Reviewed patient's current orders and MAR    YES  PMH/SH reviewed - no change compared to H&P          Current Facility-Administered Medications:     senna-docusate (PERICOLACE) 8.6-50 mg per tablet 1 Tablet, 1 Tablet, Oral, BID, Simi Gipson MD, 1 Tablet at 07/24/21 1517    bisacodyL (DULCOLAX) suppository 10 mg, 10 mg, Rectal, DAILY, Troy Tiwari MD, 10 mg at 07/23/21 1154    polyethylene glycol (MIRALAX) packet 17 g, 17 g, Oral, DAILY, Troy Tiwari MD, 17 g at 07/24/21 0912    mirtazapine (REMERON) tablet 45 mg, 45 mg, Oral, QHS, Amy Sandra, MD, 45 mg at 07/23/21 2114    pravastatin (PRAVACHOL) tablet 80 mg, 80 mg, Oral, QHS, Amy Flores MD, 80 mg at 07/23/21 2114    dilTIAZem ER (CARDIZEM CD) capsule 180 mg, 180 mg, Oral, DAILY, Gregor Shen MD, 180 mg at 07/24/21 0913    pantoprazole (PROTONIX) 40 mg in 0.9% sodium chloride 10 mL injection, 40 mg, IntraVENous, Q12H, Revonda Osler, MD, 40 mg at 07/24/21 0913    sodium chloride (NS) flush 5-40 mL, 5-40 mL, IntraVENous, Q8H, Gerri Llanos MD, 10 mL at 07/24/21 1400    sodium chloride (NS) flush 5-40 mL, 5-40 mL, IntraVENous, PRN, Revonda Osler, MD    acetaminophen (TYLENOL) tablet 650 mg, 650 mg, Oral, Q6H PRN **OR** acetaminophen (TYLENOL) suppository 650 mg, 650 mg, Rectal, Q6H PRN, Revonda Osler, MD    ondansetron (ZOFRAN ODT) tablet 4 mg, 4 mg, Oral, Q8H PRN **OR** ondansetron (ZOFRAN) injection 4 mg, 4 mg, IntraVENous, Q6H PRN, Revonda Osler, MD  ________________________________________________________________________  Care Plan discussed with:    Comments   Patient y    Family      RN y    Care Manager     Consultant                        Multidiciplinary team rounds were held today with , nursing, pharmacist and clinical coordinator. Patient's plan of care was discussed; medications were reviewed and discharge planning was addressed. ________________________________________________________________________  Total NON critical care TIME:  35  Minutes    Total CRITICAL CARE TIME Spent:   Minutes non procedure based      Comments   >50% of visit spent in counseling and coordination of care     ________________________________________________________________________  Mortimer Rusk, MD     Procedures: see electronic medical records for all procedures/Xrays and details which were not copied into this note but were reviewed prior to creation of Plan. LABS:  I reviewed today's most current labs and imaging studies.   Pertinent labs include:  Recent Labs     07/23/21  0248 07/22/21  0231   WBC 3.8 4.5   HGB 8.9* 8.5*   HCT 30.6* 29.3*   * 146*     Recent Labs     07/24/21  0315 07/23/21  0248 07/22/21  0231    140 139   K 4.1 3.9 4.3   * 110* 108   CO2 25 27 25   GLU 76 84 90   BUN 14 19 27*   CREA 1.12* 1.03* 1.35* CA 7.8* 7.6* 8.2*       Signed: Sidney Ortega MD

## 2021-07-25 NOTE — PROGRESS NOTES
End of Shift Note    Bedside shift change report given to SEBASTIAN García (oncoming nurse) by Henry Torres RN (offgoing nurse). Report included the following information SBAR, Kardex, Intake/Output and MAR    Shift worked:  7am-7pm     Shift summary and any significant changes:     Pt ambulated to the bathroom with assistance three times during the shift. Pt tolerated the activity well. Pt is still having loose bloody BMs. Pt did not complain of pain during the shift. Pt is tolerating the current diet. Pt ambulated to the chair with assistance multiple times during the shfit per MD order. Pt had an uneventful shift. Concerns for physician to address:       Zone phone for oncoming shift:   [phone]       Activity:  Activity Level: Up with Assistance  Number times ambulated in hallways past shift: 0  Number of times OOB to chair past shift: 3    Cardiac:   Cardiac Monitoring: Yes  Cardiac Rhythm: Sinus Rhythm    Access:   Current line(s): PIV     Genitourinary:   Urinary status: urostomy    Respiratory:   O2 Device: None (Room air)  Chronic home O2 use?: NO  Incentive spirometer at bedside: YES  Actual Volume (ml): 750 ml  GI:  Last Bowel Movement Date: 07/24/21  Current diet:  ADULT ORAL NUTRITION SUPPLEMENT Breakfast, Lunch, Dinner; Standard High Calorie/High Protein  ADULT DIET Easy to Chew; Low Fiber  Passing flatus: YES  Tolerating current diet: YES       Pain Management:   Patient states pain is manageable on current regimen: YES    Skin:  Adam Score: 20  Interventions: increase time out of bed    Patient Safety:  Fall Score:  Total Score: 2  Interventions: gripper socks and pt to call before getting OOB       Length of Stay:  Expected LOS: 3d 0h  Actual LOS: Anoop 42, RN

## 2021-07-25 NOTE — PROGRESS NOTES
Problem: Falls - Risk of  Goal: *Absence of Falls  Description: Document Brett Frye Fall Risk and appropriate interventions in the flowsheet.   Outcome: Progressing Towards Goal  Note: Fall Risk Interventions:  Mobility Interventions: Patient to call before getting OOB         Medication Interventions: Patient to call before getting OOB    Elimination Interventions: Call light in reach, Patient to call for help with toileting needs              Problem: Patient Education: Go to Patient Education Activity  Goal: Patient/Family Education  Outcome: Progressing Towards Goal     Problem: Patient Education: Go to Patient Education Activity  Goal: Patient/Family Education  Outcome: Progressing Towards Goal

## 2021-07-26 PROCEDURE — C9113 INJ PANTOPRAZOLE SODIUM, VIA: HCPCS | Performed by: STUDENT IN AN ORGANIZED HEALTH CARE EDUCATION/TRAINING PROGRAM

## 2021-07-26 PROCEDURE — 74011000250 HC RX REV CODE- 250: Performed by: STUDENT IN AN ORGANIZED HEALTH CARE EDUCATION/TRAINING PROGRAM

## 2021-07-26 PROCEDURE — 74011250636 HC RX REV CODE- 250/636: Performed by: STUDENT IN AN ORGANIZED HEALTH CARE EDUCATION/TRAINING PROGRAM

## 2021-07-26 PROCEDURE — 97530 THERAPEUTIC ACTIVITIES: CPT

## 2021-07-26 PROCEDURE — 74011250637 HC RX REV CODE- 250/637: Performed by: SURGERY

## 2021-07-26 PROCEDURE — 74011250637 HC RX REV CODE- 250/637: Performed by: STUDENT IN AN ORGANIZED HEALTH CARE EDUCATION/TRAINING PROGRAM

## 2021-07-26 PROCEDURE — 74011250637 HC RX REV CODE- 250/637: Performed by: INTERNAL MEDICINE

## 2021-07-26 PROCEDURE — 97116 GAIT TRAINING THERAPY: CPT

## 2021-07-26 PROCEDURE — 65660000000 HC RM CCU STEPDOWN

## 2021-07-26 RX ORDER — PANTOPRAZOLE SODIUM 40 MG/1
40 TABLET, DELAYED RELEASE ORAL
Status: DISCONTINUED | OUTPATIENT
Start: 2021-07-27 | End: 2021-07-29

## 2021-07-26 RX ADMIN — DILTIAZEM HYDROCHLORIDE 180 MG: 180 CAPSULE, COATED, EXTENDED RELEASE ORAL at 09:47

## 2021-07-26 RX ADMIN — PRAVASTATIN SODIUM 80 MG: 40 TABLET ORAL at 21:23

## 2021-07-26 RX ADMIN — SODIUM CHLORIDE 40 MG: 9 INJECTION INTRAMUSCULAR; INTRAVENOUS; SUBCUTANEOUS at 09:47

## 2021-07-26 RX ADMIN — Medication 10 ML: at 21:24

## 2021-07-26 RX ADMIN — Medication 10 ML: at 03:20

## 2021-07-26 RX ADMIN — AMOXICILLIN AND CLAVULANATE POTASSIUM 1 TABLET: 500; 125 TABLET, FILM COATED ORAL at 09:47

## 2021-07-26 RX ADMIN — POLYETHYLENE GLYCOL 3350 17 G: 17 POWDER, FOR SOLUTION ORAL at 09:47

## 2021-07-26 RX ADMIN — Medication 10 ML: at 14:00

## 2021-07-26 RX ADMIN — MIRTAZAPINE 45 MG: 15 TABLET, FILM COATED ORAL at 21:24

## 2021-07-26 RX ADMIN — AMOXICILLIN AND CLAVULANATE POTASSIUM 1 TABLET: 500; 125 TABLET, FILM COATED ORAL at 21:23

## 2021-07-26 NOTE — PROGRESS NOTES
Comprehensive Nutrition Assessment    Type and Reason for Visit: Reassess    Nutrition Recommendations/Plan:   · Continue Easy To Chew Diet. · Continue Ensure Enlive po TID with meals. · Please document % meals and supplements consumed in flowsheet I/O's under intake. Nutrition Assessment:      7/26: Chart reviewed; med noted for recurrent SBO. RD visited with pt at bedside, tolerating the Easy To Chew diet but only ate about 50% as she does not like zucchini. Pt would like to continue the Ensure Enlive shakes TID. Noted possible discharge today or tomorrow. Patient Vitals for the past 168 hrs:   % Diet Eaten   07/25/21 1730 51 - 75%   07/25/21 1230 76 - 100%   07/25/21 0800 76 - 100%   07/24/21 1808 76 - 100%   07/24/21 1730 76 - 100%   07/24/21 1230 76 - 100%   07/24/21 0800 76 - 100%   07/23/21 1540 26 - 50%   07/23/21 0750 1 - 25%     Patient Vitals for the past 168 hrs:   Supplement intake %   07/24/21 1808 76 - 100%     Last Weight Metric  Weight Loss Metrics 7/20/2021 6/10/2021 6/20/2019 6/4/2019 5/13/2019 1/2/2018 11/22/2017   Today's Wt 115 lb 1.3 oz 122 lb 9.2 oz 121 lb 9.6 oz 120 lb 2.4 oz 124 lb 4.8 oz 162 lb 182 lb 12.2 oz   BMI 18.02 kg/m2 19.2 kg/m2 19.05 kg/m2 18.82 kg/m2 19.47 kg/m2 25.37 kg/m2 -     Malnutrition Assessment:  Malnutrition Status:  Severe malnutrition    Context:  Chronic illness     Findings of the 6 clinical characteristics of malnutrition:   Energy Intake:  No significant decrease in energy intake  Weight Loss:  Unable to assess     Body Fat Loss:  7 - Severe body fat loss,     Muscle Mass Loss:  7 - Severe muscle mass loss,    Fluid Accumulation:  No significant fluid accumulation,     Strength:  Not performed     Estimated Daily Nutrient Needs:  Energy (kcal): 1534 kcal ( x 1.3AF +250kcal);  Weight Used for Energy Requirements: Current  Protein (g): 62-73g (1.2-1.4 g/kg bw); Weight Used for Protein Requirements: Current  Fluid (ml/day): 1550 mL; Method Used for Fluid Requirements: 1 ml/kcal     Nutrition Related Findings:  BM: 7/25; Labs: reviewed; Meds: reviewed      Wounds:    None       Current Nutrition Therapies:  ADULT ORAL NUTRITION SUPPLEMENT Breakfast, Lunch, Dinner; Standard High Calorie/High Protein  ADULT DIET Easy to Chew; Low Fiber    Anthropometric Measures:  · Height:  5' 7\" (170.2 cm)  · Current Body Wt:  52.2 kg (115 lb 1.3 oz)   · Ideal Body Wt:  135 lbs:  85.2 %   · BMI Category:  Underweight (BMI less than 18.5)       Nutrition Diagnosis:   · Underweight related to  (hx of weight loss) as evidenced by  (BMI 18)    Nutrition Interventions:   Food and/or Nutrient Delivery: Modify current diet, Start oral nutrition supplement  Nutrition Education and Counseling: No recommendations at this time  Coordination of Nutrition Care: No recommendation at this time    Goals:  Continue to trend PO intake >50% of meals next 5-7 days       Nutrition Monitoring and Evaluation:   Behavioral-Environmental Outcomes: None identified  Food/Nutrient Intake Outcomes: Food and nutrient intake, Diet advancement/tolerance, Supplement intake  Physical Signs/Symptoms Outcomes: Biochemical data, Weight, GI status    Discharge Planning:    Continue current diet, Continue oral nutrition supplement     Electronically signed by Melissa Castellanos RD on 7/26/2021 at 1:24 PM

## 2021-07-26 NOTE — PROGRESS NOTES
Spiritual Care Partner Volunteer visited patient at [address] in [facility] on 7/26/2021   Documented by:    Kalyn Dallas M.Div., M.S., ..  Spiritual Care Provider   [facility] [phone] [facility]1959[facility]

## 2021-07-26 NOTE — PROGRESS NOTES
Physician Progress Note      PATIENT:               Gareth Merino  CSN #:                  [id number]  :                       1934  ADMIT DATE:       2021 2:25 PM  [address] [place] DATE:  RESPONDING  PROVIDER #:        Janki Sterling MD          QUERY TEXT:    Dr Eufemia Pruitt  Pt admitted with acute GI bleed, PSBO. Registered dietitian notes ' severe malnutrition':  documented. Please further specify type of malnutrition with documentation in the medical record. The medical record reflects the following:  Risk Factors: presents with GI bleeding, ABLA, history of Bladder cancer  Clinical Indicators: RD on  notes: Malnutrition Status:  Severe malnutrition; Context:  Chronic illness; Findings of the 6 clinical characteristics of malnutrition: Energy Intake:  No significant decrease in energy intake; Weight Loss:  Unable to assess  Body Fat Loss:  7 - Severe body fat loss,   Muscle Mass Loss:  7 - Severe muscle mass loss,  Fluid Accumulation:  No significant fluid accumulation  Treatment: Ensure Enlive TID recommended, daily weights, advance diet as care allows. ASPEN Criteria:  https://aspenjournals. onlinelibrary. reyes. com/doi/full/10.1177/4834323186611898  Options provided:  -- Moderate Malnutrition  -- Severe Malnutrition  -- Moderate Protein calorie malnutrition  -- Severe Protein calorie malnutrition  -- Other - I will add my own diagnosis  -- Disagree - Not applicable / Not valid  -- Disagree - Clinically unable to determine / Unknown  -- Refer to Clinical Documentation Reviewer    PROVIDER RESPONSE TEXT:    This patient has severe malnutrition. Query created by:  Jacobo Corral on 2021 2:51 PM      Electronically signed by:  Janki Sterling MD 2021 8:00 AM

## 2021-07-26 NOTE — PROGRESS NOTES
Problem: Falls - Risk of  Goal: *Absence of Falls  Description: Document Yesica Casas Fall Risk and appropriate interventions in the flowsheet.   Outcome: Progressing Towards Goal  Note: Fall Risk Interventions:  Mobility Interventions: Bed/chair exit alarm         Medication Interventions: Bed/chair exit alarm    Elimination Interventions: Call light in reach, Bed/chair exit alarm

## 2021-07-26 NOTE — PROGRESS NOTES
Problem: Mobility Impaired (Adult and Pediatric)  Goal: *Acute Goals and Plan of Care (Insert Text)  Description: FUNCTIONAL STATUS PRIOR TO ADMISSION: Patient was modified independent using a rolling walker for functional mobility. HOME SUPPORT PRIOR TO ADMISSION: The patient lived alone but had cousin to check in on her. Physical Therapy Goals  Initiated 7/23/2021  1. Patient will move from supine to sit and sit to supine  in bed with modified independence within 7 day(s). 2.  Patient will transfer from bed to chair and chair to bed with modified independence using the least restrictive device within 7 day(s). 3.  Patient will perform sit to stand with modified independence within 7 day(s). 4.  Patient will ambulate with modified independence for 150 feet with the least restrictive device within 7 day(s). 5.  Patient will ascend/descend 4 stairs with 1 handrail(s) with modified independence within 7 day(s). Outcome: Not Progressing Towards Goal   PHYSICAL THERAPY TREATMENT  Patient: Delbert Gu (35 y.o. female)  Date: 7/26/2021  Diagnosis: Acute GI bleeding [K92.2]  KELLY (acute kidney injury) ([facility] [address] [N17.9]  UTI (urinary tract infection) [N39.0]  Hypotension [I95.9] <principal problem not specified>       Precautions: Fall  Chart, physical therapy assessment, plan of care and goals were reviewed. ASSESSMENT  Patient continues with skilled PT services and is slowly progressing towards goals, patient experiencing dizziness and tachycardia with activity today. Patient resting in bed upon arrival, requiring encouragement to participate in therapy. Supine>sit Mod Indep. Upon sitting on edge of bed, patient HR up to 106 bpm. Sit<>stand SBA with cuing for hand placement. HR up to 112 bpm with standing and up to 122 bpm after ambulating 30ft with RW Min A for steadying, patient c/o dizziness. BP remained stable. Returned to chair with all needs in reach and nursing notified of patient status. Returned about 20 minutes later and patient requesting to return to bed due to severe dizziness. Chair>bed with walker Min A with cuing for sequencing. Obtained vitals which appear stable though remaining tachycardic. Informed nursing of patient complaints of dizziness and concerns of tachycardia. Patient lives alone, would benefit from increased support at home and [facility] services. 07/26/21 0950 07/26/21 0955   Vital Signs   Pulse (Heart Rate) (!) 106 (!) 122   Level of Consciousness Alert (0)  --    /86 120/78   MAP (Calculated) 102 92   BP 1 Location Left upper arm Left upper arm   BP 1 Method Automatic Automatic   BP Patient Position Sitting Standing     Current Level of Function Impacting Discharge (mobility/balance): Min A     Other factors to consider for discharge: tachycardia with minimal activity and severe dizziness         PLAN :  Patient continues to benefit from skilled intervention to address the above impairments. Continue treatment per established plan of care. to address goals. Recommendation for discharge: (in order for the patient to meet his/her long term goals)  Physical therapy at least 2 days/week in the home AND ensure assist and/or supervision for safety with functional mobility and ADLs    This discharge recommendation:  Has been made in collaboration with the attending provider and/or case management    IF patient discharges home will need the following DME: rolling walker     SUBJECTIVE:   Patient stated I don't really want to get out of this bed.     OBJECTIVE DATA SUMMARY:   Critical Behavior:  Neurologic State: Alert  Orientation Level: Oriented X4        Functional Mobility Training:  Bed Mobility:  Rolling: Modified independent  Supine to Sit: Modified independent  Sit to Supine: Contact guard assistance  Scooting: Modified independent  Transfers:  Sit to Stand: Stand-by assistance  Stand to Sit: Stand-by assistance  Bed to Chair: Contact guard assistance  Balance:  Sitting: Intact  Standing: Impaired; With support  Standing - Static: Good  Standing - Dynamic : Fair;Constant support  Ambulation/Gait Training:  Distance (ft): 30 Feet (ft)  Assistive Device: Walker, rolling;Gait belt  Ambulation - Level of Assistance: Contact guard assistance  Gait Abnormalities: Decreased step clearance  Base of Support: Widened  Speed/Inocencia: Pace decreased (<100 feet/min); Shuffled  Step Length: Left shortened;Right shortened   Patient c/o severe dizziness after ambulating about 15ft, having to turn around in the room to quickly sit on side of bed requiring Min A to maintain safety. Activity Tolerance:   Poor, SpO2 stable on RA, observed SOB with activity and tachycardia with minimal activity    After treatment patient left in no apparent distress:   Supine in bed, Call bell within reach, Side rails x 3 and nursing notified    COMMUNICATION/COLLABORATION:   The patients plan of care was discussed with: Registered nurse.      Kylie Buck, ZHANE   Time Calculation: 24 mins

## 2021-07-26 NOTE — PROGRESS NOTES
End of Shift Note    Bedside shift change report given to María Elena Lo (oncoming nurse) by Ursula Jones RN (offgoing nurse). Report included the following information SBAR, Kardex, Intake/Output and MAR    Shift worked:  7am-7pm     Shift summary and any significant changes:     Pt ambulated to the chair twice for meals and tolerated the activity well. Pt is tolerating the current diet. Pt did not complain of pain during the shift. Pt had an uneventful shift. Concerns for physician to address:       Zone phone for oncoming shift:   [phone]       Activity:  Activity Level: Up with Assistance  Number times ambulated in hallways past shift: 0  Number of times OOB to chair past shift: 2    Cardiac:   Cardiac Monitoring: Yes      Cardiac Rhythm: Sinus Rhythm    Access:   Current line(s): PIV     Genitourinary:   Urinary status: urostomy    Respiratory:   O2 Device: None (Room air)  Chronic home O2 use?: NO  Incentive spirometer at bedside: YES  Actual Volume (ml): 750 ml  GI:  Last Bowel Movement Date: 07/25/21 (small soft stool)  Current diet:  ADULT ORAL NUTRITION SUPPLEMENT Breakfast, Lunch, Dinner; Standard High Calorie/High Protein  ADULT DIET Easy to Chew; Low Fiber  Passing flatus: YES  Tolerating current diet: YES       Pain Management:   Patient states pain is manageable on current regimen: YES    Skin:  Adam Score: 20  Interventions: increase time out of bed, limit briefs and internal/external urinary devices    Patient Safety:  Fall Score:  Total Score: 2  Interventions: gripper socks and pt to call before getting OOB       Length of Stay:  Expected LOS: 3d 0h  Actual LOS: [address] [place], RN

## 2021-07-26 NOTE — INTERDISCIPLINARY ROUNDS
Interdisciplinary Rounds were completed on 07/26/21 for this patient. Rounds included nursing, clinical care leader, pharmacy, and case management. Plan of care discussed. See clinical pathway and/or care plan for interventions and desired outcomes.

## 2021-07-26 NOTE — PROGRESS NOTES
Problem: Mobility Impaired (Adult and Pediatric)  Goal: *Acute Goals and Plan of Care (Insert Text)  Description: FUNCTIONAL STATUS PRIOR TO ADMISSION: Patient was modified independent using a rolling walker for functional mobility. HOME SUPPORT PRIOR TO ADMISSION: The patient lived alone but had cousin to check in on her. Physical Therapy Goals  Initiated 7/23/2021  1. Patient will move from supine to sit and sit to supine  in bed with modified independence within 7 day(s). 2.  Patient will transfer from bed to chair and chair to bed with modified independence using the least restrictive device within 7 day(s). 3.  Patient will perform sit to stand with modified independence within 7 day(s). 4.  Patient will ambulate with modified independence for 150 feet with the least restrictive device within 7 day(s). 5.  Patient will ascend/descend 4 stairs with 1 handrail(s) with modified independence within 7 day(s).  7/26/2021 1107 by Gerardo Abebe, April, PT  Outcome: Not Progressing Towards Goal   PHYSICAL THERAPY TREATMENT  Patient: Catha Soulier (83 y.o. female)  Date: 7/26/2021  Diagnosis: Acute GI bleeding [K92.2]  KELLY (acute kidney injury) ([facility], [place] [address] [N17.9]  UTI (urinary tract infection) [N39.0]  Hypotension [I95.9] <principal problem not specified>       Precautions: Fall  Chart, physical therapy assessment, plan of care and goals were reviewed. ASSESSMENT  Patient continues with skilled PT services and is not progressing towards goals. 2nd session today perform per MD request in order to reassess vitals during mobility. Upon entering room, patient eating ice and stating \"something just doesn't feel right but I'm not dizzy anymore. \" Vitals during mobility obtained, results below. HR starting tachycardic with abnormal response/increase to only standing, did not ambulate this session due to high HR and patient c/o dizziness/feeling weird.  SBP with significant drop from supine>standing though DBP remains stable. Returned to supine position and all needs in reach. Nursing notified immediately of patients vitals during mobility. Patient does not appear medically stable for d/c.       07/26/21 1420 07/26/21 1423 07/26/21 1425   Vital Signs   Pulse (Heart Rate) (!) 108 (!) 113 (!) 127   Heart Rate Source Monitor  --   --    BP (!) 145/77 118/71 107/77   MAP (Calculated) 100 87 87   BP 1 Location Left upper arm  --   --    BP 1 Method Automatic  --   --    BP Patient Position At rest Sitting Standing     Current Level of Function Impacting Discharge (mobility/balance): CGA    Other factors to consider for discharge: Unstable HR and BP         PLAN :  Patient continues to benefit from skilled intervention to address the above impairments. Continue treatment per established plan of care. to address goals. Recommendation for discharge: (in order for the patient to meet his/her long term goals)  To be determined: HH vs Rehab depending on medical stability     This discharge recommendation:  A follow-up discussion with the attending provider and/or case management is planned    IF patient discharges home will need the following DME: to be determined (TBD)       SUBJECTIVE:   Patient stated I just don't feel right, it may be a cold coming on. .. I don't know, my body feels weird.     OBJECTIVE DATA SUMMARY:   Critical Behavior:  Neurologic State: Alert  Orientation Level: Oriented X4        Functional Mobility Training:  Bed Mobility:  Rolling: Modified independent  Supine to Sit: Modified independent  Sit to Supine: Supervision  Scooting: Supervision  Transfers:  Sit to Stand: Stand-by assistance  Stand to Sit: Contact guard assistance  Bed to Chair: Contact guard assistance  Balance:  Sitting: Intact  Standing: Impaired; With support  Standing - Static: Good;Constant support  Standing - Dynamic : Fair;Constant support  Ambulation/Gait Training:  Distance (ft): 3 Feet (ft) (side stepping toward head of bed)  Assistive Device: Walker, rolling;Gait belt  Ambulation - Level of Assistance: Contact guard assistance  Gait Abnormalities: Decreased step clearance  Base of Support: Widened  Speed/Inocencia: Pace decreased (<100 feet/min); Shuffled  Step Length: Left shortened;Right shortened    Activity Tolerance:   Poor, SpO2 stable on RA, observed SOB with activity and signs and symptoms of orthostatic hypotension    After treatment patient left in no apparent distress:   Supine in bed, Call bell within reach and Side rails x 3    COMMUNICATION/COLLABORATION:   The patients plan of care was discussed with: Registered nurse.      Kylie Buck PT   Time Calculation: 14 mins

## 2021-07-26 NOTE — PROGRESS NOTES
Admit Date: 2021    Subjective:     Reports she feels fine. Tolerating diet and having bowel function. Feels ready for discharge. Objective:     Blood pressure 128/70, pulse 99, temperature 98.1 °F (36.7 °C), resp. rate 16, height 5' 7\" (1.702 m), weight 52.2 kg (115 lb 1.3 oz), SpO2 98 %. Temp (24hrs), Av.2 °F (36.8 °C), Min:97.9 °F (36.6 °C), Max:98.8 °F (37.1 °C)      Physical Exam:  GENERAL: alert, cooperative, no distress, appears stated age, LUNG: clear to auscultation bilaterally, HEART: regular rate and rhythm, ABDOMEN: soft, NT, non distended, EXTREMITIES:  extremities normal, atraumatic, no cyanosis or edema    Labs: No results found for this or any previous visit (from the past 24 hour(s)). Data Review images and reports reviewed    Assessment:     Active Problems:    UTI (urinary tract infection) (2021)      Hypotension (2021)      Acute GI bleeding (2021)      KELLY (acute kidney injury) ([facility] [address] (2021)    Resolution of SBO    Plan/Recommendations/Medical Decision Making:     Continue present treatment   Tolerating regular diet. [place] for discharge from surgical standpoint.      Joel Heredia, LISA  [id number] [facility]

## 2021-07-26 NOTE — PROGRESS NOTES
Spiritual Care Assessment/Progress Note  [facility]      NAME: Amy Potts      MRN: [id number]  AGE: 80 y.o.  SEX: female  Taoist Affiliation: Jain   Language: English     7/26/2021     Total Time (in minutes): 7     Spiritual Assessment begun in [facility] through conversation with:         [x]Patient        [] Family    [] Friend(s)        Reason for Consult: Initial/Spiritual assessment, patient floor     Spiritual beliefs: (Please include comment if needed)     [x] Identifies with a eulalio tradition: Jain        [] Supported by a eulalio community:            [] Claims no spiritual orientation:           [] Seeking spiritual identity:                [] Adheres to an individual form of spirituality:           [] Not able to assess:                           Identified resources for coping:      [] Prayer                               [] Music                  [] Guided Imagery     [] Family/friends                 [] Pet visits     [] Devotional reading                         [x] Unknown     [] Other:                                               Interventions offered during this visit: (See comments for more details)    Patient Interventions: Coping skills reviewed/reinforced, Affirmation of emotions/emotional suffering, Initial visit, Initial/Spiritual assessment, patient floor           Plan of Care:     [] Support spiritual and/or cultural needs    [] Support AMD and/or advance care planning process      [] Support grieving process   [] Coordinate Rites and/or Rituals    [] Coordination with community clergy   [] No spiritual needs identified at this time   [] Detailed Plan of Care below (See Comments)  [] Make referral to Music Therapy  [] Make referral to Pet Therapy     [] Make referral to Addiction services  [] Make referral to [facility]  [] Make referral to Spiritual Care Partner  [] No future visits requested        [x] Follow up upon further referrals Comments:     Initial Spiritual Care Assessment visit for Ms. Ligia Toscano in 2118. Patient was alert, no family was present during the visit. Patient said she is feeling dizzy and feeling cold.  wished her to feel better and explored any concerns in her mind. She denied any.  debriefed with alanna CORTES. She was already aware of it gave her extra blankets. Advised of  availability.       [id number] Maryanne Bowman., M.S., Th.M.  Spiritual Care Provider   Paging Service [phone])

## 2021-07-26 NOTE — PROGRESS NOTES
Hospitalist Progress Note    NAME: Toño Sorenson   :  1934   MRN:  [id number]     Shanika Ruelas is a 80 y.o.  female who presents with bleeding per rectum for the last 4 days started on Friday. Patient past medical history of hypertension, hyperlipidemia, insomnia. Patient stated for the last 4 days she has noticed some bleeding per rectum which has been going continuously, went to see her primary care doctor today which advised the patient to go to the ER for further evaluation. Denies any abdominal pain, no nausea vomiting, no chest pain, no shortness of breath, no fevers or chills. She was noted to have lower GI bleed and also recurrent SBO. Assessment / Plan:  Acute lower GI bleeding hemodynamically stable  Acute on chronic blood loss anemia  -Bleeding scan shows no evidence of bleeding  -Evaluated by GI and recommended conservative management given SBO  -Hemoglobin is stable between 7-8  -GI recommended outpatient follow-up    Recurrent SBO  -CT abdomen shows multiple significantly dilated loops of small bowel in the abdomen and also right lower ileal conduit with mild hydronephrosis due to bowel distention  -cont regular diet  -Cont scheduled laxatives  -We will need small bowel follow-through in about 2 weeks on outpatient basis with Dr. Stefany Holden  -General surgery cleared the patient for discharge for follow-up in 2 weeks  -She is able to tolerate solid diet      History of bladder cancer s/p urostomy  -Continue supportive care for urostomy    Incidental 7 mm pulmonary nodule  -We will repeat CT of chest in about 6 months. I notified the patient about the importance of follow-up of lung nodule as this can turn into malignancies and she verbalized understanding    Acute kidney injury  -Baseline creatinine is normal.  Creatinine peaked at 2.0.  -Creatinine is trending down and is 1.3    UTI  -Urine culture grew Klebsiella.   It is complicated UTI due to urostomy so will treat for a total of 7 days. Continue Augmentin until 7/28     Hypertension  Insomnia  Hyperlipidemia  -Continue Cardizem    Disposition:  Patient worked with physical therapy today, got extremely dizzy, tachycardic and was not able to move beyond 10 feet  reattempt physical therapy again this afternoon  Discharge today or tomorrow based on her ability to ambulate    I called pt's daughter today and she informed me that patient lives alone and does not have much support at home and wants her to go to rehab if appropriate. We will wait for PT recommendations        Code Status: Full code  Surrogate Decision Maker:     DVT Prophylaxis: SCD       Baseline: Ambulatory at home        Body mass index is 18.02 kg/m². Subjective:     Chief Complaint / Reason for Physician Visit  She does not report any abdominal pain. Does not report any nausea or vomiting  She got up with physical therapy this a.m. and felt extremely dizzy, became tachycardic and was not able to be move beyond 10 feet        Review of Systems:  Symptom Y/N Comments  Symptom Y/N Comments   Fever/Chills    Chest Pain     Poor Appetite    Edema     Cough    Abdominal Pain     Sputum    Joint Pain     SOB/DE LOS SANTOS    Pruritis/Rash     Nausea/vomit    Tolerating PT/OT     Diarrhea    Tolerating Diet     Constipation    Other       Could NOT obtain due to:      Objective:     VITALS:   Last 24hrs VS reviewed since prior progress note.  Most recent are:  Patient Vitals for the past 24 hrs:   Temp Pulse Resp BP SpO2   07/26/21 1125 98 °F (36.7 °C) (!) 101 16 (!) 143/87 97 %   07/26/21 1035 -- 99 -- (!) 150/82 98 %   07/26/21 1010 -- (!) 104 -- -- 96 %   07/26/21 0955 -- (!) 122 -- 120/78 96 %   07/26/21 0950 -- (!) 106 -- 135/86 97 %   07/26/21 0715 98.1 °F (36.7 °C) 99 16 128/70 98 %   07/26/21 0321 97.9 °F (36.6 °C) 93 16 119/75 100 %   07/26/21 0012 98.3 °F (36.8 °C) 94 18 118/75 98 %   07/25/21 1958 98.4 °F (36.9 °C) 91 18 121/80 98 %   07/25/21 1557 97.9 °F (36.6 °C) 93 18 122/65 99 %       Intake/Output Summary (Last 24 hours) at 7/26/2021 1246  Last data filed at 7/26/2021 0224  Gross per 24 hour   Intake 250 ml   Output 1450 ml   Net -1200 ml        PHYSICAL EXAM:  General: WD, WN. Alert, cooperative, no acute distress    EENT:  EOMI. Anicteric sclerae. MMM  Resp:  CTA bilaterally, no wheezing or rales. No accessory muscle use  CV:  Regular  rhythm,  No edema  GI:  Soft, Non distended, Non tender.  +Bowel sounds, urostomy in place  Neurologic:  Alert and awake, normal speech,   Psych:   Not anxious nor agitated  Skin:  No rashes.   No jaundice    Reviewed most current lab test results and cultures  YES  Reviewed most current radiology test results   YES  Review and summation of old records today    NO  Reviewed patient's current orders and MAR    YES  PMH/SH reviewed - no change compared to H&P          Current Facility-Administered Medications:     [START ON 7/27/2021] pantoprazole (PROTONIX) tablet 40 mg, 40 mg, Oral, ACB, Gregor Shen MD    amoxicillin-clavulanate (AUGMENTIN) 500-125 mg per tablet 1 Tablet, 1 Tablet, Oral, Q12H, Heath Worley MD, 1 Tablet at 07/26/21 0947    bisacodyL (DULCOLAX) suppository 10 mg, 10 mg, Rectal, DAILY, Troy Boland MD, 10 mg at 07/23/21 1154    polyethylene glycol (MIRALAX) packet 17 g, 17 g, Oral, DAILY, Millicent Ferro MD, 17 g at 07/26/21 0947    mirtazapine (REMERON) tablet 45 mg, 45 mg, Oral, QHS, Rohith Kohli MD, 45 mg at 07/25/21 2243    pravastatin (PRAVACHOL) tablet 80 mg, 80 mg, Oral, QHS, Rohith Kohli MD, 80 mg at 07/25/21 2243    dilTIAZem ER (CARDIZEM CD) capsule 180 mg, 180 mg, Oral, DAILY, Gregor Shen MD, 180 mg at 07/26/21 0947    sodium chloride (NS) flush 5-40 mL, 5-40 mL, IntraVENous, Q8H, Gerri Llanos MD, 10 mL at 07/26/21 0320    sodium chloride (NS) flush 5-40 mL, 5-40 mL, IntraVENous, PRN, Rohith Kohli MD    acetaminophen (TYLENOL) tablet 650 mg, 650 mg, Oral, Q6H PRN **OR** acetaminophen (TYLENOL) suppository 650 mg, 650 mg, Rectal, Q6H PRN, Bsosman Sotelo MD    ondansetron (ZOFRAN ODT) tablet 4 mg, 4 mg, Oral, Q8H PRN **OR** ondansetron (ZOFRAN) injection 4 mg, 4 mg, IntraVENous, Q6H PRN, Bossman Sotelo MD  ________________________________________________________________________  Care Plan discussed with:    Comments   Patient y    Family      RN y    Care Manager     Consultant                        Multidiciplinary team rounds were held today with , nursing, pharmacist and clinical coordinator. Patient's plan of care was discussed; medications were reviewed and discharge planning was addressed. ________________________________________________________________________  Total NON critical care TIME:  35  Minutes    Total CRITICAL CARE TIME Spent:   Minutes non procedure based      Comments   >50% of visit spent in counseling and coordination of care     ________________________________________________________________________  Magdalena Martinez MD     Procedures: see electronic medical records for all procedures/Xrays and details which were not copied into this note but were reviewed prior to creation of Plan. LABS:  I reviewed today's most current labs and imaging studies. Pertinent labs include:  No results for input(s): WBC, HGB, HCT, PLT, HGBEXT, HCTEXT, PLTEXT, HGBEXT, HCTEXT, PLTEXT in the last 72 hours.   Recent Labs     07/24/21  0315      K 4.1   *   CO2 25   GLU 76   BUN 14   CREA 1.12*   CA 7.8*       Signed: Magdalena Martinez MD

## 2021-07-26 NOTE — PROGRESS NOTES
Hospitalist Progress Note    NAME: Dina Messer   :  1934   MRN:  [id number]       Assessment / Plan:  Acute lower GI bleeding hemodynamically stable  Acute blood loss anemia  -Bleeding scan shows no evidence of bleeding  -Evaluated by GI and recommended conservative management given SBO  -Hemoglobin is stable between 7-8  -GI recommended outpatient follow-up    Recurrent SBO  -CT abdomen shows multiple significantly dilated loops of small bowel in the abdomen and also right lower ileal conduit with mild hydronephrosis due to bowel distention  -Discussed with Dr. Waqar Wilkinson  today  -cont regular diet  -Cont scheduled laxatives  -We will need small bowel follow-through in about 2 weeks on outpatient basis with Dr. Waqar Wilkinson  -Per surgery not base line yet so will keep her one more day until we obtain clearance from surgery      History of bladder cancer s/p urostomy  -Continue supportive care for urostomy    Incidental 7 mm pulmonary nodule  -We will repeat CT of chest in about 6 months    Acute kidney injury  -Baseline creatinine is normal.  Creatinine peaked at 2.0.  -Creatinine is trending down and is 1.3    UTI  -Urine culture grew Klebsiella. It is complicated UTI due to urostomy so will treat for a total of 7 days. Start Augmentin.     Hypertension  Insomnia  Hyperlipidemia  -Continue Cardizem        Code Status: Full code  Surrogate Decision Maker:     DVT Prophylaxis: SCD       Baseline: Ambulatory at home        Body mass index is 18.02 kg/m².          Subjective:     Chief Complaint / Reason for Physician Visit  No abdominal pain  Having BM now      Review of Systems:  Symptom Y/N Comments  Symptom Y/N Comments   Fever/Chills    Chest Pain     Poor Appetite    Edema     Cough    Abdominal Pain     Sputum    Joint Pain     SOB/DE LOS SANTOS    Pruritis/Rash     Nausea/vomit    Tolerating PT/OT     Diarrhea    Tolerating Diet     Constipation    Other       Could NOT obtain due to:      Objective:     VITALS: Last 24hrs VS reviewed since prior progress note. Most recent are:  Patient Vitals for the past 24 hrs:   Temp Pulse Resp BP SpO2   07/25/21 1958 98.4 °F (36.9 °C) 91 18 121/80 98 %   07/25/21 1557 97.9 °F (36.6 °C) 93 18 122/65 99 %   07/25/21 1233 -- -- -- (!) 143/88 --   07/25/21 1152 98.8 °F (37.1 °C) 83 18 (!) 154/82 95 %   07/25/21 0741 98.4 °F (36.9 °C) 90 16 132/81 97 %   07/25/21 0342 98.1 °F (36.7 °C) 96 18 126/72 100 %   07/24/21 2330 97.9 °F (36.6 °C) 95 18 124/73 100 %       Intake/Output Summary (Last 24 hours) at 7/25/2021 2110  Last data filed at 7/25/2021 1557  Gross per 24 hour   Intake 238 ml   Output 1250 ml   Net -1012 ml        PHYSICAL EXAM:  General: WD, WN. Alert, cooperative, no acute distress    EENT:  EOMI. Anicteric sclerae. MMM  Resp:  CTA bilaterally, no wheezing or rales. No accessory muscle use  CV:  Regular  rhythm,  No edema  GI:  Soft, Non distended, Non tender.  +Bowel sounds, urostomy in place  Neurologic:  Alert and awake, normal speech,   Psych:   Not anxious nor agitated  Skin:  No rashes.   No jaundice    Reviewed most current lab test results and cultures  YES  Reviewed most current radiology test results   YES  Review and summation of old records today    NO  Reviewed patient's current orders and MAR    YES  PMH/SH reviewed - no change compared to H&P          Current Facility-Administered Medications:     amoxicillin-clavulanate (AUGMENTIN) 500-125 mg per tablet 1 Tablet, 1 Tablet, Oral, Q12H, Komal Shen MD, 1 Tablet at 07/25/21 1259    senna-docusate (PERICOLACE) 8.6-50 mg per tablet 1 Tablet, 1 Tablet, Oral, BID, Amanda Gaming MD, 1 Tablet at 07/25/21 0953    bisacodyL (DULCOLAX) suppository 10 mg, 10 mg, Rectal, DAILY, Troy Ashraf MD, 10 mg at 07/23/21 1154    polyethylene glycol (MIRALAX) packet 17 g, 17 g, Oral, DAILY, Spencer Tolbert MD, 17 g at 07/25/21 0953    mirtazapine (REMERON) tablet 45 mg, 45 mg, Oral, QHS, Nati Phipps MD, 45 mg at 07/24/21 2147    pravastatin (PRAVACHOL) tablet 80 mg, 80 mg, Oral, QHS, Bossman Sotelo MD, 80 mg at 07/24/21 2147    dilTIAZem ER (CARDIZEM CD) capsule 180 mg, 180 mg, Oral, DAILY, Johana Cullen, Merril Hatchet, MD, 180 mg at 07/25/21 0953    pantoprazole (PROTONIX) 40 mg in 0.9% sodium chloride 10 mL injection, 40 mg, IntraVENous, Q12H, Bossman Sotelo MD, 40 mg at 07/25/21 0953    sodium chloride (NS) flush 5-40 mL, 5-40 mL, IntraVENous, Q8H, Gerri Llanos MD, 10 mL at 07/25/21 1400    sodium chloride (NS) flush 5-40 mL, 5-40 mL, IntraVENous, PRN, Bossman Sotelo MD    acetaminophen (TYLENOL) tablet 650 mg, 650 mg, Oral, Q6H PRN **OR** acetaminophen (TYLENOL) suppository 650 mg, 650 mg, Rectal, Q6H PRN, Bossman Sotelo MD    ondansetron (ZOFRAN ODT) tablet 4 mg, 4 mg, Oral, Q8H PRN **OR** ondansetron (ZOFRAN) injection 4 mg, 4 mg, IntraVENous, Q6H PRN, Bossman Sotelo MD  ________________________________________________________________________  Care Plan discussed with:    Comments   Patient y    Family      RN y    Care Manager     Consultant                        Multidiciplinary team rounds were held today with , nursing, pharmacist and clinical coordinator. Patient's plan of care was discussed; medications were reviewed and discharge planning was addressed. ________________________________________________________________________  Total NON critical care TIME:  35  Minutes    Total CRITICAL CARE TIME Spent:   Minutes non procedure based      Comments   >50% of visit spent in counseling and coordination of care     ________________________________________________________________________  Magdalena Martinez MD     Procedures: see electronic medical records for all procedures/Xrays and details which were not copied into this note but were reviewed prior to creation of Plan. LABS:  I reviewed today's most current labs and imaging studies.   Pertinent labs include:  Recent Labs 07/23/21  0248   WBC 3.8   HGB 8.9*   HCT 30.6*   *     Recent Labs     07/24/21 0315 07/23/21 0248    140   K 4.1 3.9   * 110*   CO2 25 27   GLU 76 84   BUN 14 19   CREA 1.12* 1.03*   CA 7.8* 7.6*       Signed: Floydene Shone, MD

## 2021-07-27 PROCEDURE — 74011250637 HC RX REV CODE- 250/637: Performed by: STUDENT IN AN ORGANIZED HEALTH CARE EDUCATION/TRAINING PROGRAM

## 2021-07-27 PROCEDURE — 74011250637 HC RX REV CODE- 250/637: Performed by: INTERNAL MEDICINE

## 2021-07-27 PROCEDURE — 74011250636 HC RX REV CODE- 250/636: Performed by: STUDENT IN AN ORGANIZED HEALTH CARE EDUCATION/TRAINING PROGRAM

## 2021-07-27 PROCEDURE — 97530 THERAPEUTIC ACTIVITIES: CPT

## 2021-07-27 PROCEDURE — 65660000000 HC RM CCU STEPDOWN

## 2021-07-27 PROCEDURE — 74011250637 HC RX REV CODE- 250/637: Performed by: SURGERY

## 2021-07-27 PROCEDURE — 99232 SBSQ HOSP IP/OBS MODERATE 35: CPT | Performed by: SURGERY

## 2021-07-27 RX ORDER — HYDRALAZINE HYDROCHLORIDE 20 MG/ML
10 INJECTION INTRAMUSCULAR; INTRAVENOUS
Status: DISCONTINUED | OUTPATIENT
Start: 2021-07-27 | End: 2021-07-30 | Stop reason: HOSPADM

## 2021-07-27 RX ADMIN — DILTIAZEM HYDROCHLORIDE 180 MG: 180 CAPSULE, COATED, EXTENDED RELEASE ORAL at 10:14

## 2021-07-27 RX ADMIN — Medication 10 ML: at 21:02

## 2021-07-27 RX ADMIN — POLYETHYLENE GLYCOL 3350 17 G: 17 POWDER, FOR SOLUTION ORAL at 10:14

## 2021-07-27 RX ADMIN — AMOXICILLIN AND CLAVULANATE POTASSIUM 1 TABLET: 500; 125 TABLET, FILM COATED ORAL at 10:14

## 2021-07-27 RX ADMIN — Medication 10 ML: at 06:25

## 2021-07-27 RX ADMIN — ONDANSETRON 4 MG: 2 INJECTION INTRAMUSCULAR; INTRAVENOUS at 11:55

## 2021-07-27 RX ADMIN — PANTOPRAZOLE SODIUM 40 MG: 40 TABLET, DELAYED RELEASE ORAL at 06:30

## 2021-07-27 RX ADMIN — PRAVASTATIN SODIUM 80 MG: 40 TABLET ORAL at 21:01

## 2021-07-27 RX ADMIN — BISACODYL 10 MG: 10 SUPPOSITORY RECTAL at 10:14

## 2021-07-27 RX ADMIN — AMOXICILLIN AND CLAVULANATE POTASSIUM 1 TABLET: 500; 125 TABLET, FILM COATED ORAL at 21:01

## 2021-07-27 RX ADMIN — MIRTAZAPINE 45 MG: 15 TABLET, FILM COATED ORAL at 21:01

## 2021-07-27 NOTE — PROGRESS NOTES
Surgical Note    Date/Time:  2021 5:31 PM        Assessment :    Plan:  Patient Active Problem List   Diagnosis Code    Transitional cell bladder cancer ([facility] C67.9    Aortic aneurysm ([facility] [facility] I71.9    SBO (small bowel obstruction) ([facility] [facility] K56.609    Pneumonia J18.9    Moderate protein-calorie malnutrition ([facility] [facility] E44.0    Functional diarrhea K59.1    Poor appetite R63.0    Counseling regarding advance care planning and goals of care Z71.89    Do not resuscitate status Z66    UTI (urinary tract infection) N39.0    Hypotension I95.9    Acute GI bleeding K92.2    KELLY (acute kidney injury) ([facility] [address] [facility] N17.9        Patient still admitted pending disposition  Patient wants to go home but family encouraging SNF. Abdomen soft  + BM this morning    Plan for outpatient small bowel follow-through           Subjective:     Chief Complaint:      Review of Systems:  Y  N       Y  N  [] [x]  Fever/chills                                               [] [x]  Chest Pain  [] [x]  Cough                                                       [] [x]  Diarrhea   [] [x]  Sputum                                                     [] [x]  Constipation  [] [x]  SOB/DE LOS SANTOS                                                [] [x]  Nausea/Vomit  [] [x]  Abd Pain                                                    [x] []  Tolerating diet  [] [x]  Dysuria                                                           []Unable to obtain  ROS due to  []mental status change  []sedated   []intubated    Objective:     VITALS:   Last 24hrs VS reviewed since prior hospitalist progress note.  Most recent are:  Visit Vitals  /78   Pulse (!) 104   Temp 98.1 °F (36.7 °C)   Resp 18   Ht 5' 7\" (1.702 m)   Wt 52.2 kg (115 lb 1.3 oz)   SpO2 98%   BMI 18.02 kg/m²     Temp (24hrs), Av.1 °F (36.7 °C), Min:97.8 °F (36.6 °C), Max:98.9 °F (37.2 °C)      Intake/Output Summary (Last 24 hours) at 2021 1731  Last data filed at 2021 1806  Gross per 24 hour Intake 240 ml   Output --   Net 240 ml         []Robledo []NGT  []Intubated on vent    PHYSICAL EXAM:  Gen:  [] A&O  []non-toxic  [x] No acute distress             [] ill apearing  []  Critical        HEENT:   [x]NC/AT/PERRLA/EOMI    []pink conjunctivae      []pale conjunctivae                  PERRL  []yes  []no      []moist mucosa    []dry mucosa    hearing intact to voice []yes  []No    RESP:   [x]CTA bialterally/no wheezing/rhonchi/rales/crackles    []clear bilaterally  []wheezing   []rhonchi   []crackles     use of accessory muscles []yes []no    CARD:   [x] regular rate and rhythm/No murmurs/rubs/gallops    murmur  []yes ()  []no      Rubs  []yes  []no       Gallops []yes  []no    Rate [] regular  [] irregular        carotid bruits  []Right  [] Left                 LE edema []yes  []no           JVP  [] yes   [] no    ABD:    [x]soft  [x]non distended  [x]non tender  [] NABS    SKIN:   Rashes [] yes   [x] no    Ulcers [] yes   [x] no               [x]normal   []tight to palpitation    skin turgor [] good  []poor  []decreased               Cyanosis/clubbing [] yes [x] no    NEUR:   [x]cranial nerves II-XII grossly intact       []Cranial nerves deficit                 [] facial droop    [] slurred speech   []aphasic     []Strength normal     [] weakness  [] LUE  []  RUE/ [] LLE  []  RLE    follows commands  [] yes [] no           PSYCH:   insight []poor [x]good   Alert and Oriented to  [x]person  [x]place  [x] time                    []depressed []anxious []agitated  []lethargic []stuporous  []sedated           Lab Data Reviewed: (see below)    Medications Reviewed: (see below)    PMH/SH reviewed - no change compared to H&P    Care Plan discussed with:  [x]Patient   []Family    []Care Manager   [x]RN    []Consultant/Specialist :    Prophylaxis:  []Lovenox  []Coumadin  []Hep SQ  []SCDs  []H2B/PPI    Disposition:  []Home w/ Family   []HH PT,OT,RN   []SNF/LTC   []SAH/Rehab    Ancillary Serices:   [] PT     []OT []SW      []Nut      []HH ________________________________________________________________________  LABS:  No results for input(s): WBC, HGB, HCT, PLT, HGBEXT, HCTEXT, PLTEXT in the last 72 hours. No results for input(s): NA, K, CL, CO2, BUN, CREA, GLU, CA, MG, PHOS, URICA in the last 72 hours. No results for input(s): ALT, AP, TBIL, TBILI, TP, ALB, GLOB, GGT, AML, LPSE in the last 72 hours. No lab exists for component: SGOT, GPT, AMYP, HLPSE  No results for input(s): INR, PTP, APTT, INREXT in the last 72 hours. No results for input(s): FE, TIBC, PSAT, FERR in the last 72 hours. No results for input(s): PH, PCO2, PO2 in the last 72 hours. No results for input(s): CPK, CKMB in the last 72 hours.     No lab exists for component: TROPONINI  Lab Results   Component Value Date/Time    Glucose (POC) 108 (H) 06/04/2019 10:02 AM    Glucose (POC) 70 06/04/2019 08:27 AM    Glucose (POC) 126 (H) 11/26/2017 07:27 AM    Glucose (POC) 136 (H) 11/25/2017 09:50 PM    Glucose (POC) 114 (H) 11/25/2017 05:12 PM       MEDICATIONS:  Current Facility-Administered Medications   Medication Dose Route Frequency    hydrALAZINE (APRESOLINE) 20 mg/mL injection 10 mg  10 mg IntraVENous Q2H PRN    pantoprazole (PROTONIX) tablet 40 mg  40 mg Oral ACB    amoxicillin-clavulanate (AUGMENTIN) 500-125 mg per tablet 1 Tablet  1 Tablet Oral Q12H    bisacodyL (DULCOLAX) suppository 10 mg  10 mg Rectal DAILY    polyethylene glycol (MIRALAX) packet 17 g  17 g Oral DAILY    mirtazapine (REMERON) tablet 45 mg  45 mg Oral QHS    pravastatin (PRAVACHOL) tablet 80 mg  80 mg Oral QHS    dilTIAZem ER (CARDIZEM CD) capsule 180 mg  180 mg Oral DAILY    sodium chloride (NS) flush 5-40 mL  5-40 mL IntraVENous Q8H    sodium chloride (NS) flush 5-40 mL  5-40 mL IntraVENous PRN    acetaminophen (TYLENOL) tablet 650 mg  650 mg Oral Q6H PRN    Or    acetaminophen (TYLENOL) suppository 650 mg  650 mg Rectal Q6H PRN    ondansetron (ZOFRAN ODT) tablet 4 mg  4 mg Oral Q8H PRN    Or    ondansetron (ZOFRAN) injection 4 mg  4 mg IntraVENous Q6H PRN

## 2021-07-27 NOTE — PROGRESS NOTES
Hospitalist Progress Note    NAME: Dalton Chapman   :  1934   MRN:  [id number]     Chani Bertrand is a 80 y.o.  female who presents with bleeding per rectum for the last 4 days started on Friday. Patient past medical history of hypertension, hyperlipidemia, insomnia. Patient stated for the last 4 days she has noticed some bleeding per rectum which has been going continuously, went to see her primary care doctor today which advised the patient to go to the ER for further evaluation. Denies any abdominal pain, no nausea vomiting, no chest pain, no shortness of breath, no fevers or chills. She was noted to have lower GI bleed and also recurrent SBO. Assessment / Plan:  Acute lower GI bleeding hemodynamically stable  Acute on chronic blood loss anemia  Bleeding scan shows no evidence of bleeding  Evaluated by GI and recommended conservative management given SBO  Hemoglobin is stable between 7-8  GI recommended outpatient follow-up  Recurrent SBO  CT abdomen shows multiple significantly dilated loops of small bowel in the abdomen and also right lower ileal conduit with mild hydronephrosis due to bowel distention  cont regular diet  Cont scheduled laxatives  We will need small bowel follow-through in about 2 weeks on outpatient basis with Dr. Stella Alberts surgery cleared the patient for discharge for follow-up in 2 weeks  She is able to tolerate solid diet  Seen by physical therapy today and recommended SNF. Will be discussing with case management for SNF placement. History of bladder cancer s/p urostomy  Continue supportive care for urostomy    Incidental 7 mm pulmonary nodule  We will repeat CT of chest in about 6 months. I notified the patient about the importance of follow-up of lung nodule as this can turn into malignancies and she verbalized understanding    Acute kidney injury  Baseline creatinine is normal.  Creatinine peaked at 2.0.   Creatinine is trending down and is 1. 3    UTI  Urine culture grew Klebsiella. It is complicated UTI due to urostomy so will treat for a total of 7 days. Continue Augmentin until 7/28     Hypertension  Insomnia  Hyperlipidemia  Continue Cardizem    Disposition:  And will need SNF placement as per physical therapy recommendation.          Code Status: Full code  Surrogate Decision Maker:     DVT Prophylaxis: SCD       Baseline: Ambulatory at home        Body mass index is 18.02 kg/m². Subjective:     Chief Complaint / Reason for Physician Visit  Patient was seen and examined this morning. No specific complaints. She says she is generally weak. Review of Systems:  Symptom Y/N Comments  Symptom Y/N Comments   Fever/Chills n   Chest Pain n    Poor Appetite    Edema     Cough n   Abdominal Pain n    Sputum    Joint Pain     SOB/DE [place] SANTOS n   Pruritis/Rash     Nausea/vomit    Tolerating PT/OT     Diarrhea    Tolerating Diet     Constipation    Other       Could NOT obtain due to:      Objective:     VITALS:   Last 24hrs VS reviewed since prior progress note.  Most recent are:  Patient Vitals for the past 24 hrs:   Temp Pulse Resp BP SpO2   07/27/21 0428 97.8 °F (36.6 °C) (!) 105 20 (!) 143/83 98 %   07/26/21 2353 98.3 °F (36.8 °C) (!) 105 19 (!) 151/84 96 %   07/26/21 2038 97.8 °F (36.6 °C) (!) 102 17 139/80 98 %   07/26/21 1602 98.1 °F (36.7 °C) (!) 104 16 114/75 98 %   07/26/21 1430 -- (!) 107 -- 135/74 96 %   07/26/21 1425 -- (!) 127 -- 107/77 --   07/26/21 1423 -- (!) 113 -- 118/71 --   07/26/21 1420 -- (!) 108 -- (!) 145/77 97 %   07/26/21 1125 98 °F (36.7 °C) (!) 101 16 (!) 143/87 97 %   07/26/21 1035 -- 99 -- (!) 150/82 98 %   07/26/21 1010 -- (!) 104 -- -- 96 %   07/26/21 0955 -- (!) 122 -- 120/78 96 %   07/26/21 0950 -- (!) 106 -- 135/86 97 %       Intake/Output Summary (Last 24 hours) at 7/27/2021 0749  Last data filed at 7/26/2021 1806  Gross per 24 hour   Intake 558 ml   Output 900 ml   Net -342 ml        PHYSICAL EXAM:  General: WD, WN. Alert, cooperative, no acute distress    EENT:  EOMI. Anicteric sclerae. MMM  Resp:  CTA bilaterally, no wheezing or rales. No accessory muscle use  CV:  Regular  rhythm,  No edema  GI:  Soft, Non distended, Non tender.  +Bowel sounds, urostomy in place  Neurologic:  Alert and awake, normal speech,   Psych:   Not anxious nor agitated  Skin:  No rashes.   No jaundice    Reviewed most current lab test results and cultures  YES  Reviewed most current radiology test results   YES  Review and summation of old records today    NO  Reviewed patient's current orders and MAR    YES  PMH/SH reviewed - no change compared to H&P          Current Facility-Administered Medications:     pantoprazole (PROTONIX) tablet 40 mg, 40 mg, Oral, ACB, Kevin Shen MD, 40 mg at 07/27/21 0630    amoxicillin-clavulanate (AUGMENTIN) 500-125 mg per tablet 1 Tablet, 1 Tablet, Oral, Q12H, Deya House MD, 1 Tablet at 07/26/21 2123    bisacodyL (DULCOLAX) suppository 10 mg, 10 mg, Rectal, DAILY, Elías Henry MD, 10 mg at 07/23/21 1154    polyethylene glycol (MIRALAX) packet 17 g, 17 g, Oral, DAILY, Elías Henry MD, 17 g at 07/26/21 0947    mirtazapine (REMERON) tablet 45 mg, 45 mg, Oral, QHS, Janessa Guillermo MD, 45 mg at 07/26/21 2124    pravastatin (PRAVACHOL) tablet 80 mg, 80 mg, Oral, QHS, Janessa Guillermo MD, 80 mg at 07/26/21 2123    dilTIAZem ER (CARDIZEM CD) capsule 180 mg, 180 mg, Oral, DAILY, Gregor Preciado MD, 180 mg at 07/26/21 0947    sodium chloride (NS) flush 5-40 mL, 5-40 mL, IntraVENous, Q8H, Gerri Llanos MD, 10 mL at 07/27/21 6349    sodium chloride (NS) flush 5-40 mL, 5-40 mL, IntraVENous, PRN, Janessa Guillermo MD    acetaminophen (TYLENOL) tablet 650 mg, 650 mg, Oral, Q6H PRN **OR** acetaminophen (TYLENOL) suppository 650 mg, 650 mg, Rectal, Q6H PRN, Janessa Guillermo MD    ondansetron (ZOFRAN ODT) tablet 4 mg, 4 mg, Oral, Q8H PRN **OR** ondansetron (ZOFRAN) injection 4 mg, 4 mg, IntraVENous, Q6H NENA, Cruzito Bishop MD  ________________________________________________________________________  Care Plan discussed with:    Comments   Patient y    Family      RN y    Care Manager     Consultant                        Multidiciplinary team rounds were held today with , nursing, pharmacist and clinical coordinator. Patient's plan of care was discussed; medications were reviewed and discharge planning was addressed. ________________________________________________________________________  Total NON critical care TIME:  35  Minutes    Total CRITICAL CARE TIME Spent:   Minutes non procedure based      Comments   >50% of visit spent in counseling and coordination of care     ________________________________________________________________________  Harrison Alberts MD     Procedures: see electronic medical records for all procedures/Xrays and details which were not copied into this note but were reviewed prior to creation of Plan. LABS:  I reviewed today's most current labs and imaging studies. Pertinent labs include:  No results for input(s): WBC, HGB, HCT, PLT, HGBEXT, HCTEXT, PLTEXT, HGBEXT, HCTEXT, PLTEXT in the last 72 hours. No results for input(s): NA, K, CL, CO2, GLU, BUN, CREA, CA, MG, PHOS, ALB, TBIL, TBILI, ALT, INR, INREXT, INREXT in the last 72 hours. No lab exists for component: SGOT    Signed:  Harrison Alberts MD

## 2021-07-27 NOTE — PROGRESS NOTES
0 -  Messaged MD \"Just wanted to let you know that when PT/OT was working with patient, her HR went up to 122 and she got extremely dizzy. She had only moved 10 feet and was sitting in the chair. She stills stated she was dizzy in the chair, so PT/OT put her in the bed. I just talked to her and her dizziness is starting to fade now. \"    12 - Messaged MD \"Just wanted to let you know PT/OT met with the patient again. The patient's HR was 108 to start with and increased to 127 when just standing upright. Pt states they feel dizzy when standing upright and just feel off. \"    End of Shift Note    Bedside shift change report given to Naida Tarango RN (oncoming nurse) by Branden Johns RN (offgoing nurse). Report included the following information SBAR, Kardex, Intake/Output and MAR    Shift worked:  7am-7pm     Shift summary and any significant changes:     Pt ambulated to the bathroom once during the shift with assistance and tolerated the activity fairly. When pt worked with PT/OT today HR increased into the 120s with minimal movement. Pt also reported feeling dizzy. PT/OT came at another time to move pt and pt reported dizziness with just standing up. Pt did not have any pain during the shift. Pt is tolerating the current diet. Pt states she might be getting a cold     Concerns for physician to address:       Zone phone for oncoming shift:   [phone]       Activity:  Activity Level:  Up with Assistance  Number times ambulated in hallways past shift: 0  Number of times OOB to chair past shift: 1    Cardiac:   Cardiac Monitoring: Yes      Cardiac Rhythm: Sinus Tach    Access:   Current line(s): PIV     Genitourinary:   Urinary status: urostomy    Respiratory:   O2 Device: None (Room air)  Chronic home O2 use?: NO  Incentive spirometer at bedside: YES  Actual Volume (ml): 750 ml  GI:  Last Bowel Movement Date: 07/25/21  Current diet:  ADULT ORAL NUTRITION SUPPLEMENT Breakfast, Lunch, Dinner; Standard High Calorie/High Protein  ADULT DIET Easy to Chew; Low Fiber  Passing flatus: YES  Tolerating current diet: YES       Pain Management:   Patient states pain is manageable on current regimen: YES    Skin:  Adam Score: 20  Interventions: increase time out of bed, foam dressing and internal/external urinary devices    Patient Safety:  Fall Score:  Total Score: 3  Interventions: gripper socks and pt to call before getting OOB  High Fall Risk: Yes    Length of Stay:  Expected LOS: 3d 0h  Actual LOS: 6      Vivek Everett RN

## 2021-07-27 NOTE — INTERDISCIPLINARY ROUNDS
Interdisciplinary Rounds were completed on 07/27/21 for this patient. Rounds included nursing, clinical care leader, pharmacy, and case management. Plan of care discussed. See clinical pathway and/or care plan for interventions and desired outcomes.

## 2021-07-27 NOTE — PROGRESS NOTES
End of Shift Note    Bedside shift change report given to Ita Ovalle (oncoming nurse) by Kelli Soriano (offgoing nurse). Report included the following information SBAR, Kardex, Intake/Output and MAR    Shift worked:  07a-7p     Shift summary and any significant changes:     room air   Urostomy care  Emesis in afternoon   foam pad with phytoplex on buttucks   Turn team  Pending discharge   PT/OT  Bm   Concerns for physician to address: none     Zone phone for oncoming shift:   none       Activity:  Activity Level: Up with Assistance  Number times ambulated in hallways past shift: 0  Number of times OOB to chair past shift: 2    Cardiac:   Cardiac Monitoring: Yes      Cardiac Rhythm: Sinus Rhythm    Access:   Current line(s): PIV     Genitourinary:   Urinary status: rose    Respiratory:   O2 Device: None (Room air)  Chronic home O2 use?: NO  Incentive spirometer at bedside: YES  Actual Volume (ml): 700 ml  GI:  Last Bowel Movement Date: 07/27/21  Current diet:  ADULT ORAL NUTRITION SUPPLEMENT Breakfast, Lunch, Dinner; Standard High Calorie/High Protein  ADULT DIET Easy to Chew; Low Fiber  Passing flatus: YES  Tolerating current diet: YES       Pain Management:   Patient states pain is manageable on current regimen: YES    Skin:  Adam Score: 17  Interventions: turn team, float heels and foam dressing    Patient Safety:  Fall Score:  Total Score: 2  Interventions: assistive device (walker, cane, etc), gripper socks and pt to call before getting OOB  High Fall Risk: Yes    Length of Stay:  Expected LOS: 3d 0h  [facility]  32/42/68  [id number]

## 2021-07-27 NOTE — PROGRESS NOTES
Problem: Mobility Impaired (Adult and Pediatric)  Goal: *Acute Goals and Plan of Care (Insert Text)  Description: FUNCTIONAL STATUS PRIOR TO ADMISSION: Patient was modified independent using a rolling walker for functional mobility. HOME SUPPORT PRIOR TO ADMISSION: The patient lived alone but had cousin to check in on her. Physical Therapy Goals  Initiated 7/23/2021  1. Patient will move from supine to sit and sit to supine  in bed with modified independence within 7 day(s). 2.  Patient will transfer from bed to chair and chair to bed with modified independence using the least restrictive device within 7 day(s). 3.  Patient will perform sit to stand with modified independence within 7 day(s). 4.  Patient will ambulate with modified independence for 150 feet with the least restrictive device within 7 day(s). 5.  Patient will ascend/descend 4 stairs with 1 handrail(s) with modified independence within 7 day(s). Outcome: Not Progressing Towards Goal   PHYSICAL THERAPY TREATMENT  Patient: Milagros Grey (64 y.o. female)  Date: 7/27/2021  Diagnosis: Acute GI bleeding [K92.2]  KELLY (acute kidney injury) ([facility] [address] [N17.9]  UTI (urinary tract infection) [N39.0]  Hypotension [I95.9] <principal problem not specified>       Precautions: Fall  Chart, physical therapy assessment, plan of care and goals were reviewed. ASSESSMENT  Patient continues with skilled PT services and is not progressing towards goals due to persistent symptomatic orthostatic hypotension with tachycardia. Patient continues to demonstrate limited functional mobility and decreased independence secondary to impaired standing balance, generalized weakness, impaired gait mechanics, limited endurance, and decreased activity tolerance. Received supine in bed and agreeable to PT intervention. VS noted in doc flowsheet ([id number]) for session. Patient symptomatic with all positional changes, noting dizziness and changes in her vision.  Pt needing to have BM and BSC not available, therefore returned patient to bed and provided patient with bedpan. Obtained BSC for patient at the end of session for future uses; RN informed. After using bedpan, patient was assisted bed>chair with RW support, however needed BLEs elevated in recliner chair for BP to recover and symptoms to lessen. Encouraged patient to sit up OOB as long as BP stable to build tolerance for OOB. Pt was left with all needs met, RN aware, and VSS following session. Patient is not safe to return home alone at this time. She will require 24/7 assistance. If she is unable to obtain 24/7 assistance, recommend patient discharge to SNF rehab to address functional impairments and improve independence prior to returning home alone. Current Level of Function Impacting Discharge (mobility/balance): mod I for bed mobility; SBA/CGA for transfers; CGA for short distance ambulation with RW support    Other factors to consider for discharge: increased risk for falls; decline from baseline mobility         PLAN :  Patient continues to benefit from skilled intervention to address the above impairments. Continue treatment per established plan of care. to address goals. Recommendation for discharge: (in order for the patient to meet his/her long term goals)  To be determined: HHPT and 24/7 assistance; If patient unable to have increased assistance, she will require SNF rehab prior to returning home    This discharge recommendation:  A follow-up discussion with the attending provider and/or case management is planned    IF patient discharges home will need the following DME: to be determined (TBD)       SUBJECTIVE:   Patient stated I feel so weird.     OBJECTIVE DATA SUMMARY:   Critical Behavior:  Neurologic State: Alert  Orientation Level: Oriented X4        Functional Mobility Training:  Bed Mobility:  Rolling: Modified independent; Additional time  Supine to Sit: Modified independent; Additional time  Sit to Supine: Modified independent; Additional time  Scooting: Modified independent; Additional time        Transfers:  Sit to Stand: Stand-by assistance; Additional time  Stand to Sit: Contact guard assistance; Additional time (VCs for hand placement and to control descent)                             Balance:  Sitting: Intact  Standing: Impaired; With support  Standing - Static: Good;Constant support  Standing - Dynamic : Good;Fair;Constant support  Ambulation/Gait Training:  Distance (ft): 4 Feet (ft) (bed>chair transfer)  Assistive Device: Gait belt;Walker, rolling  Ambulation - Level of Assistance: Contact guard assistance;Assist x1;Additional time; Adaptive equipment        Gait Abnormalities: Decreased step clearance (mild trunk flexion)        Base of Support: Narrowed     Speed/Inocencia: Slow;Shuffled  Step Length: Left shortened;Right shortened       Pain Rating:  No pain complaints    Activity Tolerance:   Poor, SpO2 stable on RA, requires frequent rest breaks, and signs and symptoms of orthostatic hypotension    After treatment patient left in no apparent distress:   Sitting in chair, Call bell within reach, and BLEs reclined in chair    COMMUNICATION/COLLABORATION:   The patients plan of care was discussed with: Physical therapist and Registered nurse.      Juli Reid, PT, DPT   Time Calculation: 47 mins

## 2021-07-27 NOTE — PROGRESS NOTES
Transition of Care Plan:     RUR:12%  Disposition:Home  Follow up appointments:  DME needed:n/a  Transportation at Discharge:cousin, Lorri Frisk or means to access home:        IM Medicare Letter:2nd IM provided  BCPI-A Bundle Letter:n/a  Caregiver Contact:  Discharge Caregiver contacted prior to discharge?      Patient has declined SNF & HH. Patient stated she had [facility] recently & can perform exercises on her own. Patient stated that her cousin is available to assist her as needed. Patient  has felt dizzy today and was unable to hold down any food. CM will continue to follow.     Sara Yarbrough  Ext [phone]

## 2021-07-27 NOTE — PROGRESS NOTES
Problem: Falls - Risk of  Goal: *Absence of Falls  Description: Document Lauren Metz Fall Risk and appropriate interventions in the flowsheet.   Outcome: Progressing Towards Goal  Note: Fall Risk Interventions:  Mobility Interventions: Patient to call before getting OOB         Medication Interventions: Teach patient to arise slowly, Patient to call before getting OOB    Elimination Interventions: Call light in reach              Problem: Patient Education: Go to Patient Education Activity  Goal: Patient/Family Education  Outcome: Progressing Towards Goal     Problem: Patient Education: Go to Patient Education Activity  Goal: Patient/Family Education  Outcome: Progressing Towards Goal

## 2021-07-28 ENCOUNTER — APPOINTMENT (OUTPATIENT)
Dept: GENERAL RADIOLOGY | Age: 86
DRG: 388 | End: 2021-07-28
Attending: INTERNAL MEDICINE
Payer: MEDICARE

## 2021-07-28 LAB
ALBUMIN SERPL-MCNC: 2.8 G/DL (ref 3.5–5)
ALBUMIN/GLOB SERPL: 0.8 {RATIO} (ref 1.1–2.2)
ALP SERPL-CCNC: 97 U/L (ref 45–117)
ALT SERPL-CCNC: 23 U/L (ref 12–78)
ANION GAP SERPL CALC-SCNC: 3 MMOL/L (ref 5–15)
AST SERPL-CCNC: 28 U/L (ref 15–37)
BASOPHILS # BLD: 0 K/UL (ref 0–0.1)
BASOPHILS NFR BLD: 0 % (ref 0–1)
BILIRUB SERPL-MCNC: 1.4 MG/DL (ref 0.2–1)
BUN SERPL-MCNC: 23 MG/DL (ref 6–20)
BUN/CREAT SERPL: 15 (ref 12–20)
CALCIUM SERPL-MCNC: 9 MG/DL (ref 8.5–10.1)
CHLORIDE SERPL-SCNC: 96 MMOL/L (ref 97–108)
CO2 SERPL-SCNC: 32 MMOL/L (ref 21–32)
CREAT SERPL-MCNC: 1.52 MG/DL (ref 0.55–1.02)
DIFFERENTIAL METHOD BLD: ABNORMAL
EOSINOPHIL # BLD: 0.1 K/UL (ref 0–0.4)
EOSINOPHIL NFR BLD: 2 % (ref 0–7)
ERYTHROCYTE [DISTWIDTH] IN BLOOD BY AUTOMATED COUNT: 14.6 % (ref 11.5–14.5)
GLOBULIN SER CALC-MCNC: 3.7 G/DL (ref 2–4)
GLUCOSE SERPL-MCNC: 104 MG/DL (ref 65–100)
HCT VFR BLD AUTO: 30.1 % (ref 35–47)
HGB BLD-MCNC: 9.1 G/DL (ref 11.5–16)
IMM GRANULOCYTES # BLD AUTO: 0 K/UL (ref 0–0.04)
IMM GRANULOCYTES NFR BLD AUTO: 0 % (ref 0–0.5)
LYMPHOCYTES # BLD: 0.6 K/UL (ref 0.8–3.5)
LYMPHOCYTES NFR BLD: 17 % (ref 12–49)
MCH RBC QN AUTO: 26.1 PG (ref 26–34)
MCHC RBC AUTO-ENTMCNC: 30.2 G/DL (ref 30–36.5)
MCV RBC AUTO: 86.5 FL (ref 80–99)
MONOCYTES # BLD: 0.4 K/UL (ref 0–1)
MONOCYTES NFR BLD: 9 % (ref 5–13)
NEUTS SEG # BLD: 2.8 K/UL (ref 1.8–8)
NEUTS SEG NFR BLD: 72 % (ref 32–75)
NRBC # BLD: 0 K/UL (ref 0–0.01)
NRBC BLD-RTO: 0 PER 100 WBC
PLATELET # BLD AUTO: 186 K/UL (ref 150–400)
PMV BLD AUTO: 10.2 FL (ref 8.9–12.9)
POTASSIUM SERPL-SCNC: 5.3 MMOL/L (ref 3.5–5.1)
PROT SERPL-MCNC: 6.5 G/DL (ref 6.4–8.2)
RBC # BLD AUTO: 3.48 M/UL (ref 3.8–5.2)
SODIUM SERPL-SCNC: 131 MMOL/L (ref 136–145)
WBC # BLD AUTO: 3.9 K/UL (ref 3.6–11)

## 2021-07-28 PROCEDURE — 74011250637 HC RX REV CODE- 250/637: Performed by: STUDENT IN AN ORGANIZED HEALTH CARE EDUCATION/TRAINING PROGRAM

## 2021-07-28 PROCEDURE — 74011000250 HC RX REV CODE- 250: Performed by: INTERNAL MEDICINE

## 2021-07-28 PROCEDURE — 97530 THERAPEUTIC ACTIVITIES: CPT

## 2021-07-28 PROCEDURE — 74011250636 HC RX REV CODE- 250/636: Performed by: INTERNAL MEDICINE

## 2021-07-28 PROCEDURE — 65660000000 HC RM CCU STEPDOWN

## 2021-07-28 PROCEDURE — 36415 COLL VENOUS BLD VENIPUNCTURE: CPT

## 2021-07-28 PROCEDURE — 80053 COMPREHEN METABOLIC PANEL: CPT

## 2021-07-28 PROCEDURE — 85025 COMPLETE CBC W/AUTO DIFF WBC: CPT

## 2021-07-28 PROCEDURE — 97116 GAIT TRAINING THERAPY: CPT

## 2021-07-28 PROCEDURE — 74018 RADEX ABDOMEN 1 VIEW: CPT

## 2021-07-28 PROCEDURE — 74011250637 HC RX REV CODE- 250/637: Performed by: SURGERY

## 2021-07-28 PROCEDURE — 74011250637 HC RX REV CODE- 250/637: Performed by: INTERNAL MEDICINE

## 2021-07-28 PROCEDURE — 74011250636 HC RX REV CODE- 250/636: Performed by: STUDENT IN AN ORGANIZED HEALTH CARE EDUCATION/TRAINING PROGRAM

## 2021-07-28 RX ADMIN — SODIUM CHLORIDE 10 MG: 9 INJECTION INTRAMUSCULAR; INTRAVENOUS; SUBCUTANEOUS at 13:04

## 2021-07-28 RX ADMIN — Medication 10 ML: at 21:15

## 2021-07-28 RX ADMIN — PRAVASTATIN SODIUM 80 MG: 40 TABLET ORAL at 21:14

## 2021-07-28 RX ADMIN — Medication 5 ML: at 13:06

## 2021-07-28 RX ADMIN — POLYETHYLENE GLYCOL 3350 17 G: 17 POWDER, FOR SOLUTION ORAL at 08:56

## 2021-07-28 RX ADMIN — AMOXICILLIN AND CLAVULANATE POTASSIUM 1 TABLET: 500; 125 TABLET, FILM COATED ORAL at 21:14

## 2021-07-28 RX ADMIN — MIRTAZAPINE 45 MG: 15 TABLET, FILM COATED ORAL at 21:14

## 2021-07-28 RX ADMIN — AMOXICILLIN AND CLAVULANATE POTASSIUM 1 TABLET: 500; 125 TABLET, FILM COATED ORAL at 08:56

## 2021-07-28 RX ADMIN — PANTOPRAZOLE SODIUM 40 MG: 40 TABLET, DELAYED RELEASE ORAL at 08:57

## 2021-07-28 RX ADMIN — DILTIAZEM HYDROCHLORIDE 180 MG: 180 CAPSULE, COATED, EXTENDED RELEASE ORAL at 08:56

## 2021-07-28 RX ADMIN — ONDANSETRON 4 MG: 2 INJECTION INTRAMUSCULAR; INTRAVENOUS at 10:39

## 2021-07-28 RX ADMIN — ONDANSETRON 4 MG: 2 INJECTION INTRAMUSCULAR; INTRAVENOUS at 04:42

## 2021-07-28 RX ADMIN — Medication 5 ML: at 06:00

## 2021-07-28 NOTE — PROGRESS NOTES
End of Shift Note    Bedside shift change report given to Heraclio CORTES (oncoming nurse) by Chano Granados RN (offgoing nurse). Report included the following information SBAR, Kardex, Intake/Output, MAR and Recent Results    Shift worked:  3096-9818     Shift summary and any significant changes:     Pt c/o nausea and abdominal distention; MD made aware. Abd KUB ordered and compazine ordered since zofran is ineffective. Since Compazine, pt ate dinner without difficulties. No complaints of pain. Pt tolerated dinner since administration of compazine. Concerns for physician to address:  discharge planning once medical stable     Zone phone for oncoming shift:          Activity:  Activity Level: Up with Assistance  Number times ambulated in hallways past shift: 0  Number of times OOB to chair past shift: 1    Cardiac:   Cardiac Monitoring: Yes      Cardiac Rhythm: Sinus Rhythm    Access:   Current line(s): PIV     Genitourinary:   Urinary status: voiding    Respiratory:   O2 Device: None (Room air)  Chronic home O2 use?: NO  Incentive spirometer at bedside: YES  Actual Volume (ml): 700 ml  GI:  Last Bowel Movement Date: 07/27/21  Current diet:  ADULT ORAL NUTRITION SUPPLEMENT Breakfast, Lunch, Dinner; Standard High Calorie/High Protein  ADULT DIET Easy to Chew; Low Fiber  Passing flatus: YES  Tolerating current diet: YES       Pain Management:   Patient states pain is manageable on current regimen: YES    Skin:  Adam Score: 16  Interventions: float heels and increase time out of bed    Patient Safety:  Fall Score:  Total Score: 3  Interventions: bed/chair alarm, assistive device (walker, cane, etc), gripper socks, pt to call before getting OOB and stay with me (per policy)  High Fall Risk: Yes    Length of Stay:  Expected LOS: 3d 0h  Actual LOS: 8      Chano Granados, RN

## 2021-07-28 NOTE — PROGRESS NOTES
Transition of Care Plan:     RUR:12%  Disposition:Home  Follow up appointments:  DME needed:n/a  Transportation at Discharge:cousin, Sukumar Kimbrough or means to access home:        IM Medicare Letter:2nd IM provided  BCPI-A Bundle Letter:n/a  Caregiver Contact:  Discharge Caregiver contacted prior to discharge?       Patient continues to decline SNF & HH. Patient stated she had [facility] recently & can perform exercises on her own. Patient stated that her cousin is available to assist her as needed yet family has reportedly told staff that pt needs SNF because they cannot meet her needs. On 7/28 pt vomited several times during PT, dc cancelled. Attending MD feels strongly that pt should go to SNF but per extensive discussion later in day w/ CM, she is still unwilling to go to SNF. CM explained that SNF stay can be short-term; offered [facility] as a facility closer to her home (pt's  actually passed away there). CM was not able to speak w/ cousin Silvia Carty during day due to schedule constraints but may need family to address with pt. Ref sent to [facility] to see if they are currently following pt for PT/OT. Pt says she has [facility] but does not know name - this provider from most recent admission. Need MD order. Ref sent to [facility] via [facility] in case pt agrees to SNF. Pt would likely benefit from [facility] Personal Care aide at [place].     ANTONIO Hazel

## 2021-07-28 NOTE — PROGRESS NOTES
Patient urostomy bag had to be changed due to the previous one leaking. Patient was sinus tachy throughout the shift. Patient reported feeling nauseous, zofran was given.

## 2021-07-28 NOTE — INTERDISCIPLINARY ROUNDS
Interdisciplinary Rounds were completed on 07/28/21 for this patient. Rounds included nursing, clinical care leader, pharmacy, and case management. Plan of care discussed. See clinical pathway and/or care plan for interventions and desired outcomes.

## 2021-07-28 NOTE — PROGRESS NOTES
Problem: Mobility Impaired (Adult and Pediatric)  Goal: *Acute Goals and Plan of Care (Insert Text)  Description: FUNCTIONAL STATUS PRIOR TO ADMISSION: Patient was modified independent using a rolling walker for functional mobility. HOME SUPPORT PRIOR TO ADMISSION: The patient lived alone but had cousin to check in on her. Physical Therapy Goals  Initiated 7/23/2021  1. Patient will move from supine to sit and sit to supine  in bed with modified independence within 7 day(s). 2.  Patient will transfer from bed to chair and chair to bed with modified independence using the least restrictive device within 7 day(s). 3.  Patient will perform sit to stand with modified independence within 7 day(s). 4.  Patient will ambulate with modified independence for 150 feet with the least restrictive device within 7 day(s). 5.  Patient will ascend/descend 4 stairs with 1 handrail(s) with modified independence within 7 day(s). Outcome: Progressing Towards Goal   PHYSICAL THERAPY TREATMENT  Patient: Jenn King (10 y.o. female)  Date: 7/28/2021  Diagnosis: Acute GI bleeding [K92.2]  KELLY (acute kidney injury) ([facility] [address] [N17.9]  UTI (urinary tract infection) [N39.0]  Hypotension [I95.9] <principal problem not specified>       Precautions: Fall  Chart, physical therapy assessment, plan of care and goals were reviewed. ASSESSMENT  RN clears pt for PT treatmen (aware of 113 restin heart rate). Patient continues with skilled PT services and is progressing towards goals. Pt is agreeable to participation, but but has immediate emesis upon reaching EOB. Pt requires encouragement to attempt to resume session. Due to two additional episodes of vomiting. Although pt notes intense nausea and moderate fatigue, she is able to mobilize under her own power ( Requires no physical assist). Session also used to complete sit<--> stand transfers x 5, for brief change and linen freshening. Supervision is suggested at D/c.  Will advance activity as tolerate. Current Level of Function Impacting Discharge (mobility/balance): SBA-Mode I with all activities. Stairs not addressed     Other factors to consider for discharge: Nausea, fatigue and dizziness ((potential nutrition deficit)         PLAN :  Patient continues to benefit from skilled intervention to address the above impairments. Continue treatment per established plan of care. to address goals. Recommendation for discharge: (in order for the patient to meet his/her long term goals)  Physical therapy at least 2 days/week in the home     This discharge recommendation:  Has been made in collaboration with the attending provider and/or case management    IF patient discharges home will need the following DME: rolling walker       SUBJECTIVE:   Patient stated I'm not gonna be able to do this.     OBJECTIVE DATA SUMMARY:   Critical Behavior:  Neurologic State: Alert  Orientation Level: Oriented X4  Functional Mobility Training:  Bed Mobility:  Rolling: Modified independent  Supine to Sit: Modified independent  Sit to Supine: Modified independent  Scooting: Modified independent  Transfers:  Sit to Stand: Stand-by assistance  Stand to Sit: Stand-by assistance   Balance:  Sitting: Intact  Standing: Impaired; With support  Standing - Static: Good  Standing - Dynamic : Fair  Ambulation/Gait Training:  Distance (ft): 34 Feet (ft)  Assistive Device: Gait belt;Walker, rolling  Ambulation - Level of Assistance: Stand-by assistance  Gait Abnormalities: Decreased step clearance  Base of Support: Narrowed  Speed/Inocencia: Slow  Step Length: Left shortened;Right shortened  Pain Rating:  Pain in:0  Pain out: 0  Activity Tolerance:   Fair, requires rest breaks, and N/V    After treatment patient left in no apparent distress:   Supine in bed, Call bell within reach, and RN updated    COMMUNICATION/COLLABORATION:   The patients plan of care was discussed with: Registered nurse and Physician. Marisa King, CYNTHIA   Time Calculation: 36 mins

## 2021-07-28 NOTE — PROGRESS NOTES
Hospitalist Progress Note    NAME: Zeferino Reyes   :  1934   MRN:  [id number]     Belkys Emery is a 80 y.o.  female who presents with bleeding per rectum for the last 4 days started on Friday. Patient past medical history of hypertension, hyperlipidemia, insomnia. Patient stated for the last 4 days she has noticed some bleeding per rectum which has been going continuously, went to see her primary care doctor today which advised the patient to go to the ER for further evaluation. Denies any abdominal pain, no nausea vomiting, no chest pain, no shortness of breath, no fevers or chills. She was noted to have lower GI bleed and also recurrent SBO. Assessment / Plan:  Acute lower GI bleeding hemodynamically stable  Acute on chronic blood loss anemia  Bleeding scan shows no evidence of bleeding  Evaluated by GI and recommended conservative management given SBO  Hemoglobin is stable between 7-8  GI recommended outpatient follow-up  Recurrent SBO  patient was nauseous and vomiting again today and ordered KUB and compazine as Zofran was not helping much. CT abdomen shows multiple significantly dilated loops of small bowel in the abdomen and also right lower ileal conduit with mild hydronephrosis due to bowel distention  cont regular diet  Cont scheduled laxatives  We will need small bowel follow-through in about 2 weeks on outpatient basis with Dr. Rolan Parra surgery cleared the patient for discharge for follow-up in 2 weeks  She is able to tolerate solid diet till this morning and she became nauseous   Seen by physical therapy today and recommended SNF. Will be discussing with case management for SNF placement. History of bladder cancer s/p urostomy  Continue supportive care for urostomy    Incidental 7 mm pulmonary nodule  We will repeat CT of chest in about 6 months.   I notified the patient about the importance of follow-up of lung nodule as this can turn into malignancies and she verbalized understanding    Acute kidney injury  Baseline creatinine is normal.  Creatinine peaked at 2.0. Creatinine is trending down and is 1.3    UTI  Urine culture grew Klebsiella. It is complicated UTI due to urostomy so will treat for a total of 7 days. Continue Augmentin until 7/28     Hypertension  Insomnia  Hyperlipidemia  Continue Cardizem    Disposition:  Case management working on SNF placement      Code Status: Full code  Surrogate Decision Maker:     DVT Prophylaxis: SCD       Baseline: Ambulatory at home        Body mass index is 18.02 kg/m². Subjective:     Chief Complaint / Reason for Physician Visit  Patient was seen and examined and she was nauseous and slightly distended abdomen , no CP or SOB   She says she is generally weak. Review of Systems:  Symptom Y/N Comments  Symptom Y/N Comments   Fever/Chills n   Chest Pain n    Poor Appetite    Edema     Cough n   Abdominal Pain n    Sputum    Joint Pain     SOB/[place] n   Pruritis/Rash     Nausea/vomit    Tolerating PT/OT     Diarrhea    Tolerating Diet     Constipation    Other       Could NOT obtain due to:      Objective:     VITALS:   Last 24hrs VS reviewed since prior progress note.  Most recent are:  Patient Vitals for the past 24 hrs:   Temp Pulse Resp BP SpO2   07/28/21 0401 97.8 °F (36.6 °C) (!) 104 18 124/79 95 %   07/28/21 0008 97.7 °F (36.5 °C) (!) 105 18 133/80 98 %   07/27/21 2013 97.9 °F (36.6 °C) (!) 110 18 137/83 97 %   07/27/21 1523 98.1 °F (36.7 °C) (!) 104 18 128/78 98 %   07/27/21 1206 97.8 °F (36.6 °C) (!) 106 20 130/86 98 %   07/27/21 1123 -- (!) 111 -- (!) 153/104 --   07/27/21 1121 -- (!) 115 -- (!) 134/97 --   07/27/21 1119 -- (!) 119 -- (!) 160/117 --   07/27/21 1114 -- (!) 129 -- 117/88 --   07/27/21 1100 -- (!) 133 -- (!) 119/94 100 %   07/27/21 1056 -- (!) 117 -- (!) 141/100 99 %   07/27/21 1052 -- (!) 107 -- (!) 149/99 96 %   07/27/21 0824 -- (!) 103 -- -- --   07/27/21 0750 98.9 °F (37.2 °C) (!) 104 20 139/85 99 %       Intake/Output Summary (Last 24 hours) at 7/28/2021 0738  Last data filed at 7/27/2021 1523  Gross per 24 hour   Intake --   Output 1100 ml   Net -1100 ml        PHYSICAL EXAM:  General: WD, WN. Alert, cooperative, no acute distress    EENT:  EOMI. Anicteric sclerae. MMM  Resp:  CTA bilaterally, no wheezing or rales. No accessory muscle use  CV:  Regular  rhythm,  No edema  GI:  Non tender but slightly distended abdomen .  +Bowel sounds, urostomy in place  Neurologic:  Alert and awake, normal speech,   Psych:   Not anxious nor agitated  Skin:  No rashes.   No jaundice    Reviewed most current lab test results and cultures  YES  Reviewed most current radiology test results   YES  Review and summation of old records today    NO  Reviewed patient's current orders and MAR    YES  PMH/SH reviewed - no change compared to H&P          Current Facility-Administered Medications:     hydrALAZINE (APRESOLINE) 20 mg/mL injection 10 mg, 10 mg, IntraVENous, Q2H PRN, David Rich MD    pantoprazole (PROTONIX) tablet 40 mg, 40 mg, Oral, ACB, Gregor Shen MD, 40 mg at 07/27/21 0630    amoxicillin-clavulanate (AUGMENTIN) 500-125 mg per tablet 1 Tablet, 1 Tablet, Oral, Q12H, Tanya Gardner MD, 1 Tablet at 07/27/21 2101    bisacodyL (DULCOLAX) suppository 10 mg, 10 mg, Rectal, DAILY, Troy Rodrigez MD, 10 mg at 07/27/21 1014    polyethylene glycol (MIRALAX) packet 17 g, 17 g, Oral, DAILY, Troy Rodrigez MD, 17 g at 07/27/21 1014    mirtazapine (REMERON) tablet 45 mg, 45 mg, Oral, QHS, Deana Danielle MD, 45 mg at 07/27/21 2101    pravastatin (PRAVACHOL) tablet 80 mg, 80 mg, Oral, QHS, Deana Danielle MD, 80 mg at 07/27/21 2101    dilTIAZem ER (CARDIZEM CD) capsule 180 mg, 180 mg, Oral, DAILY, Gregor Shen MD, 180 mg at 07/27/21 1014    sodium chloride (NS) flush 5-40 mL, 5-40 mL, IntraVENous, Q8H, Gerri Llanos MD, 10 mL at 07/27/21 2102    sodium chloride (NS) flush 5-40 mL, 5-40 mL, IntraVENous, PRN, Ana Enriquez MD    acetaminophen (TYLENOL) tablet 650 mg, 650 mg, Oral, Q6H PRN **OR** acetaminophen (TYLENOL) suppository 650 mg, 650 mg, Rectal, Q6H PRN, Ana Enriquez MD    ondansetron (ZOFRAN ODT) tablet 4 mg, 4 mg, Oral, Q8H PRN **OR** ondansetron (ZOFRAN) injection 4 mg, 4 mg, IntraVENous, Q6H PRN, Ana Enriquez MD, 4 mg at 07/28/21 4998  ________________________________________________________________________  Care Plan discussed with:    Comments   Patient y    Family      RN y    Care Manager     Consultant                        Multidiciplinary team rounds were held today with , nursing, pharmacist and clinical coordinator. Patient's plan of care was discussed; medications were reviewed and discharge planning was addressed. ________________________________________________________________________  Total NON critical care TIME:  35  Minutes    Total CRITICAL CARE TIME Spent:   Minutes non procedure based      Comments   >50% of visit spent in counseling and coordination of care     ________________________________________________________________________  Ruth Ann Ruvalcaba MD     Procedures: see electronic medical records for all procedures/Xrays and details which were not copied into this note but were reviewed prior to creation of Plan. LABS:  I reviewed today's most current labs and imaging studies. Pertinent labs include:  Recent Labs     07/28/21  0454   WBC 3.9   HGB 9.1*   HCT 30.1*        Recent Labs     07/28/21  0454   *   K 5.3*   CL 96*   CO2 32   *   BUN 23*   CREA 1.52*   CA 9.0   ALB 2.8*   TBILI 1.4*   ALT 23       Signed:  Ruth Ann Ruvalcaba MD

## 2021-07-29 LAB
ALBUMIN SERPL-MCNC: 2.9 G/DL (ref 3.5–5)
ALBUMIN/GLOB SERPL: 0.8 {RATIO} (ref 1.1–2.2)
ALP SERPL-CCNC: 93 U/L (ref 45–117)
ALT SERPL-CCNC: 39 U/L (ref 12–78)
ANION GAP SERPL CALC-SCNC: 4 MMOL/L (ref 5–15)
APPEARANCE UR: ABNORMAL
AST SERPL-CCNC: 54 U/L (ref 15–37)
BACTERIA URNS QL MICRO: NEGATIVE /HPF
BASOPHILS # BLD: 0 K/UL (ref 0–0.1)
BASOPHILS NFR BLD: 0 % (ref 0–1)
BILIRUB SERPL-MCNC: 0.6 MG/DL (ref 0.2–1)
BILIRUB UR QL: NEGATIVE
BUN SERPL-MCNC: 25 MG/DL (ref 6–20)
BUN/CREAT SERPL: 15 (ref 12–20)
CALCIUM SERPL-MCNC: 9.6 MG/DL (ref 8.5–10.1)
CHLORIDE SERPL-SCNC: 94 MMOL/L (ref 97–108)
CO2 SERPL-SCNC: 33 MMOL/L (ref 21–32)
COLOR UR: ABNORMAL
CREAT SERPL-MCNC: 1.67 MG/DL (ref 0.55–1.02)
DIFFERENTIAL METHOD BLD: ABNORMAL
EOSINOPHIL # BLD: 0.1 K/UL (ref 0–0.4)
EOSINOPHIL NFR BLD: 3 % (ref 0–7)
EPITH CASTS URNS QL MICRO: ABNORMAL /LPF
ERYTHROCYTE [DISTWIDTH] IN BLOOD BY AUTOMATED COUNT: 14.6 % (ref 11.5–14.5)
GLOBULIN SER CALC-MCNC: 3.6 G/DL (ref 2–4)
GLUCOSE SERPL-MCNC: 90 MG/DL (ref 65–100)
GLUCOSE UR STRIP.AUTO-MCNC: NEGATIVE MG/DL
HCT VFR BLD AUTO: 28.4 % (ref 35–47)
HGB BLD-MCNC: 8.7 G/DL (ref 11.5–16)
HGB UR QL STRIP: NEGATIVE
IMM GRANULOCYTES # BLD AUTO: 0 K/UL (ref 0–0.04)
IMM GRANULOCYTES NFR BLD AUTO: 0 % (ref 0–0.5)
KETONES UR QL STRIP.AUTO: NEGATIVE MG/DL
LEUKOCYTE ESTERASE UR QL STRIP.AUTO: ABNORMAL
LYMPHOCYTES # BLD: 0.7 K/UL (ref 0.8–3.5)
LYMPHOCYTES NFR BLD: 13 % (ref 12–49)
MCH RBC QN AUTO: 26.6 PG (ref 26–34)
MCHC RBC AUTO-ENTMCNC: 30.6 G/DL (ref 30–36.5)
MCV RBC AUTO: 86.9 FL (ref 80–99)
MONOCYTES # BLD: 0.4 K/UL (ref 0–1)
MONOCYTES NFR BLD: 8 % (ref 5–13)
NEUTS SEG # BLD: 3.9 K/UL (ref 1.8–8)
NEUTS SEG NFR BLD: 75 % (ref 32–75)
NITRITE UR QL STRIP.AUTO: NEGATIVE
NRBC # BLD: 0 K/UL (ref 0–0.01)
NRBC BLD-RTO: 0 PER 100 WBC
PH UR STRIP: 6.5 [PH] (ref 5–8)
PLATELET # BLD AUTO: 179 K/UL (ref 150–400)
PMV BLD AUTO: 10.1 FL (ref 8.9–12.9)
POTASSIUM SERPL-SCNC: 5.1 MMOL/L (ref 3.5–5.1)
PROT SERPL-MCNC: 6.5 G/DL (ref 6.4–8.2)
PROT UR STRIP-MCNC: ABNORMAL MG/DL
RBC # BLD AUTO: 3.27 M/UL (ref 3.8–5.2)
RBC #/AREA URNS HPF: ABNORMAL /HPF (ref 0–5)
SODIUM SERPL-SCNC: 131 MMOL/L (ref 136–145)
SP GR UR REFRACTOMETRY: 1.01 (ref 1–1.03)
UA: UC IF INDICATED,UAUC: ABNORMAL
UROBILINOGEN UR QL STRIP.AUTO: 0.2 EU/DL (ref 0.2–1)
WBC # BLD AUTO: 5.2 K/UL (ref 3.6–11)
WBC URNS QL MICRO: ABNORMAL /HPF (ref 0–4)
YEAST BUDDING URNS QL: PRESENT
YEAST URNS QL MICRO: PRESENT

## 2021-07-29 PROCEDURE — 87077 CULTURE AEROBIC IDENTIFY: CPT

## 2021-07-29 PROCEDURE — 74011250637 HC RX REV CODE- 250/637: Performed by: STUDENT IN AN ORGANIZED HEALTH CARE EDUCATION/TRAINING PROGRAM

## 2021-07-29 PROCEDURE — 87086 URINE CULTURE/COLONY COUNT: CPT

## 2021-07-29 PROCEDURE — 80053 COMPREHEN METABOLIC PANEL: CPT

## 2021-07-29 PROCEDURE — 74011250637 HC RX REV CODE- 250/637: Performed by: SURGERY

## 2021-07-29 PROCEDURE — 81001 URINALYSIS AUTO W/SCOPE: CPT

## 2021-07-29 PROCEDURE — 74011250637 HC RX REV CODE- 250/637: Performed by: INTERNAL MEDICINE

## 2021-07-29 PROCEDURE — 74011250636 HC RX REV CODE- 250/636: Performed by: STUDENT IN AN ORGANIZED HEALTH CARE EDUCATION/TRAINING PROGRAM

## 2021-07-29 PROCEDURE — 85025 COMPLETE CBC W/AUTO DIFF WBC: CPT

## 2021-07-29 PROCEDURE — 36415 COLL VENOUS BLD VENIPUNCTURE: CPT

## 2021-07-29 PROCEDURE — 65660000000 HC RM CCU STEPDOWN

## 2021-07-29 PROCEDURE — 74011000250 HC RX REV CODE- 250: Performed by: INTERNAL MEDICINE

## 2021-07-29 PROCEDURE — 74011250636 HC RX REV CODE- 250/636: Performed by: INTERNAL MEDICINE

## 2021-07-29 RX ORDER — METOCLOPRAMIDE HYDROCHLORIDE 5 MG/ML
5 INJECTION INTRAMUSCULAR; INTRAVENOUS EVERY 6 HOURS
Status: DISCONTINUED | OUTPATIENT
Start: 2021-07-29 | End: 2021-07-30 | Stop reason: HOSPADM

## 2021-07-29 RX ORDER — SODIUM CHLORIDE 9 MG/ML
75 INJECTION, SOLUTION INTRAVENOUS CONTINUOUS
Status: DISCONTINUED | OUTPATIENT
Start: 2021-07-29 | End: 2021-07-30 | Stop reason: HOSPADM

## 2021-07-29 RX ORDER — PANTOPRAZOLE SODIUM 40 MG/1
40 TABLET, DELAYED RELEASE ORAL 2 TIMES DAILY
Status: DISCONTINUED | OUTPATIENT
Start: 2021-07-29 | End: 2021-07-30 | Stop reason: HOSPADM

## 2021-07-29 RX ADMIN — METOCLOPRAMIDE 5 MG: 5 INJECTION, SOLUTION INTRAMUSCULAR; INTRAVENOUS at 21:15

## 2021-07-29 RX ADMIN — METOCLOPRAMIDE 5 MG: 5 INJECTION, SOLUTION INTRAMUSCULAR; INTRAVENOUS at 11:57

## 2021-07-29 RX ADMIN — MIRTAZAPINE 45 MG: 15 TABLET, FILM COATED ORAL at 23:18

## 2021-07-29 RX ADMIN — DILTIAZEM HYDROCHLORIDE 180 MG: 180 CAPSULE, COATED, EXTENDED RELEASE ORAL at 11:56

## 2021-07-29 RX ADMIN — METOCLOPRAMIDE 5 MG: 5 INJECTION, SOLUTION INTRAMUSCULAR; INTRAVENOUS at 18:18

## 2021-07-29 RX ADMIN — PRAVASTATIN SODIUM 80 MG: 40 TABLET ORAL at 23:18

## 2021-07-29 RX ADMIN — SODIUM CHLORIDE 75 ML/HR: 9 INJECTION, SOLUTION INTRAVENOUS at 09:39

## 2021-07-29 RX ADMIN — ONDANSETRON 4 MG: 2 INJECTION INTRAMUSCULAR; INTRAVENOUS at 15:14

## 2021-07-29 RX ADMIN — SODIUM CHLORIDE 75 ML/HR: 9 INJECTION, SOLUTION INTRAVENOUS at 21:14

## 2021-07-29 RX ADMIN — Medication 10 ML: at 14:00

## 2021-07-29 RX ADMIN — Medication 10 ML: at 06:07

## 2021-07-29 RX ADMIN — POLYETHYLENE GLYCOL 3350 17 G: 17 POWDER, FOR SOLUTION ORAL at 11:56

## 2021-07-29 RX ADMIN — ONDANSETRON 4 MG: 2 INJECTION INTRAMUSCULAR; INTRAVENOUS at 09:39

## 2021-07-29 RX ADMIN — SODIUM CHLORIDE 10 MG: 9 INJECTION INTRAMUSCULAR; INTRAVENOUS; SUBCUTANEOUS at 06:03

## 2021-07-29 RX ADMIN — PANTOPRAZOLE SODIUM 40 MG: 40 TABLET, DELAYED RELEASE ORAL at 11:56

## 2021-07-29 RX ADMIN — Medication 10 ML: at 23:18

## 2021-07-29 RX ADMIN — PANTOPRAZOLE SODIUM 40 MG: 40 TABLET, DELAYED RELEASE ORAL at 18:18

## 2021-07-29 NOTE — PROGRESS NOTES
Problem: Falls - Risk of  Goal: *Absence of Falls  Description: Document Lynnette Sotelo Fall Risk and appropriate interventions in the flowsheet. Outcome: Progressing Towards Goal  Note: Fall Risk Interventions:  Mobility Interventions: Bed/chair exit alarm         Medication Interventions: Teach patient to arise slowly    Elimination Interventions: Call light in reach              Problem: Patient Education: Go to Patient Education Activity  Goal: Patient/Family Education  Outcome: Progressing Towards Goal     Problem: Patient Education: Go to Patient Education Activity  Goal: Patient/Family Education  Outcome: Progressing Towards Goal     Problem: Pressure Injury - Risk of  Goal: *Prevention of pressure injury  Description: Document Adam Scale and appropriate interventions in the flowsheet.   Outcome: Progressing Towards Goal  Note: Pressure Injury Interventions:  Sensory Interventions: Assess changes in LOC    Moisture Interventions: Absorbent underpads    Activity Interventions: Increase time out of bed    Mobility Interventions: HOB 30 degrees or less    Nutrition Interventions: Document food/fluid/supplement intake    Friction and Shear Interventions: HOB 30 degrees or less

## 2021-07-29 NOTE — PROGRESS NOTES
End of Shift Note    Bedside shift change report given to Efrain (oncoming nurse) by Charmaine Zuleta RN (offgoing nurse). Report included the following information SBAR, Kardex, ED Summary, Recent Results and Cardiac Rhythm Sinus Tach    Shift worked:  3p-7a     Shift summary and any significant changes:     Pt still nauseous, controlled with PRNS, urine culture sent. Concerns for physician to address:  none     Zone phone for oncoming shift:   3p-7p       Activity:  Activity Level: Up with Assistance  Number times ambulated in hallways past shift: 0  Number of times OOB to chair past shift: 3    Cardiac:   Cardiac Monitoring: Yes      Cardiac Rhythm: Sinus Tach    Access:   Current line(s): PIV     Genitourinary:   Urinary status: urostomy    Respiratory:   O2 Device: None (Room air)  Chronic home O2 use?: YES  Incentive spirometer at bedside: YES  Actual Volume (ml): 700 ml  GI:  Last Bowel Movement Date: 07/27/21  Current diet:  ADULT ORAL NUTRITION SUPPLEMENT Breakfast, Lunch, Dinner; Standard High Calorie/High Protein  ADULT DIET Easy to Chew; Low Fiber  Passing flatus: YES  Tolerating current diet: YES       Pain Management:   Patient states pain is manageable on current regimen: YES    Skin:  Adam Score: 17  Interventions: PT/OT consult    Patient Safety:  Fall Score:  Total Score: 3  Interventions: gripper socks  High Fall Risk: Yes    Length of Stay:  Expected LOS: 3d 0h  Actual LOS: Popeye Juarez, RN

## 2021-07-29 NOTE — PROGRESS NOTES
Transition of Care Plan:     RUR:16%  Disposition:  SNF (referrals sent to [facility])  Follow up appointments:  DME needed:n/a  Transportation at Discharge:cousin, Stepan Pepe or means to access home:        IM Medicare Letter:2nd IM provided  BCPI-A Bundle Letter:n/a  Caregiver Contact:  Discharge Caregiver contacted prior to discharge? CM met with pt and family at bedside today 7/29/21 to discuss.       3:04 p.m. CM received a call from Alfredo Rowell , that pt had a LTSS on 7/9/19 in [facility].  CM called and advised [facility]. Jeanette said to go ahead and schedule transportation for tomorrow. Pt will go into room 229A. Nursing can call report to [phone]. Their fax is [phone] and they will need the d/c order faxed at d/c.  CM called and left message on Courtney Abel voice mail at [phone] and called and spoke to Reza Williamson at [phone] to inform of plan. CM also spoke with pt and she is in agreement. CM will arrange transportation. 1:42 p.m. CM received a call back from [facility] who stated they can accept this pt when the UAI is completed. CM approached by nursing that family was in the room requesting to speak to CM. CM met with pt and family, Reza Williamson and Courtney Abel. CM spoke with pt about sending a referral to SNF as that is what is recommended. Pt stated she was in agreement. CM confirmed pt and family's first choice is [facility] and their 2nd choice is [facility] [address]. CM called and spoke to [facility] who explained that it looks as if she can accept pt if we do a UAI. CM informed CM , Jessica Leonard. CM will also send a referral to [facility] via [employer]. [address] verbally reviewed with pt and family.     Orquidea Edwards,   Care Management, [address]

## 2021-07-29 NOTE — PROGRESS NOTES
Hospitalist Progress Note    NAME: Aguilar Henderson   :  1934   MRN:  [id number]     Itzel Contreras is a 80 y.o.  female who presents with bleeding per rectum for the last 4 days started on Friday. Patient past medical history of hypertension, hyperlipidemia, insomnia. Patient stated for the last 4 days she has noticed some bleeding per rectum which has been going continuously, went to see her primary care doctor today which advised the patient to go to the ER for further evaluation. Denies any abdominal pain, no nausea vomiting, no chest pain, no shortness of breath, no fevers or chills. She was noted to have lower GI bleed and also recurrent SBO. Assessment / Plan:  Acute lower GI bleeding hemodynamically stable  Acute on chronic blood loss anemia  Mild hyponatremia monitoring  Bleeding scan shows no evidence of bleeding  Evaluated by GI and recommended conservative management given SBO  Hemoglobin is stable between 7-8  GI recommended outpatient follow-up  Recurrent SBO  patient was nauseous and vomiting still persisting and switched her to Reglan. Also PPI now twice daily dosing. Hopefully her nausea gets better and discharged tomorrow. Otherwise may need to reconsult surgery tomorrow  CT abdomen shows multiple significantly dilated loops of small bowel in the abdomen and also right lower ileal conduit with mild hydronephrosis due to bowel distention  cont regular diet  Cont scheduled laxatives  We will need small bowel follow-through in about 2 weeks on outpatient basis with Dr. Luís Staley surgery cleared the patient for discharge for follow-up in 2 weeks  She is able to tolerate solid diet till this morning and she became nauseous   Seen by physical therapy today and recommended SNF. Will be discussing with case management for SNF placement.     History of bladder cancer s/p urostomy  Continue supportive care for urostomy    Incidental 7 mm pulmonary nodule  We will repeat CT of chest in about 6 months. I notified the patient about the importance of follow-up of lung nodule as this can turn into malignancies and she verbalized understanding    Acute kidney injury  Baseline creatinine is normal.  Creatinine peaked at 2.0. Creatinine is trending down and is 1.3    UTI  Urine culture grew Klebsiella. It is complicated UTI due to urostomy so will treat for a total of 7 days. Continue Augmentin until 7/28     Hypertension  Insomnia  Hyperlipidemia  Continue Cardizem    Disposition:  Case management working on SNF placement      Code Status: Full code  Surrogate Decision Maker:     DVT Prophylaxis: SCD       Baseline: Ambulatory at home        Body mass index is 18.02 kg/m². Subjective:     Chief Complaint / Reason for Physician Visit  Patient's nausea and vomiting is still persisting patient denied any distention in the belly but the nurse believes her abdomen is slightly distended. Review of Systems:  Symptom Y/N Comments  Symptom Y/N Comments   Fever/Chills n   Chest Pain n    Poor Appetite    Edema     Cough n   Abdominal Pain n    Sputum    Joint Pain     SOB/[place] n   Pruritis/Rash     Nausea/vomit    Tolerating PT/OT     Diarrhea    Tolerating Diet     Constipation    Other       Could NOT obtain due to:      Objective:     VITALS:   Last 24hrs VS reviewed since prior progress note.  Most recent are:  Patient Vitals for the past 24 hrs:   Temp Pulse Resp BP SpO2   07/29/21 0425 98.4 °F (36.9 °C) 89 17 127/80 95 %   07/29/21 0010 98.6 °F (37 °C) (!) 104 16 133/82 96 %   07/28/21 1940 98.4 °F (36.9 °C) (!) 105 16 133/85 95 %   07/28/21 1459 98.1 °F (36.7 °C) (!) 101 12 132/82 94 %   07/28/21 1141 97.5 °F (36.4 °C) (!) 111 14 (!) 149/91 97 %   07/28/21 0821 98.2 °F (36.8 °C) (!) 107 14 (!) 150/86 96 %       Intake/Output Summary (Last 24 hours) at 7/29/2021 0742  Last data filed at 7/29/2021 0508  Gross per 24 hour   Intake --   Output 600 ml   Net -600 ml PHYSICAL EXAM:  General: WD, WN. Alert, cooperative, no acute distress    EENT:  EOMI. Anicteric sclerae. MMM  Resp:  CTA bilaterally, no wheezing or rales. No accessory muscle use  CV:  Regular  rhythm,  No edema  GI:  Non tender but slightly distended abdomen .  +Bowel sounds, urostomy in place  Neurologic:  Alert and awake, normal speech,   Psych:   Not anxious nor agitated  Skin:  No rashes.   No jaundice    Reviewed most current lab test results and cultures  YES  Reviewed most current radiology test results   YES  Review and summation of old records today    NO  Reviewed patient's current orders and MAR    YES  PMH/SH reviewed - no change compared to H&P          Current Facility-Administered Medications:     prochlorperazine (COMPAZINE) with saline injection 10 mg, 10 mg, IntraVENous, Q4H PRN, David Flaherty MD, 10 mg at 07/29/21 0603    hydrALAZINE (APRESOLINE) 20 mg/mL injection 10 mg, 10 mg, IntraVENous, Q2H PRN, David Flaherty MD    pantoprazole (PROTONIX) tablet 40 mg, 40 mg, Oral, ACB, Gregor Shen MD, 40 mg at 07/28/21 0857    bisacodyL (DULCOLAX) suppository 10 mg, 10 mg, Rectal, DAILY, Troy Pelaez MD, 10 mg at 07/27/21 1014    polyethylene glycol (MIRALAX) packet 17 g, 17 g, Oral, DAILY, Troy Pelaez MD, 17 g at 07/28/21 0856    mirtazapine (REMERON) tablet 45 mg, 45 mg, Oral, QHS, Gerri Llanos MD, 45 mg at 07/28/21 2114    pravastatin (PRAVACHOL) tablet 80 mg, 80 mg, Oral, QHS, Dodie Childers MD, 80 mg at 07/28/21 2114    dilTIAZem ER (CARDIZEM CD) capsule 180 mg, 180 mg, Oral, DAILY, Gregor Shen MD, 180 mg at 07/28/21 0856    sodium chloride (NS) flush 5-40 mL, 5-40 mL, IntraVENous, Q8H, Gerri Llanos MD, 10 mL at 07/29/21 0607    sodium chloride (NS) flush 5-40 mL, 5-40 mL, IntraVENous, PRN, Dodie Shown, MD    acetaminophen (TYLENOL) tablet 650 mg, 650 mg, Oral, Q6H PRN **OR** acetaminophen (TYLENOL) suppository 650 mg, 650 mg, Rectal, Q6H PRN, Morenita Valentino MD    ondansetron (ZOFRAN ODT) tablet 4 mg, 4 mg, Oral, Q8H PRN **OR** ondansetron (ZOFRAN) injection 4 mg, 4 mg, IntraVENous, Q6H PRN, Morenita Valentino MD, 4 mg at 07/28/21 1039  ________________________________________________________________________  Care Plan discussed with:    Comments   Patient y    Family      RN y    Care Manager     Consultant                        Multidiciplinary team rounds were held today with , nursing, pharmacist and clinical coordinator. Patient's plan of care was discussed; medications were reviewed and discharge planning was addressed. ________________________________________________________________________  Total NON critical care TIME:  35  Minutes    Total CRITICAL CARE TIME Spent:   Minutes non procedure based      Comments   >50% of visit spent in counseling and coordination of care     ________________________________________________________________________  Lewis Brown MD     Procedures: see electronic medical records for all procedures/Xrays and details which were not copied into this note but were reviewed prior to creation of Plan. LABS:  I reviewed today's most current labs and imaging studies. Pertinent labs include:  Recent Labs     07/29/21  0503 07/28/21  0454   WBC 5.2 3.9   HGB 8.7* 9.1*   HCT 28.4* 30.1*    186     Recent Labs     07/29/21  0503 07/28/21  0454   * 131*   K 5.1 5.3*   CL 94* 96*   CO2 33* 32   GLU 90 104*   BUN 25* 23*   CREA 1.67* 1.52*   CA 9.6 9.0   ALB 2.9* 2.8*   TBILI 0.6 1.4*   ALT 39 23       Signed:  Lewis Brown MD

## 2021-07-30 VITALS
WEIGHT: 115.08 LBS | BODY MASS INDEX: 18.06 KG/M2 | SYSTOLIC BLOOD PRESSURE: 140 MMHG | OXYGEN SATURATION: 96 % | HEART RATE: 94 BPM | RESPIRATION RATE: 18 BRPM | DIASTOLIC BLOOD PRESSURE: 91 MMHG | HEIGHT: 67 IN | TEMPERATURE: 98.5 F

## 2021-07-30 LAB
ALBUMIN SERPL-MCNC: 2.7 G/DL (ref 3.5–5)
ALBUMIN/GLOB SERPL: 0.8 {RATIO} (ref 1.1–2.2)
ALP SERPL-CCNC: 82 U/L (ref 45–117)
ALT SERPL-CCNC: 44 U/L (ref 12–78)
ANION GAP SERPL CALC-SCNC: 7 MMOL/L (ref 5–15)
AST SERPL-CCNC: 47 U/L (ref 15–37)
BASOPHILS # BLD: 0 K/UL (ref 0–0.1)
BASOPHILS NFR BLD: 0 % (ref 0–1)
BILIRUB SERPL-MCNC: 0.9 MG/DL (ref 0.2–1)
BUN SERPL-MCNC: 26 MG/DL (ref 6–20)
BUN/CREAT SERPL: 16 (ref 12–20)
CALCIUM SERPL-MCNC: 8.9 MG/DL (ref 8.5–10.1)
CHLORIDE SERPL-SCNC: 97 MMOL/L (ref 97–108)
CO2 SERPL-SCNC: 28 MMOL/L (ref 21–32)
CREAT SERPL-MCNC: 1.58 MG/DL (ref 0.55–1.02)
DIFFERENTIAL METHOD BLD: ABNORMAL
EOSINOPHIL # BLD: 0.1 K/UL (ref 0–0.4)
EOSINOPHIL NFR BLD: 1 % (ref 0–7)
ERYTHROCYTE [DISTWIDTH] IN BLOOD BY AUTOMATED COUNT: 14.5 % (ref 11.5–14.5)
GLOBULIN SER CALC-MCNC: 3.4 G/DL (ref 2–4)
GLUCOSE SERPL-MCNC: 93 MG/DL (ref 65–100)
HCT VFR BLD AUTO: 26 % (ref 35–47)
HGB BLD-MCNC: 7.8 G/DL (ref 11.5–16)
IMM GRANULOCYTES # BLD AUTO: 0 K/UL (ref 0–0.04)
IMM GRANULOCYTES NFR BLD AUTO: 0 % (ref 0–0.5)
LYMPHOCYTES # BLD: 0.5 K/UL (ref 0.8–3.5)
LYMPHOCYTES NFR BLD: 8 % (ref 12–49)
MCH RBC QN AUTO: 26.6 PG (ref 26–34)
MCHC RBC AUTO-ENTMCNC: 30 G/DL (ref 30–36.5)
MCV RBC AUTO: 88.7 FL (ref 80–99)
MONOCYTES # BLD: 0.4 K/UL (ref 0–1)
MONOCYTES NFR BLD: 7 % (ref 5–13)
NEUTS SEG # BLD: 4.9 K/UL (ref 1.8–8)
NEUTS SEG NFR BLD: 84 % (ref 32–75)
NRBC # BLD: 0 K/UL (ref 0–0.01)
NRBC BLD-RTO: 0 PER 100 WBC
PLATELET # BLD AUTO: 188 K/UL (ref 150–400)
PMV BLD AUTO: 9.8 FL (ref 8.9–12.9)
POTASSIUM SERPL-SCNC: 4.9 MMOL/L (ref 3.5–5.1)
PROT SERPL-MCNC: 6.1 G/DL (ref 6.4–8.2)
RBC # BLD AUTO: 2.93 M/UL (ref 3.8–5.2)
RBC MORPH BLD: ABNORMAL
RBC MORPH BLD: ABNORMAL
SODIUM SERPL-SCNC: 132 MMOL/L (ref 136–145)
WBC # BLD AUTO: 5.9 K/UL (ref 3.6–11)

## 2021-07-30 PROCEDURE — 74011250637 HC RX REV CODE- 250/637: Performed by: SURGERY

## 2021-07-30 PROCEDURE — 74011250637 HC RX REV CODE- 250/637: Performed by: INTERNAL MEDICINE

## 2021-07-30 PROCEDURE — 36415 COLL VENOUS BLD VENIPUNCTURE: CPT

## 2021-07-30 PROCEDURE — 74011250636 HC RX REV CODE- 250/636: Performed by: INTERNAL MEDICINE

## 2021-07-30 PROCEDURE — 80053 COMPREHEN METABOLIC PANEL: CPT

## 2021-07-30 PROCEDURE — 85025 COMPLETE CBC W/AUTO DIFF WBC: CPT

## 2021-07-30 RX ORDER — PANTOPRAZOLE SODIUM 40 MG/1
40 TABLET, DELAYED RELEASE ORAL 2 TIMES DAILY
Qty: 60 TABLET | Refills: 0 | Status: SHIPPED | OUTPATIENT
Start: 2021-07-30

## 2021-07-30 RX ADMIN — BISACODYL 10 MG: 10 SUPPOSITORY RECTAL at 09:28

## 2021-07-30 RX ADMIN — METOCLOPRAMIDE 5 MG: 5 INJECTION, SOLUTION INTRAMUSCULAR; INTRAVENOUS at 05:35

## 2021-07-30 RX ADMIN — Medication 10 ML: at 05:36

## 2021-07-30 RX ADMIN — POLYETHYLENE GLYCOL 3350 17 G: 17 POWDER, FOR SOLUTION ORAL at 09:28

## 2021-07-30 RX ADMIN — PANTOPRAZOLE SODIUM 40 MG: 40 TABLET, DELAYED RELEASE ORAL at 09:28

## 2021-07-30 RX ADMIN — METOCLOPRAMIDE 5 MG: 5 INJECTION, SOLUTION INTRAMUSCULAR; INTRAVENOUS at 11:47

## 2021-07-30 RX ADMIN — SODIUM CHLORIDE 75 ML/HR: 9 INJECTION, SOLUTION INTRAVENOUS at 09:27

## 2021-07-30 RX ADMIN — Medication 10 ML: at 11:47

## 2021-07-30 RX ADMIN — DILTIAZEM HYDROCHLORIDE 180 MG: 180 CAPSULE, COATED, EXTENDED RELEASE ORAL at 09:28

## 2021-07-30 NOTE — PROGRESS NOTES
Transition of Care Plan:     RUR:17%  Disposition:  SNF [facility]  Follow up appointments:  DME needed:n/a  Transportation at Discharge:  [facility] at 11 a.m. Keys or means to access home:   n/a     IM Medicare Letter:2nd IM provided  BCPI-A Bundle Letter:n/a  Caregiver Contact: Collene Collet [phone]   Discharge Caregiver contacted prior to discharge? CM met with pt and family at bedside today 7/29/21 to discuss. Transition of Care Plan to SNF/Rehab    SNF/Rehab Transition:  Patient has been accepted to [facility] and meets criteria for admission. Patient will transported by [facility] and expected to leave at 11 a.m. Communication to Patient/Family:  Met with patient and Collene Collet (identified care giver) and they are agreeable to the transition plan. Communication to SNF/Rehab:  Bedside RN, Thai Gutierrez has been notified to update the transition plan to the facility and call report (phone number [phone]). Discharge information has been updated on the AVS.     Discharge instructions to be fax'd to facility at [facility] #[phone]. Nursing Please include all hard scripts for controlled substances, med rec and dc summary, and AVS in packet.      Reviewed and confirmed with facility, Jeanette, can manage the patient care needs for the following:     [place] with (X) only those applicable:    Medication:  [x]  Medications will be available at the facility  []  IV Antibiotics   [x]  Controlled Substance - hard copy to be sent with patient   []  Weekly Labs   Documents:  [x] Hard RX  [x] MAR  [x] Kardex  [x] AVS  [x]Transfer Summary  [x]Discharge   Equipment:  []  CPAP/BiPAP  []  Wound Vacuum  []  Robledo or Urinary Device  []  PICC/Central Line  []  Nebulizer  []  Ventilator   Treatment:  []Isolation (for MRSA, VRE, etc.)  []Surgical Drain Management  []Tracheostomy Care  []Dressing Changes  []Dialysis with transportation and chair time   []PEG Care  []Oxygen  []Daily Weights for Heart Failure Dietary:  [x]Any diet limitations  Easy to chew  []Tube Feedings   []Total Parenteral Management (TPN)   Eligible for Medicaid Long Term Services and Supports  Yes:  [] Eligible for medical assistance or will become eligible within 180 days and UAI completed. [] Provider/Patient and/or support system has requested screening. [] UAI copy provided to patient or responsible party  [] UAI unavailable at discharge will send once processed to SNF provider. [x] UAI unavailable but was done in [facility] on 7/9/19. CM informed Jeanette of the [place]. No:   [] Private pay and is not financially eligible for Medicaid within the next 180 days. [] Reside out-of-state.   [] A residents of a state owned/operated facility that is licensed  by [facility] and Developmental Services or [facility]  [] Enrollment in [facility] hospice services  [] [address]  [] Patient /Family declines to have screening completed or provide financial information for screening     Financial Resources:  Medicaid    [] Initiated and application pending   [x] Full coverage     Advanced Care Plan:  []Surrogate Decision Maker of Care  []POA  []Communicated Code Status Full   Other

## 2021-07-30 NOTE — DISCHARGE SUMMARY
Hospitalist Discharge Summary     Patient ID:  Madyson Méndez  [id number]  69 y.o.  1934    PCP on record: Pierce Cisneros NP    Admit date: 7/20/2021  Discharge date and time: 7/30/2021      DISCHARGE DIAGNOSIS:    GI bleed       CONSULTATIONS:  IP CONSULT TO GASTROENTEROLOGY  IP CONSULT TO HOSPITALIST  IP CONSULT TO GENERAL SURGERY    Excerpted HPI from H&P of Jayson Slater MD:  Win Cobian is a 80 y.o.  female who presents with bleeding per rectum for the last 4 days started on Friday. Patient past medical history of hypertension, hyperlipidemia, insomnia. Patient stated for the last 4 days she has noticed some bleeding per rectum which has been going continuously, went to see her primary care doctor today which advised the patient to go to the ER for further evaluation. Denies any abdominal pain, no nausea vomiting, no chest pain, no shortness of breath, no fevers or chills       ______________________________________________________________________  DISCHARGE SUMMARY/HOSPITAL COURSE:  for full details see H&P, daily progress notes, labs, consult notes. Acute lower GI bleeding hemodynamically stable  Acute on chronic blood loss anemia  Bleeding scan shows no evidence of bleeding  Evaluated by GI and recommended conservative management given SBO  Hemoglobin is stable between 7-8  GI recommended outpatient follow-up  Recurrent SBO  patient was nauseous and vomiting still persisting and switched her to Reglan. resolved   Also PPI now twice daily dosing. Hopefully her nausea gets better and discharged tomorrow.     CT abdomen shows multiple significantly dilated loops of small bowel in the abdomen and also right lower ileal conduit with mild hydronephrosis due to bowel distention  cont regular diet  Cont scheduled laxatives  We will need small bowel follow-through in about 2 weeks on outpatient basis with Dr. Mary Simons surgery cleared the patient for discharge for follow-up in 2 weeks    History of bladder cancer s/p urostomy  Continue supportive care for urostomy     Incidental 7 mm pulmonary nodule  We will repeat CT of chest in about 6 months. I notified the patient about the importance of follow-up of lung nodule as this can turn into malignancies and she verbalized understanding     Acute kidney injury  Baseline creatinine is normal.  Creatinine peaked at 2.0. Creatinine is trending down and is 1.3     UTI  Urine culture grew Klebsiella. It is complicated UTI due to urostomy so will treat for a total of 7 days. completed treatment      Hypertension  Insomnia  Hyperlipidemia  Continue Cardizem              _______________________________________________________________________  Patient seen and examined by me on discharge day. Pertinent Findings:  Gen:    Not in distress  Chest: Clear lungs  CVS:   Regular rhythm. No edema  Abd:  Soft, not distended, not tender  Neuro:  Alert,oriented times 4   _______________________________________________________________________  DISCHARGE MEDICATIONS:   Current Discharge Medication List      START taking these medications    Details   pantoprazole (PROTONIX) 40 mg tablet Take 1 Tablet by mouth two (2) times a day. Qty: 60 Tablet, Refills: 0  Start date: 7/30/2021         CONTINUE these medications which have NOT CHANGED    Details   calcium carbonate (OS-ZULEMA) 500 mg calcium (1,250 mg) tablet Take 1 Tablet by mouth three (3) times daily. mirtazapine (REMERON) 45 mg tablet Take 45 mg by mouth nightly. dilTIAZem ER (Cartia XT) 180 mg capsule Take 180 mg by mouth daily. ondansetron hcl (Zofran) 4 mg tablet Take 4 mg by mouth every eight (8) hours as needed for Nausea. metoprolol succinate (TOPROL XL) 50 mg XL tablet Take 50 mg by mouth daily. pravastatin (PRAVACHOL) 80 mg tablet Take 80 mg by mouth nightly.       POLYVINYL ALCOHOL/POVIDONE (ARTIFICIAL TEARS OP) Apply 1 Drop to eye as needed (DRY EYES). 1 DROP TO EACH EYE             My Recommended Diet, Activity, Wound Care, and follow-up labs are listed in the patient's Discharge Insturctions which I have personally completed and reviewed.     ______________________________________________________________________    Risk of deterioration: High    Condition at Discharge:  Stable  ______________________________________________________________________    Disposition  SNF/LTC    ______________________________________________________________________    Care Plan discussed with:   Patient, Family, RN, Care Manager, Consultant    Comment:   ______________________________________________________________________  Total NON critical care TIME:  35   Minutes    Code Status: Full Code  ___

## 2021-07-30 NOTE — PROGRESS NOTES
Problem: Falls - Risk of  Goal: *Absence of Falls  Description: Document Michael Charles Fall Risk and appropriate interventions in the flowsheet. Outcome: Resolved/Met     Problem: Patient Education: Go to Patient Education Activity  Goal: Patient/Family Education  Outcome: Resolved/Met     Problem: Patient Education: Go to Patient Education Activity  Goal: Patient/Family Education  Outcome: Resolved/Met     Problem: Pressure Injury - Risk of  Goal: *Prevention of pressure injury  Description: Document Adam Scale and appropriate interventions in the flowsheet.   Outcome: Resolved/Met     Problem: Patient Education: Go to Patient Education Activity  Goal: Patient/Family Education  Outcome: Resolved/Met

## 2021-07-30 NOTE — PROGRESS NOTES
1900--bedside report received from salomón Hobson, pt resting in bed oriented x 4, has no complaints at this time call bell in reach assessment as noted    2100--pt thought she had BM, only gas, cream applied to sacrum, call bell in reach    0100--200ml from ilostomy    0400--am labs drawn and sent to lab, pt has no complaints at this time    0700--bedside report given to salomón loyola who is assuming care of pt

## 2021-07-30 NOTE — PROGRESS NOTES
Problem: Falls - Risk of  Goal: *Absence of Falls  Description: Document Hiram Sites Fall Risk and appropriate interventions in the flowsheet.   Outcome: Progressing Towards Goal  Note: Fall Risk Interventions:  Mobility Interventions: Patient to call before getting OOB         Medication Interventions: Patient to call before getting OOB    Elimination Interventions: Call light in reach

## 2021-07-30 NOTE — PROGRESS NOTES
Hospital to [facility] [address]                                                                        80 y.o.   female    [address]   Room: 2118/01    [facility] [address] GENERAL SURGERY  Unit Phone# :  [phone]      [address]  [facility] [address] [place] [address]  Dept: [phone]  Loc: [phone]                    SITUATION     Admitted:  7/20/2021         Attending Provider:  Ramon Hemphill,*       Consultations:  IP CONSULT TO GASTROENTEROLOGY  IP CONSULT TO HOSPITALIST  IP CONSULT TO [facility]    PCP:  Brittany Marcus, LISA   [phone]    Treatment Team: Attending Provider: Ramon Hemphill MD; Consulting Provider: Berta Jurado MD; Consulting Provider: Sara Hollingsworth MD; Consulting Provider: Wali Rollins MD; Consulting Provider: Rocio Wheat MD; Utilization Review: Milena Hopson RN; Care Manager: Gemini Fleming; Care Manager: ANTONIO Bright; Staff Nurse: Rhonda Wiley RN; Care Manager: Awilda Cantrell    Admitting Dx:  Acute GI bleeding [K92.2]  KELLY (acute kidney injury) ([facility] [address] [N17.9]  UTI (urinary tract infection) [N39.0]  Hypotension [I95.9]       Principal Problem: <principal problem not specified>    * No surgery found * of      BY: * Surgery not found *             ON: * No surgery found *                  Code Status: Full Code                Advance Directives:   Advance Care Planning 7/23/2021   Patient's Healthcare Decision Maker is: -   Primary Decision Maker Name -   Primary Decision Maker Phone Number -   Primary Decision Maker Relationship to Patient -   Confirm Advance Directive Yes, on file   Patient Would Like to Complete Advance Directive -   Does the patient have other document types -    (Send w/patient)   No Doesnt Have       Isolation:  There are currently no Active Isolations       MDRO: No current active infections    Pain Medications given:  none BACKGROUND     Allergies:  No Known Allergies    Past Medical History:   Diagnosis Date    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[address]     abdominal (pt not sure of blood vessel) repaired    Arthritis     hands    Cancer ([address]     bladder     Diabetes ([address]     oral hypoglycemics    Hypertension     Ill-defined condition     high cholesterol    Menopause        Past Surgical History:   Procedure Laterality Date    HX CATARACT REMOVAL      bilateral    HX CHOLECYSTECTOMY  06/04/2019    lap cindy with IOC    HX GYN      hysterectomy    HX HEENT      Lasik on right    HX UROLOGICAL  2017    resection of bladder tumor    NM ABDOMEN SURGERY PROC UNLISTED  09/2017    aneurysm repair    NM BREAST SURGERY PROCEDURE UNLISTED         Medications Prior to Admission   Medication Sig    calcium carbonate (OS-ZULEMA) 500 mg calcium (1,250 mg) tablet Take 1 Tablet by mouth three (3) times daily.  mirtazapine (REMERON) 45 mg tablet Take 45 mg by mouth nightly.  dilTIAZem ER (Cartia XT) 180 mg capsule Take 180 mg by mouth daily.  ondansetron hcl (Zofran) 4 mg tablet Take 4 mg by mouth every eight (8) hours as needed for Nausea.  metoprolol succinate (TOPROL XL) 50 mg XL tablet Take 50 mg by mouth daily.  pravastatin (PRAVACHOL) 80 mg tablet Take 80 mg by mouth nightly.  POLYVINYL ALCOHOL/POVIDONE (ARTIFICIAL TEARS OP) Apply 1 Drop to eye as needed (DRY EYES). 1 DROP TO EACH EYE       Hard scripts included in transfer packet no    Vaccinations:    Immunization History   Administered Date(s) Administered    Covid-19, MODERNA, Mrna, Lnp-s, Pf, 100mcg/0.5mL 01/22/2021, 02/19/2021    Influenza Vaccine 10/01/2017               The Charl[place] [employer] Index tool is an evidenced based tool that has more automatic generated information. The tool looks at many different items such as the age of the patient, how many times they were admitted in the last calendar year, current length of stay in the hospital and their diagnosis.  All of these items are pulled automatically from information documented in the chart from various places and will generate a score that predicts whether a patient is at low (less than 13), medium (13-20) or high (21 or greater) risk of being readmitted.         ASSESSMENT                Temp: 98.3 °F (36.8 °C) (07/30/21 0835) Pulse (Heart Rate): 94 (07/30/21 0835)     Resp Rate: 18 (07/30/21 0835)           BP: (!) 140/87 (07/30/21 0835)     O2 Sat (%): 94 % (07/30/21 0835)     Weight: 52.2 kg (115 lb 1.3 oz)    Height: 5' 7\" (170.2 cm) (07/22/21 1440)       If above not within 1 hour of discharge:    BP:_____  P:____  R:____ T:_____ O2 Sat: ___%  O2: ______    Active Orders   Diet    ADULT DIET Easy to Chew; Low Fiber         Orientation: oriented to time, place, person and situation     Active Behaviors: None                                   Active Lines/Drains:  (Peg Tube / Robledo / CL or S/L?): yes    Urinary Status: Urostomy     Last BM: Last Bowel Movement Date: 07/27/21                   Mobility: Slightly limited   Weight Bearing Status: WBAT (Weight Bearing as Tolerated)      Gait Training  Assistive Device: Gait belt, Walker, rolling  Ambulation - Level of Assistance: Stand-by assistance  Distance (ft): 34 Feet (ft)         Lab Results   Component Value Date/Time    Glucose 93 07/30/2021 05:37 AM    Hemoglobin A1c 5.7 (H) 06/11/2021 01:24 AM    INR 1.1 07/20/2021 03:03 PM    INR 1.1 06/12/2021 12:19 AM    HGB 7.8 (L) 07/30/2021 05:37 AM    HGB 8.7 (L) 07/29/2021 05:03 AM        RECOMMENDATION     See After Visit Summary (AVS) for:  · Discharge instructions  · After [address]   · Special equipment needed (entered pre-discharge by Care Management)  · Medication Reconciliation    · Follow up Appointment(s)         Report given/sent by:  Melissa Chalet, RN                    Verbal report given to: CARLIN Jones          Estimated discharge time:  7/30/2021 at 1100

## 2021-07-31 LAB
BACTERIA SPEC CULT: ABNORMAL
CC UR VC: ABNORMAL
SERVICE CMNT-IMP: ABNORMAL

## 2022-02-28 ENCOUNTER — TRANSCRIBE ORDER (OUTPATIENT)
Dept: SCHEDULING | Age: 87
End: 2022-02-28

## 2022-02-28 DIAGNOSIS — I71.40 AAA (ABDOMINAL AORTIC ANEURYSM): Primary | ICD-10-CM

## 2022-03-01 ENCOUNTER — HOSPITAL ENCOUNTER (OUTPATIENT)
Dept: MAMMOGRAPHY | Age: 87
Discharge: HOME | End: 2022-03-01
Attending: NURSE PRACTITIONER
Payer: OTHER MISCELLANEOUS

## 2022-03-01 VITALS — WEIGHT: 102 LBS | BODY MASS INDEX: 15.98 KG/M2

## 2022-03-01 DIAGNOSIS — Z12.31 VISIT FOR SCREENING MAMMOGRAM: ICD-10-CM

## 2022-03-01 PROCEDURE — 77067 SCR MAMMO BI INCL CAD: CPT

## 2022-03-18 PROBLEM — C67.9 TRANSITIONAL CELL BLADDER CANCER (HCC): Status: ACTIVE | Noted: 2017-07-25

## 2022-03-18 PROBLEM — K59.1 FUNCTIONAL DIARRHEA: Status: ACTIVE | Noted: 2021-06-11

## 2022-03-18 PROBLEM — Z66 DO NOT RESUSCITATE STATUS: Status: ACTIVE | Noted: 2021-06-11

## 2022-03-18 PROBLEM — R63.0 POOR APPETITE: Status: ACTIVE | Noted: 2021-06-11

## 2022-03-18 PROBLEM — I71.9 AORTIC ANEURYSM (HCC): Status: ACTIVE | Noted: 2017-09-26

## 2022-03-19 PROBLEM — N39.0 UTI (URINARY TRACT INFECTION): Status: ACTIVE | Noted: 2021-07-20

## 2022-03-19 PROBLEM — N17.9 AKI (ACUTE KIDNEY INJURY) (HCC): Status: ACTIVE | Noted: 2021-07-20

## 2022-03-19 PROBLEM — K92.2 ACUTE GI BLEEDING: Status: ACTIVE | Noted: 2021-07-20

## 2022-03-19 PROBLEM — J18.9 PNEUMONIA: Status: ACTIVE | Noted: 2021-06-11

## 2022-03-19 PROBLEM — I95.9 HYPOTENSION: Status: ACTIVE | Noted: 2021-07-20

## 2022-03-19 PROBLEM — Z71.89 COUNSELING REGARDING ADVANCE CARE PLANNING AND GOALS OF CARE: Status: ACTIVE | Noted: 2021-06-11

## 2022-03-19 PROBLEM — E44.0 MODERATE PROTEIN-CALORIE MALNUTRITION (HCC): Status: ACTIVE | Noted: 2021-06-11

## 2022-03-19 PROBLEM — K56.609 SBO (SMALL BOWEL OBSTRUCTION) (HCC): Status: ACTIVE | Noted: 2021-06-11

## 2022-10-14 ENCOUNTER — TRANSCRIBE ORDER (OUTPATIENT)
Dept: SCHEDULING | Age: 87
End: 2022-10-14

## 2022-10-14 DIAGNOSIS — I71.40 AAA (ABDOMINAL AORTIC ANEURYSM): Primary | ICD-10-CM

## 2023-10-12 NOTE — PROGRESS NOTES
Pt was seen Monday and was given rx for the cough . It has since gotten worse not better. Please advise    End of Shift Note    Bedside shift change report given to [address] (oncoming nurse) by Jared Barksdale RN (offgoing nurse). Report included the following information SBAR, Kardex and MAR    Shift worked:  [id number]     Shift summary and any significant changes:     No acute changes overnight. Pt complained of her bottom being sore, a foam dressing was placed to protect her skin and encouraged pt to turn self in bed to prevent skin breakdown. Concerns for physician to address:       Zone phone for oncoming shift:   [id number]       Activity:  Activity Level: Up with Assistance  Number times ambulated in hallways past shift: 0  Number of times OOB to chair past shift: 0    Cardiac:   Cardiac Monitoring: Yes      Cardiac Rhythm: Sinus Rhythm    Access:   Current line(s): PIV     Genitourinary:   Urinary status: urostomy    Respiratory:   O2 Device: None (Room air)  Chronic home O2 use?: NO  Incentive spirometer at bedside: YES  Actual Volume (ml): 750 ml  GI:  Last Bowel Movement Date: 07/24/21  Current diet:  ADULT ORAL NUTRITION SUPPLEMENT Breakfast, Lunch, Dinner; Standard High Calorie/High Protein  ADULT DIET Easy to Chew; Low Fiber  Passing flatus: YES  Tolerating current diet: YES       Pain Management:   Patient states pain is manageable on current regimen: YES    Skin:  Adam Score: 20  Interventions: increase time out of bed    Patient Safety:  Fall Score:  Total Score: 2  Interventions: gripper socks       Length of Stay:  Expected LOS: 3d 0h  Actual LOS: [address] RN

## 2024-10-12 NOTE — PROGRESS NOTES
Physical Therapy    Chart reviewed. Attempting to see patient for PT session, but pain is not under control. Will check back later today.     Eder Sessions, PT no

## 2024-12-29 NOTE — PROGRESS NOTES
Chief Complaint   Patient presents with    Surgical Follow-up     Post/op lap cindy on 6/4/19. 1. Have you been to the ER, urgent care clinic since your last visit? Hospitalized since your last visit?no    2. Have you seen or consulted any other health care providers outside of the [address] [place] [address] since your last visit? Include any pap smears or colon screening. No    Discussed advanced directive. Patient states that she does not have an advanced directive.
Chief Complaint   Patient presents with    Surgical Follow-up     Post/op lap cindy on 6/4/19. Reviewed path and provided a copy: gallstones and chronic cholecystitis  LFTs normal on day of surgery    \"I feel a lot different\"  Happy with results  Food staying done  Didn't require any pain meds  BMs ok  complete resolution of preoperative symptoms    Physical Exam:   Abdominal exam: soft  non-distended  Non-tender. Urostomy working Wound: clean, dry, no drainage    Doing well  Continue unrestricted activity.   Follow-up: nayana Duncan MD FACS
yes

## (undated) DEVICE — Device

## (undated) DEVICE — INFECTION CONTROL KIT SYS

## (undated) DEVICE — NDL FLTR TIP 5 MIC 18GX1.5IN --

## (undated) DEVICE — GUARDIAN LVC: Brand: GUARDIAN

## (undated) DEVICE — 1200 GUARD II KIT W/5MM TUBE W/O VAC TUBE: Brand: GUARDIAN

## (undated) DEVICE — GOWN,SIRUS,NONRNF,SETINSLV,XL,20/CS: Brand: MEDLINE

## (undated) DEVICE — PTA BALLOON DILATATION CATHETER: Brand: STERLING™

## (undated) DEVICE — MEDI-VAC NON-CONDUCTIVE SUCTION TUBING: Brand: CARDINAL HEALTH

## (undated) DEVICE — VESSEL LOOPS,MINI, YELLOW: Brand: DEVON

## (undated) DEVICE — SYR POWER 150ML 8IN FILL TUBE --

## (undated) DEVICE — SURGICAL PROCEDURE PACK TISS 3X5 IN ABSORBABLE SEPRAFILM

## (undated) DEVICE — DRAPE,REIN 53X77,STERILE: Brand: MEDLINE

## (undated) DEVICE — JELLY,LUBE,STERILE,FLIP TOP,TUBE,4-OZ: Brand: MEDLINE

## (undated) DEVICE — (D)SYR 10ML 1/5ML GRAD NSAF -- PKGING CHANGE USE ITEM 338027

## (undated) DEVICE — BLADE ASSEMB CLP HAIR FINE --

## (undated) DEVICE — ROCKER SWITCH PENCIL BLADE ELECTRODE, HOLSTER: Brand: EDGE

## (undated) DEVICE — Device: Brand: VISIONS PV .035 DIGITAL IVUS CATHETER

## (undated) DEVICE — INFLATION DEVICE: Brand: ENCORE™ 26

## (undated) DEVICE — SOLUTION IRRIG 3000ML 0.9% SOD CHL FLX CONT 0797208] ICU MEDICAL INC]

## (undated) DEVICE — SUTURE PDS II SZ 1 L96IN ABSRB VLT XLH L70MM 1/2 CIR Z881G

## (undated) DEVICE — ASTOUND STANDARD SURGICAL GOWN, XL: Brand: CONVERTORS

## (undated) DEVICE — SOLUTION SCRB 4OZ 10% PVP I POVIDONE IOD TOP PAINT EXIDINE

## (undated) DEVICE — SUT CHRMC 2-0 54IN REEL BRN --

## (undated) DEVICE — VISUALIZATION SYSTEM: Brand: CLEARIFY

## (undated) DEVICE — LOADING UNIT WITH DST SERIES TECHNOLOGY: Brand: GIA

## (undated) DEVICE — GUIDEWIRE VASC L260CM DIA0.035IN TIP L10CM CRV PTFE S STL

## (undated) DEVICE — DRAPE XR C ARM 41X74IN LF --

## (undated) DEVICE — MAGNETIC INSTRUMENT PAD 10" X 16"; MEDIUM; DISPOSABLE: Brand: CARDINAL HEALTH

## (undated) DEVICE — INTRODUCER SHTH 7FR CANN L11CM DIL TIP 35MM ORNG TUNGSTEN

## (undated) DEVICE — STRAP SECUREMENT L AD W1.25XL2.75IN CATH ADH CATH-SECURE

## (undated) DEVICE — SUTURE VCRL 2-0 XLH 27IN ABSRB BRAID VLT J581G

## (undated) DEVICE — SLIM BODY SKIN STAPLER: Brand: APPOSE ULC

## (undated) DEVICE — BLADE ELECTRODE: Brand: EDGE

## (undated) DEVICE — CLIP LIG L TI MTL LIG SYS 24 COUNT HORZ

## (undated) DEVICE — SUT SLK 3-0 30IN SH BLK --

## (undated) DEVICE — SURGICAL PROCEDURE PACK BASIN MAJ SET CUST NO CAUT

## (undated) DEVICE — SYR LR LCK 1ML GRAD NSAF 30ML --

## (undated) DEVICE — DEVON™ KNEE AND BODY STRAP 60" X 3" (1.5 M X 7.6 CM): Brand: DEVON

## (undated) DEVICE — REM POLYHESIVE ADULT PATIENT RETURN ELECTRODE: Brand: VALLEYLAB

## (undated) DEVICE — BAG COLLECTION FLD OR-TBL NS --

## (undated) DEVICE — SNARE STD 18-30MM 7FR 120CM --

## (undated) DEVICE — CATHETER URET 4FR L70CM POLYUR OLV FLX TIP KINK RESIST W/

## (undated) DEVICE — APPLICATOR BNDG 1MM ADH PREMIERPRO EXOFIN

## (undated) DEVICE — TOWEL SURG W17XL27IN STD BLU COT NONFENESTRATED PREWASHED

## (undated) DEVICE — STERILE POLYISOPRENE POWDER-FREE SURGICAL GLOVES: Brand: PROTEXIS

## (undated) DEVICE — SUTURE VCRL SZ 3-0 L27IN ABSRB VLT SH L26MM 1/2 CIR J784G

## (undated) DEVICE — NEEDLE HYPO 22GA L1.5IN BLK S STL HUB POLYPR SHLD REG BVL

## (undated) DEVICE — KENDALL SCD EXPRESS SLEEVES, KNEE LENGTH, MEDIUM: Brand: KENDALL SCD

## (undated) DEVICE — DESTINATION CAROTID GUIDING SHEATH: Brand: DESTINATION

## (undated) DEVICE — SINGLE BASIN: Brand: CARDINAL HEALTH

## (undated) DEVICE — SPONGE LAPAROTOMY W4XL18IN WHT STRUNG RADPQ PREWASHED ST

## (undated) DEVICE — CATHETER URETH 26FR 30CC BLLN F 3 W SPEC M RND TIP TWO

## (undated) DEVICE — X-RAY SPONGES,16 PLY: Brand: DERMACEA

## (undated) DEVICE — HANDLE LT SNAP ON ULT DURABLE LENS FOR TRUMPF ALC DISPOSABLE

## (undated) DEVICE — FLOSEAL MATRIX IS INDICATED IN SURGICAL PROCEDURES (OTHER THAN IN OPHTHALMIC) AS AN ADJUNCT TO HEMOSTASIS WHEN CONTROL OF BLEEDING BY LIGATURE OR CONVENTIONALPROCEDURES IS INEFFECTIVE OR IMPRACTICAL.: Brand: FLOSEAL HEMOSTATIC MATRIX

## (undated) DEVICE — LEGGINGS: Brand: CONVERTORS

## (undated) DEVICE — ADPT VALVE HEMO 3.0-8.0FR 8IN -- PRELUDE

## (undated) DEVICE — GLOVE SURG SZ 7.5 L11.2IN THK9.8MIL STRW LTX POLYMER BEAD

## (undated) DEVICE — STAPLER INT L60MM THK38MM BLU TI RELD DISPOSABLE DST SER GIA

## (undated) DEVICE — RADIFOCUS GLIDEWIRE: Brand: GLIDEWIRE

## (undated) DEVICE — YANKAUER BULB TIP, NO VENT: Brand: ARGYLE

## (undated) DEVICE — SUTURE SZ 0 27IN 5/8 CIR UR-6  TAPER PT VIOLET ABSRB VICRYL J603H

## (undated) DEVICE — SUTURE PERMAHAND SZ 3-0 L18IN NONABSORBABLE BLK L26MM SH C013D

## (undated) DEVICE — SUT CHRMC 3-0 27IN SH BRN --

## (undated) DEVICE — RELOAD STPL L60MM REG TISS BLU TI FOR PROX LIN CUT DST SER

## (undated) DEVICE — CATH URETH FOL 2W MED 24FRX30 -- CONVERT TO ITEM 181871

## (undated) DEVICE — TROCAR ENDOSCP L100MM DIA5MM BLDELSS STBL SL THRD OPT VW

## (undated) DEVICE — ZINACTIVE USE 2641837 CLIP LIG M BLU TI HRT SHP WIRE HORZ 600 PER BX

## (undated) DEVICE — POOLE SUCTION INSTRUMENT WITH REMOVABLE SHEATH: Brand: POOLE

## (undated) DEVICE — CYSTOSCOPY PACK: Brand: CONVERTORS

## (undated) DEVICE — STARCLOSE SE VASCULAR CLOSURE SYSTEM: Brand: STARCLOSE SE

## (undated) DEVICE — SURGICAL PROCEDURE KIT GEN LAPAROSCOPY LF

## (undated) DEVICE — (D)PREP SKN CHLRAPRP APPL 26ML -- CONVERT TO ITEM 371833

## (undated) DEVICE — SOLUTION IV 500ML 0.9% SOD CHL FLX CONT

## (undated) DEVICE — SPONGE HEMOSTAT CELLULS 4X8IN -- SURGICEL

## (undated) DEVICE — SYR 10ML LUER LOK 1/5ML GRAD --

## (undated) DEVICE — STRAP,POSITIONING,KNEE/BODY,FOAM,4X60": Brand: MEDLINE

## (undated) DEVICE — PACK,UNIVERSAL,NO GOWNS: Brand: MEDLINE

## (undated) DEVICE — DRAPE FLD WRM W44XL66IN C6L FOR INTRATEMP SYS THERMABASIN

## (undated) DEVICE — SYR BULB 60ML IRRIGATION -- CONVERT TO ITEM 116413

## (undated) DEVICE — [HIGH FLOW INSUFFLATOR,  DO NOT USE IF PACKAGE IS DAMAGED,  KEEP DRY,  KEEP AWAY FROM SUNLIGHT,  PROTECT FROM HEAT AND RADIOACTIVE SOURCES.]: Brand: PNEUMOSURE

## (undated) DEVICE — ROSEN CURVED WIRE GUIDE: Brand: ROSEN

## (undated) DEVICE — SOLUTION IRRIG 1000ML H2O STRL BLT

## (undated) DEVICE — COVER,TABLE,60X90,STERILE: Brand: MEDLINE

## (undated) DEVICE — SUTURE PROL SZ 4-0 L18IN NONABSORBABLE BLU RB-1 L17MM 1/2 8757H

## (undated) DEVICE — VASCULAR-RICHMOND-LF: Brand: MEDLINE INDUSTRIES, INC.

## (undated) DEVICE — APPLICATOR FBR TIP L6IN COT TIP WOOD SHFT SWAB 2000 PER CA

## (undated) DEVICE — VESSEL LOOPS,MAXI, BLUE: Brand: DEVON

## (undated) DEVICE — CATHETER ANGIO 5FR L100CM GWIRE 0.038IN BERN NONBRAIDED HI

## (undated) DEVICE — SYR 50ML LR LCK 1ML GRAD NSAF --

## (undated) DEVICE — TUBING HI PRSS INJ 48IN 1200PSI FLX BRAID POLYUR CNTRST

## (undated) DEVICE — GUIDEWIRE VASC L180CM DIA0.035IN TAPR 3CM HYDRPHLC NIT STR

## (undated) DEVICE — TUBING IRRIG L96IN DIA0.241IN L BOR T-U-R W/ NVENT PIERCING

## (undated) DEVICE — SUTURE ETHLN SZ 2-0 L30IN NONABSORBABLE BLK L36MM PSLX 3/8 1697H

## (undated) DEVICE — CATHETER ANGIO 5FR L70CM GWIRE 0.035IN 1CM SPC OMNI FLSH 21

## (undated) DEVICE — PART NUMBER 108260, VTI 8 MHZ DISPOSABLE DOPPLER PROBE, STRAIGHT, BOX: Brand: VTI 8 MHZ DISPOSABLE DOPPLER PROBE, STRAIGHT, BOX

## (undated) DEVICE — SUTURE PERMAHAND SZ 3-0 L30IN NONABSORBABLE BLK SILK BRAID A304H

## (undated) DEVICE — SUTURE CHROMIC GUT SZ 2-0 L27IN ABSRB BRN L26MM SH 1/2 CIR G123H

## (undated) DEVICE — STAPLER WITH DST SERIES TECHNOLOGY: Brand: GIA

## (undated) DEVICE — SUTURE VCRL SZ 2-0 L27IN ABSRB VLT L26MM SH 1/2 CIR J317H

## (undated) DEVICE — SYRINGE MED 20ML STD CLR PLAS LUERLOCK TIP N CTRL DISP

## (undated) DEVICE — SUTURE VCRL SZ 2-0 L36IN ABSRB VLT L36MM CT-1 1/2 CIR J345H

## (undated) DEVICE — GOWN,PLEAT,SPECIALTY,XL,STRL: Brand: MEDLINE

## (undated) DEVICE — CUTTING ELECTRODE BIPO 24-26FR 12/30°  FOR RESECTOSCOPES, TELESCOPE Ø 4MM, FOR SHEATHS, INTERMITTENT/CONTINUOUS IRRIGATION, 24/26, FR, LOOP: ROUND, WIRE Ø 0.25MM, FORK COLOR BLUE, STEM COLOR BLUE, PACK=3 PCS, FOR SHARK AND S-LINE RESECTOSCOPES, STERILE, FOR SINGLE USE: Brand: SHARK/S-LINE

## (undated) DEVICE — TRAY CATH 16F DRN BG LTX -- CONVERT TO ITEM 363158

## (undated) DEVICE — SUTURE VCRL SZ 2-0 L27IN ABSRB UD L26MM SH 1/2 CIR J417H

## (undated) DEVICE — CLICKLINE SCISSORS INSERT: Brand: CLICKLINE

## (undated) DEVICE — TRAY PREP DRY W/ PREM GLV 2 APPL 6 SPNG 2 UNDPD 1 OVERWRAP

## (undated) DEVICE — Device: Brand: GRAND SLAM

## (undated) DEVICE — BLADELESS OPTICAL TROCAR WITH FIXATION CANNULA: Brand: VERSAPORT

## (undated) DEVICE — CATHETER DIAG 5FR L65CM ID0.046IN GWIRE 0.035IN IMPRESS RIM

## (undated) DEVICE — 3000CC GUARDIAN II: Brand: GUARDIAN

## (undated) DEVICE — SPONGE: SPECIALTY PEANUT XR 100/CS: Brand: MEDICAL ACTION INDUSTRIES

## (undated) DEVICE — SOLUTION IV 1000ML 0.9% SOD CHL

## (undated) DEVICE — NEEDLE ANGIO 19GA L6.98CM ART ENTRY W/O STYL 1 PART PERC

## (undated) DEVICE — NEEDLE HYPO 18GA L1.5IN PNK S STL HUB POLYPR SHLD REG BVL

## (undated) DEVICE — SUTURE VCRL SZ 3-0 L27IN ABSRB UD L24MM PS-1 3/8 CIR PRIM J936H

## (undated) DEVICE — BLADELESS OPTICAL TROCAR WITH FIXATION CANNULA: Brand: VERSAONE

## (undated) DEVICE — CATH BLLN STENT GRAF 12FRX46MM -- RELIANT

## (undated) DEVICE — SUTURE MCRYL SZ 4-0 L27IN ABSRB UD L19MM PS-2 1/2 CIR PRIM Y426H

## (undated) DEVICE — DUAL LUMEN STOMACH TUBE MULTI-FUNCTIONAL PORT: Brand: SALEM SUMP

## (undated) DEVICE — CATHETERIZATION TRAY FOL 14 FR URIMTR STATLOK SURSTP

## (undated) DEVICE — ADPT IV LR STUB 160/190ML 18G --

## (undated) DEVICE — DRAIN SURG W10MMXL20CM SIL FULL PERF HUBLESS FLAT RADPQ